# Patient Record
Sex: FEMALE | Race: WHITE | NOT HISPANIC OR LATINO | Employment: OTHER | URBAN - METROPOLITAN AREA
[De-identification: names, ages, dates, MRNs, and addresses within clinical notes are randomized per-mention and may not be internally consistent; named-entity substitution may affect disease eponyms.]

---

## 2017-05-08 ENCOUNTER — OFFICE VISIT (OUTPATIENT)
Dept: ORTHOPEDICS | Facility: CLINIC | Age: 62
End: 2017-05-08
Payer: COMMERCIAL

## 2017-05-08 VITALS — TEMPERATURE: 97.2 F | HEIGHT: 62 IN | RESPIRATION RATE: 18 BRPM | WEIGHT: 148 LBS | BODY MASS INDEX: 27.23 KG/M2

## 2017-05-08 DIAGNOSIS — M17.11 PRIMARY OSTEOARTHRITIS OF RIGHT KNEE: Primary | ICD-10-CM

## 2017-05-08 PROCEDURE — 20610 DRAIN/INJ JOINT/BURSA W/O US: CPT | Mod: RT | Performed by: ORTHOPAEDIC SURGERY

## 2017-05-08 RX ORDER — METHYLPREDNISOLONE ACETATE 80 MG/ML
80 INJECTION, SUSPENSION INTRA-ARTICULAR; INTRALESIONAL; INTRAMUSCULAR; SOFT TISSUE ONCE
Qty: 1 ML | Refills: 0 | OUTPATIENT
Start: 2017-05-08 | End: 2017-05-08

## 2017-05-08 NOTE — LETTER
5/8/2017       RE: [facility]ilda Amaro  [address] [address] [facility] [address]           Dear Colleague,    Thank you for referring your patient, Nilda Amaro, to the [facility]. Please see a copy of my visit note below.    Follow up right knee primary osteoarthritis.  Last injection 12/19/16.  She has lost weight, and knees are feeling better with that.  She notes bowlegged gait on right..  Range of motion 0-125.    She  desires injection today of right knee(s).  Risks, benefits, potential complications and alternatives were discussed.   With the patient's consent, sterile prep was performed of right knee(s).  Right knee was injected with Depo Medrol 80 mg and lidocaine at anterolateral site.  Return to clinic as needed.       The patient's right knee was prepped with betadine solution after verification of allergies. Area approximately 10 cm x 10 cm prepped in a sterile fashion. After injection, betadine removed with soap and water and band-aids applied.    1ml depo medrol with 1% lidocaine plain injected into patient's right knee by Dr. Quincy Carey  LOT# [id number]  Exp. 8/19    Again, thank you for allowing me to participate in the care of your patient.        Sincerely,              Quincy Carey MD

## 2017-05-08 NOTE — PROGRESS NOTES
Follow up right knee primary osteoarthritis.  Last injection 12/19/16.  She has lost weight, and knees are feeling better with that.  She notes bowlegged gait on right..  Range of motion 0-125.    She  desires injection today of right knee(s).  Risks, benefits, potential complications and alternatives were discussed.   With the patient's consent, sterile prep was performed of right knee(s).  Right knee was injected with Depo Medrol 80 mg and lidocaine at anterolateral site.  Return to clinic as needed.

## 2017-05-08 NOTE — PROGRESS NOTES
The patient's right knee was prepped with betadine solution after verification of allergies. Area approximately 10 cm x 10 cm prepped in a sterile fashion. After injection, betadine removed with soap and water and band-aids applied.    1ml depo medrol with 1% lidocaine plain injected into patient's right knee by Dr. Quincy Carey  LOT# [id number]  Exp. 8/19

## 2017-05-08 NOTE — NURSING NOTE
"  Chief Complaint   Patient presents with     RECHECK     Right knee OA last injection 12/29/16.       Initial Temp 97.2  F (36.2  C)  Resp 18  Ht 1.562 m (5' 1.5\")  Wt 67.1 kg (148 lb)  BMI 27.51 kg/m2 Estimated body mass index is 27.51 kg/(m^2) as calculated from the following:    Height as of this encounter: 1.562 m (5' 1.5\").    Weight as of this encounter: 67.1 kg (148 lb).  Medication Reconciliation: complete   Meri Connell MA      "

## 2017-05-08 NOTE — MR AVS SNAPSHOT
After Visit Summary   5/8/2017    Nilda Amaro    MRN: [id number]           Patient Information     Date Of Birth          1955        Visit Information        Provider Department      5/8/2017 1:15 PM Quincy Carey MD [facility]        Today's Diagnoses     Primary osteoarthritis of right knee    -  1      Care Instructions    You have had a steroid injection today.  For the first 2 hours there will likely be some numbing in the joint from the lidocaine.  This is a good sign, indicating that the injection is in the right place.  In 2 hours the lidocaine will wear off, and the joint will hurt like you had a shot.  Each day the cortisone makes it feel better.  It reaches peak effect in 2 weeks.  We expect it to last for 3 months.  You may resume regular activity when you feel ready.  If you are diabetic, your glucoses will be quite high for several days.          Follow-ups after your visit        Who to contact     If you have questions or need follow up information about today's clinic visit or your schedule please contact [facility] directly at [phone].  Normal or non-critical lab and imaging results will be communicated to you by MYOMOhart, letter or phone within 4 business days after the clinic has received the results. If you do not hear from us within 7 days, please contact the clinic through Tripbirdst or phone. If you have a critical or abnormal lab result, we will notify you by phone as soon as possible.  Submit refill requests through Alitalia or call your pharmacy and they will forward the refill request to us. Please allow 3 business days for your refill to be completed.          Additional Information About Your Visit        MyChart Information     Alitalia gives you secure access to your electronic health record. If you see a primary care provider, you can also send messages to your care team and make appointments. If you have questions, please call  "your primary care clinic.  If you do not have a primary care provider, please call [phone] and they will assist you.        Care EveryWhere ID     This is your Care EveryWhere ID. This could be used by other organizations to access your [facility] medical records  [id number]        Your Vitals Were     Temperature Respirations Height BMI (Body Mass Index)          97.2  F (36.2  C) 18 1.562 m (5' 1.5\") 27.51 kg/m2         Blood Pressure from Last 3 Encounters:   10/10/16 135/82   04/07/16 133/80   03/21/16 102/76    Weight from Last 3 Encounters:   05/08/17 67.1 kg (148 lb)   12/19/16 73 kg (161 lb)   10/10/16 80.4 kg (177 lb 3.2 oz)              We Performed the Following     DRAIN/INJECT LARGE JOINT/BURSA     METHYLPREDNISOLONE 80 MG INJ          Today's Medication Changes          These changes are accurate as of: 5/8/17  1:42 PM.  If you have any questions, ask your nurse or doctor.               Start taking these medicines.        Dose/Directions    methylPREDNISolone acetate 80 MG/ML injection   Commonly known as:  DEPO-MEDROL   Used for:  Primary osteoarthritis of right knee   Started by:  Quincy Carey MD        Dose:  80 mg   1 mL (80 mg) by INTRA-ARTICULAR route once for 1 dose   Quantity:  1 mL   Refills:  0            Where to get your medicines      Some of these will need a paper prescription and others can be bought over the counter.  Ask your nurse if you have questions.     You don't need a prescription for these medications     methylPREDNISolone acetate 80 MG/ML injection                Primary Care Provider Office Phone # Fax #    [facility] [phone]       [facility] [address] [place]  [facility] [address]        Thank you!     Thank you for choosing [facility]  for your care. Our goal is always to provide you with excellent care. Hearing back from our patients is one way we can continue to improve our services. Please " take a few minutes to complete the written survey that you may receive in the mail after your visit with us. Thank you!             Your Updated Medication List - Protect others around you: Learn how to safely use, store and throw away your medicines at www.disposemymeds.org.          This list is accurate as of: 5/8/17  1:42 PM.  Always use your most recent med list.                   Brand Name Dispense Instructions for use    celecoxib 200 MG capsule    celeBREX    60 capsule    Take 1 capsule (200 mg) by mouth 2 times daily as needed for moderate pain       methylPREDNISolone acetate 80 MG/ML injection    DEPO-MEDROL    1 mL    1 mL (80 mg) by INTRA-ARTICULAR route once for 1 dose       nystatin 391496 UNIT/GM Powd    MYCOSTATIN    60 g    Apply topically 3 times daily as needed       omeprazole 40 MG capsule    priLOSEC    30 capsule    Take 1 capsule (40 mg) by mouth daily Take 30-60 minutes before a meal.       sulindac 200 MG tablet    CLINORIL     Take 1 tablet (200 mg) by mouth 2 times daily (with meals) for knee pain.       triamcinolone 0.1 % ointment    KENALOG    80 g    Apply sparingly to affected area twice daily. Apply sparingly 2-3 weeks as directed.

## 2017-07-26 ENCOUNTER — TELEPHONE (OUTPATIENT)
Dept: ORTHOPEDICS | Facility: CLINIC | Age: 62
End: 2017-07-26

## 2017-07-26 NOTE — TELEPHONE ENCOUNTER
Reason for Call:  Other call back    Detailed comments: Patient would like to know if she has to come in exactly at the 3 month ashley to receive another injection?  Patient has scheduled an appointment dated 08/7/17 if she should cancel please call,     Phone Number Patient can be reached at: Home number on file [phone] (home)    Best Time: today    Can we leave a detailed message on this number? YES    Call taken on 7/26/2017 at 7:54 AM by Radha Sanchez

## 2017-07-26 NOTE — TELEPHONE ENCOUNTER
Tried calling Nilda but was unable to reach her and did not leave a message. Will try calling back tomorrow morning.    KRISTEN GrossC  Supervising physician: Quincy Carey MD  Dept. of Orthopedics  [facility]

## 2017-07-27 NOTE — TELEPHONE ENCOUNTER
Spoke with Nilda letting her know that she does not have to wait exactly 3 months for her appointment and that she can come in earlier if she wishes. She stated that she normally sees Dr. Carey at [facility]. I gave her the clinic hours we are at [facility] and the phone number to schedule an appointment. She had no further questions at this time.    KRISTEN GrossC  Supervising physician: Quincy Carey MD  Dept. of Orthopedics  [facility]

## 2017-07-31 ENCOUNTER — RADIANT APPOINTMENT (OUTPATIENT)
Dept: GENERAL RADIOLOGY | Facility: CLINIC | Age: 62
End: 2017-07-31
Attending: PHYSICIAN ASSISTANT
Payer: COMMERCIAL

## 2017-07-31 ENCOUNTER — OFFICE VISIT (OUTPATIENT)
Dept: ORTHOPEDICS | Facility: CLINIC | Age: 62
End: 2017-07-31
Payer: COMMERCIAL

## 2017-07-31 VITALS — RESPIRATION RATE: 18 BRPM | BODY MASS INDEX: 27.42 KG/M2 | HEIGHT: 62 IN | TEMPERATURE: 97.5 F | WEIGHT: 149 LBS

## 2017-07-31 DIAGNOSIS — M17.11 PRIMARY OSTEOARTHRITIS OF RIGHT KNEE: Primary | ICD-10-CM

## 2017-07-31 PROCEDURE — 73562 X-RAY EXAM OF KNEE 3: CPT | Mod: RT

## 2017-07-31 PROCEDURE — 20610 DRAIN/INJ JOINT/BURSA W/O US: CPT | Mod: RT | Performed by: ORTHOPAEDIC SURGERY

## 2017-07-31 RX ORDER — METHYLPREDNISOLONE ACETATE 80 MG/ML
80 INJECTION, SUSPENSION INTRA-ARTICULAR; INTRALESIONAL; INTRAMUSCULAR; SOFT TISSUE ONCE
Qty: 1 ML | Refills: 0 | OUTPATIENT
Start: 2017-07-31 | End: 2017-07-31

## 2017-07-31 NOTE — MR AVS SNAPSHOT
After Visit Summary   7/31/2017    Nilda Amaro    MRN: [id number]           Patient Information     Date Of Birth          1955        Visit Information        Provider Department      7/31/2017 8:30 AM Quincy Carey MD [facility]        Today's Diagnoses     Primary osteoarthritis of right knee    -  1      Care Instructions    You have had a steroid injection today.  For the first 2 hours there will likely be some numbing in the joint from the lidocaine.  This is a good sign, indicating that the injection is in the right place.  In 2 hours the lidocaine will wear off, and the joint will hurt like you had a shot.  Each day the cortisone makes it feel better.  It reaches peak effect in 2 weeks.  We expect it to last for 3 months.  You may resume regular activity when you feel ready.  If you are diabetic, your glucoses will be quite high for several days.            Follow-ups after your visit        Who to contact     If you have questions or need follow up information about today's clinic visit or your schedule please contact [facility] directly at [phone].  Normal or non-critical lab and imaging results will be communicated to you by [employer] Grouphart, letter or phone within 4 business days after the clinic has received the results. If you do not hear from us within 7 days, please contact the clinic through Monte Cristot or phone. If you have a critical or abnormal lab result, we will notify you by phone as soon as possible.  Submit refill requests through AirKast or call your pharmacy and they will forward the refill request to us. Please allow 3 business days for your refill to be completed.          Additional Information About Your Visit        MyChart Information     AirKast gives you secure access to your electronic health record. If you see a primary care provider, you can also send messages to your care team and make appointments. If you have questions, please  "call your primary care clinic.  If you do not have a primary care provider, please call [phone] and they will assist you.        Care EveryWhere ID     This is your Care EveryWhere ID. This could be used by other organizations to access your [facility] medical records  [id number]        Your Vitals Were     Temperature Respirations Height BMI (Body Mass Index)          97.5  F (36.4  C) 18 1.562 m (5' 1.5\") 27.7 kg/m2         Blood Pressure from Last 3 Encounters:   10/10/16 135/82   04/07/16 133/80   03/21/16 102/76    Weight from Last 3 Encounters:   07/31/17 67.6 kg (149 lb)   05/08/17 67.1 kg (148 lb)   12/19/16 73 kg (161 lb)              We Performed the Following     X-ray rt knee 3 view          Today's Medication Changes          These changes are accurate as of: 7/31/17  9:46 AM.  If you have any questions, ask your nurse or doctor.               Stop taking these medicines if you haven't already. Please contact your care team if you have questions.     sulindac 200 MG tablet   Commonly known as:  CLINORIL   Stopped by:  Quincy Carey MD                    Primary Care Provider Office Phone # Fax #    [facility] [phone]       [facility] [address]  [facility] [address]        Equal Access to Services     [facility] Center[facility] AH: Hadii aad ku hadasho Soomaali, waaxda luqadaha, qaybta kaalmada adeegyada, sujata murphy. So [facility] [phone].    ATENCIÓN: Si habla español, tiene a croft disposición servicios gratuitos de asistencia lingüística. Llame al [phone].    We comply with applicable federal civil rights laws and [place] laws. We do not discriminate on the basis of race, color, national origin, age, disability sex, sexual orientation or gender identity.            Thank you!     Thank you for choosing [facility]  for your care. Our goal is always to provide you with excellent care. Hearing back " from our patients is one way we can continue to improve our services. Please take a few minutes to complete the written survey that you may receive in the mail after your visit with us. Thank you!             Your Updated Medication List - Protect others around you: Learn how to safely use, store and throw away your medicines at www.disposemymeds.org.          This list is accurate as of: 7/31/17  9:46 AM.  Always use your most recent med list.                   Brand Name Dispense Instructions for use Diagnosis    celecoxib 200 MG capsule    celeBREX    60 capsule    Take 1 capsule (200 mg) by mouth 2 times daily as needed for moderate pain    Primary osteoarthritis of both knees       nystatin 875960 UNIT/GM Powd    MYCOSTATIN    60 g    Apply topically 3 times daily as needed    Intertriginous candidiasis       omeprazole 40 MG capsule    priLOSEC    30 capsule    Take 1 capsule (40 mg) by mouth daily Take 30-60 minutes before a meal.    Gastroesophageal reflux disease without esophagitis, Gastroesophageal reflux disease with esophagitis       triamcinolone 0.1 % ointment    KENALOG    80 g    Apply sparingly to affected area twice daily. Apply sparingly 2-3 weeks as directed.    Intertrigo

## 2017-07-31 NOTE — NURSING NOTE
"Chief Complaint   Patient presents with     RECHECK     Right knee OA last injection 5/8/17.       Initial Temp 97.5  F (36.4  C)  Resp 18  Ht 1.562 m (5' 1.5\")  Wt 67.6 kg (149 lb)  BMI 27.7 kg/m2 Estimated body mass index is 27.7 kg/(m^2) as calculated from the following:    Height as of this encounter: 1.562 m (5' 1.5\").    Weight as of this encounter: 67.6 kg (149 lb).  Medication Reconciliation: complete   Meri Connell MA      "

## 2017-07-31 NOTE — LETTER
7/31/2017         RE: [facility]ilda Amaro  [address] [address] [facility] [address]        Dear Colleague,    Thank you for referring your patient, Nilda Amaro, to the [facility]. Please see a copy of my visit note below.    The patient's right knee was prepped with betadine solution after verification of allergies. Area approximately 10 cm x 10 cm prepped in a sterile fashion. After injection, betadine removed with soap and water and band-aids applied.    1ml depo-medrol with 1% lidocaine plain injected into patient's right knee by Dr. Quincy Carey  LOT# [id number]  Exp. 08/2019    Delmar Perla PA-C  Supervising physician: Quincy Carey MD  Dept. of Orthopedics  [facility]          Follow up right knee primary osteoarthritis.  Last injection 5/8/17 did numb the joint, but steroid injection did not last.  She is wondering about her options.  She has pain mainly along the medial aspect of the knee.  She has spurring along the medial aspect of the knee.  There is no effusion.  There is no ligamentous laxity.  Range of motion 0-125.      Xray images were independently visualized with the patient.  Right knee x-rays today show severe medial osteoarthritis.  She has has mild patellofemoral arthritis. Her arthritis is much worse than November 2015.    Assessment:  Right knee osteoarthritis severe.  We discussed options of anti-inflammatories steroid injection or joint replacement.  She  desires injection today of right knee(s).  Risks, benefits, potential complications and alternatives were discussed.   With the patient's consent, sterile prep was performed of right knee(s).  Right knee was injected with Depo Medrol 80 mg and lidocaine at anterolateral site.  Return to clinic as needed.         Again, thank you for allowing me to participate in the care of your patient.        Sincerely,        Quincy Carey MD

## 2017-07-31 NOTE — PROGRESS NOTES
Follow up right knee primary osteoarthritis.  Last injection 5/8/17 did numb the joint, but steroid injection did not last.  She is wondering about her options.  She has pain mainly along the medial aspect of the knee.  She has spurring along the medial aspect of the knee.  There is no effusion.  There is no ligamentous laxity.  Range of motion 0-125.      Xray images were independently visualized with the patient.  Right knee x-rays today show severe medial osteoarthritis.  She has has mild patellofemoral arthritis. Her arthritis is much worse than November 2015.    Assessment:  Right knee osteoarthritis severe.  We discussed options of anti-inflammatories steroid injection or joint replacement.  She  desires injection today of right knee(s).  Risks, benefits, potential complications and alternatives were discussed.   With the patient's consent, sterile prep was performed of right knee(s).  Right knee was injected with Depo Medrol 80 mg and lidocaine at anterolateral site.  Return to clinic as needed.

## 2017-07-31 NOTE — PROGRESS NOTES
The patient's right knee was prepped with betadine solution after verification of allergies. Area approximately 10 cm x 10 cm prepped in a sterile fashion. After injection, betadine removed with soap and water and band-aids applied.    1ml depo-medrol with 1% lidocaine plain injected into patient's right knee by Dr. Quincy Carey  LOT# [id number]  Exp. 08/2019    Delmar Perla PA-C  Supervising physician: Quincy Carey MD  Dept. of Orthopedics  [facility]

## 2017-08-28 ENCOUNTER — MYC REFILL (OUTPATIENT)
Dept: ORTHOPEDICS | Facility: CLINIC | Age: 62
End: 2017-08-28

## 2017-08-28 DIAGNOSIS — M17.0 PRIMARY OSTEOARTHRITIS OF BOTH KNEES: ICD-10-CM

## 2017-08-28 RX ORDER — CELECOXIB 200 MG/1
200 CAPSULE ORAL 2 TIMES DAILY PRN
Qty: 60 CAPSULE | Refills: 2 | Status: SHIPPED | OUTPATIENT
Start: 2017-08-28 | End: 2018-01-29

## 2017-08-28 NOTE — TELEPHONE ENCOUNTER
Message from MyChart:  Original authorizing provider: MD Nilda Castellano would like a refill of the following medications:  celecoxib (CELEBREX) 200 MG capsule [Quincy Carey MD]    Preferred pharmacy: [facility] [place] - [place], [place] - [address]    Comment:

## 2017-08-28 NOTE — TELEPHONE ENCOUNTER
Prescription approved per [facility] Refill Protocol.    Signed Prescriptions:                        Disp   Refills    celecoxib (CELEBREX) 200 MG capsule        60 cap*2        Sig: Take 1 capsule (200 mg) by mouth 2 times daily as           needed for moderate pain  Authorizing Provider: JENNY WILLETT  Ordering User: JANELLE JOHN    Celecoxib (Celebrex) 200 MG capsule       Last Written Prescription Date: 6/27/16  Last Quantity: 60, # refills: 11  Last Office Visit with [facility], [facility] or [facility] prescribing provider: 7/31/2017       Creatinine   Date Value Ref Range Status   10/10/2016 0.69 0.52 - 1.04 mg/dL Final     Lab Results   Component Value Date    AST 18 10/10/2016     Lab Results   Component Value Date    ALT 23 10/10/2016     BP Readings from Last 3 Encounters:   10/10/16 135/82   04/07/16 133/80   03/21/16 102/76     Janelle John RN

## 2017-08-29 ENCOUNTER — MYC REFILL (OUTPATIENT)
Dept: ORTHOPEDICS | Facility: CLINIC | Age: 62
End: 2017-08-29

## 2017-08-29 DIAGNOSIS — M17.0 PRIMARY OSTEOARTHRITIS OF BOTH KNEES: ICD-10-CM

## 2017-08-29 RX ORDER — CELECOXIB 200 MG/1
200 CAPSULE ORAL 2 TIMES DAILY PRN
Qty: 60 CAPSULE | Refills: 2 | OUTPATIENT
Start: 2017-08-29

## 2017-08-29 NOTE — TELEPHONE ENCOUNTER
Message from MyChart:  Original authorizing provider: MD Nilda Castellano would like a refill of the following medications:  celecoxib (CELEBREX) 200 MG capsule [Quincy Carey MD]    Preferred pharmacy: [facility] [place], [place] - [address]    Comment:

## 2017-09-11 DIAGNOSIS — Z12.31 VISIT FOR SCREENING MAMMOGRAM: ICD-10-CM

## 2017-09-11 PROCEDURE — G0202 SCR MAMMO BI INCL CAD: HCPCS

## 2017-09-15 DIAGNOSIS — Z13.29 SCREENING FOR THYROID DISORDER: ICD-10-CM

## 2017-09-15 DIAGNOSIS — Z13.6 CARDIOVASCULAR SCREENING; LDL GOAL LESS THAN 160: ICD-10-CM

## 2017-09-15 DIAGNOSIS — Z13.0 SCREENING FOR DEFICIENCY ANEMIA: ICD-10-CM

## 2017-09-15 DIAGNOSIS — Z13.1 SCREENING FOR DIABETES MELLITUS (DM): ICD-10-CM

## 2017-09-15 DIAGNOSIS — Z00.00 ROUTINE GENERAL MEDICAL EXAMINATION AT A HEALTH CARE FACILITY: Primary | ICD-10-CM

## 2017-09-25 ENCOUNTER — OFFICE VISIT (OUTPATIENT)
Dept: PEDIATRICS | Facility: CLINIC | Age: 62
End: 2017-09-25
Payer: COMMERCIAL

## 2017-09-25 VITALS
HEIGHT: 62 IN | SYSTOLIC BLOOD PRESSURE: 116 MMHG | WEIGHT: 150.9 LBS | OXYGEN SATURATION: 99 % | DIASTOLIC BLOOD PRESSURE: 60 MMHG | TEMPERATURE: 96.8 F | BODY MASS INDEX: 27.77 KG/M2 | HEART RATE: 49 BPM

## 2017-09-25 DIAGNOSIS — Z13.29 SCREENING FOR THYROID DISORDER: ICD-10-CM

## 2017-09-25 DIAGNOSIS — Z13.6 CARDIOVASCULAR SCREENING; LDL GOAL LESS THAN 160: ICD-10-CM

## 2017-09-25 DIAGNOSIS — Z80.3 FAMILY HISTORY OF BREAST CANCER IN SISTER: ICD-10-CM

## 2017-09-25 DIAGNOSIS — Z13.0 SCREENING FOR DEFICIENCY ANEMIA: ICD-10-CM

## 2017-09-25 DIAGNOSIS — M17.0 PRIMARY OSTEOARTHRITIS OF BOTH KNEES: ICD-10-CM

## 2017-09-25 DIAGNOSIS — E66.811 OBESITY, CLASS I, BMI 30-34.9: ICD-10-CM

## 2017-09-25 DIAGNOSIS — L81.9 CHANGE IN COLOR OF PIGMENTED SKIN LESION: ICD-10-CM

## 2017-09-25 DIAGNOSIS — Z00.00 ROUTINE GENERAL MEDICAL EXAMINATION AT A HEALTH CARE FACILITY: ICD-10-CM

## 2017-09-25 DIAGNOSIS — R00.1 BRADYCARDIA: ICD-10-CM

## 2017-09-25 DIAGNOSIS — Z13.1 SCREENING FOR DIABETES MELLITUS (DM): ICD-10-CM

## 2017-09-25 DIAGNOSIS — Z00.00 ROUTINE GENERAL MEDICAL EXAMINATION AT A HEALTH CARE FACILITY: Primary | ICD-10-CM

## 2017-09-25 LAB
ALBUMIN SERPL-MCNC: 3.7 G/DL (ref 3.4–5)
ALP SERPL-CCNC: 60 U/L (ref 40–150)
ALT SERPL W P-5'-P-CCNC: 24 U/L (ref 0–50)
ANION GAP SERPL CALCULATED.3IONS-SCNC: 4 MMOL/L (ref 3–14)
AST SERPL W P-5'-P-CCNC: 18 U/L (ref 0–45)
BASOPHILS # BLD AUTO: 0 10E9/L (ref 0–0.2)
BASOPHILS NFR BLD AUTO: 0.3 %
BILIRUB SERPL-MCNC: 0.4 MG/DL (ref 0.2–1.3)
BUN SERPL-MCNC: 19 MG/DL (ref 7–30)
CALCIUM SERPL-MCNC: 8.7 MG/DL (ref 8.5–10.1)
CHLORIDE SERPL-SCNC: 103 MMOL/L (ref 94–109)
CHOLEST SERPL-MCNC: 193 MG/DL
CO2 SERPL-SCNC: 30 MMOL/L (ref 20–32)
CREAT SERPL-MCNC: 0.67 MG/DL (ref 0.52–1.04)
DIFFERENTIAL METHOD BLD: NORMAL
EOSINOPHIL # BLD AUTO: 0.1 10E9/L (ref 0–0.7)
EOSINOPHIL NFR BLD AUTO: 1.9 %
ERYTHROCYTE [DISTWIDTH] IN BLOOD BY AUTOMATED COUNT: 13.5 % (ref 10–15)
GFR SERPL CREATININE-BSD FRML MDRD: 89 ML/MIN/1.7M2
GLUCOSE SERPL-MCNC: 81 MG/DL (ref 70–99)
HCT VFR BLD AUTO: 39.7 % (ref 35–47)
HDLC SERPL-MCNC: 81 MG/DL
HGB BLD-MCNC: 13.5 G/DL (ref 11.7–15.7)
LDLC SERPL CALC-MCNC: 103 MG/DL
LYMPHOCYTES # BLD AUTO: 1.8 10E9/L (ref 0.8–5.3)
LYMPHOCYTES NFR BLD AUTO: 26.1 %
MCH RBC QN AUTO: 32.5 PG (ref 26.5–33)
MCHC RBC AUTO-ENTMCNC: 34 G/DL (ref 31.5–36.5)
MCV RBC AUTO: 95 FL (ref 78–100)
MONOCYTES # BLD AUTO: 0.7 10E9/L (ref 0–1.3)
MONOCYTES NFR BLD AUTO: 9.3 %
NEUTROPHILS # BLD AUTO: 4.4 10E9/L (ref 1.6–8.3)
NEUTROPHILS NFR BLD AUTO: 62.4 %
NONHDLC SERPL-MCNC: 112 MG/DL
PLATELET # BLD AUTO: 368 10E9/L (ref 150–450)
POTASSIUM SERPL-SCNC: 4 MMOL/L (ref 3.4–5.3)
PROT SERPL-MCNC: 7.4 G/DL (ref 6.8–8.8)
RBC # BLD AUTO: 4.16 10E12/L (ref 3.8–5.2)
SODIUM SERPL-SCNC: 137 MMOL/L (ref 133–144)
TRIGL SERPL-MCNC: 46 MG/DL
TSH SERPL DL<=0.005 MIU/L-ACNC: 1.19 MU/L (ref 0.4–4)
WBC # BLD AUTO: 7 10E9/L (ref 4–11)

## 2017-09-25 PROCEDURE — 99396 PREV VISIT EST AGE 40-64: CPT | Performed by: FAMILY MEDICINE

## 2017-09-25 PROCEDURE — 36415 COLL VENOUS BLD VENIPUNCTURE: CPT | Performed by: FAMILY MEDICINE

## 2017-09-25 PROCEDURE — 80061 LIPID PANEL: CPT | Performed by: FAMILY MEDICINE

## 2017-09-25 PROCEDURE — 80050 GENERAL HEALTH PANEL: CPT | Performed by: FAMILY MEDICINE

## 2017-09-25 ASSESSMENT — PAIN SCALES - GENERAL: PAINLEVEL: NO PAIN (0)

## 2017-09-25 NOTE — PROGRESS NOTES
SUBJECTIVE:   CC: Nilda Amaro is an 62 year old woman who presents for preventive health visit.     Healthy Habits:  This is a new patient to this provider  Here today for physical      Do you get at least three servings of calcium containing foods daily (dairy, green leafy vegetables, etc.)? yes    Amount of exercise or daily activities, outside of work: 3 day(s) per week    Problems taking medications regularly No    Medication side effects: No    Have you had an eye exam in the past two years? no    Do you see a dentist twice per year? yes    Do you have sleep apnea, excessive snoring or daytime drowsiness?no              Today's PHQ-2 Score:   PHQ-2 ( 1999 Pfizer) 9/25/2017 4/7/2016   Q1: Little interest or pleasure in doing things 0 0   Q2: Feeling down, depressed or hopeless 0 0   PHQ-2 Score 0 0         Abuse: Current or Past(Physical, Sexual or Emotional)- NO  Do you feel safe in your environment - YES  Social History   Substance Use Topics     Smoking status: Former Smoker     Smokeless tobacco: Never Used     Alcohol use 0.0 oz/week     0 Standard drinks or equivalent per week     The patient does not drink >3 drinks per day nor >7 drinks per week.    Reviewed orders with patient.  Reviewed health maintenance and updated orders accordingly - Yes  Labs reviewed in EPIC  BP Readings from Last 3 Encounters:   09/25/17 116/60   10/10/16 135/82   04/07/16 133/80    Wt Readings from Last 3 Encounters:   09/25/17 150 lb 14.4 oz (68.4 kg)   07/31/17 149 lb (67.6 kg)   05/08/17 148 lb (67.1 kg)                  Patient Active Problem List   Diagnosis     Advance care planning     Obesity, Class I, BMI 30-34.9     Gastroesophageal reflux disease without esophagitis     Medial meniscus tear, right, subsequent encounter     Chondromalacia patellae, left     Chondromalacia patellae, right     Primary osteoarthritis of both knees     CARDIOVASCULAR SCREENING; LDL GOAL LESS THAN 160     Change in color of pigmented  skin lesion, left neck, 2mm     Past Surgical History:   Procedure Laterality Date     COLONOSCOPY WITH CO2 INSUFFLATION N/A 3/21/2016    Procedure: COLONOSCOPY WITH CO2 INSUFFLATION;  Surgeon: Geoffrey Blackwell MD;  Location: [facility]     ENDOSCOPY UPPER, COLONOSCOPY, COMBINED N/A 3/21/2016    Procedure: COMBINED ENDOSCOPY UPPER, COLONOSCOPY;  Surgeon: Geoffrey Blackwell MD;  Location: [facility]     ESOPHAGOSCOPY, GASTROSCOPY, DUODENOSCOPY (EGD), COMBINED N/A 3/21/2016    Procedure: COMBINED ESOPHAGOSCOPY, GASTROSCOPY, DUODENOSCOPY (EGD), BIOPSY SINGLE OR MULTIPLE;  Surgeon: Geoffrey Blackwell MD;  Location: [facility]       Social History   Substance Use Topics     Smoking status: Former Smoker     Smokeless tobacco: Never Used     Alcohol use 0.0 oz/week     0 Standard drinks or equivalent per week     History reviewed. No pertinent family history.      Current Outpatient Prescriptions   Medication Sig Dispense Refill     Multiple Vitamins-Minerals (CENTRUM ADULTS PO) Take 1 tablet by mouth daily       vitamin B complex with vitamin C (VITAMIN  B COMPLEX) TABS tablet Take 1 tablet by mouth daily       Cyanocobalamin (VITAMIN B 12 PO) Take 1 tablet by mouth daily       Omega-3 Fatty Acids (FISH OIL OMEGA-3 PO) Take 1 capsule by mouth daily       Calcium Citrate-Vitamin D (CALCIUM + D PO) Take 1 tablet by mouth daily       celecoxib (CELEBREX) 200 MG capsule Take 1 capsule (200 mg) by mouth 2 times daily as needed for moderate pain 60 capsule 2     triamcinolone (KENALOG) 0.1 % ointment Apply sparingly to affected area twice daily. Apply sparingly 2-3 weeks as directed. 80 g 1     nystatin (MYCOSTATIN) 903633 UNIT/GM POWD Apply topically 3 times daily as needed 60 g 3     Allergies   Allergen Reactions     Penicillins      Recent Labs   Lab Test  09/25/17   1052  10/10/16   1140  08/03/15   0949   A1C   --    --   5.4   LDL  103*   --   119   HDL  81   --   78   TRIG  46   --   65   ALT  24  23   --    CR  0.67   0.69   --    GFRESTIMATED  89  86   --    GFRESTBLACK  >90  >90  African American GFR Calc     --    POTASSIUM  4.0  3.9   --    TSH  1.19   --    --               Patient over age 50, mutual decision to screen reflected in health maintenance.      Pertinent mammograms are reviewed under the imaging tab.  History of abnormal Pap smear: NO - age 30- 65 PAP every 3 years recommended    Reviewed and updated as needed this visit by clinical staffTobacco  Allergies  Meds  Med Hx  Surg Hx  Fam Hx  Soc Hx        Reviewed and updated as needed this visit by Provider          Past Medical History:   Diagnosis Date     Arthritis       Past Surgical History:   Procedure Laterality Date     COLONOSCOPY WITH CO2 INSUFFLATION N/A 3/21/2016    Procedure: COLONOSCOPY WITH CO2 INSUFFLATION;  Surgeon: Geoffrey Blackwell MD;  [facility]     ENDOSCOPY UPPER, COLONOSCOPY, COMBINED N/A 3/21/2016    Procedure: COMBINED ENDOSCOPY UPPER, COLONOSCOPY;  Surgeon: Geoffrey Blackwell MD;  [facility]     ESOPHAGOSCOPY, GASTROSCOPY, DUODENOSCOPY (EGD), COMBINED N/A 3/21/2016    Procedure: COMBINED ESOPHAGOSCOPY, GASTROSCOPY, DUODENOSCOPY (EGD), BIOPSY SINGLE OR MULTIPLE;  Surgeon: Geoffrey Blackwell MD;  Location: [facility]     Obstetric History     No data available            ROS:  C: NEGATIVE for fever, chills, change in weight  INTEGUMENTARY/SKIN: Darkening of the mole on the left side of upper neck  E: NEGATIVE for vision changes or irritation  ENT: NEGATIVE for ear, mouth and throat problems  R: NEGATIVE for significant cough or SOB  B: NEGATIVE for masses, tenderness or discharge  CV: NEGATIVE for chest pain, palpitations or peripheral edema  GI: NEGATIVE for nausea, abdominal pain, heartburn, or change in bowel habits  : NEGATIVE for unusual urinary or vaginal symptoms. No vaginal bleeding.  M: NEGATIVE for significant arthralgias or myalgia  N: NEGATIVE for weakness, dizziness or paresthesias  E: NEGATIVE for  "temperature intolerance, skin/hair changes  H: NEGATIVE for bleeding problems  P: NEGATIVE for changes in mood or affect     OBJECTIVE:   /60 (BP Location: Left arm, Patient Position: Sitting, Cuff Size: Adult Regular)  Pulse (!) 49  Temp 96.8  F (36  C) (Oral)  Ht 5' 1.5\" (1.562 m)  Wt 150 lb 14.4 oz (68.4 kg)  SpO2 99%  BMI 28.05 kg/m2  EXAM:  GENERAL APPEARANCE: healthy, alert and no distress  EYES: Eyes grossly normal to inspection, PERRL and conjunctivae and sclerae normal  HENT: ear canals and TM's normal, nose and mouth without ulcers or lesions, oropharynx clear and oral mucous membranes moist  NECK: no adenopathy, no asymmetry, masses, or scars and thyroid normal to palpation  RESP: lungs clear to auscultation - no rales, rhonchi or wheezes  BREAST: normal without masses, tenderness or nipple discharge and no palpable axillary masses or adenopathy  CV: regular rate and rhythm, normal S1 S2, no S3 or S4, no murmur, click or rub, no peripheral edema and peripheral pulses strong  ABDOMEN: soft, nontender, no hepatosplenomegaly, no masses and bowel sounds normal   (female): normal female external genitalia, normal urethral meatus, vaginal mucosal atrophy noted, normal cervix, adnexae, and uterus without masses or abnormal discharge  MS: no musculoskeletal defects are noted and gait is age appropriate without ataxia  SKIN: 2 mm, dark black nevus-skin of the left upper anterior neck  NEURO: Normal strength and tone, sensory exam grossly normal, mentation intact and speech normal  PSYCH: mentation appears normal and affect normal/bright    ASSESSMENT/PLAN:   1. Routine general medical examination at a health care facility  Discussed on regular exercises, daily calcium intake, healthy eating, self breast exams monthly and routine dental checks    2. Primary osteoarthritis of both knees  Patient is requesting to be seen by [facility] physician or at [facility] for a second opinion before " proceeding with surgery for her knee osteoarthritis as was recommended by Dr. Carey  - ORTHOPEDICS ADULT REFERRAL    3. Change in color of pigmented skin lesion, left neck, 2mm  Recommended biopsy, patient will return   in a few weeks-will consider a 3 mm punch biopsy    4. CARDIOVASCULAR SCREENING; LDL GOAL LESS THAN 160  LDL Cholesterol Calculated   Date Value Ref Range Status   09/25/2017 103 (H) <100 mg/dL Final     Comment:     Above desirable:  100-129 mg/dl  Borderline High:  130-159 mg/dL  High:             160-189 mg/dL  Very high:       >189 mg/dl     ]      5. Obesity, Class I, BMI 30-34.  Wt Readings from Last 5 Encounters:   09/25/17 150 lb 14.4 oz (68.4 kg)   07/31/17 149 lb (67.6 kg)   05/08/17 148 lb (67.1 kg)   12/19/16 161 lb (73 kg)   10/10/16 177 lb 3.2 oz (80.4 kg)     Appreciated patient suffered some weight loss, she is currently in [employer]  Continue healthy weight loss efforts, healthy eating, regular exercises    6. Family history of breast cancer in sister  Continue annual mammograms    7. Bradycardia  Patient's heart rate was noted to be 49, she is asymptomatic.   Reviewed flow sheet, has normal heart rate in the past  Recommended to push fluids, monitor for symptoms of dizziness, chest pressure, shortness of breath, follow p.r.n., and will recheck at the time of visit for biopsy In 2 weeks      COUNSELING:   Reviewed preventive health counseling, as reflected in patient instructions  Special attention given to:        Regular exercise       Healthy diet/nutrition       Vision screening       Immunizations    Declined: Influenza due to Other              Osteoporosis Prevention/Bone Health       Colon cancer screening       Consider Hep C screening for patients born between 1945 and 1965       (Christelle)menopause management       The 10-year ASCVD risk score ([place] BERNAL Jr, et al., 2013) is: 2.3%    Values used to calculate the score:      Age: 62 years      Sex: Female      Is  "Non- : No      Diabetic: No      Tobacco smoker: No      Systolic Blood Pressure: 116 mmHg      Is BP treated: No      HDL Cholesterol: 81 mg/dL      Total Cholesterol: 193 mg/dL       Advance Care Planning           reports that she has quit smoking. She has never used smokeless tobacco.    Estimated body mass index is 28.05 kg/(m^2) as calculated from the following:    Height as of this encounter: 5' 1.5\" (1.562 m).    Weight as of this encounter: 150 lb 14.4 oz (68.4 kg).   Weight management plan: Discussed healthy diet and exercise guidelines and patient will follow up in 12 months in clinic to re-evaluate.    Counseling Resources:  ATP IV Guidelines  Pooled Cohorts Equation Calculator  Breast Cancer Risk Calculator  FRAX Risk Assessment  ICSI Preventive Guidelines  Dietary Guidelines for Americans, 2010  USDA's MyPlate  ASA Prophylaxis  Lung CA Screening    Leonor Muir MD  [facility]  Chart documentation done in part with Dragon Voice recognition Software. Although reviewed after completion, some word and grammatical error may remain.    "

## 2017-09-25 NOTE — PATIENT INSTRUCTIONS
Schedule for mole biopsy- 40 minutes  Schedule for ortho consult      Preventive Health Recommendations  Female Ages 50 - 64    Yearly exam: See your health care provider every year in order to  o Review health changes.   o Discuss preventive care.    o Review your medicines if your doctor has prescribed any.      Get a Pap test every three years (unless you have an abnormal result and your provider advises testing more often).    If you get Pap tests with HPV test, you only need to test every 5 years, unless you have an abnormal result.     You do not need a Pap test if your uterus was removed (hysterectomy) and you have not had cancer.    You should be tested each year for STDs (sexually transmitted diseases) if you're at risk.     Have a mammogram every 1 to 2 years.    Have a colonoscopy at age 50, or have a yearly FIT test (stool test). These exams screen for colon cancer.      Have a cholesterol test every 5 years, or more often if advised.    Have a diabetes test (fasting glucose) every three years. If you are at risk for diabetes, you should have this test more often.     If you are at risk for osteoporosis (brittle bone disease), think about having a bone density scan (DEXA).    Shots: Get a flu shot each year. Get a tetanus shot every 10 years.    Nutrition:     Eat at least 5 servings of fruits and vegetables each day.    Eat whole-grain bread, whole-wheat pasta and brown rice instead of white grains and rice.    Talk to your provider about Calcium and Vitamin D.     Lifestyle    Exercise at least 150 minutes a week (30 minutes a day, 5 days a week). This will help you control your weight and prevent disease.    Limit alcohol to one drink per day.    No smoking.     Wear sunscreen to prevent skin cancer.     See your dentist every six months for an exam and cleaning.    See your eye doctor every 1 to 2 years.

## 2017-09-25 NOTE — MR AVS SNAPSHOT
After Visit Summary   9/25/2017    Nilda Amaro    MRN: [id number]           Patient Information     Date Of Birth          1955        Visit Information        Provider Department      9/25/2017 11:00 AM Leonor Muir MD [facility]        Today's Diagnoses     Routine general medical examination at a health care facility    -  1    Primary osteoarthritis of both knees          Care Instructions    Schedule for mole biopsy- 40 minutes  Schedule for ortho consult      Preventive Health Recommendations  Female Ages 50 - 64    Yearly exam: See your health care provider every year in order to  o Review health changes.   o Discuss preventive care.    o Review your medicines if your doctor has prescribed any.      Get a Pap test every three years (unless you have an abnormal result and your provider advises testing more often).    If you get Pap tests with HPV test, you only need to test every 5 years, unless you have an abnormal result.     You do not need a Pap test if your uterus was removed (hysterectomy) and you have not had cancer.    You should be tested each year for STDs (sexually transmitted diseases) if you're at risk.     Have a mammogram every 1 to 2 years.    Have a colonoscopy at age 50, or have a yearly FIT test (stool test). These exams screen for colon cancer.      Have a cholesterol test every 5 years, or more often if advised.    Have a diabetes test (fasting glucose) every three years. If you are at risk for diabetes, you should have this test more often.     If you are at risk for osteoporosis (brittle bone disease), think about having a bone density scan (DEXA).    Shots: Get a flu shot each year. Get a tetanus shot every 10 years.    Nutrition:     Eat at least 5 servings of fruits and vegetables each day.    Eat whole-grain bread, whole-wheat pasta and brown rice instead of white grains and rice.    Talk to your provider about Calcium and Vitamin D.      Lifestyle    Exercise at least 150 minutes a week (30 minutes a day, 5 days a week). This will help you control your weight and prevent disease.    Limit alcohol to one drink per day.    No smoking.     Wear sunscreen to prevent skin cancer.     See your dentist every six months for an exam and cleaning.    See your eye doctor every 1 to 2 years.            Follow-ups after your visit        Additional Services     ORTHOPEDICS ADULT REFERRAL       Your provider has referred you to: [facility]  OTHER PROVIDERS:  [facility] [place] [phone]   [email or web address]  [place] [phone]   https://[email or web address]/[email or web address]  [place] [phone]   https://www.[facility].com/locations/[facility] [phone]   https://www.[email or web address].com/locations/[place]  [place] [phone]   https://www.[facility].com/locations/[place]  [place] [phone]   https://www.[facility].com/locations/[place]  [place] [phone]   https://www.[email or web address].MyFit/locations/[place]  [place] [phone]   https://www.[facility].com/locations/[place]  [place] [phone]   https://www.[facility].com/locations/[place]  [place] [phone]   https://www.[email or web address]/locations/[place]  [place] [phone]   https://www.[email or web address].com/locations/[place]  [place] [phone]   https://www.[facility].com/locations/[place]  [place] [phone]   https://www.[facility].com/locations/[place]  [place] [phone]   https://www.[facility].com/locations/[place]  [place] [phone]   https://www.[facility].com/locations/[place]  [place] [phone]   https://www.[facility].com/locations/[place] [phone]   https://www.[facility].com/locations/[place] [phone]   https://www.[facility].com/locations/[place] [phone]   https://www.[facility].com/locations/[place]  [place] [phone]   https://www.[facility].com/locations/[place]  [place] [phone]    https://www.[email or web address].net.com/locations/[place] [phone]   https://www.[email or web address].net.[facility]/locations/[place] [phone]   https://www.[email or web address].[facility]/locations/[place]  [place] [phone]   https://www.[facility].net.[facility]/locations/[facility] [phone] https://www.[email or web address]/locations/[place]    Please be aware that coverage of these services is subject to the terms and limitations of your health insurance plan.  Call member services at your health plan with any benefit or coverage questions.      Please bring the following to your appointment:    >>   Any x-rays, CTs or MRIs which have been performed.  Contact the facility where they were done to arrange for  prior to your scheduled appointment.    >>   List of current medications   >>   This referral request   >>   Any documents/labs given to you for this referral                  Who to contact     If you have questions or need follow up information about today's clinic visit or your schedule please contact [facility] directly at [phone].  Normal or non-critical lab and imaging results will be communicated to you by MyChart, letter or phone within 4 business days after the clinic has received the results. If you do not hear from us within 7 days, please contact the clinic through [facility] or phone. If you have a critical or abnormal lab result, we will notify you by phone as soon as possible.  Submit refill requests through MEEP or call your pharmacy and they will forward the refill request to us. Please allow 3 business days for your refill to be completed.          Additional Information About Your Visit        MEEP Information     MEEP is an electronic gateway that provides easy, online access to your medical records. With MEEP, you can request a clinic appointment, read your test results, renew a prescription or communicate with your care team.     To sign up for MEEP visit the website at  "[email or web address]   You will be asked to enter the access code listed below, as well as some personal information. Please follow the directions to create your username and password.     Your access code is: [email or web address]  Expires: 2017 11:44 AM     Your access code will  in 90 days. If you need help or a new code, please contact your [facility] or call [phone] for assistance.        Care EveryWhere ID     This is your Care EveryWhere ID. This could be used by other organizations to access your [facility] medical records  [id number]        Your Vitals Were     Pulse Temperature Height Pulse Oximetry BMI (Body Mass Index)       49 96.8  F (36  C) (Oral) 5' 1.5\" (1.562 m) 99% 28.05 kg/m2        Blood Pressure from Last 3 Encounters:   17 116/60   10/10/16 135/82   16 133/80    Weight from Last 3 Encounters:   17 150 lb 14.4 oz (68.4 kg)   17 149 lb (67.6 kg)   17 148 lb (67.1 kg)              We Performed the Following     ORTHOPEDICS ADULT REFERRAL        Primary Care Provider Office Phone # Fax #    [facility] [phone]       [address]  [facility] [address]        Equal Access to Services     [facility]: Hadii aad ku hadasho Soomaali, waaxda luqadaha, qaybta kaalmada adeegyada, waxay deb olson . So [facility] [phone].    ATENCIÓN: Si habla español, tiene a croft disposición servicios gratuitos de asistencia lingüística. Llame al [phone].    We comply with applicable federal civil rights laws and [place] laws. We do not discriminate on the basis of race, color, national origin, age, disability sex, sexual orientation or gender identity.            Thank you!     Thank you for choosing [facility]  for your care. Our goal is always to provide you with excellent care. Hearing back from our patients is one way we can continue to improve our services. " Please take a few minutes to complete the written survey that you may receive in the mail after your visit with us. Thank you!             Your Updated Medication List - Protect others around you: Learn how to safely use, store and throw away your medicines at www.disposemymeds.org.          This list is accurate as of: 9/25/17 11:44 AM.  Always use your most recent med list.                   Brand Name Dispense Instructions for use Diagnosis    CALCIUM + D PO      Take 1 tablet by mouth daily        celecoxib 200 MG capsule    celeBREX    60 capsule    Take 1 capsule (200 mg) by mouth 2 times daily as needed for moderate pain    Primary osteoarthritis of both knees       CENTRUM ADULTS PO      Take 1 tablet by mouth daily        FISH OIL OMEGA-3 PO      Take 1 capsule by mouth daily        nystatin [id number] UNIT/GM Powd    MYCOSTATIN    60 g    Apply topically 3 times daily as needed    Intertriginous candidiasis       triamcinolone 0.1 % ointment    KENALOG    80 g    Apply sparingly to affected area twice daily. Apply sparingly 2-3 weeks as directed.    Intertrigo       VITAMIN B 12 PO      Take 1 tablet by mouth daily        vitamin B complex with vitamin C Tabs tablet      Take 1 tablet by mouth daily

## 2017-09-25 NOTE — LETTER
September 25, 2017      Nilda Amaro  [address] [place] [address]        Dear Nilda,       Enclosed are your recent lab results.    Your lab results are all normal.    Sincerely,      Loenor Muir MD                                                      Resulted Orders   CBC with platelets differential   Result Value Ref Range    WBC 7.0 4.0 - 11.0 10e9/L    RBC Count 4.16 3.8 - 5.2 10e12/L    Hemoglobin 13.5 11.7 - 15.7 g/dL    Hematocrit 39.7 35.0 - 47.0 %    MCV 95 78 - 100 fl    MCH 32.5 26.5 - 33.0 pg    MCHC 34.0 31.5 - 36.5 g/dL    RDW 13.5 10.0 - 15.0 %    Platelet Count 368 150 - 450 10e9/L    Diff Method Automated Method     % Neutrophils 62.4 %    % Lymphocytes 26.1 %    % Monocytes 9.3 %    % Eosinophils 1.9 %    % Basophils 0.3 %    Absolute Neutrophil 4.4 1.6 - 8.3 10e9/L    Absolute Lymphocytes 1.8 0.8 - 5.3 10e9/L    Absolute Monocytes 0.7 0.0 - 1.3 10e9/L    Absolute Eosinophils 0.1 0.0 - 0.7 10e9/L    Absolute Basophils 0.0 0.0 - 0.2 10e9/L   **Comprehensive metabolic panel FUTURE anytime   Result Value Ref Range    Sodium 137 133 - 144 mmol/L    Potassium 4.0 3.4 - 5.3 mmol/L    Chloride 103 94 - 109 mmol/L    Carbon Dioxide 30 20 - 32 mmol/L    Anion Gap 4 3 - 14 mmol/L    Glucose 81 70 - 99 mg/dL      Comment:      Fasting specimen    Urea Nitrogen 19 7 - 30 mg/dL    Creatinine 0.67 0.52 - 1.04 mg/dL    GFR Estimate 89 >60 mL/min/1.7m2      Comment:      Non  GFR Calc    GFR Estimate If Black >90 >60 mL/min/1.7m2      Comment:       GFR Calc    Calcium 8.7 8.5 - 10.1 mg/dL    Bilirubin Total 0.4 0.2 - 1.3 mg/dL    Albumin 3.7 3.4 - 5.0 g/dL    Protein Total 7.4 6.8 - 8.8 g/dL    Alkaline Phosphatase 60 40 - 150 U/L    ALT 24 0 - 50 U/L    AST 18 0 - 45 U/L   TSH with free T4 reflex   Result Value Ref Range    TSH 1.19 0.40 - 4.00 mU/L   **Lipid panel reflex to direct LDL FUTURE anytime   Result Value Ref Range    Cholesterol 193 <200 mg/dL     Triglycerides 46 <150 mg/dL      Comment:      Fasting specimen    HDL Cholesterol 81 >49 mg/dL    LDL Cholesterol Calculated 103 (H) <100 mg/dL      Comment:      Above desirable:  100-129 mg/dl  Borderline High:  130-159 mg/dL  High:             160-189 mg/dL  Very high:       >189 mg/dl      Non HDL Cholesterol 112 <130 mg/dL

## 2017-09-25 NOTE — NURSING NOTE
"Chief Complaint   Patient presents with     Physical       Initial /60 (BP Location: Left arm, Patient Position: Sitting, Cuff Size: Adult Regular)  Pulse (!) 49  Temp 96.8  F (36  C) (Oral)  Ht 5' 1.5\" (1.562 m)  Wt 150 lb 14.4 oz (68.4 kg)  SpO2 99%  BMI 28.05 kg/m2 Estimated body mass index is 28.05 kg/(m^2) as calculated from the following:    Height as of this encounter: 5' 1.5\" (1.562 m).    Weight as of this encounter: 150 lb 14.4 oz (68.4 kg).  BP completed using cuff size: regular  John Aparicio CMA    "

## 2017-10-09 ENCOUNTER — OFFICE VISIT (OUTPATIENT)
Dept: PEDIATRICS | Facility: CLINIC | Age: 62
End: 2017-10-09
Payer: COMMERCIAL

## 2017-10-09 VITALS
HEART RATE: 54 BPM | WEIGHT: 151.6 LBS | OXYGEN SATURATION: 97 % | SYSTOLIC BLOOD PRESSURE: 128 MMHG | BODY MASS INDEX: 28.18 KG/M2 | TEMPERATURE: 97.6 F | DIASTOLIC BLOOD PRESSURE: 81 MMHG

## 2017-10-09 DIAGNOSIS — L81.9 PIGMENTED SKIN LESION OF UNCERTAIN NATURE: Primary | ICD-10-CM

## 2017-10-09 DIAGNOSIS — T63.441A BEE STING, ACCIDENTAL OR UNINTENTIONAL, INITIAL ENCOUNTER: ICD-10-CM

## 2017-10-09 PROCEDURE — 88305 TISSUE EXAM BY PATHOLOGIST: CPT | Performed by: FAMILY MEDICINE

## 2017-10-09 PROCEDURE — 11100 HC BIOPSY SKIN/SUBQ/MUC MEM, SINGLE LESION: CPT | Performed by: FAMILY MEDICINE

## 2017-10-09 PROCEDURE — 99212 OFFICE O/P EST SF 10 MIN: CPT | Mod: 25 | Performed by: FAMILY MEDICINE

## 2017-10-09 NOTE — MR AVS SNAPSHOT
After Visit Summary   10/9/2017    Nilda Amaro    MRN: [id number]           Patient Information     Date Of Birth          1955        Visit Information        Provider Department      10/9/2017 7:50 AM Leonor Muir MD; PROC RM 1 FP [facility]        Today's Diagnoses     Pigmented skin lesion of uncertain nature    -  1    Bee sting, accidental or unintentional, initial encounter          Care Instructions    Return for suture removal in 1 week  - see appointment details below            Follow-ups after your visit        Your next 10 appointments already scheduled     Oct 16, 2017  8:20 AM CDT   Nurse Only with MG ANCILLARY   [facility] ([facility])    [address] [address] [place] [id number] [phone]              Who to contact     If you have questions or need follow up information about today's clinic visit or your schedule please contact [facility] directly at [phone].  Normal or non-critical lab and imaging results will be communicated to you by [employer] Loftshart, letter or phone within 4 business days after the clinic has received the results. If you do not hear from us within 7 days, please contact the clinic through Catacelt or phone. If you have a critical or abnormal lab result, we will notify you by phone as soon as possible.  Submit refill requests through Polyvore or call your pharmacy and they will forward the refill request to us. Please allow 3 business days for your refill to be completed.          Additional Information About Your Visit        MyChart Information     Polyvore gives you secure access to your electronic health record. If you see a primary care provider, you can also send messages to your care team and make appointments. If you have questions, please call your primary care clinic.  If you do not have a primary care provider, please call [phone] and they will assist you.       WalkHub is an electronic gateway that provides easy, online access to your medical records. With WalkHub, you can request a clinic appointment, read your test results, renew a prescription or communicate with your care team.     To access your existing account, please contact your [facility] or call [phone] for assistance.        Care EveryWhere ID     This is your Care EveryWhere ID. This could be used by other organizations to access your [place] medical records  [id number]        Your Vitals Were     Pulse Temperature Pulse Oximetry BMI (Body Mass Index)          54 97.6  F (36.4  C) (Oral) 97% 28.18 kg/m2         Blood Pressure from Last 3 Encounters:   10/09/17 128/81   09/25/17 116/60   10/10/16 135/82    Weight from Last 3 Encounters:   10/09/17 151 lb 9.6 oz (68.8 kg)   09/25/17 150 lb 14.4 oz (68.4 kg)   07/31/17 149 lb (67.6 kg)              We Performed the Following     Surgical pathology exam        Primary Care Provider Office Phone # Fax #    Leonor Muir [phone]       [address] [facility] [address]        Equal Access to Services     PARIS ALVAREZ : Hadii arnol ku hadasho Soomaali, waaxda luqadaha, qaybta kaalmada adeegyada, sujata murphy. So [facility] [phone].    ATENCIÓN: Si habla español, tiene a croft disposición servicios gratuitos de asistencia lingüística. Llame al [phone].    We comply with applicable federal civil rights laws and [place] laws. We do not discriminate on the basis of race, color, national origin, age, disability, sex, sexual orientation, or gender identity.            Thank you!     Thank you for choosing [facility]  for your care. Our goal is always to provide you with excellent care. Hearing back from our patients is one way we can continue to improve our services. Please take a few minutes to complete the written survey that you may receive in the mail  after your visit with us. Thank you!             Your Updated Medication List - Protect others around you: Learn how to safely use, store and throw away your medicines at www.disposemymeds.org.          This list is accurate as of: 10/9/17  8:22 AM.  Always use your most recent med list.                   Brand Name Dispense Instructions for use Diagnosis    CALCIUM + D PO      Take 1 tablet by mouth daily        celecoxib 200 MG capsule    celeBREX    60 capsule    Take 1 capsule (200 mg) by mouth 2 times daily as needed for moderate pain    Primary osteoarthritis of both knees       CENTRUM ADULTS PO      Take 1 tablet by mouth daily        FISH OIL OMEGA-3 PO      Take 1 capsule by mouth daily        nystatin 040346 UNIT/GM Powd    MYCOSTATIN    60 g    Apply topically 3 times daily as needed    Intertriginous candidiasis       triamcinolone 0.1 % ointment    KENALOG    80 g    Apply sparingly to affected area twice daily. Apply sparingly 2-3 weeks as directed.    Intertrigo       VITAMIN B 12 PO      Take 1 tablet by mouth daily        vitamin B complex with vitamin C Tabs tablet      Take 1 tablet by mouth daily

## 2017-10-09 NOTE — PROGRESS NOTES
SUBJECTIVE:   Nilda Amaro is a 62 year old female who presents to clinic today for the following health issues:      Lesion Removal - Left neck lesion removal  Patient is here for lesion to bone on the left upper neck-changing pigmented skin lesion    Insect sting - Patient c/o bee sting on left foot. Happened on Saturday 10/7/17. Patient c/o ongoing irritation/pain. Possible stinger remaining in foot. Patient has put bacitracin and a bandage on it.  Has no history of allergic reaction to bee sting in the past  Denies concerns for leg or foot swelling, fever, Throat swelling, difficulty breathing        Problem list and histories reviewed & adjusted, as indicated.  Additional history: as documented    Patient Active Problem List   Diagnosis     Advance care planning     Obesity, Class I, BMI 30-34.9     Gastroesophageal reflux disease without esophagitis     Medial meniscus tear, right, subsequent encounter     Chondromalacia patellae, left     Chondromalacia patellae, right     Primary osteoarthritis of both knees     CARDIOVASCULAR SCREENING; LDL GOAL LESS THAN 160     Change in color of pigmented skin lesion, left neck, 2mm     Family history of breast cancer in sister     Past Surgical History:   Procedure Laterality Date     COLONOSCOPY WITH CO2 INSUFFLATION N/A 3/21/2016    Procedure: COLONOSCOPY WITH CO2 INSUFFLATION;  Surgeon: Geoffrey Blackwell MD;  Location:  OR     ENDOSCOPY UPPER, COLONOSCOPY, COMBINED N/A 3/21/2016    Procedure: COMBINED ENDOSCOPY UPPER, COLONOSCOPY;  Surgeon: Geoffrey Blackwell MD;  Location:  OR     ESOPHAGOSCOPY, GASTROSCOPY, DUODENOSCOPY (EGD), COMBINED N/A 3/21/2016    Procedure: COMBINED ESOPHAGOSCOPY, GASTROSCOPY, DUODENOSCOPY (EGD), BIOPSY SINGLE OR MULTIPLE;  Surgeon: Geoffrey Blackwell MD;  Location:  OR       Social History   Substance Use Topics     Smoking status: Former Smoker     Smokeless tobacco: Never Used     Alcohol use 0.0 oz/week     0  Standard drinks or equivalent per week     Family History   Problem Relation Age of Onset     Ankylosing Spondylitis Brother 57     Breast Cancer Sister          Current Outpatient Prescriptions   Medication Sig Dispense Refill     Multiple Vitamins-Minerals (CENTRUM ADULTS PO) Take 1 tablet by mouth daily       vitamin B complex with vitamin C (VITAMIN  B COMPLEX) TABS tablet Take 1 tablet by mouth daily       Cyanocobalamin (VITAMIN B 12 PO) Take 1 tablet by mouth daily       Omega-3 Fatty Acids (FISH OIL OMEGA-3 PO) Take 1 capsule by mouth daily       Calcium Citrate-Vitamin D (CALCIUM + D PO) Take 1 tablet by mouth daily       celecoxib (CELEBREX) 200 MG capsule Take 1 capsule (200 mg) by mouth 2 times daily as needed for moderate pain 60 capsule 2     triamcinolone (KENALOG) 0.1 % ointment Apply sparingly to affected area twice daily. Apply sparingly 2-3 weeks as directed. 80 g 1     nystatin (MYCOSTATIN) 849877 UNIT/GM POWD Apply topically 3 times daily as needed 60 g 3     Allergies   Allergen Reactions     Penicillins      Recent Labs   Lab Test  09/25/17   1052  10/10/16   1140  08/03/15   0949   A1C   --    --   5.4   LDL  103*   --   119   HDL  81   --   78   TRIG  46   --   65   ALT  24  23   --    CR  0.67  0.69   --    GFRESTIMATED  89  86   --    GFRESTBLACK  >90  >90  African American GFR Calc     --    POTASSIUM  4.0  3.9   --    TSH  1.19   --    --       BP Readings from Last 3 Encounters:   10/09/17 128/81   09/25/17 116/60   10/10/16 135/82    Wt Readings from Last 3 Encounters:   10/09/17 151 lb 9.6 oz (68.8 kg)   09/25/17 150 lb 14.4 oz (68.4 kg)   07/31/17 149 lb (67.6 kg)                  Labs reviewed in EPIC          Reviewed and updated as needed this visit by clinical staffTobacco  Allergies  Meds  Med Hx  Surg Hx  Fam Hx  Soc Hx      Reviewed and updated as needed this visit by Provider         ROS:  C: NEGATIVE for fever, chills, change in weight  INTEGUMENTARY/SKIN:as  above    OBJECTIVE:     /81  Pulse 54  Temp 97.6  F (36.4  C) (Oral)  Wt 151 lb 9.6 oz (68.8 kg)  SpO2 97%  BMI 28.18 kg/m2  Body mass index is 28.18 kg/(m^2).  GENERAL: healthy, alert and no distress  SKIN:  Left upper anterior neck-3 mm black pigmented raised skin lesion  Left foot-3 mm, insect bite ashley with no Tenderness, surrounding inflammation, swelling    Diagnostic Test Results:  none     ASSESSMENT/PLAN:             1. Pigmented skin lesion of uncertain nature  After explaining the risks and benefits of the procedure and after getting written consent under aseptic precuations and local anesthesia with 1cc of 2% lidocaine and epinephrine, the lesion was removed with a 3 mm punch blade  Wound was closed with 1 interrupted suture with 5-0 Ethilon.   Wound was dressed with bacitracin.   Wound care instructions given  Specimen sent for histopathology  Patient will be back in 1 week for suture removal  .rtc for concerns of cutaneous infection.  patient tolerated the procedure well.  Patient verbalised understanding and is agreeable to the plan.    - Surgical pathology exam  - BIOPSY SKIN/SUBQ/MUC MEM, SINGLE LESION    2. Bee sting, accidental or unintentional, initial encounter  Reassured patient, will apply ice locally   can take over-the-counter Benadryl 25 mg 1-2 times a day p.r.n. For itching or concerns for swelling  RTC in 1week if no better by then or sooner prn.        Chart documentation done in part with Dragon Voice recognition Software. Although reviewed after completion, some word and grammatical error may remain.    See Patient Instructions    Leonor Muir MD  [facility]

## 2017-10-09 NOTE — NURSING NOTE
"Chief Complaint   Patient presents with     Lesion Removal     Insect Bites       Initial /81  Pulse 54  Temp 97.6  F (36.4  C) (Oral)  Wt 151 lb 9.6 oz (68.8 kg)  SpO2 97%  BMI 28.18 kg/m2 Estimated body mass index is 28.18 kg/(m^2) as calculated from the following:    Height as of 9/25/17: 5' 1.5\" (1.562 m).    Weight as of this encounter: 151 lb 9.6 oz (68.8 kg).  BP completed using cuff size: andrea Aparicio CMA    "

## 2017-10-13 LAB — COPATH REPORT: NORMAL

## 2017-10-15 NOTE — PROGRESS NOTES
Dear Nilda,   Dr. Muir is out of the office. I'm review your results on her behalf.     Here are your recent results.   -- the skin biopsy showed the skin lesion is consistent with lentigo, which is a benign condition with increasing number of pigment containing cells in the skin. This is not cancerous.     Please call or Mychart to our office if you have further questions.     Katie Stokes MD-PhD

## 2017-10-16 ENCOUNTER — ALLIED HEALTH/NURSE VISIT (OUTPATIENT)
Dept: PEDIATRICS | Facility: CLINIC | Age: 62
End: 2017-10-16
Payer: COMMERCIAL

## 2017-10-16 DIAGNOSIS — Z48.02 VISIT FOR SUTURE REMOVAL: Primary | ICD-10-CM

## 2017-10-16 PROCEDURE — 99207 ZZC NO CHARGE LOS: CPT

## 2017-10-16 NOTE — MR AVS SNAPSHOT
After Visit Summary   10/16/2017    Nilda Amaro    MRN: [id number]           Patient Information     Date Of Birth          1955        Visit Information        Provider Department      10/16/2017 8:20 AM MG ANCILLARY [facility]        Today's Diagnoses     Visit for suture removal    -  1       Follow-ups after your visit        Who to contact     If you have questions or need follow up information about today's clinic visit or your schedule please contact [facility] directly at [phone].  Normal or non-critical lab and imaging results will be communicated to you by Acrintahart, letter or phone within 4 business days after the clinic has received the results. If you do not hear from us within 7 days, please contact the clinic through Acrintahart or phone. If you have a critical or abnormal lab result, we will notify you by phone as soon as possible.  Submit refill requests through ScaleGrid or call your pharmacy and they will forward the refill request to us. Please allow 3 business days for your refill to be completed.          Additional Information About Your Visit        MyChart Information     ScaleGrid gives you secure access to your electronic health record. If you see a primary care provider, you can also send messages to your care team and make appointments. If you have questions, please call your primary care clinic.  If you do not have a primary care provider, please call [phone] and they will assist you.      ScaleGrid is an electronic gateway that provides easy, online access to your medical records. With ScaleGrid, you can request a clinic appointment, read your test results, renew a prescription or communicate with your care team.     To access your existing account, please contact your [facility] or call [phone] for assistance.        Care EveryWhere ID     This is your Care EveryWhere ID. This could be used by  other organizations to access your [place] medical records  [id number]         Blood Pressure from Last 3 Encounters:   10/09/17 128/81   09/25/17 116/60   10/10/16 135/82    Weight from Last 3 Encounters:   10/09/17 151 lb 9.6 oz (68.8 kg)   09/25/17 150 lb 14.4 oz (68.4 kg)   07/31/17 149 lb (67.6 kg)              Today, you had the following     No orders found for display       Primary Care Provider Office Phone # Fax #    Leonor Muir [phone]       [address]  [facility] [address]        Equal Access to Services     PARIS ALVAREZ : Hadii arnol pondo Sorose, waaxda angella, qaybta kaalmada adenoniyada, sujata olson . So [facility] [phone].    ATENCIÓN: Si habla español, tiene a croft disposición servicios gratuitos de asistencia lingüística. Llame al [phone].    We comply with applicable federal civil rights laws and [place] laws. We do not discriminate on the basis of race, color, national origin, age, disability, sex, sexual orientation, or gender identity.            Thank you!     Thank you for choosing [facility]  for your care. Our goal is always to provide you with excellent care. Hearing back from our patients is one way we can continue to improve our services. Please take a few minutes to complete the written survey that you may receive in the mail after your visit with us. Thank you!             Your Updated Medication List - Protect others around you: Learn how to safely use, store and throw away your medicines at www.disposemymeds.org.          This list is accurate as of: 10/16/17  8:38 AM.  Always use your most recent med list.                   Brand Name Dispense Instructions for use Diagnosis    CALCIUM + D PO      Take 1 tablet by mouth daily        celecoxib 200 MG capsule    celeBREX    60 capsule    Take 1 capsule (200 mg) by mouth 2 times daily as needed for moderate pain    Primary osteoarthritis of  both knees       CENTRUM ADULTS PO      Take 1 tablet by mouth daily        FISH OIL OMEGA-3 PO      Take 1 capsule by mouth daily        nystatin 400653 UNIT/GM Powd    MYCOSTATIN    60 g    Apply topically 3 times daily as needed    Intertriginous candidiasis       triamcinolone 0.1 % ointment    KENALOG    80 g    Apply sparingly to affected area twice daily. Apply sparingly 2-3 weeks as directed.    Intertrigo       VITAMIN B 12 PO      Take 1 tablet by mouth daily        vitamin B complex with vitamin C Tabs tablet      Take 1 tablet by mouth daily

## 2017-10-16 NOTE — NURSING NOTE
Nilda presents to the clinic today for  removal of suture.  The patient has had the suture in place for 7 days.    There has been no history of infection or drainage.    O: 1 suture are seen located on the Left side of neck.  The wound is healing well with no signs of infection.    Tetanus status is up to date.    A: Suture removal.    P:  All suture were easily removed today.  Routine wound care discussed.  The patient will follow up as needed.    Ashlyn Rodriguez RN, [facility]

## 2018-01-29 ENCOUNTER — OFFICE VISIT (OUTPATIENT)
Dept: ORTHOPEDICS | Facility: CLINIC | Age: 63
End: 2018-01-29
Payer: COMMERCIAL

## 2018-01-29 VITALS — BODY MASS INDEX: 28.52 KG/M2 | TEMPERATURE: 98 F | RESPIRATION RATE: 18 BRPM | WEIGHT: 155 LBS | HEIGHT: 62 IN

## 2018-01-29 DIAGNOSIS — M17.0 PRIMARY OSTEOARTHRITIS OF BOTH KNEES: ICD-10-CM

## 2018-01-29 DIAGNOSIS — M17.11 PRIMARY OSTEOARTHRITIS OF RIGHT KNEE: Primary | ICD-10-CM

## 2018-01-29 PROCEDURE — 20610 DRAIN/INJ JOINT/BURSA W/O US: CPT | Mod: RT | Performed by: ORTHOPAEDIC SURGERY

## 2018-01-29 RX ORDER — CELECOXIB 200 MG/1
200 CAPSULE ORAL 2 TIMES DAILY PRN
Qty: 60 CAPSULE | Refills: 11 | Status: SHIPPED | OUTPATIENT
Start: 2018-01-29 | End: 2018-10-01

## 2018-01-29 RX ORDER — METHYLPREDNISOLONE ACETATE 80 MG/ML
80 INJECTION, SUSPENSION INTRA-ARTICULAR; INTRALESIONAL; INTRAMUSCULAR; SOFT TISSUE ONCE
Qty: 1 ML | Refills: 0 | OUTPATIENT
Start: 2018-01-29 | End: 2018-01-29

## 2018-01-29 NOTE — NURSING NOTE
"Chief Complaint   Patient presents with     RECHECK     Right knee OA last injection 7/31/17.       Initial Temp 98  F (36.7  C)  Resp 18  Ht 1.562 m (5' 1.5\")  Wt 70.3 kg (155 lb)  BMI 28.81 kg/m2 Estimated body mass index is 28.81 kg/(m^2) as calculated from the following:    Height as of this encounter: 1.562 m (5' 1.5\").    Weight as of this encounter: 70.3 kg (155 lb).  Medication Reconciliation: complete   Meri Connell MA      "

## 2018-01-29 NOTE — PROGRESS NOTES
Follow up right knee primary osteoarthritis.  Last injection October.  She went for a second opinion and was also told she had osteoarthritis, received steroid injection.  She is wondering about her options.  She has pain mainly along the medial aspect of the knee.  She has spurring along the medial aspect of the knee.  There is no effusion.  There is no ligamentous laxity.  Range of motion 0-125.      Xray images were independently visualized with the patient.  Right knee x-rays 7/31/17show severe medial osteoarthritis.  She has has mild patellofemoral arthritis. Her arthritis is much worse than November 2015.    Assessment:  Right knee osteoarthritis severe.  We discussed options of anti-inflammatories steroid injection or joint replacement.  She  desires injection today of right knee(s).  Risks, benefits, potential complications and alternatives were discussed.   With the patient's consent, sterile prep was performed of right knee(s).  Right knee was injected with Depo Medrol 80 mg and lidocaine at anterolateral site.  Consider total knee arthroplasty if pain is not improved.  Return to clinic as needed.

## 2018-01-29 NOTE — MR AVS SNAPSHOT
"              After Visit Summary   1/29/2018    Nilda Amaro    MRN: [id number]           Patient Information     Date Of Birth          1955        Visit Information        Provider Department      1/29/2018 1:45 PM Quincy Carey MD [facility]        Today's Diagnoses     Primary osteoarthritis of right knee    -  1    Primary osteoarthritis of both knees           Follow-ups after your visit        Who to contact     If you have questions or need follow up information about today's clinic visit or your schedule please contact [facility] directly at [phone].  Normal or non-critical lab and imaging results will be communicated to you by [employer], letter or phone within 4 business days after the clinic has received the results. If you do not hear from us within 7 days, please contact the clinic through LotLinxt or phone. If you have a critical or abnormal lab result, we will notify you by phone as soon as possible.  Submit refill requests through E-nterview or call your pharmacy and they will forward the refill request to us. Please allow 3 business days for your refill to be completed.          Additional Information About Your Visit        MyChart Information     E-nterview gives you secure access to your electronic health record. If you see a primary care provider, you can also send messages to your care team and make appointments. If you have questions, please call your primary care clinic.  If you do not have a primary care provider, please call [phone] and they will assist you.        Care EveryWhere ID     This is your Care EveryWhere ID. This could be used by other organizations to access your [place] medical records  [id number]        Your Vitals Were     Temperature Respirations Height BMI (Body Mass Index)          98  F (36.7  C) 18 1.562 m (5' 1.5\") 28.81 kg/m2         Blood Pressure from Last 3 Encounters:   10/09/17 128/81   09/25/17 116/60   10/10/16 " 135/82    Weight from Last 3 Encounters:   01/29/18 70.3 kg (155 lb)   10/09/17 68.8 kg (151 lb 9.6 oz)   09/25/17 68.4 kg (150 lb 14.4 oz)              We Performed the Following     DRAIN/INJECT LARGE JOINT/BURSA     METHYLPREDNISOLONE 80 MG INJ          Today's Medication Changes          These changes are accurate as of 1/29/18  2:06 PM.  If you have any questions, ask your nurse or doctor.               Start taking these medicines.        Dose/Directions    methylPREDNISolone acetate 80 MG/ML injection   Commonly known as:  DEPO-MEDROL   Used for:  Primary osteoarthritis of right knee   Started by:  Quincy Carey MD        Dose:  80 mg   1 mL (80 mg) by INTRA-ARTICULAR route once for 1 dose   Quantity:  1 mL   Refills:  0            Where to get your medicines      These medications were sent to [facility] - [place], [place] - [address]  [address], [facility] [address]     Phone:  [phone]     celecoxib 200 MG capsule         Some of these will need a paper prescription and others can be bought over the counter.  Ask your nurse if you have questions.     You don't need a prescription for these medications     methylPREDNISolone acetate 80 MG/ML injection                Primary Care Provider Office Phone # Fax #    Leonor Muir [phone] [address]  [facility] [address]        Equal Access to Services     [facility] AH: Hadii arnol almeida hadasho Sorose, waaxda luqadaha, qaybta kaalmada adeegyada, sujata olson . So [facility] [phone].    ATENCIÓN: Si habla español, tiene a croft disposición servicios gratuitos de asistencia lingüística. Llame al [phone].    We comply with applicable federal civil rights laws and [place] laws. We do not discriminate on the basis of race, color, national origin, age, disability, sex, sexual orientation, or gender identity.            Thank you!     Thank you for  choosing [facility]  for your care. Our goal is always to provide you with excellent care. Hearing back from our patients is one way we can continue to improve our services. Please take a few minutes to complete the written survey that you may receive in the mail after your visit with us. Thank you!             Your Updated Medication List - Protect others around you: Learn how to safely use, store and throw away your medicines at www.disposemymeds.org.          This list is accurate as of 1/29/18  2:06 PM.  Always use your most recent med list.                   Brand Name Dispense Instructions for use Diagnosis    CALCIUM + D PO      Take 1 tablet by mouth daily        celecoxib 200 MG capsule    celeBREX    60 capsule    Take 1 capsule (200 mg) by mouth 2 times daily as needed for moderate pain    Primary osteoarthritis of both knees       CENTRUM ADULTS PO      Take 1 tablet by mouth daily        FISH OIL OMEGA-3 PO      Take 1 capsule by mouth daily        methylPREDNISolone acetate 80 MG/ML injection    DEPO-MEDROL    1 mL    1 mL (80 mg) by INTRA-ARTICULAR route once for 1 dose    Primary osteoarthritis of right knee       nystatin 514030 UNIT/GM Powd    MYCOSTATIN    60 g    Apply topically 3 times daily as needed    Intertriginous candidiasis       triamcinolone 0.1 % ointment    KENALOG    80 g    Apply sparingly to affected area twice daily. Apply sparingly 2-3 weeks as directed.    Intertrigo       VITAMIN B 12 PO      Take 1 tablet by mouth daily        vitamin B complex with vitamin C Tabs tablet      Take 1 tablet by mouth daily

## 2018-01-29 NOTE — LETTER
1/29/2018         RE: Nilda Amaro  [address] [facility] [address]        Dear Colleague,    Thank you for referring your patient, Nilda Amaro, to the [facility]. Please see a copy of my visit note below.    The patient's right knee was prepped with betadine solution after verification of allergies. Area approximately 10 cm x 10 cm prepped in a sterile fashion. After injection, betadine removed with soap and water and band-aids applied.    1ml depo-medrol with 1% lidocaine plain injected into patient's right knee by Dr. Quincy Carey  LOT# [id number]  Exp. 03/2020    Delmar Perla PA-C  Supervising physician: Quincy Carey MD  Dept. of Orthopedics  [facility]          Follow up right knee primary osteoarthritis.  Last injection October.  She went for a second opinion and was also told she had osteoarthritis, received steroid injection.  She is wondering about her options.  She has pain mainly along the medial aspect of the knee.  She has spurring along the medial aspect of the knee.  There is no effusion.  There is no ligamentous laxity.  Range of motion 0-125.      Xray images were independently visualized with the patient.  Right knee x-rays 7/31/17show severe medial osteoarthritis.  She has has mild patellofemoral arthritis. Her arthritis is much worse than November 2015.    Assessment:  Right knee osteoarthritis severe.  We discussed options of anti-inflammatories steroid injection or joint replacement.  She  desires injection today of right knee(s).  Risks, benefits, potential complications and alternatives were discussed.   With the patient's consent, sterile prep was performed of right knee(s).  Right knee was injected with Depo Medrol 80 mg and lidocaine at anterolateral site.  Consider total knee arthroplasty if pain is not improved.  Return to clinic as needed.         Again, thank you for allowing me to participate in the care of your patient.         Sincerely,        Quincy Carey MD

## 2018-01-29 NOTE — PROGRESS NOTES
The patient's right knee was prepped with betadine solution after verification of allergies. Area approximately 10 cm x 10 cm prepped in a sterile fashion. After injection, betadine removed with soap and water and band-aids applied.    1ml depo-medrol with 1% lidocaine plain injected into patient's right knee by Dr. Quincy Carey  LOT# [id number]  Exp. 03/2020    Delmar Perla PA-C  Supervising physician: Quincy Carey MD  Dept. of Orthopedics  [facility]

## 2018-02-26 ENCOUNTER — MYC MEDICAL ADVICE (OUTPATIENT)
Dept: ORTHOPEDICS | Facility: CLINIC | Age: 63
End: 2018-02-26

## 2018-03-05 ENCOUNTER — TRANSFERRED RECORDS (OUTPATIENT)
Dept: HEALTH INFORMATION MANAGEMENT | Facility: CLINIC | Age: 63
End: 2018-03-05

## 2018-03-12 ENCOUNTER — TRANSFERRED RECORDS (OUTPATIENT)
Dept: HEALTH INFORMATION MANAGEMENT | Facility: CLINIC | Age: 63
End: 2018-03-12

## 2018-03-26 ENCOUNTER — OFFICE VISIT (OUTPATIENT)
Dept: PEDIATRICS | Facility: CLINIC | Age: 63
End: 2018-03-26
Payer: COMMERCIAL

## 2018-03-26 VITALS
BODY MASS INDEX: 29.35 KG/M2 | DIASTOLIC BLOOD PRESSURE: 85 MMHG | WEIGHT: 157.9 LBS | OXYGEN SATURATION: 98 % | HEART RATE: 54 BPM | SYSTOLIC BLOOD PRESSURE: 137 MMHG | TEMPERATURE: 98.6 F

## 2018-03-26 DIAGNOSIS — Z01.818 PREOP GENERAL PHYSICAL EXAM: Primary | ICD-10-CM

## 2018-03-26 DIAGNOSIS — M17.11 PRIMARY OSTEOARTHRITIS OF RIGHT KNEE: ICD-10-CM

## 2018-03-26 LAB
ANION GAP SERPL CALCULATED.3IONS-SCNC: 7 MMOL/L (ref 3–14)
BASOPHILS # BLD AUTO: 0 10E9/L (ref 0–0.2)
BASOPHILS NFR BLD AUTO: 0.4 %
BUN SERPL-MCNC: 17 MG/DL (ref 7–30)
CALCIUM SERPL-MCNC: 9 MG/DL (ref 8.5–10.1)
CHLORIDE SERPL-SCNC: 104 MMOL/L (ref 94–109)
CO2 SERPL-SCNC: 28 MMOL/L (ref 20–32)
CREAT SERPL-MCNC: 0.65 MG/DL (ref 0.52–1.04)
DIFFERENTIAL METHOD BLD: NORMAL
EOSINOPHIL # BLD AUTO: 0.1 10E9/L (ref 0–0.7)
EOSINOPHIL NFR BLD AUTO: 1.6 %
ERYTHROCYTE [DISTWIDTH] IN BLOOD BY AUTOMATED COUNT: 12.6 % (ref 10–15)
GFR SERPL CREATININE-BSD FRML MDRD: >90 ML/MIN/1.7M2
GLUCOSE SERPL-MCNC: 77 MG/DL (ref 70–99)
HCT VFR BLD AUTO: 36.7 % (ref 35–47)
HGB BLD-MCNC: 12.9 G/DL (ref 11.7–15.7)
IMM GRANULOCYTES # BLD: 0 10E9/L (ref 0–0.4)
IMM GRANULOCYTES NFR BLD: 0.4 %
LYMPHOCYTES # BLD AUTO: 2.2 10E9/L (ref 0.8–5.3)
LYMPHOCYTES NFR BLD AUTO: 29.3 %
MCH RBC QN AUTO: 32.8 PG (ref 26.5–33)
MCHC RBC AUTO-ENTMCNC: 35.1 G/DL (ref 31.5–36.5)
MCV RBC AUTO: 93 FL (ref 78–100)
MONOCYTES # BLD AUTO: 0.6 10E9/L (ref 0–1.3)
MONOCYTES NFR BLD AUTO: 7.6 %
NEUTROPHILS # BLD AUTO: 4.5 10E9/L (ref 1.6–8.3)
NEUTROPHILS NFR BLD AUTO: 60.7 %
PLATELET # BLD AUTO: 316 10E9/L (ref 150–450)
POTASSIUM SERPL-SCNC: 3.8 MMOL/L (ref 3.4–5.3)
RBC # BLD AUTO: 3.93 10E12/L (ref 3.8–5.2)
SODIUM SERPL-SCNC: 139 MMOL/L (ref 133–144)
WBC # BLD AUTO: 7.3 10E9/L (ref 4–11)

## 2018-03-26 PROCEDURE — 93000 ELECTROCARDIOGRAM COMPLETE: CPT | Performed by: FAMILY MEDICINE

## 2018-03-26 PROCEDURE — 99215 OFFICE O/P EST HI 40 MIN: CPT | Performed by: FAMILY MEDICINE

## 2018-03-26 PROCEDURE — 80048 BASIC METABOLIC PNL TOTAL CA: CPT | Performed by: FAMILY MEDICINE

## 2018-03-26 PROCEDURE — 36415 COLL VENOUS BLD VENIPUNCTURE: CPT | Performed by: FAMILY MEDICINE

## 2018-03-26 PROCEDURE — 85025 COMPLETE CBC W/AUTO DIFF WBC: CPT | Performed by: FAMILY MEDICINE

## 2018-03-26 NOTE — NURSING NOTE
"Chief Complaint   Patient presents with     Pre-Op Exam       Initial /85 (BP Location: Right arm, Patient Position: Chair, Cuff Size: Adult Regular)  Pulse 54  Temp 98.6  F (37  C) (Temporal)  Wt 157 lb 14.4 oz (71.6 kg)  SpO2 98%  BMI 29.35 kg/m2 Estimated body mass index is 29.35 kg/(m^2) as calculated from the following:    Height as of 1/29/18: 5' 1.5\" (1.562 m).    Weight as of this encounter: 157 lb 14.4 oz (71.6 kg).  Medication Reconciliation: complete   Latonia Muller CMA      "

## 2018-03-26 NOTE — PATIENT INSTRUCTIONS
Get the labs today  Hold the supplements including the fish oil one week before surgery  Hold CELEBREX 5 days prior to surgery        Before Your Surgery      Call your surgeon if there is any change in your health. This includes signs of a cold or flu (such as a sore throat, runny nose, cough, rash or fever).    Do not smoke, drink alcohol or take over the counter medicine (unless your surgeon or primary care doctor tells you to) for the 24 hours before and after surgery.    If you take prescribed drugs: Follow your doctor s orders about which medicines to take and which to stop until after surgery.    Eating and drinking prior to surgery: follow the instructions from your surgeon    Take a shower or bath the night before surgery. Use the soap your surgeon gave you to gently clean your skin. If you do not have soap from your surgeon, use your regular soap. Do not shave or scrub the surgery site.  Wear clean pajamas and have clean sheets on your bed.

## 2018-03-26 NOTE — MR AVS SNAPSHOT
After Visit Summary   3/26/2018    Nilda Amaro    MRN: [id number]           Patient Information     Date Of Birth          1955        Visit Information        Provider Department      3/26/2018 1:50 PM Leonor Muir MD [facility]        Today's Diagnoses     Preop general physical exam    -  1    Primary osteoarthritis of right knee          Care Instructions    Get the labs today  Hold the supplements including the fish oil one week before surgery  Hold CELEBREX 5 days prior to surgery        Before Your Surgery      Call your surgeon if there is any change in your health. This includes signs of a cold or flu (such as a sore throat, runny nose, cough, rash or fever).    Do not smoke, drink alcohol or take over the counter medicine (unless your surgeon or primary care doctor tells you to) for the 24 hours before and after surgery.    If you take prescribed drugs: Follow your doctor s orders about which medicines to take and which to stop until after surgery.    Eating and drinking prior to surgery: follow the instructions from your surgeon    Take a shower or bath the night before surgery. Use the soap your surgeon gave you to gently clean your skin. If you do not have soap from your surgeon, use your regular soap. Do not shave or scrub the surgery site.  Wear clean pajamas and have clean sheets on your bed.           Follow-ups after your visit        Who to contact     If you have questions or need follow up information about today's clinic visit or your schedule please contact [facility] directly at [phone].  Normal or non-critical lab and imaging results will be communicated to you by MyChart, letter or phone within 4 business days after the clinic has received the results. If you do not hear from us within 7 days, please contact the clinic through MyChart or phone. If you have a critical or abnormal lab result, we will notify you by phone as  soon as possible.  Submit refill requests through Cherry or call your pharmacy and they will forward the refill request to us. Please allow 3 business days for your refill to be completed.          Additional Information About Your Visit        [facility] Information     Cherry gives you secure access to your electronic health record. If you see a primary care provider, you can also send messages to your care team and make appointments. If you have questions, please call your primary care clinic.  If you do not have a primary care provider, please call [phone] and they will assist you.      Cherry is an electronic gateway that provides easy, online access to your medical records. With Cherry, you can request a clinic appointment, read your test results, renew a prescription or communicate with your care team.     To access your existing account, please contact your [facility] or call [phone] for assistance.        Care EveryWhere ID     This is your Care EveryWhere ID. This could be used by other organizations to access your [place] medical records  [id number]        Your Vitals Were     Pulse Temperature Pulse Oximetry BMI (Body Mass Index)          54 98.6  F (37  C) (Temporal) 98% 29.35 kg/m2         Blood Pressure from Last 3 Encounters:   03/26/18 137/85   10/09/17 128/81   09/25/17 116/60    Weight from Last 3 Encounters:   03/26/18 157 lb 14.4 oz (71.6 kg)   01/29/18 155 lb (70.3 kg)   10/09/17 151 lb 9.6 oz (68.8 kg)              We Performed the Following     Basic metabolic panel  (Ca, Cl, CO2, Creat, Gluc, K, Na, BUN)     CBC with platelets and differential     EKG 12-lead complete w/read - Clinics          Today's Medication Changes          These changes are accurate as of 3/26/18  2:12 PM.  If you have any questions, ask your nurse or doctor.               These medicines have changed or have updated prescriptions.        Dose/Directions    triamcinolone 0.1  % ointment   Commonly known as:  KENALOG   This may have changed:    - when to take this  - reasons to take this  - additional instructions   Used for:  Intertrigo        Apply sparingly to affected area twice daily. Apply sparingly 2-3 weeks as directed.   Quantity:  80 g   Refills:  1                Primary Care Provider Office Phone # Fax #    Leonor Muir [phone] [address]  [facility] [address]        Equal Access to Services     [facility] ALVAREZ : Hadii aad ku hadasho Soomaali, waaxda luqadaha, qaybta kaalmada adeegyada, waxay idiin hayaan adeeg jhonathanarashaun lamarychuy . So [facility] [phone].    ATENCIÓN: Si habla español, tiene a croft disposición servicios gratuitos de asistencia lingüística. [facility] [phone].    We comply with applicable federal civil rights laws and [place] laws. We do not discriminate on the basis of race, color, national origin, age, disability, sex, sexual orientation, or gender identity.            Thank you!     Thank you for choosing [facility]  for your care. Our goal is always to provide you with excellent care. Hearing back from our patients is one way we can continue to improve our services. Please take a few minutes to complete the written survey that you may receive in the mail after your visit with us. Thank you!             Your Updated Medication List - Protect others around you: Learn how to safely use, store and throw away your medicines at www.disposemymeds.org.          This list is accurate as of 3/26/18  2:12 PM.  Always use your most recent med list.                   Brand Name Dispense Instructions for use Diagnosis    BIOTIN PO      Take by mouth daily    Preop general physical exam, Primary osteoarthritis of right knee       CALCIUM + D PO      Take 1 tablet by mouth daily        celecoxib 200 MG capsule    celeBREX    60 capsule    Take 1 capsule (200 mg) by mouth 2 times daily as needed for moderate pain     Primary osteoarthritis of both knees       CENTRUM ADULTS PO      Take 1 tablet by mouth daily        FISH OIL OMEGA-3 PO      Take 1 capsule by mouth daily        nystatin [id number] UNIT/GM Powd    MYCOSTATIN    60 g    Apply topically 3 times daily as needed    Intertriginous candidiasis       triamcinolone 0.1 % ointment    KENALOG    80 g    Apply sparingly to affected area twice daily. Apply sparingly 2-3 weeks as directed.    Intertrigo       VITAMIN B 12 PO      Take 1 tablet by mouth daily        vitamin B complex with vitamin C Tabs tablet      Take 1 tablet by mouth daily

## 2018-03-26 NOTE — PROGRESS NOTES
[address] [address]  [phone]  Dept: [phone]    PRE-OP EVALUATION:  Today's date: 3/26/2018    Nilda Amaro (: 1955) presents for pre-operative evaluation assessment as requested by Dr. Briggs.  She requires evaluation and anesthesia risk assessment prior to undergoing surgery/procedure for treatment of right knee pain .    Proposed Surgery/ Procedure: Total right knee replacement  Date of Surgery/ Procedure: 18  Time of Surgery/ Procedure: 7:30 am  Hospital/Surgical Facility: [facility]  Fax number for surgical facility: [phone]  Primary Physician: Leonor Muir  Type of Anesthesia Anticipated: General    Patient has a Health Care Directive or Living Will:  NO    1. NO - Do you have a history of heart attack, stroke, stent, bypass or surgery on an artery in the head, neck, heart or legs?  2. NO - Do you ever have any pain or discomfort in your chest?  3. NO - Do you have a history of  Heart Failure?  4. NO - Are you troubled by shortness of breath when: walking on the level, up a slight hill or at night?  5. NO - Do you currently have a cold, bronchitis or other respiratory infection?  6. NO - Do you have a cough, shortness of breath or wheezing?  7. NO - Do you sometimes get pains in the calves of your legs when you walk?  8. NO - Do you or anyone in your family have previous history of blood clots?  9. NO - Do you or does anyone in your family have a serious bleeding problem such as prolonged bleeding following surgeries or cuts?  10. NO - Have you ever had problems with anemia or been told to take iron pills?  11. NO - Have you had any abnormal blood loss such as black, tarry or bloody stools, or abnormal vaginal bleeding?  12. NO - Have you ever had a blood transfusion?  13. NO - Have you or any of your relatives ever had problems with anesthesia?  14. NO - Do you have sleep apnea, excessive  snoring or daytime drowsiness?  15. NO - Do you have any prosthetic heart valves?  16. NO - Do you have prosthetic joints?  17. NO - Is there any chance that you may be pregnant?      HPI:     HPI related to upcoming procedure: Patient is here for preop clearance for upcoming right knee total arthroplasty for severe right knee osteoarthritis.      See problem list for active medical problems.  Problems all longstanding and stable, except as noted/documented.  See ROS for pertinent symptoms related to these conditions.                                                                                                  .    MEDICAL HISTORY:     Patient Active Problem List    Diagnosis Date Noted     Change in color of pigmented skin lesion, left neck, 2mm 09/25/2017     Priority: Medium     Family history of breast cancer in sister 09/25/2017     Priority: Medium     CARDIOVASCULAR SCREENING; LDL GOAL LESS THAN 160 09/15/2017     Priority: Medium     Primary osteoarthritis of both knees 06/27/2016     Priority: Medium     Medial meniscus tear, right, subsequent encounter 02/01/2016     Priority: Medium     Chondromalacia patellae, left 02/01/2016     Priority: Medium     Chondromalacia patellae, right 02/01/2016     Priority: Medium     Gastroesophageal reflux disease without esophagitis 11/05/2015     Priority: Medium     Obesity, Class I, BMI 30-34.9 09/25/2015     Priority: Medium     Advance care planning 08/03/2015     Priority: Medium     Advance Care Planning 8/3/2015: Discussed advance care planning with patient; information given to patient to review. August 3, 2015. Gregorio Swartz MA                  Past Medical History:   Diagnosis Date     Arthritis      Past Surgical History:   Procedure Laterality Date     COLONOSCOPY WITH CO2 INSUFFLATION N/A 3/21/2016    Procedure: COLONOSCOPY WITH CO2 INSUFFLATION;  Surgeon: Geoffrey Blackwell MD;  Location:  OR     ENDOSCOPY UPPER, COLONOSCOPY, COMBINED N/A  3/21/2016    Procedure: COMBINED ENDOSCOPY UPPER, COLONOSCOPY;  Surgeon: Geoffrey Blackwell MD;  Location: [facility] OR     ESOPHAGOSCOPY, GASTROSCOPY, DUODENOSCOPY (EGD), COMBINED N/A 3/21/2016    Procedure: COMBINED ESOPHAGOSCOPY, GASTROSCOPY, DUODENOSCOPY (EGD), BIOPSY SINGLE OR MULTIPLE;  Surgeon: Geoffrey Blackwell MD;  Location: [facility] OR     Current Outpatient Prescriptions   Medication Sig Dispense Refill     BIOTIN PO Take by mouth daily       celecoxib (CELEBREX) 200 MG capsule Take 1 capsule (200 mg) by mouth 2 times daily as needed for moderate pain 60 capsule 11     Multiple Vitamins-Minerals (CENTRUM ADULTS PO) Take 1 tablet by mouth daily       vitamin B complex with vitamin C (VITAMIN  B COMPLEX) TABS tablet Take 1 tablet by mouth daily       Cyanocobalamin (VITAMIN B 12 PO) Take 1 tablet by mouth daily       Omega-3 Fatty Acids (FISH OIL OMEGA-3 PO) Take 1 capsule by mouth daily       Calcium Citrate-Vitamin D (CALCIUM + D PO) Take 1 tablet by mouth daily       nystatin (MYCOSTATIN) 282506 UNIT/GM POWD Apply topically 3 times daily as needed 60 g 3     triamcinolone (KENALOG) 0.1 % ointment Apply sparingly to affected area twice daily. Apply sparingly 2-3 weeks as directed. (Patient taking differently: as needed Apply sparingly to affected area twice daily. Apply sparingly 2-3 weeks as directed.) 80 g 1     OTC products: None, except as noted above    Allergies   Allergen Reactions     Penicillins       Latex Allergy: NO    Social History   Substance Use Topics     Smoking status: Former Smoker     Smokeless tobacco: Never Used     Alcohol use 0.0 oz/week     0 Standard drinks or equivalent per week     History   Drug Use No       REVIEW OF SYSTEMS:   CONSTITUTIONAL: NEGATIVE for fever, chills, change in weight  INTEGUMENTARY/SKIN: NEGATIVE for worrisome rashes, moles or lesions  EYES: NEGATIVE for vision changes or irritation  ENT/MOUTH: NEGATIVE for ear, mouth and throat problems  RESP: NEGATIVE  for significant cough or SOB  BREAST: NEGATIVE for masses, tenderness or discharge  CV: NEGATIVE for chest pain, palpitations or peripheral edema  GI: NEGATIVE for nausea, abdominal pain, heartburn, or change in bowel habits  : NEGATIVE for frequency, dysuria, or hematuria  MUSCULOSKELETAL: Right knee pain  NEURO: NEGATIVE for weakness, dizziness or paresthesias  ENDOCRINE: NEGATIVE for temperature intolerance, skin/hair changes  HEME: NEGATIVE for bleeding problems  PSYCHIATRIC: NEGATIVE for changes in mood or affect    EXAM:   /85 (BP Location: Right arm, Patient Position: Chair, Cuff Size: Adult Regular)  Pulse 54  Temp 98.6  F (37  C) (Temporal)  Wt 157 lb 14.4 oz (71.6 kg)  SpO2 98%  BMI 29.35 kg/m2    GENERAL APPEARANCE: healthy, alert and no distress     EYES: EOMI, PERRL     HENT: ear canals and TM's normal and nose and mouth without ulcers or lesions     NECK: no adenopathy, no asymmetry, masses, or scars and thyroid normal to palpation     RESP: lungs clear to auscultation - no rales, rhonchi or wheezes     CV: regular rates and rhythm, normal S1 S2, no S3 or S4 and no murmur, click or rub     ABDOMEN:  soft, nontender, no HSM or masses and bowel sounds normal     MS: extremities normal- no gross deformities noted, no evidence of inflammation in joints, FROM in all extremities.     SKIN: no suspicious lesions or rashes     NEURO: Normal strength and tone, sensory exam grossly normal, mentation intact and speech normal     PSYCH: mentation appears normal. and affect normal/bright     LYMPHATICS: No cervical adenopathy    DIAGNOSTICS:   EKG: Normal Sinus Rhythm, sinus bradycardia, normal axis, normal intervals, no acute ST/T changes c/w ischemia, no LVH by voltage criteria, unchanged from previous tracings  Results for orders placed or performed in visit on 03/26/18   CBC with platelets and differential   Result Value Ref Range    WBC 7.3 4.0 - 11.0 10e9/L    RBC Count 3.93 3.8 - 5.2 10e12/L     Hemoglobin 12.9 11.7 - 15.7 g/dL    Hematocrit 36.7 35.0 - 47.0 %    MCV 93 78 - 100 fl    MCH 32.8 26.5 - 33.0 pg    MCHC 35.1 31.5 - 36.5 g/dL    RDW 12.6 10.0 - 15.0 %    Platelet Count 316 150 - 450 10e9/L    Diff Method Automated Method     % Neutrophils 60.7 %    % Lymphocytes 29.3 %    % Monocytes 7.6 %    % Eosinophils 1.6 %    % Basophils 0.4 %    % Immature Granulocytes 0.4 %    Absolute Neutrophil 4.5 1.6 - 8.3 10e9/L    Absolute Lymphocytes 2.2 0.8 - 5.3 10e9/L    Absolute Monocytes 0.6 0.0 - 1.3 10e9/L    Absolute Eosinophils 0.1 0.0 - 0.7 10e9/L    Absolute Basophils 0.0 0.0 - 0.2 10e9/L    Abs Immature Granulocytes 0.0 0 - 0.4 10e9/L   Basic metabolic panel  (Ca, Cl, CO2, Creat, Gluc, K, Na, BUN)   Result Value Ref Range    Sodium 139 133 - 144 mmol/L    Potassium 3.8 3.4 - 5.3 mmol/L    Chloride 104 94 - 109 mmol/L    Carbon Dioxide 28 20 - 32 mmol/L    Anion Gap 7 3 - 14 mmol/L    Glucose 77 70 - 99 mg/dL    Urea Nitrogen 17 7 - 30 mg/dL    Creatinine 0.65 0.52 - 1.04 mg/dL    GFR Estimate >90 >60 mL/min/1.7m2    GFR Estimate If Black >90 >60 mL/min/1.7m2    Calcium 9.0 8.5 - 10.1 mg/dL         Recent Labs   Lab Test  09/25/17   1052  10/10/16   1140  08/03/15   0949   HGB  13.5   --    --    PLT  368   --    --    NA  137  136   --    POTASSIUM  4.0  3.9   --    CR  0.67  0.69   --    A1C   --    --   5.4        IMPRESSION:   Reason for surgery/procedure: Right knee osteoarthritis/total right knee arthroplasty  Diagnosis/reason for consult: Preoperative clearance    ASSESSMENT / PLAN:  (Z01.818) Preop general physical exam  (primary encounter diagnosis)  Comment:   Plan: BIOTIN PO, EKG 12-lead complete w/read -         Clinics, CBC with platelets and differential,         Basic metabolic panel  (Ca, Cl, CO2, Creat,         Gluc, K, Na, BUN)        Patient is cleared for the procedure, recommended to hold Celebrex 3-4 days before the procedure, hold omega-3 fish oil 1 week before the  procedure.    (M17.11) Primary osteoarthritis of right knee  Comment:   Plan: BIOTIN PO, EKG 12-lead complete w/read -         Clinics, CBC with platelets and differential,         Basic metabolic panel  (Ca, Cl, CO2, Creat,         Gluc, K, Na, BUN)        as above        The proposed surgical procedure is considered INTERMEDIATE risk.    REVISED CARDIAC RISK INDEX  The patient has the following serious cardiovascular risks for perioperative complications such as (MI, PE, VFib and 3  AV Block):  No serious cardiac risks  INTERPRETATION: 0 risks: Class I (very low risk - 0.4% complication rate)    The patient has the following additional risks for perioperative complications:  No identified additional risks      ICD-10-CM    1. Preop general physical exam Z01.818 BIOTIN PO     EKG 12-lead complete w/read - Clinics     CBC with platelets and differential     Basic metabolic panel  (Ca, Cl, CO2, Creat, Gluc, K, Na, BUN)   2. Primary osteoarthritis of right knee M17.11 BIOTIN PO     EKG 12-lead complete w/read - Clinics     CBC with platelets and differential     Basic metabolic panel  (Ca, Cl, CO2, Creat, Gluc, K, Na, BUN)       RECOMMENDATIONS:     --Consult hospital rounder / IM to assist post-op medical management    --Patient is to take all scheduled medications on the day of surgery EXCEPT for modifications listed below.    Anticoagulant or Antiplatelet Medication Use  NSAIDS: Celecoxib (Celebrex):    Stop 2-3 days prior to surgery        APPROVAL GIVEN to proceed with proposed procedure, without further diagnostic evaluation       Signed Electronically by: Leonor Muir MD    Copy of this evaluation report is provided to requesting physician.    [facility] Preop Guidelines

## 2018-05-07 ENCOUNTER — TRANSFERRED RECORDS (OUTPATIENT)
Dept: HEALTH INFORMATION MANAGEMENT | Facility: CLINIC | Age: 63
End: 2018-05-07

## 2018-06-04 ENCOUNTER — TRANSFERRED RECORDS (OUTPATIENT)
Dept: HEALTH INFORMATION MANAGEMENT | Facility: CLINIC | Age: 63
End: 2018-06-04

## 2018-06-14 ENCOUNTER — TRANSFERRED RECORDS (OUTPATIENT)
Dept: HEALTH INFORMATION MANAGEMENT | Facility: CLINIC | Age: 63
End: 2018-06-14

## 2018-06-20 ENCOUNTER — TRANSFERRED RECORDS (OUTPATIENT)
Dept: HEALTH INFORMATION MANAGEMENT | Facility: CLINIC | Age: 63
End: 2018-06-20

## 2018-06-27 ENCOUNTER — TRANSFERRED RECORDS (OUTPATIENT)
Dept: HEALTH INFORMATION MANAGEMENT | Facility: CLINIC | Age: 63
End: 2018-06-27

## 2018-07-27 DIAGNOSIS — Z13.6 CARDIOVASCULAR SCREENING; LDL GOAL LESS THAN 160: Primary | ICD-10-CM

## 2018-07-30 ENCOUNTER — TRANSFERRED RECORDS (OUTPATIENT)
Dept: HEALTH INFORMATION MANAGEMENT | Facility: CLINIC | Age: 63
End: 2018-07-30

## 2018-08-06 ENCOUNTER — OFFICE VISIT (OUTPATIENT)
Dept: PEDIATRICS | Facility: CLINIC | Age: 63
End: 2018-08-06
Payer: COMMERCIAL

## 2018-08-06 VITALS
WEIGHT: 163.5 LBS | DIASTOLIC BLOOD PRESSURE: 88 MMHG | HEART RATE: 52 BPM | TEMPERATURE: 97.7 F | OXYGEN SATURATION: 98 % | BODY MASS INDEX: 30.39 KG/M2 | SYSTOLIC BLOOD PRESSURE: 144 MMHG

## 2018-08-06 DIAGNOSIS — M79.644 THUMB PAIN, RIGHT: Primary | ICD-10-CM

## 2018-08-06 DIAGNOSIS — R03.0 ELEVATED BLOOD PRESSURE READING WITHOUT DIAGNOSIS OF HYPERTENSION: ICD-10-CM

## 2018-08-06 DIAGNOSIS — M77.8 THUMB TENDONITIS: ICD-10-CM

## 2018-08-06 PROCEDURE — 99214 OFFICE O/P EST MOD 30 MIN: CPT | Performed by: FAMILY MEDICINE

## 2018-08-06 ASSESSMENT — PAIN SCALES - GENERAL: PAINLEVEL: MILD PAIN (3)

## 2018-08-06 NOTE — PROGRESS NOTES
SUBJECTIVE:   Nilda Amaro is a 62 year old female who presents to clinic today for the following health issues:      Musculoskeletal problem/pain  Patient with past medical history significant for obesity and bilateral knee osteoarthritis, status post right knee replacement 3 months ago is here with concerns of having sudden onset of pain and swelling at the base of the right thumb for the past 3-4 days which started after she cleaned her house on Thursday.  Patient denies history of fall or trauma but noticed a pain with moving the thumb.  She denies right wrist pain no previous similar symptoms, left-sided symptoms.    Patient is right-handed.      Duration: Thursday    Description  Location: Right thumb    Intensity:  mild    Accompanying signs and symptoms: swelling    History  Previous similar problem: no   Previous evaluation:  none    Precipitating or alleviating factors:  Trauma or overuse: YES- no specific injury. Pain onset after cleaning house  Aggravating factors include: movement in general    Therapies tried and outcome: nothing    Elevated BP-patient's blood pressure was found to be elevated today.  Denies history of hypertension.  Does have family history of hypertension in both parents and siblings.  Patient does not smoke, rarely drinks alcohol   Denies chest pain, SOB, edema legs, visual concerns, focal neurological symptoms, dizziness, syncope, palpitations.  Patient noticed elevated blood pressure since she had a knee surgery 3 months ago          Problem list and histories reviewed & adjusted, as indicated.  Additional history: as documented    Patient Active Problem List   Diagnosis     Advance care planning     Obesity, Class I, BMI 30-34.9     Gastroesophageal reflux disease without esophagitis     Medial meniscus tear, right, subsequent encounter     Chondromalacia patellae, left     Chondromalacia patellae, right     Primary osteoarthritis of both knees     CARDIOVASCULAR SCREENING; LDL  GOAL LESS THAN 160     Change in color of pigmented skin lesion, left neck, 2mm     Family history of breast cancer in sister     Past Surgical History:   Procedure Laterality Date     COLONOSCOPY WITH CO2 INSUFFLATION N/A 3/21/2016    Procedure: COLONOSCOPY WITH CO2 INSUFFLATION;  Surgeon: Geoffrey Blackwell MD;  Location: [facility] OR     ENDOSCOPY UPPER, COLONOSCOPY, COMBINED N/A 3/21/2016    Procedure: COMBINED ENDOSCOPY UPPER, COLONOSCOPY;  Surgeon: Geoffrey Blackwell MD;  Location: [facility] OR     ESOPHAGOSCOPY, GASTROSCOPY, DUODENOSCOPY (EGD), COMBINED N/A 3/21/2016    Procedure: COMBINED ESOPHAGOSCOPY, GASTROSCOPY, DUODENOSCOPY (EGD), BIOPSY SINGLE OR MULTIPLE;  Surgeon: Geoffrey Blackwell MD;  Location: [facility] OR       Social History   Substance Use Topics     Smoking status: Former Smoker     Smokeless tobacco: Never Used     Alcohol use 0.0 oz/week     0 Standard drinks or equivalent per week     Family History   Problem Relation Age of Onset     Ankylosing Spondylitis Brother 57     Breast Cancer Sister          Current Outpatient Prescriptions   Medication Sig Dispense Refill     BIOTIN PO Take by mouth daily       Calcium Citrate-Vitamin D (CALCIUM + D PO) Take 1 tablet by mouth daily       celecoxib (CELEBREX) 200 MG capsule Take 1 capsule (200 mg) by mouth 2 times daily as needed for moderate pain 60 capsule 11     Cyanocobalamin (VITAMIN B 12 PO) Take 1 tablet by mouth daily       Multiple Vitamins-Minerals (CENTRUM ADULTS PO) Take 1 tablet by mouth daily       nystatin (MYCOSTATIN) 792753 UNIT/GM POWD Apply topically 3 times daily as needed 60 g 3     Omega-3 Fatty Acids (FISH OIL OMEGA-3 PO) Take 1 capsule by mouth daily       triamcinolone (KENALOG) 0.1 % ointment Apply sparingly to affected area twice daily. Apply sparingly 2-3 weeks as directed. (Patient taking differently: as needed Apply sparingly to affected area twice daily. Apply sparingly 2-3 weeks as directed.) 80 g 1     vitamin B complex  with vitamin C (VITAMIN  B COMPLEX) TABS tablet Take 1 tablet by mouth daily       Allergies   Allergen Reactions     Penicillins      Recent Labs   Lab Test  03/26/18   1415  09/25/17   1052  10/10/16   1140   08/03/15   0949   A1C   --    --    --    --   5.4   LDL   --   103*   --    --   119   HDL   --   81   --    --   78   TRIG   --   46   --    --   65   ALT   --   24  23   --    --    CR  0.65  0.67  0.69   < >   --    GFRESTIMATED  >90  89  86   < >   --    GFRESTBLACK  >90  >90  >90  African American GFR Calc     < >   --    POTASSIUM  3.8  4.0  3.9   < >   --    TSH   --   1.19   --    --    --     < > = values in this interval not displayed.      BP Readings from Last 3 Encounters:   08/06/18 144/88   03/26/18 137/85   10/09/17 128/81    Wt Readings from Last 3 Encounters:   08/06/18 163 lb 8 oz (74.2 kg)   03/26/18 157 lb 14.4 oz (71.6 kg)   01/29/18 155 lb (70.3 kg)                  Labs reviewed in EPIC    Reviewed and updated as needed this visit by clinical staff  Tobacco  Allergies  Med Hx  Surg Hx  Fam Hx  Soc Hx      Reviewed and updated as needed this visit by Provider         ROS:  CONSTITUTIONAL: NEGATIVE for fever, chills, change in weight  RESP: NEGATIVE for significant cough or SOB  CV: NEGATIVE for chest pain, palpitations or peripheral edema  CV: as above  MUSCULOSKELETAL: as above  NEURO: NEGATIVE for weakness, dizziness or paresthesias  PSYCHIATRIC: NEGATIVE for changes in mood or affect    OBJECTIVE:     /88  Pulse 52  Temp 97.7  F (36.5  C) (Oral)  Wt 163 lb 8 oz (74.2 kg)  SpO2 98%  BMI 30.39 kg/m2  Body mass index is 30.39 kg/(m^2).  GENERAL: healthy, alert and no distress  MS: Right wrist-normal exam  Right thumb-minimal tenderness with no significant swelling at the right CMP joint, full range of motion but with minimal amount of pain  Positive Finkelstein test  SKIN: no suspicious lesions or rashes    Diagnostic Test Results:  none     ASSESSMENT/PLAN:              1. Thumb pain, right  ddx-thumb tendinitis versus de Quervain's tenosynovitis vs CMP arthritis  Gave education handouts on the same.  Reviewed the etiology of the condition, recommended thumb splints,   Recommended  local ice and heat, avoid triggering activities, tylenol or Ibuprofen prn for pain and rtc ofr persistent or worsening concerns in 2 weeks or sooner if needed.  Patient verbalised understanding and is agreeable to the plan.        2. Thumb tendonitis  as above      3.Elevated blood pressure reading without diagnosis of hypertension  Comment:   Plan:   BP Readings from Last 6 Encounters:   08/06/18 144/88   03/26/18 137/85   10/09/17 128/81   09/25/17 116/60   10/10/16 135/82   04/07/16 133/80       Initial blood pressure was 141/86, rechecked-144/88  Recommended patient to start taking blood pressure once daily at home, the blood pressure log at her physical appointment next month  We will consider antihypertensives  for persistent or worsening concerns  Will follow low salt diet, weight loss and regular exercises.  Patient verbalised understanding and is agreeable to the plan.        Chart documentation done in part with Dragon Voice recognition Software. Although reviewed after completion, some word and grammatical error may remain.    See Patient Instructions    Leonor Muir MD  [facility]

## 2018-08-06 NOTE — MR AVS SNAPSHOT
After Visit Summary   8/6/2018    Nilda Amaro    MRN: [id number]           Patient Information     Date Of Birth          1955        Visit Information        Provider Department      8/6/2018 8:50 AM Leonor Muir MD [facility]        Today's Diagnoses     Thumb pain, right    -  1    Thumb tendonitis        Elevated blood pressure reading without diagnosis of hypertension          Care Instructions    Wear thumb splints  Apply ice and heat  Take Motrin 400 mg , 3 times a day as needed for thumb pain  Start checking BP once daily at home and bring the log at your next visit  Understanding De Quervain Tenosynovitis    De Quervain tenosynovitis is a condition that can cause wrist and thumb pain. Tendons connect muscles in your wrist and forearm to the bones in your thumb. The tendons have a protective cover (sheath). The sheath s lining makes a fluid that lets the tendons slide easily when you straighten your wrist and thumb. If any of these tendons are irritated or injured, they can become swollen and inflamed. This is called de Quervain tenosynovitis.  How to say it  [id number]   What causes de Quervain tenosynovitis?  This condition is most often caused from overuse. For example, making the same wrist motions over and over can irritate the tendons. This includes doing things like unscrewing jar lids or grasping a tool. Activities such as typing, playing racquet sports, knitting, and texting can also lead to the condition.  Symptoms of de Quervain tenosynovitis  You may have pain, soreness, redness, and swelling along the side of your wrist and the base of your thumb. You may feel pain when you pinch or grasp things, turn or touch your wrist, or make a fist. Your thumb may catch or make a crackling sound when you move it.  Treatment for de Quervain tenosynovitis  Treatments may include:    Resting the wrist and thumb. This involves limiting  movements that make your symptoms worse. You also may need to avoid certain hobbies, sports, and types of work for a time.    Cold packs. These help reduce pain and swelling.    Prescription or over-the-counter pain medicines. These help relieve pain and swelling.    Splint or brace. This helps keep the thumb and wrist from moving and gives time for your tendons to heal.    Exercises or physical therapy. These help stretch, strengthen, and improve the range of motion in your wrist and thumb.    Shots of medicine into the area around the tendon. These may help relieve symptoms for a time.    Surgery. You may need surgery if other treatments don t relieve symptoms. During surgery, the surgeon releases the sheath that surrounds the tendons so the tendons can move more easily.  Possible complications of de Quervain tenosynovitis  Without proper care and treatment, healing may take longer than normal. Also, symptoms may continue or get worse. Over time, the problem may become long-term (chronic). This can make it hard to use your wrist and thumb for normal activities.  When to call your healthcare provider  Call your healthcare provider right away if you have any of these:    Fever of 100.4 F (38 C) or higher, or as directed    Symptoms that don t get better with treatment, or get worse    Pain, numbness, or coldness in the hand    New symptoms   Date Last Reviewed: 3/10/2016    8109-2154 The [employer]. [address] [place] [address]. All rights reserved. This information is not intended as a substitute for professional medical care. Always follow your healthcare professional's instructions.                Follow-ups after your visit        Your next 10 appointments already scheduled     Oct 01, 2018  7:50 AM CDT   Physical with Leon[facility] Muir MD   [facility]    [address] [address] [place] [id number] [phone]              Who to  contact     If you have questions or need follow up information about today's clinic visit or your schedule please contact [facility] directly at [phone].  Normal or non-critical lab and imaging results will be communicated to you by C-Notehart, letter or phone within 4 business days after the clinic has received the results. If you do not hear from us within 7 days, please contact the clinic through C-Notehart or phone. If you have a critical or abnormal lab result, we will notify you by phone as soon as possible.  Submit refill requests through [employer] or call your pharmacy and they will forward the refill request to us. Please allow 3 business days for your refill to be completed.          Additional Information About Your Visit        [employer] Information     [employer] gives you secure access to your electronic health record. If you see a primary care provider, you can also send messages to your care team and make appointments. If you have questions, please call your primary care clinic.  If you do not have a primary care provider, please call [phone] and they will assist you.      [employer] is an electronic gateway that provides easy, online access to your medical records. With [employer], you can request a clinic appointment, read your test results, renew a prescription or communicate with your care team.     To access your existing account, please contact your [facility] or call [phone] for assistance.        Care EveryWhere ID     This is your Care EveryWhere ID. This could be used by other organizations to access your [facility] medical records  [id number]        Your Vitals Were     Pulse Temperature Pulse Oximetry BMI (Body Mass Index)          52 97.7  F (36.5  C) (Oral) 98% 30.39 kg/m2         Blood Pressure from Last 3 Encounters:   08/06/18 144/88   03/26/18 137/85   10/09/17 128/81    Weight from Last 3 Encounters:   08/06/18 163 lb 8 oz (74.2 kg)    03/26/18 157 lb 14.4 oz (71.6 kg)   01/29/18 155 lb (70.3 kg)              Today, you had the following     No orders found for display         Today's Medication Changes          These changes are accurate as of 8/6/18  9:21 AM.  If you have any questions, ask your nurse or doctor.               These medicines have changed or have updated prescriptions.        Dose/Directions    triamcinolone 0.1 % ointment   Commonly known as:  KENALOG   This may have changed:    - when to take this  - reasons to take this  - additional instructions   Used for:  Intertrigo        Apply sparingly to affected area twice daily. Apply sparingly 2-3 weeks as directed.   Quantity:  80 g   Refills:  1                Primary Care Provider Office Phone # Fax #    Leonor Muir [phone] [address]  [facility] [address]        Equal Access to Services     [facility] : Hadii arnol almeida hadasho Sorose, waaxda luqadaha, qaybta kaalmada adeegyada, sujata boyd hayalberto olson . So [facility] [phone].    ATENCIÓN: Si habla español, tiene a croft disposición servicios gratuitos de asistencia lingüística. Llame al [phone].    We comply with applicable federal civil rights laws and [place] laws. We do not discriminate on the basis of race, color, national origin, age, disability, sex, sexual orientation, or gender identity.            Thank you!     Thank you for choosing [facility]  for your care. Our goal is always to provide you with excellent care. Hearing back from our patients is one way we can continue to improve our services. Please take a few minutes to complete the written survey that you may receive in the mail after your visit with us. Thank you!             Your Updated Medication List - Protect others around you: Learn how to safely use, store and throw away your medicines at www.disposemymeds.org.          This list is accurate as of 8/6/18  9:21 AM.  Always use  your most recent med list.                   Brand Name Dispense Instructions for use Diagnosis    BIOTIN PO      Take by mouth daily    Preop general physical exam, Primary osteoarthritis of right knee       CALCIUM + D PO      Take 1 tablet by mouth daily        celecoxib 200 MG capsule    celeBREX    60 capsule    Take 1 capsule (200 mg) by mouth 2 times daily as needed for moderate pain    Primary osteoarthritis of both knees       CENTRUM ADULTS PO      Take 1 tablet by mouth daily        FISH OIL OMEGA-3 PO      Take 1 capsule by mouth daily        nystatin 746211 UNIT/GM Powd    MYCOSTATIN    60 g    Apply topically 3 times daily as needed    Intertriginous candidiasis       triamcinolone 0.1 % ointment    KENALOG    80 g    Apply sparingly to affected area twice daily. Apply sparingly 2-3 weeks as directed.    Intertrigo       VITAMIN B 12 PO      Take 1 tablet by mouth daily        vitamin B complex with vitamin C Tabs tablet      Take 1 tablet by mouth daily

## 2018-08-06 NOTE — PATIENT INSTRUCTIONS
Wear thumb splints  Apply ice and heat  Take Motrin 400 mg , 3 times a day as needed for thumb pain  Start checking BP once daily at home and bring the log at your next visit  Understanding De Quervain Tenosynovitis    De Quervain tenosynovitis is a condition that can cause wrist and thumb pain. Tendons connect muscles in your wrist and forearm to the bones in your thumb. The tendons have a protective cover (sheath). The sheath s lining makes a fluid that lets the tendons slide easily when you straighten your wrist and thumb. If any of these tendons are irritated or injured, they can become swollen and inflamed. This is called de Quervain tenosynovitis.  How to say it  [id number] ten-oh-sin-oh-VY-tis   What causes de Quervain tenosynovitis?  This condition is most often caused from overuse. For example, making the same wrist motions over and over can irritate the tendons. This includes doing things like unscrewing jar lids or grasping a tool. Activities such as typing, playing racquet sports, knitting, and texting can also lead to the condition.  Symptoms of de Quervain tenosynovitis  You may have pain, soreness, redness, and swelling along the side of your wrist and the base of your thumb. You may feel pain when you pinch or grasp things, turn or touch your wrist, or make a fist. Your thumb may catch or make a crackling sound when you move it.  Treatment for de Quervain tenosynovitis  Treatments may include:    Resting the wrist and thumb. This involves limiting movements that make your symptoms worse. You also may need to avoid certain hobbies, sports, and types of work for a time.    Cold packs. These help reduce pain and swelling.    Prescription or over-the-counter pain medicines. These help relieve pain and swelling.    Splint or brace. This helps keep the thumb and wrist from moving and gives time for your tendons to heal.    Exercises or physical therapy. These help stretch, strengthen, and improve the  range of motion in your wrist and thumb.    Shots of medicine into the area around the tendon. These may help relieve symptoms for a time.    Surgery. You may need surgery if other treatments don t relieve symptoms. During surgery, the surgeon releases the sheath that surrounds the tendons so the tendons can move more easily.  Possible complications of de Quervain tenosynovitis  Without proper care and treatment, healing may take longer than normal. Also, symptoms may continue or get worse. Over time, the problem may become long-term (chronic). This can make it hard to use your wrist and thumb for normal activities.  When to call your healthcare provider  Call your healthcare provider right away if you have any of these:    Fever of 100.4 F (38 C) or higher, or as directed    Symptoms that don t get better with treatment, or get worse    Pain, numbness, or coldness in the hand    New symptoms   Date Last Reviewed: 3/10/2016    8391-4473 [employer]. [address] [place] [address], [place] [address]. All rights reserved. This information is not intended as a substitute for professional medical care. Always follow your healthcare professional's instructions.

## 2018-10-01 ENCOUNTER — RADIANT APPOINTMENT (OUTPATIENT)
Dept: MAMMOGRAPHY | Facility: CLINIC | Age: 63
End: 2018-10-01
Attending: FAMILY MEDICINE
Payer: COMMERCIAL

## 2018-10-01 ENCOUNTER — OFFICE VISIT (OUTPATIENT)
Dept: PEDIATRICS | Facility: CLINIC | Age: 63
End: 2018-10-01
Payer: COMMERCIAL

## 2018-10-01 VITALS
WEIGHT: 165.6 LBS | BODY MASS INDEX: 30.47 KG/M2 | TEMPERATURE: 98.2 F | OXYGEN SATURATION: 96 % | SYSTOLIC BLOOD PRESSURE: 130 MMHG | DIASTOLIC BLOOD PRESSURE: 84 MMHG | HEIGHT: 62 IN | HEART RATE: 55 BPM

## 2018-10-01 DIAGNOSIS — Z12.39 BREAST CANCER SCREENING: ICD-10-CM

## 2018-10-01 DIAGNOSIS — Z23 NEED FOR PROPHYLACTIC VACCINATION AND INOCULATION AGAINST INFLUENZA: ICD-10-CM

## 2018-10-01 DIAGNOSIS — Z00.01 ENCOUNTER FOR GENERAL ADULT MEDICAL EXAMINATION WITH ABNORMAL FINDINGS: ICD-10-CM

## 2018-10-01 DIAGNOSIS — Z00.01 ENCOUNTER FOR GENERAL ADULT MEDICAL EXAMINATION WITH ABNORMAL FINDINGS: Primary | ICD-10-CM

## 2018-10-01 DIAGNOSIS — L30.9 DERMATITIS: ICD-10-CM

## 2018-10-01 DIAGNOSIS — Z13.6 CARDIOVASCULAR SCREENING; LDL GOAL LESS THAN 160: ICD-10-CM

## 2018-10-01 DIAGNOSIS — I10 HYPERTENSION GOAL BP (BLOOD PRESSURE) < 140/90: ICD-10-CM

## 2018-10-01 DIAGNOSIS — Z12.4 SCREENING FOR CERVICAL CANCER: ICD-10-CM

## 2018-10-01 DIAGNOSIS — Z80.3 FAMILY HISTORY OF BREAST CANCER IN SISTER: ICD-10-CM

## 2018-10-01 DIAGNOSIS — E66.811 OBESITY, CLASS I, BMI 30-34.9: ICD-10-CM

## 2018-10-01 DIAGNOSIS — Z13.1 SCREENING FOR DIABETES MELLITUS (DM): ICD-10-CM

## 2018-10-01 DIAGNOSIS — Z13.29 SCREENING FOR THYROID DISORDER: ICD-10-CM

## 2018-10-01 DIAGNOSIS — Z13.0 SCREENING FOR DEFICIENCY ANEMIA: ICD-10-CM

## 2018-10-01 DIAGNOSIS — Z11.4 SCREENING FOR HUMAN IMMUNODEFICIENCY VIRUS: ICD-10-CM

## 2018-10-01 PROBLEM — R03.0 ELEVATED BLOOD PRESSURE READING WITHOUT DIAGNOSIS OF HYPERTENSION: Status: RESOLVED | Noted: 2018-08-06 | Resolved: 2018-10-01

## 2018-10-01 LAB
ALBUMIN SERPL-MCNC: 3.6 G/DL (ref 3.4–5)
ALP SERPL-CCNC: 63 U/L (ref 40–150)
ALT SERPL W P-5'-P-CCNC: 22 U/L (ref 0–50)
ANION GAP SERPL CALCULATED.3IONS-SCNC: 6 MMOL/L (ref 3–14)
AST SERPL W P-5'-P-CCNC: 19 U/L (ref 0–45)
BASOPHILS # BLD AUTO: 0 10E9/L (ref 0–0.2)
BASOPHILS NFR BLD AUTO: 0.4 %
BILIRUB SERPL-MCNC: 0.4 MG/DL (ref 0.2–1.3)
BUN SERPL-MCNC: 15 MG/DL (ref 7–30)
CALCIUM SERPL-MCNC: 8.8 MG/DL (ref 8.5–10.1)
CHLORIDE SERPL-SCNC: 106 MMOL/L (ref 94–109)
CHOLEST SERPL-MCNC: 210 MG/DL
CO2 SERPL-SCNC: 28 MMOL/L (ref 20–32)
CREAT SERPL-MCNC: 0.67 MG/DL (ref 0.52–1.04)
CREAT UR-MCNC: 39 MG/DL
DIFFERENTIAL METHOD BLD: NORMAL
EOSINOPHIL # BLD AUTO: 0.2 10E9/L (ref 0–0.7)
EOSINOPHIL NFR BLD AUTO: 2.1 %
ERYTHROCYTE [DISTWIDTH] IN BLOOD BY AUTOMATED COUNT: 14.7 % (ref 10–15)
GFR SERPL CREATININE-BSD FRML MDRD: 89 ML/MIN/1.7M2
GLUCOSE SERPL-MCNC: 81 MG/DL (ref 70–99)
HCT VFR BLD AUTO: 39.8 % (ref 35–47)
HDLC SERPL-MCNC: 90 MG/DL
HGB BLD-MCNC: 12.9 G/DL (ref 11.7–15.7)
HIV 1+2 AB+HIV1 P24 AG SERPL QL IA: NONREACTIVE
IMM GRANULOCYTES # BLD: 0 10E9/L (ref 0–0.4)
IMM GRANULOCYTES NFR BLD: 0.3 %
LDLC SERPL CALC-MCNC: 113 MG/DL
LYMPHOCYTES # BLD AUTO: 1.7 10E9/L (ref 0.8–5.3)
LYMPHOCYTES NFR BLD AUTO: 24.7 %
MCH RBC QN AUTO: 31.2 PG (ref 26.5–33)
MCHC RBC AUTO-ENTMCNC: 32.4 G/DL (ref 31.5–36.5)
MCV RBC AUTO: 96 FL (ref 78–100)
MICROALBUMIN UR-MCNC: <5 MG/L
MICROALBUMIN/CREAT UR: NORMAL MG/G CR (ref 0–25)
MONOCYTES # BLD AUTO: 0.7 10E9/L (ref 0–1.3)
MONOCYTES NFR BLD AUTO: 9.3 %
NEUTROPHILS # BLD AUTO: 4.4 10E9/L (ref 1.6–8.3)
NEUTROPHILS NFR BLD AUTO: 63.2 %
NONHDLC SERPL-MCNC: 120 MG/DL
PLATELET # BLD AUTO: 348 10E9/L (ref 150–450)
POTASSIUM SERPL-SCNC: 4.1 MMOL/L (ref 3.4–5.3)
PROT SERPL-MCNC: 7.5 G/DL (ref 6.8–8.8)
RBC # BLD AUTO: 4.13 10E12/L (ref 3.8–5.2)
SODIUM SERPL-SCNC: 140 MMOL/L (ref 133–144)
TRIGL SERPL-MCNC: 36 MG/DL
TSH SERPL DL<=0.005 MIU/L-ACNC: 2.43 MU/L (ref 0.4–4)
WBC # BLD AUTO: 7 10E9/L (ref 4–11)

## 2018-10-01 PROCEDURE — 90682 RIV4 VACC RECOMBINANT DNA IM: CPT | Performed by: FAMILY MEDICINE

## 2018-10-01 PROCEDURE — G0145 SCR C/V CYTO,THINLAYER,RESCR: HCPCS | Performed by: FAMILY MEDICINE

## 2018-10-01 PROCEDURE — 90471 IMMUNIZATION ADMIN: CPT | Performed by: FAMILY MEDICINE

## 2018-10-01 PROCEDURE — 36415 COLL VENOUS BLD VENIPUNCTURE: CPT | Performed by: FAMILY MEDICINE

## 2018-10-01 PROCEDURE — 84443 ASSAY THYROID STIM HORMONE: CPT | Performed by: FAMILY MEDICINE

## 2018-10-01 PROCEDURE — 80053 COMPREHEN METABOLIC PANEL: CPT | Performed by: FAMILY MEDICINE

## 2018-10-01 PROCEDURE — 85025 COMPLETE CBC W/AUTO DIFF WBC: CPT | Performed by: FAMILY MEDICINE

## 2018-10-01 PROCEDURE — 82043 UR ALBUMIN QUANTITATIVE: CPT | Performed by: FAMILY MEDICINE

## 2018-10-01 PROCEDURE — 77067 SCR MAMMO BI INCL CAD: CPT | Performed by: STUDENT IN AN ORGANIZED HEALTH CARE EDUCATION/TRAINING PROGRAM

## 2018-10-01 PROCEDURE — 87624 HPV HI-RISK TYP POOLED RSLT: CPT | Performed by: FAMILY MEDICINE

## 2018-10-01 PROCEDURE — 87389 HIV-1 AG W/HIV-1&-2 AB AG IA: CPT | Performed by: FAMILY MEDICINE

## 2018-10-01 PROCEDURE — 99396 PREV VISIT EST AGE 40-64: CPT | Mod: 25 | Performed by: FAMILY MEDICINE

## 2018-10-01 PROCEDURE — 99213 OFFICE O/P EST LOW 20 MIN: CPT | Mod: 25 | Performed by: FAMILY MEDICINE

## 2018-10-01 PROCEDURE — 80061 LIPID PANEL: CPT | Performed by: FAMILY MEDICINE

## 2018-10-01 RX ORDER — TRIAMCINOLONE ACETONIDE 1 MG/G
OINTMENT TOPICAL
Qty: 80 G | Refills: 1 | Status: SHIPPED | OUTPATIENT
Start: 2018-10-01 | End: 2018-12-27

## 2018-10-01 RX ORDER — LISINOPRIL 10 MG/1
10 TABLET ORAL DAILY
Qty: 30 TABLET | Refills: 1 | Status: SHIPPED | OUTPATIENT
Start: 2018-10-01 | End: 2018-10-15

## 2018-10-01 ASSESSMENT — PAIN SCALES - GENERAL: PAINLEVEL: NO PAIN (0)

## 2018-10-01 NOTE — MR AVS SNAPSHOT
After Visit Summary   10/1/2018    Nilda Amaro    MRN: [id number]           Patient Information     Date Of Birth          1955        Visit Information        Provider Department      10/1/2018 7:50 AM Leonor Muir MD [facility]        Today's Diagnoses     Encounter for general adult medical examination with abnormal findings    -  1    Screening for cervical cancer        Hypertension goal BP (blood pressure) < 140/90        Screening for human immunodeficiency virus        Screening for thyroid disorder        Screening for deficiency anemia        Screening for diabetes mellitus (DM)        Intertrigo        Dermatitis        Breast cancer screening          Care Instructions    Get the flu shot today  Get the labs today    Schedule for mammogram  Schedule for HTN recheck and non fasting labs in 2 weeks    Start on LISINOPRIL 10mg daily      Preventive Health Recommendations  Female Ages 50 - 64    Yearly exam: See your health care provider every year in order to  o Review health changes.   o Discuss preventive care.    o Review your medicines if your doctor has prescribed any.      Get a Pap test every three years (unless you have an abnormal result and your provider advises testing more often).    If you get Pap tests with HPV test, you only need to test every 5 years, unless you have an abnormal result.     You do not need a Pap test if your uterus was removed (hysterectomy) and you have not had cancer.    You should be tested each year for STDs (sexually transmitted diseases) if you're at risk.     Have a mammogram every 1 to 2 years.    Have a colonoscopy at age 50, or have a yearly FIT test (stool test). These exams screen for colon cancer.      Have a cholesterol test every 5 years, or more often if advised.    Have a diabetes test (fasting glucose) every three years. If you are at risk for diabetes, you should have this test more often.     If you are at risk  for osteoporosis (brittle bone disease), think about having a bone density scan (DEXA).        Shots: Get a flu shot each year. Get a tetanus shot every 10 years.    Nutrition:     Eat at least 5 servings of fruits and vegetables each day.    Eat whole-grain bread, whole-wheat pasta and brown rice instead of white grains and rice.    Get adequate Calcium and Vitamin D.     Lifestyle    Exercise at least 150 minutes a week (30 minutes a day, 5 days a week). This will help you control your weight and prevent disease.    Limit alcohol to one drink per day.    No smoking.     Wear sunscreen to prevent skin cancer.     See your dentist every six months for an exam and cleaning.    See your eye doctor every 1 to 2 years.            Follow-ups after your visit        Future tests that were ordered for you today     Open Future Orders        Priority Expected Expires Ordered    MA Screening Digital Bilateral Routine  10/1/2019 10/1/2018            Who to contact     If you have questions or need follow up information about today's clinic visit or your schedule please contact [facility] directly at [phone].  Normal or non-critical lab and imaging results will be communicated to you by Zaarlyhart, letter or phone within 4 business days after the clinic has received the results. If you do not hear from us within 7 days, please contact the clinic through [employer] or phone. If you have a critical or abnormal lab result, we will notify you by phone as soon as possible.  Submit refill requests through [employer] or call your pharmacy and they will forward the refill request to us. Please allow 3 business days for your refill to be completed.          Additional Information About Your Visit        [employer] Information     [employer] gives you secure access to your electronic health record. If you see a primary care provider, you can also send messages to your care team and make appointments. If you have questions,  "please call your primary care clinic.  If you do not have a primary care provider, please call [phone] and they will assist you.      [employer] is an electronic gateway that provides easy, online access to your medical records. With [employer], you can request a clinic appointment, read your test results, renew a prescription or communicate with your care team.     To access your existing account, please contact your [facility] or call [phone] for assistance.        Care EveryWhere ID     This is your Care EveryWhere ID. This could be used by other organizations to access your [facility] medical records  [id number]        Your Vitals Were     Pulse Temperature Height Pulse Oximetry BMI (Body Mass Index)       55 98.2  F (36.8  C) (Oral) 5' 1.75\" (1.568 m) 96% 30.53 kg/m2        Blood Pressure from Last 3 Encounters:   10/01/18 130/84   08/06/18 144/88   03/26/18 137/85    Weight from Last 3 Encounters:   10/01/18 165 lb 9.6 oz (75.1 kg)   08/06/18 163 lb 8 oz (74.2 kg)   03/26/18 157 lb 14.4 oz (71.6 kg)              We Performed the Following     Albumin Random Urine Quantitative with Creat Ratio     CBC with platelets and differential     Comprehensive metabolic panel     HIV Antigen Antibody Combo     HPV High Risk Types DNA Cervical     Lipid panel reflex to direct LDL Fasting     Pap imaged thin layer screen with HPV - recommended age 30 - 65 years (select HPV order below)     TSH with free T4 reflex          Today's Medication Changes          These changes are accurate as of 10/1/18  8:21 AM.  If you have any questions, ask your nurse or doctor.               Start taking these medicines.        Dose/Directions    lisinopril 10 MG tablet   Commonly known as:  PRINIVIL/ZESTRIL   Used for:  Hypertension goal BP (blood pressure) < 140/90   Started by:  Leonor Muir MD        Dose:  10 mg   Take 1 tablet (10 mg) by mouth daily   Quantity:  30 tablet   Refills:  1       "   Stop taking these medicines if you haven't already. Please contact your care team if you have questions.     celecoxib 200 MG capsule   Commonly known as:  celeBREX   Stopped by:  Leonor Muir MD                Where to get your medicines      These medications were sent to [facility] [place] - [address]  [address], [place] [address]     Phone:  [phone]     lisinopril 10 MG tablet    triamcinolone 0.1 % ointment                Primary Care Provider Office Phone # Fax #    Leonor Muir [phone] [address]  [facility] [address]        Equal Access to Services     [facility]: Hadii aad ku hadasho Soomaali, waaxda luqadaha, qaybta kaalmada adeegyada, waxay idiin hayalberto olson . So [facility] [phone].    ATENCIÓN: Si habla español, tiene a croft disposición servicios gratuitos de asistencia lingüística. [facility] [phone].    We comply with applicable federal civil rights laws and [place] laws. We do not discriminate on the basis of race, color, national origin, age, disability, sex, sexual orientation, or gender identity.            Thank you!     Thank you for choosing [facility]  for your care. Our goal is always to provide you with excellent care. Hearing back from our patients is one way we can continue to improve our services. Please take a few minutes to complete the written survey that you may receive in the mail after your visit with us. Thank you!             Your Updated Medication List - Protect others around you: Learn how to safely use, store and throw away your medicines at www.disposemymeds.org.          This list is accurate as of 10/1/18  8:21 AM.  Always use your most recent med list.                   Brand Name Dispense Instructions for use Diagnosis    BIOTIN PO      Take by mouth daily    Preop general physical exam, Primary osteoarthritis of right knee        CALCIUM + D PO      Take 1 tablet by mouth daily        CENTRUM ADULTS PO      Take 1 tablet by mouth daily        FISH OIL OMEGA-3 PO      Take 1 capsule by mouth daily        lisinopril 10 MG tablet    PRINIVIL/ZESTRIL    30 tablet    Take 1 tablet (10 mg) by mouth daily    Hypertension goal BP (blood pressure) < 140/90       nystatin [id number] UNIT/GM Powd    MYCOSTATIN    60 g    Apply topically 3 times daily as needed    Intertriginous candidiasis       triamcinolone 0.1 % ointment    KENALOG    80 g    Apply sparingly to affected area twice daily. Apply sparingly 2-3 weeks as directed.    Dermatitis       VITAMIN B 12 PO      Take 1 tablet by mouth daily        vitamin B complex with vitamin C Tabs tablet      Take 1 tablet by mouth daily

## 2018-10-01 NOTE — PATIENT INSTRUCTIONS
Get the flu shot today  Get the labs today    Schedule for mammogram  Schedule for HTN recheck and non fasting labs in 2 weeks    Start on LISINOPRIL 10mg daily      Preventive Health Recommendations  Female Ages 50 - 64    Yearly exam: See your health care provider every year in order to  o Review health changes.   o Discuss preventive care.    o Review your medicines if your doctor has prescribed any.      Get a Pap test every three years (unless you have an abnormal result and your provider advises testing more often).    If you get Pap tests with HPV test, you only need to test every 5 years, unless you have an abnormal result.     You do not need a Pap test if your uterus was removed (hysterectomy) and you have not had cancer.    You should be tested each year for STDs (sexually transmitted diseases) if you're at risk.     Have a mammogram every 1 to 2 years.    Have a colonoscopy at age 50, or have a yearly FIT test (stool test). These exams screen for colon cancer.      Have a cholesterol test every 5 years, or more often if advised.    Have a diabetes test (fasting glucose) every three years. If you are at risk for diabetes, you should have this test more often.     If you are at risk for osteoporosis (brittle bone disease), think about having a bone density scan (DEXA).        Shots: Get a flu shot each year. Get a tetanus shot every 10 years.    Nutrition:     Eat at least 5 servings of fruits and vegetables each day.    Eat whole-grain bread, whole-wheat pasta and brown rice instead of white grains and rice.    Get adequate Calcium and Vitamin D.     Lifestyle    Exercise at least 150 minutes a week (30 minutes a day, 5 days a week). This will help you control your weight and prevent disease.    Limit alcohol to one drink per day.    No smoking.     Wear sunscreen to prevent skin cancer.     See your dentist every six months for an exam and cleaning.    See your eye doctor every 1 to 2 years.

## 2018-10-01 NOTE — PROGRESS NOTES

## 2018-10-01 NOTE — PROGRESS NOTES
SUBJECTIVE:   CC: Nilda Amaro is an 63 year old woman who presents for preventive health visit.     Healthy Habits:    Do you get at least three servings of calcium containing foods daily (dairy, green leafy vegetables, etc.)? yes    Amount of exercise or daily activities, outside of work: 3-4 day(s) per week    Problems taking medications regularly No    Medication side effects: No    Have you had an eye exam in the past two years? yes    Do you see a dentist twice per year? yes    Do you have sleep apnea, excessive snoring or daytime drowsiness?no      Hypertension Follow-up  New diagnosis, patient gets higher readings at home has significant family history of hypertension or heart disease  Occasionally feels lightheaded and fuzzy, and noted high blood pressure readings at those times   Denies chest pain, SOB, edema legs, visual concerns, focal neurological symptoms,, syncope, palpitations.  Denies concerns for snoring, sleep apnea, patient does not drink alcohol, does not smoke        Outpatient blood pressures are being checked at home.  Results are 145-150's/85-90's.    Low Salt Diet: no added salt      Today's PHQ-2 Score:   PHQ-2 ( 1999 Pfizer) 10/1/2018 9/25/2017   Q1: Little interest or pleasure in doing things 0 0   Q2: Feeling down, depressed or hopeless 0 0   PHQ-2 Score 0 0       Abuse: Current or Past(Physical, Sexual or Emotional)- No  Do you feel safe in your environment - Yes    Social History   Substance Use Topics     Smoking status: Former Smoker     Smokeless tobacco: Never Used     Alcohol use 0.0 oz/week     0 Standard drinks or equivalent per week     If you drink alcohol do you typically have >3 drinks per day or >7 drinks per week? No                     Reviewed orders with patient.  Reviewed health maintenance and updated orders accordingly - Yes  Labs reviewed in EPIC  BP Readings from Last 3 Encounters:   10/01/18 130/84   08/06/18 144/88   03/26/18 137/85    Wt Readings from Last 3  Encounters:   10/01/18 165 lb 9.6 oz (75.1 kg)   08/06/18 163 lb 8 oz (74.2 kg)   03/26/18 157 lb 14.4 oz (71.6 kg)                  Patient Active Problem List   Diagnosis     Advance care planning     Obesity, Class I, BMI 30-34.9     Gastroesophageal reflux disease without esophagitis     Medial meniscus tear, right, subsequent encounter     Chondromalacia patellae, left     Chondromalacia patellae, right     Primary osteoarthritis of both knees     CARDIOVASCULAR SCREENING; LDL GOAL LESS THAN 160     Change in color of pigmented skin lesion, left neck, 2mm     Family history of breast cancer in sister     Hypertension goal BP (blood pressure) < 140/90     Past Surgical History:   Procedure Laterality Date     COLONOSCOPY WITH CO2 INSUFFLATION N/A 3/21/2016    Procedure: COLONOSCOPY WITH CO2 INSUFFLATION;  Surgeon: Geoffrey Blackwell MD;  Location: [facility]     ENDOSCOPY UPPER, COLONOSCOPY, COMBINED N/A 3/21/2016    Procedure: COMBINED ENDOSCOPY UPPER, COLONOSCOPY;  Surgeon: Geoffrey Blackwell MD;  Location: [facility]     ESOPHAGOSCOPY, GASTROSCOPY, DUODENOSCOPY (EGD), COMBINED N/A 3/21/2016    Procedure: COMBINED ESOPHAGOSCOPY, GASTROSCOPY, DUODENOSCOPY (EGD), BIOPSY SINGLE OR MULTIPLE;  Surgeon: Geoffrey Blackwell MD;  Location: [facility]       Social History   Substance Use Topics     Smoking status: Former Smoker     Smokeless tobacco: Never Used     Alcohol use 0.0 oz/week     0 Standard drinks or equivalent per week     Family History   Problem Relation Age of Onset     Ankylosing Spondylitis Brother 57     HEART DISEASE Brother      Breast Cancer Sister      HEART DISEASE Sister      HEART DISEASE Mother      HEART DISEASE Father      HEART DISEASE Brother          Current Outpatient Prescriptions   Medication Sig Dispense Refill     BIOTIN PO Take by mouth daily       Calcium Citrate-Vitamin D (CALCIUM + D PO) Take 1 tablet by mouth daily       Cyanocobalamin (VITAMIN B 12 PO) Take 1 tablet by mouth  daily       lisinopril (PRINIVIL/ZESTRIL) 10 MG tablet Take 1 tablet (10 mg) by mouth daily 30 tablet 1     Multiple Vitamins-Minerals (CENTRUM ADULTS PO) Take 1 tablet by mouth daily       Omega-3 Fatty Acids (FISH OIL OMEGA-3 PO) Take 1 capsule by mouth daily       triamcinolone (KENALOG) 0.1 % ointment Apply sparingly to affected area twice daily. Apply sparingly 2-3 weeks as directed. 80 g 1     vitamin B complex with vitamin C (VITAMIN  B COMPLEX) TABS tablet Take 1 tablet by mouth daily       nystatin (MYCOSTATIN) 514711 UNIT/GM POWD Apply topically 3 times daily as needed (Patient not taking: Reported on 10/1/2018) 60 g 3     Allergies   Allergen Reactions     Penicillins      Recent Labs   Lab Test  03/26/18   1415  09/25/17   1052  10/10/16   1140   08/03/15   0949   A1C   --    --    --    --   5.4   LDL   --   103*   --    --   119   HDL   --   81   --    --   78   TRIG   --   46   --    --   65   ALT   --   24  23   --    --    CR  0.65  0.67  0.69   < >   --    GFRESTIMATED  >90  89  86   < >   --    GFRESTBLACK  >90  >90  >90  African American GFR Calc     < >   --    POTASSIUM  3.8  4.0  3.9   < >   --    TSH   --   1.19   --    --    --     < > = values in this interval not displayed.        Patient over age 50, mutual decision to screen reflected in health maintenance.    Pertinent mammograms are reviewed under the imaging tab.  History of abnormal Pap smear: NO - age 30- 65 PAP every 3 years recommended  PAP / HPV Latest Ref Rng & Units 8/4/2015   PAP - NIL   HPV 16 DNA NEG Negative   HPV 18 DNA NEG Negative   OTHER HR HPV NEG Negative     Reviewed and updated as needed this visit by clinical staff  Tobacco  Allergies  Meds  Med Hx  Surg Hx  Fam Hx  Soc Hx        Reviewed and updated as needed this visit by Provider          Past Medical History:   Diagnosis Date     Arthritis       Past Surgical History:   Procedure Laterality Date     COLONOSCOPY WITH CO2 INSUFFLATION N/A 3/21/2016     Procedure: COLONOSCOPY WITH CO2 INSUFFLATION;  Surgeon: Geoffrey Blackwell MD;  Location: [facility]     ENDOSCOPY UPPER, COLONOSCOPY, COMBINED N/A 3/21/2016    Procedure: COMBINED ENDOSCOPY UPPER, COLONOSCOPY;  Surgeon: Geoffrey Blackwell MD;  [facility]     ESOPHAGOSCOPY, GASTROSCOPY, DUODENOSCOPY (EGD), COMBINED N/A 3/21/2016    Procedure: COMBINED ESOPHAGOSCOPY, GASTROSCOPY, DUODENOSCOPY (EGD), BIOPSY SINGLE OR MULTIPLE;  Surgeon: Geoffrey Blackwell MD;  Location: [facility]     Obstetric History     No data available          ROS:  CONSTITUTIONAL: NEGATIVE for fever, chills, change in weight  INTEGUMENTARY/SKIN: Complaining of a red flaky skin lesion at the lower back,has used topical steroids in the past, is requesting refills  EYES: NEGATIVE for vision changes or irritation  ENT: NEGATIVE for ear, mouth and throat problems  RESP: NEGATIVE for significant cough or SOB  BREAST: NEGATIVE for masses, tenderness or discharge  CV: NEGATIVE for chest pain, palpitations or peripheral edema  CV: as above  GI: NEGATIVE for nausea, abdominal pain, heartburn, or change in bowel habits  : NEGATIVE for unusual urinary or vaginal symptoms. No vaginal bleeding.  MUSCULOSKELETAL: NEGATIVE for significant arthralgias or myalgia  NEURO: NEGATIVE for weakness, dizziness or paresthesias  ENDOCRINE: NEGATIVE for temperature intolerance, skin/hair changes  HEME/ALLERGY/IMMUNE: NEGATIVE for bleeding problems  PSYCHIATRIC: NEGATIVE for changes in mood or affect     OBJECTIVE:   There were no vitals taken for this visit.  EXAM:  GENERAL APPEARANCE: healthy, alert and no distress  EYES: Eyes grossly normal to inspection, PERRL and conjunctivae and sclerae normal  HENT: ear canals and TM's normal, nose and mouth without ulcers or lesions, oropharynx clear and oral mucous membranes moist  NECK: no adenopathy, no asymmetry, masses, or scars and thyroid normal to palpation  RESP: lungs clear to auscultation - no rales, rhonchi  or wheezes  BREAST: normal without masses, tenderness or nipple discharge and no palpable axillary masses or adenopathy  CV: regular rate and rhythm, normal S1 S2, no S3 or S4, no murmur, click or rub, no peripheral edema and peripheral pulses strong  ABDOMEN: soft, nontender, no hepatosplenomegaly, no masses and bowel sounds normal   (female): normal female external genitalia, normal urethral meatus, vaginal mucosal atrophy noted, normal cervix, adnexae, and uterus without masses or abnormal discharge  MS: no musculoskeletal defects are noted and gait is age appropriate without ataxia  SKIN: Erythematous dry patch of skin in the sacrococcygeal region  NEURO: Normal strength and tone, sensory exam grossly normal, mentation intact and speech normal  PSYCH: mentation appears normal and affect normal/bright    Diagnostic Test Results:  none     ASSESSMENT/PLAN:   1. Encounter for general adult medical examination with abnormal findings  Discussed on regular exercises, daily calcium intake, healthy eating, self breast exams monthly and routine dental checks  - Pap imaged thin layer screen with HPV - recommended age 30 - 65 years (select HPV order below)  - HPV High Risk Types DNA Cervical  - CBC with platelets and differential  - Comprehensive metabolic panel  - TSH with free T4 reflex  - Lipid panel reflex to direct LDL Fasting  - HIV Antigen Antibody Combo  - MA Screening Digital Bilateral; Future    2. Hypertension goal BP (blood pressure) < 140/90  BP Readings from Last 6 Encounters:   10/01/18 130/84   08/06/18 144/88   03/26/18 137/85   10/09/17 128/81   09/25/17 116/60   10/10/16 135/82     Reviewed multiple elevated blood pressure readings at home  Blood pressure today was in the prehypertensive range.    Given the family history and recent high readings and symptoms, recommended to start on lisinopril 10 mg daily to control the blood pressure  Follow-up for recheck in 2 weeks along with Nonfasting BMP  lab  Will follow low salt diet, weight loss and regular exercises.  Dosing and potential medication side effects discussed.  Patient verbalised understanding and is agreeable to the plan.    - lisinopril (PRINIVIL/ZESTRIL) 10 MG tablet; Take 1 tablet (10 mg) by mouth daily  Dispense: 30 tablet; Refill: 1  - CBC with platelets and differential  - Comprehensive metabolic panel  - TSH with free T4 reflex  - Lipid panel reflex to direct LDL Fasting  - Albumin Random Urine Quantitative with Creat Ratio    3. Screening for cervical cancer    - Pap imaged thin layer screen with HPV - recommended age 30 - 65 years (select HPV order below)  - HPV High Risk Types DNA Cervical    4. Screening for human immunodeficiency virus    - HIV Antigen Antibody Combo    5. Screening for thyroid disorder    - TSH with free T4 reflex    6. Screening for deficiency anemia    - CBC with platelets and differential    7. Screening for diabetes mellitus (DM)    - Comprehensive metabolic panel      9. Dermatitis    - triamcinolone (KENALOG) 0.1 % ointment; Apply sparingly to affected area twice daily. Apply sparingly 2-3 weeks as directed.  Dispense: 80 g; Refill: 1    10. Breast cancer screening    - MA Screening Digital Bilateral; Future    11. Need for prophylactic vaccination and inoculation against influenza    - Vaccine Administration, Initial [68039]  - FLU VACCINE, (RIV4) RECOMBINANT HA  , IM (FluBlok, egg free) [26011]- >18 YRS ([facility] [place] recommended  50-64 YRS)    12. Family history of breast cancer in sister  Continue annual mammograms    13. CARDIOVASCULAR SCREENING; LDL GOAL LESS THAN 160    - Lipid panel reflex to direct LDL Fasting    14. Obesity, Class I, BMI 30-34.9  Wt Readings from Last 5 Encounters:   10/01/18 165 lb 9.6 oz (75.1 kg)   08/06/18 163 lb 8 oz (74.2 kg)   03/26/18 157 lb 14.4 oz (71.6 kg)   01/29/18 155 lb (70.3 kg)   10/09/17 151 lb 9.6 oz (68.8 kg)     Emphasized on weight loss, portion control, low calorie and low  "fat diet, healthy eating, regular exercises.        COUNSELING:   Reviewed preventive health counseling, as reflected in patient instructions  Special attention given to:        Regular exercise       Healthy diet/nutrition       Vision screening       Immunizations    Vaccinated for: Influenza             Osteoporosis Prevention/Bone Health       Colon cancer screening       HIV screeninx in teen years, 1x in adult years, and at intervals if high risk       (Christelle)menopause management       The 10-year ASCVD risk score (David BERNAL Jr, et al., 2013) is: 4.4%    Values used to calculate the score:      Age: 63 years      Sex: Female      Is Non- : No      Diabetic: No      Tobacco smoker: No      Systolic Blood Pressure: 130 mmHg      Is BP treated: Yes      HDL Cholesterol: 81 mg/dL      Total Cholesterol: 193 mg/dL       Advance Care Planning    BP Readings from Last 1 Encounters:   18 144/88     Estimated body mass index is 30.39 kg/(m^2) as calculated from the following:    Height as of 18: 5' 1.5\" (1.562 m).    Weight as of 18: 163 lb 8 oz (74.2 kg).      Weight management plan: Discussed healthy diet and exercise guidelines and patient will follow up in 6 months in clinic to re-evaluate.     reports that she has quit smoking. She has never used smokeless tobacco.      Counseling Resources:  ATP IV Guidelines  Pooled Cohorts Equation Calculator  Breast Cancer Risk Calculator  FRAX Risk Assessment  ICSI Preventive Guidelines  Dietary Guidelines for Americans,   USDA's MyPlate  ASA Prophylaxis  Lung CA Screening    Leonor Muir MD  [facility]  Chart documentation done in part with Dragon Voice recognition Software. Although reviewed after completion, some word and grammatical error may remain.    "

## 2018-10-02 NOTE — PROGRESS NOTES
Calixto Munguia,  Your lab results showed fasting cholesterol within the expected range for your age, normal urine exam, normal fasting blood sugar, liver and kidney functions, thyroid functions, hemoglobin and blood counts.  Your test results for HIV screening is negative, this is good and reassuring.   Let me know if you have any questions. Take care.  Leonor Muir MD

## 2018-10-04 LAB
COPATH REPORT: NORMAL
PAP: NORMAL

## 2018-10-05 LAB
FINAL DIAGNOSIS: NORMAL
HPV HR 12 DNA CVX QL NAA+PROBE: NEGATIVE
HPV16 DNA SPEC QL NAA+PROBE: NEGATIVE
HPV18 DNA SPEC QL NAA+PROBE: NEGATIVE
SPECIMEN DESCRIPTION: NORMAL
SPECIMEN SOURCE CVX/VAG CYTO: NORMAL

## 2018-10-15 ENCOUNTER — OFFICE VISIT (OUTPATIENT)
Dept: PEDIATRICS | Facility: CLINIC | Age: 63
End: 2018-10-15
Payer: COMMERCIAL

## 2018-10-15 VITALS
DIASTOLIC BLOOD PRESSURE: 79 MMHG | BODY MASS INDEX: 30.24 KG/M2 | WEIGHT: 164 LBS | SYSTOLIC BLOOD PRESSURE: 123 MMHG | TEMPERATURE: 97.9 F | OXYGEN SATURATION: 99 % | HEART RATE: 55 BPM

## 2018-10-15 DIAGNOSIS — I10 HYPERTENSION GOAL BP (BLOOD PRESSURE) < 140/90: ICD-10-CM

## 2018-10-15 LAB
ANION GAP SERPL CALCULATED.3IONS-SCNC: 6 MMOL/L (ref 3–14)
BUN SERPL-MCNC: 17 MG/DL (ref 7–30)
CALCIUM SERPL-MCNC: 9 MG/DL (ref 8.5–10.1)
CHLORIDE SERPL-SCNC: 106 MMOL/L (ref 94–109)
CO2 SERPL-SCNC: 26 MMOL/L (ref 20–32)
CREAT SERPL-MCNC: 0.7 MG/DL (ref 0.52–1.04)
GFR SERPL CREATININE-BSD FRML MDRD: 85 ML/MIN/1.7M2
GLUCOSE SERPL-MCNC: 124 MG/DL (ref 70–99)
POTASSIUM SERPL-SCNC: 4 MMOL/L (ref 3.4–5.3)
SODIUM SERPL-SCNC: 138 MMOL/L (ref 133–144)

## 2018-10-15 PROCEDURE — 99213 OFFICE O/P EST LOW 20 MIN: CPT | Performed by: FAMILY MEDICINE

## 2018-10-15 PROCEDURE — 80048 BASIC METABOLIC PNL TOTAL CA: CPT | Performed by: FAMILY MEDICINE

## 2018-10-15 PROCEDURE — 36415 COLL VENOUS BLD VENIPUNCTURE: CPT | Performed by: FAMILY MEDICINE

## 2018-10-15 RX ORDER — LISINOPRIL 10 MG/1
10 TABLET ORAL DAILY
Qty: 90 TABLET | Refills: 1 | Status: SHIPPED | OUTPATIENT
Start: 2018-10-15 | End: 2019-04-15

## 2018-10-15 ASSESSMENT — PAIN SCALES - GENERAL: PAINLEVEL: NO PAIN (0)

## 2018-10-15 NOTE — MR AVS SNAPSHOT
After Visit Summary   10/15/2018    Nilda Amaro    MRN: [id number]           Patient Information     Date Of Birth          1955        Visit Information        Provider Department      10/15/2018 8:10 AM Leonor Muir MD [facility]        Today's Diagnoses     Hypertension goal BP (blood pressure) < 140/90          Care Instructions    Schedule for recheck in 6 months          Follow-ups after your visit        Who to contact     If you have questions or need follow up information about today's clinic visit or your schedule please contact [facility] [place] directly at [phone].  Normal or non-critical lab and imaging results will be communicated to you by [employer], letter or phone within 4 business days after the clinic has received the results. If you do not hear from us within 7 days, please contact the clinic through [employer] or phone. If you have a critical or abnormal lab result, we will notify you by phone as soon as possible.  Submit refill requests through [employer]+ or call your pharmacy and they will forward the refill request to us. Please allow 3 business days for your refill to be completed.          Additional Information About Your Visit        MyChart Information     [employer] gives you secure access to your electronic health record. If you see a primary care provider, you can also send messages to your care team and make appointments. If you have questions, please call your primary care clinic.  If you do not have a primary care provider, please call [phone] and they will assist you.      [employer]+ is an electronic gateway that provides easy, online access to your medical records. With [employer]+, you can request a clinic appointment, read your test results, renew a prescription or communicate with your care team.     To access your existing account, please contact your [facility] or call [phone] for  assistance.        Care EveryWhere ID     This is your Care EveryWhere ID. This could be used by other organizations to access your [facility] medical records  [id number]        Your Vitals Were     Pulse Temperature Pulse Oximetry BMI (Body Mass Index)          55 97.9  F (36.6  C) (Oral) 99% 30.24 kg/m2         Blood Pressure from Last 3 Encounters:   10/15/18 123/79   10/01/18 130/84   08/06/18 144/88    Weight from Last 3 Encounters:   10/15/18 164 lb (74.4 kg)   10/01/18 165 lb 9.6 oz (75.1 kg)   08/06/18 163 lb 8 oz (74.2 kg)              Today, you had the following     No orders found for display         Where to get your medicines      [place] [facility] [place] [address]  [address], [facility] [address]     Phone:  [phone]     lisinopril 10 MG tablet          Primary Care Provider Office Phone # Fax #    Leonor Muir [phone] [address]  [place] [address]        Equal Access to Services     PIPE ALVAREZ : Hadii aad ku hadasho Soomaali, waaxda luqadaha, qaybta kaalmada adeegyada, sujata metzin jaiden murphy. So [facility] [phone].    ATENCIÓN: Si habla español, tiene a croft disposición servicios gratuitos de asistencia lingüística. Llame al [phone].    We comply with applicable federal civil rights laws and [place] laws. We do not discriminate on the basis of race, color, national origin, age, disability, sex, sexual orientation, or gender identity.            Thank you!     Thank you for choosing [facility]  for your care. Our goal is always to provide you with excellent care. Hearing back from our patients is one way we can continue to improve our services. Please take a few minutes to complete the written survey that you may receive in the mail after your visit with us. Thank you!             Your Updated Medication List - Protect others around you:  Learn how to safely use, store and throw away your medicines at www.disposemymeds.org.          This list is accurate as of 10/15/18  8:33 AM.  Always use your most recent med list.                   Brand Name Dispense Instructions for use Diagnosis    BIOTIN PO      Take by mouth daily    Preop general physical exam, Primary osteoarthritis of right knee       CALCIUM + D PO      Take 1 tablet by mouth daily        CENTRUM ADULTS PO      Take 1 tablet by mouth daily        FISH OIL OMEGA-3 PO      Take 1 capsule by mouth daily        lisinopril 10 MG tablet    PRINIVIL/ZESTRIL    90 tablet    Take 1 tablet (10 mg) by mouth daily    Hypertension goal BP (blood pressure) < 140/90       nystatin 272108 UNIT/GM Powd    MYCOSTATIN    60 g    Apply topically 3 times daily as needed    Intertriginous candidiasis       triamcinolone 0.1 % ointment    KENALOG    80 g    Apply sparingly to affected area twice daily. Apply sparingly 2-3 weeks as directed.    Dermatitis       VITAMIN B 12 PO      Take 1 tablet by mouth daily        vitamin B complex with vitamin C Tabs tablet      Take 1 tablet by mouth daily

## 2018-10-15 NOTE — PROGRESS NOTES
SUBJECTIVE:   Nilda Amaro is a 63 year old female who presents to clinic today for the following health issues:    Hypertension Follow-up  Patient is here for hypertension recheck after being started on lisinopril 2 weeks ago.  Patient's symptoms including dizziness, headache and blurred vision were all improved after starting on medicine.    Denies medication side effects      Outpatient blood pressures are not being checked.    Low Salt Diet: low salt      Amount of exercise or physical activity: 4-5 days/week for an average of 15-30 minutes    Problems taking medications regularly: No    Medication side effects: none    Diet: low salt            Problem list and histories reviewed & adjusted, as indicated.  Additional history: as documented    Patient Active Problem List   Diagnosis     Advance care planning     Obesity, Class I, BMI 30-34.9     Gastroesophageal reflux disease without esophagitis     Medial meniscus tear, right, subsequent encounter     Chondromalacia patellae, left     Chondromalacia patellae, right     Primary osteoarthritis of both knees     CARDIOVASCULAR SCREENING; LDL GOAL LESS THAN 160     Change in color of pigmented skin lesion, left neck, 2mm     Family history of breast cancer in sister     Hypertension goal BP (blood pressure) < 140/90     Past Surgical History:   Procedure Laterality Date     COLONOSCOPY WITH CO2 INSUFFLATION N/A 3/21/2016    Procedure: COLONOSCOPY WITH CO2 INSUFFLATION;  Surgeon: Geoffrey Blackwell MD;  Location:  OR     ENDOSCOPY UPPER, COLONOSCOPY, COMBINED N/A 3/21/2016    Procedure: COMBINED ENDOSCOPY UPPER, COLONOSCOPY;  Surgeon: Geoffrey Blackwell MD;  Location:  OR     ESOPHAGOSCOPY, GASTROSCOPY, DUODENOSCOPY (EGD), COMBINED N/A 3/21/2016    Procedure: COMBINED ESOPHAGOSCOPY, GASTROSCOPY, DUODENOSCOPY (EGD), BIOPSY SINGLE OR MULTIPLE;  Surgeon: Geoffrey Blackwell MD;  Location:  OR       Social History   Substance Use Topics      Smoking status: Former Smoker     Smokeless tobacco: Never Used     Alcohol use 0.0 oz/week     0 Standard drinks or equivalent per week     Family History   Problem Relation Age of Onset     Ankylosing Spondylitis Brother 57     HEART DISEASE Brother      Breast Cancer Sister      HEART DISEASE Sister      HEART DISEASE Mother      HEART DISEASE Father      HEART DISEASE Brother          Current Outpatient Prescriptions   Medication Sig Dispense Refill     BIOTIN PO Take by mouth daily       Calcium Citrate-Vitamin D (CALCIUM + D PO) Take 1 tablet by mouth daily       Cyanocobalamin (VITAMIN B 12 PO) Take 1 tablet by mouth daily       lisinopril (PRINIVIL/ZESTRIL) 10 MG tablet Take 1 tablet (10 mg) by mouth daily 90 tablet 1     Multiple Vitamins-Minerals (CENTRUM ADULTS PO) Take 1 tablet by mouth daily       Omega-3 Fatty Acids (FISH OIL OMEGA-3 PO) Take 1 capsule by mouth daily       triamcinolone (KENALOG) 0.1 % ointment Apply sparingly to affected area twice daily. Apply sparingly 2-3 weeks as directed. 80 g 1     vitamin B complex with vitamin C (VITAMIN  B COMPLEX) TABS tablet Take 1 tablet by mouth daily       nystatin (MYCOSTATIN) 833675 UNIT/GM POWD Apply topically 3 times daily as needed (Patient not taking: Reported on 10/1/2018) 60 g 3     [DISCONTINUED] lisinopril (PRINIVIL/ZESTRIL) 10 MG tablet Take 1 tablet (10 mg) by mouth daily 30 tablet 1     Allergies   Allergen Reactions     Penicillins      Recent Labs   Lab Test  10/15/18   0752  10/01/18   0835   09/25/17   1052  10/10/16   1140   08/03/15   0949   A1C   --    --    --    --    --    --   5.4   LDL   --   113*   --   103*   --    --   119   HDL   --   90   --   81   --    --   78   TRIG   --   36   --   46   --    --   65   ALT   --   22   --   24  23   --    --    CR  0.70  0.67   < >  0.67  0.69   < >   --    GFRESTIMATED  85  89   < >  89  86   < >   --    GFRESTBLACK  >90  >90   < >  >90  >90  African American GFR Calc     < >   --     POTASSIUM  4.0  4.1   < >  4.0  3.9   < >   --    TSH   --   2.43   --   1.19   --    --    --     < > = values in this interval not displayed.      BP Readings from Last 3 Encounters:   10/15/18 123/79   10/01/18 130/84   08/06/18 144/88    Wt Readings from Last 3 Encounters:   10/15/18 164 lb (74.4 kg)   10/01/18 165 lb 9.6 oz (75.1 kg)   08/06/18 163 lb 8 oz (74.2 kg)                  Labs reviewed in EPIC    Reviewed and updated as needed this visit by clinical staff       Reviewed and updated as needed this visit by Provider         ROS:  CONSTITUTIONAL: NEGATIVE for fever, chills, change in weight  INTEGUMENTARY/SKIN: NEGATIVE for worrisome rashes, moles or lesions  EYES: NEGATIVE for vision changes or irritation  RESP: NEGATIVE for significant cough or SOB  CV: NEGATIVE for chest pain, palpitations or peripheral edema  CV: Hx HTN  GI: NEGATIVE for nausea, abdominal pain, heartburn, or change in bowel habits  MUSCULOSKELETAL: NEGATIVE for significant arthralgias or myalgia  NEURO: NEGATIVE for weakness, dizziness or paresthesias  ENDOCRINE: NEGATIVE for temperature intolerance, skin/hair changes  PSYCHIATRIC: NEGATIVE for changes in mood or affect    OBJECTIVE:     /79 (BP Location: Right arm, Patient Position: Sitting, Cuff Size: Adult Regular)  Pulse 55  Temp 97.9  F (36.6  C) (Oral)  Wt 164 lb (74.4 kg)  SpO2 99%  BMI 30.24 kg/m2  Body mass index is 30.24 kg/(m^2).  GENERAL: healthy, alert and no distress  NECK: no adenopathy, no asymmetry, masses, or scars and thyroid normal to palpation  RESP: lungs clear to auscultation - no rales, rhonchi or wheezes  CV: regular rate and rhythm, normal S1 S2, no S3 or S4, no murmur, click or rub, no peripheral edema and peripheral pulses strong  MS: no gross musculoskeletal defects noted, no edema  PSYCH: mentation appears normal, affect normal/bright    Diagnostic Test Results:  Results for orders placed or performed in visit on 10/15/18 (from the past 24  hour(s))   **Basic metabolic panel FUTURE 14d   Result Value Ref Range    Sodium 138 133 - 144 mmol/L    Potassium 4.0 3.4 - 5.3 mmol/L    Chloride 106 94 - 109 mmol/L    Carbon Dioxide 26 20 - 32 mmol/L    Anion Gap 6 3 - 14 mmol/L    Glucose 124 (H) 70 - 99 mg/dL    Urea Nitrogen 17 7 - 30 mg/dL    Creatinine 0.70 0.52 - 1.04 mg/dL    GFR Estimate 85 >60 mL/min/1.7m2    GFR Estimate If Black >90 >60 mL/min/1.7m2    Calcium 9.0 8.5 - 10.1 mg/dL       ASSESSMENT/PLAN:     Hypertension; controlled   Associated with the following complications:    None   Plan:  No changes in the patient's current treatment plan          1. Hypertension goal BP (blood pressure) < 140/90  BP Readings from Last 6 Encounters:   10/15/18 123/79   10/01/18 130/84   08/06/18 144/88   03/26/18 137/85   10/09/17 128/81   09/25/17 116/60     Blood pressure is at goal,  Continue with lisinopril 10 mg daily, regular exercises, low-salt diet, recheck in 6 months  - lisinopril (PRINIVIL/ZESTRIL) 10 MG tablet; Take 1 tablet (10 mg) by mouth daily  Dispense: 90 tablet; Refill: 1    Regular exercise  Chart documentation done in part with Dragon Voice recognition Software. Although reviewed after completion, some word and grammatical error may remain.    See Patient Instructions    Leonor Muir MD  [facility]

## 2018-11-12 ENCOUNTER — TRANSFERRED RECORDS (OUTPATIENT)
Dept: HEALTH INFORMATION MANAGEMENT | Facility: CLINIC | Age: 63
End: 2018-11-12

## 2018-12-27 ENCOUNTER — MYC REFILL (OUTPATIENT)
Dept: PEDIATRICS | Facility: CLINIC | Age: 63
End: 2018-12-27

## 2018-12-27 DIAGNOSIS — L30.9 DERMATITIS: ICD-10-CM

## 2018-12-30 RX ORDER — TRIAMCINOLONE ACETONIDE 1 MG/G
OINTMENT TOPICAL
Qty: 80 G | Refills: 1 | Status: SHIPPED | OUTPATIENT
Start: 2018-12-30 | End: 2019-10-21

## 2018-12-31 ENCOUNTER — TRANSFERRED RECORDS (OUTPATIENT)
Dept: HEALTH INFORMATION MANAGEMENT | Facility: CLINIC | Age: 63
End: 2018-12-31

## 2019-02-11 ENCOUNTER — TRANSFERRED RECORDS (OUTPATIENT)
Dept: HEALTH INFORMATION MANAGEMENT | Facility: CLINIC | Age: 64
End: 2019-02-11

## 2019-04-15 ENCOUNTER — OFFICE VISIT (OUTPATIENT)
Dept: PEDIATRICS | Facility: CLINIC | Age: 64
End: 2019-04-15
Payer: COMMERCIAL

## 2019-04-15 VITALS
WEIGHT: 167.8 LBS | HEART RATE: 56 BPM | TEMPERATURE: 97.7 F | OXYGEN SATURATION: 97 % | HEIGHT: 62 IN | BODY MASS INDEX: 30.88 KG/M2 | DIASTOLIC BLOOD PRESSURE: 81 MMHG | SYSTOLIC BLOOD PRESSURE: 128 MMHG

## 2019-04-15 DIAGNOSIS — G47.09 OTHER INSOMNIA: ICD-10-CM

## 2019-04-15 DIAGNOSIS — I10 HYPERTENSION GOAL BP (BLOOD PRESSURE) < 140/90: Primary | ICD-10-CM

## 2019-04-15 DIAGNOSIS — Z12.39 BREAST CANCER SCREENING: ICD-10-CM

## 2019-04-15 PROCEDURE — 99214 OFFICE O/P EST MOD 30 MIN: CPT | Performed by: FAMILY MEDICINE

## 2019-04-15 RX ORDER — TRAZODONE HYDROCHLORIDE 50 MG/1
25-50 TABLET, FILM COATED ORAL
Qty: 90 TABLET | Refills: 1 | Status: SHIPPED | OUTPATIENT
Start: 2019-04-15 | End: 2020-11-16

## 2019-04-15 RX ORDER — LISINOPRIL 10 MG/1
10 TABLET ORAL DAILY
Qty: 90 TABLET | Refills: 3 | Status: SHIPPED | OUTPATIENT
Start: 2019-04-15 | End: 2019-10-21

## 2019-04-15 ASSESSMENT — MIFFLIN-ST. JEOR: SCORE: 1270.42

## 2019-04-15 ASSESSMENT — PAIN SCALES - GENERAL: PAINLEVEL: NO PAIN (0)

## 2019-04-15 NOTE — PATIENT INSTRUCTIONS
Start on TRAZODONE as needed for sleep  Schedule for physical, mammogram, fasting labs in 6 months

## 2019-04-15 NOTE — PROGRESS NOTES
SUBJECTIVE:   Nilda Amaro is a 63 year old female who presents to clinic today for the following   health issues:    Hypertension Follow-up      Outpatient blood pressures are not being checked.    Low Salt Diet: low salt      Amount of exercise or physical activity: 4-5 days/week for an average of 30-45 minutes    Problems taking medications regularly: No    Medication side effects: none    Diet: low salt    Sleep Concerns - Patient reports trouble staying asleep. Patient started on Unisom about a 1 week ago and reports somewhat helpful but would prateek to further discuss.    Insomnia  Onset: Last  few months  Patient does not have trouble falling asleep, wakes up within 2 hours of going to bed and has a hard time getting back to sleep due to thinking about too many things but denies history of anxiety, panic attacks, previously problems.  Patient has mild snoring but denies concerns for sleep apnea, daytime sleepiness.  Has tried some with some relief but is worried about the long-term side effects.    Description:   Time to fall asleep (sleep latency): 15 minutes  Middle of night awakening:  YES  Early morning awakening:  YES    Progression of Symptoms:  worsening    Accompanying Signs & Symptoms:  Daytime sleepiness/napping: no  Excessive snoring/apnea: no  Restless legs: no  Frequent urination: no  Chronic pain:  no    History:  Prior Insomnia: no    Precipitating factors:   New stressful situation: no  Caffeine intake: no  OTC decongestants: no  Any new medications: no    Alleviating factors:  Self medicating (alcohol, etc.):  no    Therapies Tried and outcome: Over-the-counter Unisom with some relief        Additional history: as documented    Reviewed  and updated as needed this visit by clinical staff         Reviewed and updated as needed this visit by Provider         Patient Active Problem List   Diagnosis     Advance care planning     Obesity, Class I, BMI 30-34.9     Gastroesophageal reflux disease  without esophagitis     Medial meniscus tear, right, subsequent encounter     Chondromalacia patellae, left     Chondromalacia patellae, right     Primary osteoarthritis of both knees     CARDIOVASCULAR SCREENING; LDL GOAL LESS THAN 160     Change in color of pigmented skin lesion, left neck, 2mm     Family history of breast cancer in sister     Hypertension goal BP (blood pressure) < 140/90     Past Surgical History:   Procedure Laterality Date     COLONOSCOPY WITH CO2 INSUFFLATION N/A 3/21/2016    Procedure: COLONOSCOPY WITH CO2 INSUFFLATION;  Surgeon: Geoffrey Blackwell MD;  Location: [facility] OR     ENDOSCOPY UPPER, COLONOSCOPY, COMBINED N/A 3/21/2016    Procedure: COMBINED ENDOSCOPY UPPER, COLONOSCOPY;  Surgeon: Geoffrey Blackwell MD;  Location: [facility] OR     ESOPHAGOSCOPY, GASTROSCOPY, DUODENOSCOPY (EGD), COMBINED N/A 3/21/2016    Procedure: COMBINED ESOPHAGOSCOPY, GASTROSCOPY, DUODENOSCOPY (EGD), BIOPSY SINGLE OR MULTIPLE;  Surgeon: Geoffrey Blackwell MD;  Location: [facility] OR       Social History     Tobacco Use     Smoking status: Former Smoker     Smokeless tobacco: Never Used   Substance Use Topics     Alcohol use: Yes     Alcohol/week: 0.0 oz     Family History   Problem Relation Age of Onset     Ankylosing Spondylitis Brother 57     Heart Disease Brother      Breast Cancer Sister      Heart Disease Sister      Heart Disease Mother      Heart Disease Father      Heart Disease Brother          Current Outpatient Medications   Medication Sig Dispense Refill     BIOTIN PO Take by mouth daily       Cyanocobalamin (VITAMIN B 12 PO) Take 1 tablet by mouth daily       doxylamine (UNISOM) 25 MG TABS tablet Take 25 mg by mouth nightly as needed       lisinopril (PRINIVIL/ZESTRIL) 10 MG tablet Take 1 tablet (10 mg) by mouth daily 90 tablet 3     Multiple Vitamins-Minerals (CENTRUM ADULTS PO) Take 1 tablet by mouth daily       nystatin (MYCOSTATIN) 630451 UNIT/GM external powder Apply topically 3 times daily as  needed 60 g 3     traZODone (DESYREL) 50 MG tablet Take 0.5-1 tablets (25-50 mg) by mouth nightly as needed for sleep 90 tablet 1     triamcinolone (KENALOG) 0.1 % external ointment Apply sparingly to affected area twice daily. Apply sparingly 2-3 weeks as directed. 80 g 1     vitamin B complex with vitamin C (VITAMIN  B COMPLEX) TABS tablet Take 1 tablet by mouth daily       Calcium Citrate-Vitamin D (CALCIUM + D PO) Take 1 tablet by mouth daily       Omega-3 Fatty Acids (FISH OIL OMEGA-3 PO) Take 1 capsule by mouth daily       Allergies   Allergen Reactions     Penicillins      Recent Labs   Lab Test 10/15/18  0752 10/01/18  0835  09/25/17  1052 10/10/16  1140  08/03/15  0949   A1C  --   --   --   --   --   --  5.4   LDL  --  113*  --  103*  --   --  119   HDL  --  90  --  81  --   --  78   TRIG  --  36  --  46  --   --  65   ALT  --  22  --  24 23  --   --    CR 0.70 0.67   < > 0.67 0.69   < >  --    GFRESTIMATED 85 89   < > 89 86   < >  --    GFRESTBLACK >90 >90   < > >90 >90  African American GFR Calc     < >  --    POTASSIUM 4.0 4.1   < > 4.0 3.9   < >  --    TSH  --  2.43  --  1.19  --   --   --     < > = values in this interval not displayed.      BP Readings from Last 3 Encounters:   04/15/19 128/81   10/15/18 123/79   10/01/18 130/84    Wt Readings from Last 3 Encounters:   04/15/19 76.1 kg (167 lb 12.8 oz)   10/15/18 74.4 kg (164 lb)   10/01/18 75.1 kg (165 lb 9.6 oz)                  Labs reviewed in EPIC    ROS:  CONSTITUTIONAL: NEGATIVE for fever, chills, change in weight  RESP: NEGATIVE for significant cough or SOB  CV: NEGATIVE for chest pain, palpitations or peripheral edema  CV: Hx HTN  GI: NEGATIVE for nausea, abdominal pain, heartburn, or change in bowel habits  MUSCULOSKELETAL: NEGATIVE for significant arthralgias or myalgia  NEURO: NEGATIVE for weakness, dizziness or paresthesias  ENDOCRINE: NEGATIVE for temperature intolerance, skin/hair changes  PSYCHIATRIC: NEGATIVE for changes in mood or  "affect    OBJECTIVE:     /81   Pulse 56   Temp 97.7  F (36.5  C) (Oral)   Ht 1.568 m (5' 1.75\")   Wt 76.1 kg (167 lb 12.8 oz)   SpO2 97%   BMI 30.94 kg/m    Body mass index is 30.94 kg/m .  GENERAL: healthy, alert and no distress  NECK: no adenopathy, no asymmetry, masses, or scars and thyroid normal to palpation  RESP: lungs clear to auscultation - no rales, rhonchi or wheezes  CV: regular rate and rhythm, normal S1 S2, no S3 or S4, no murmur, click or rub, no peripheral edema and peripheral pulses strong  MS: no gross musculoskeletal defects noted, no edema  PSYCH: mentation appears normal, affect normal/bright    Diagnostic Test Results:  none     ASSESSMENT/PLAN:     Hypertension; controlled   Associated with the following complications:    None   Plan:  No changes in the patient's current treatment plan          1. Hypertension goal BP (blood pressure) < 140/90  BP Readings from Last 6 Encounters:   04/15/19 128/81   10/15/18 123/79   10/01/18 130/84   08/06/18 144/88   03/26/18 137/85   10/09/17 128/81       Blood pressure is at goal, continue with lisinopril 10 mg daily, low-salt diet, regular exercises.  Recheck along with fasting labs at the time of physical in 6 months or sooner if needed.  - lisinopril (PRINIVIL/ZESTRIL) 10 MG tablet; Take 1 tablet (10 mg) by mouth daily  Dispense: 90 tablet; Refill: 3    2. Other insomnia  Reviewed sleep hygiene  Recommended to stop using Unisom due to long-term side effects  Prescription given for trazodone to take 25-50 mg as needed for sleep  Dosing and potential medication side effects discussed.  Recommended patient to send a my chart message for update to provider in 2-3 weeks of trying the medication  Patient verbalised understanding and is agreeable to the plan.    - traZODone (DESYREL) 50 MG tablet; Take 0.5-1 tablets (25-50 mg) by mouth nightly as needed for sleep  Dispense: 90 tablet; Refill: 1    3. Breast cancer screening    - MA Screening " Digital Bilateral; Future    Chart documentation done in part with Dragon Voice recognition Software. Although reviewed after completion, some word and grammatical error may remain.    See Patient Instructions    Leonor Muir MD  [facility]

## 2019-10-12 DIAGNOSIS — I10 HYPERTENSION GOAL BP (BLOOD PRESSURE) < 140/90: ICD-10-CM

## 2019-10-12 DIAGNOSIS — Z13.6 CARDIOVASCULAR SCREENING; LDL GOAL LESS THAN 160: ICD-10-CM

## 2019-10-12 DIAGNOSIS — Z13.1 SCREENING FOR DIABETES MELLITUS (DM): ICD-10-CM

## 2019-10-12 DIAGNOSIS — Z00.00 ROUTINE GENERAL MEDICAL EXAMINATION AT A HEALTH CARE FACILITY: Primary | ICD-10-CM

## 2019-10-12 DIAGNOSIS — Z13.0 SCREENING FOR DEFICIENCY ANEMIA: ICD-10-CM

## 2019-10-21 ENCOUNTER — OFFICE VISIT (OUTPATIENT)
Dept: PEDIATRICS | Facility: CLINIC | Age: 64
End: 2019-10-21
Payer: COMMERCIAL

## 2019-10-21 ENCOUNTER — ANCILLARY PROCEDURE (OUTPATIENT)
Dept: MAMMOGRAPHY | Facility: CLINIC | Age: 64
End: 2019-10-21
Attending: FAMILY MEDICINE
Payer: COMMERCIAL

## 2019-10-21 VITALS
TEMPERATURE: 97.8 F | DIASTOLIC BLOOD PRESSURE: 82 MMHG | HEART RATE: 47 BPM | OXYGEN SATURATION: 99 % | HEIGHT: 62 IN | BODY MASS INDEX: 30.91 KG/M2 | WEIGHT: 168 LBS | SYSTOLIC BLOOD PRESSURE: 130 MMHG

## 2019-10-21 DIAGNOSIS — G47.09 OTHER INSOMNIA: ICD-10-CM

## 2019-10-21 DIAGNOSIS — Z12.39 BREAST CANCER SCREENING: ICD-10-CM

## 2019-10-21 DIAGNOSIS — I10 HYPERTENSION GOAL BP (BLOOD PRESSURE) < 140/90: ICD-10-CM

## 2019-10-21 DIAGNOSIS — L30.9 DERMATITIS: ICD-10-CM

## 2019-10-21 DIAGNOSIS — K21.00 GASTROESOPHAGEAL REFLUX DISEASE WITH ESOPHAGITIS: ICD-10-CM

## 2019-10-21 DIAGNOSIS — Z13.6 CARDIOVASCULAR SCREENING; LDL GOAL LESS THAN 160: ICD-10-CM

## 2019-10-21 DIAGNOSIS — Z12.4 CERVICAL CANCER SCREENING: ICD-10-CM

## 2019-10-21 DIAGNOSIS — Z00.00 ROUTINE GENERAL MEDICAL EXAMINATION AT A HEALTH CARE FACILITY: ICD-10-CM

## 2019-10-21 DIAGNOSIS — Z13.0 SCREENING FOR DEFICIENCY ANEMIA: ICD-10-CM

## 2019-10-21 DIAGNOSIS — Z23 NEED FOR PROPHYLACTIC VACCINATION AND INOCULATION AGAINST INFLUENZA: ICD-10-CM

## 2019-10-21 DIAGNOSIS — H91.93 HEARING DEFICIT, BILATERAL: ICD-10-CM

## 2019-10-21 DIAGNOSIS — K21.9 CHRONIC GERD: ICD-10-CM

## 2019-10-21 DIAGNOSIS — Z00.00 ROUTINE GENERAL MEDICAL EXAMINATION AT A HEALTH CARE FACILITY: Primary | ICD-10-CM

## 2019-10-21 DIAGNOSIS — Z13.1 SCREENING FOR DIABETES MELLITUS (DM): ICD-10-CM

## 2019-10-21 DIAGNOSIS — Z23 NEED FOR SHINGLES VACCINE: ICD-10-CM

## 2019-10-21 DIAGNOSIS — Z12.11 COLON CANCER SCREENING: ICD-10-CM

## 2019-10-21 DIAGNOSIS — B37.2 INTERTRIGINOUS CANDIDIASIS: ICD-10-CM

## 2019-10-21 LAB
ALBUMIN SERPL-MCNC: 3.8 G/DL (ref 3.4–5)
ALP SERPL-CCNC: 63 U/L (ref 40–150)
ALT SERPL W P-5'-P-CCNC: 21 U/L (ref 0–50)
ANION GAP SERPL CALCULATED.3IONS-SCNC: 6 MMOL/L (ref 3–14)
AST SERPL W P-5'-P-CCNC: 19 U/L (ref 0–45)
BASOPHILS # BLD AUTO: 0 10E9/L (ref 0–0.2)
BASOPHILS NFR BLD AUTO: 0.4 %
BILIRUB SERPL-MCNC: 0.5 MG/DL (ref 0.2–1.3)
BUN SERPL-MCNC: 17 MG/DL (ref 7–30)
CALCIUM SERPL-MCNC: 9.2 MG/DL (ref 8.5–10.1)
CHLORIDE SERPL-SCNC: 105 MMOL/L (ref 94–109)
CHOLEST SERPL-MCNC: 201 MG/DL
CO2 SERPL-SCNC: 26 MMOL/L (ref 20–32)
CREAT SERPL-MCNC: 0.62 MG/DL (ref 0.52–1.04)
CREAT UR-MCNC: 103 MG/DL
DIFFERENTIAL METHOD BLD: NORMAL
EOSINOPHIL # BLD AUTO: 0.3 10E9/L (ref 0–0.7)
EOSINOPHIL NFR BLD AUTO: 3.7 %
ERYTHROCYTE [DISTWIDTH] IN BLOOD BY AUTOMATED COUNT: 12.7 % (ref 10–15)
GFR SERPL CREATININE-BSD FRML MDRD: >90 ML/MIN/{1.73_M2}
GLUCOSE SERPL-MCNC: 87 MG/DL (ref 70–99)
HCT VFR BLD AUTO: 41 % (ref 35–47)
HDLC SERPL-MCNC: 84 MG/DL
HGB BLD-MCNC: 13.7 G/DL (ref 11.7–15.7)
IMM GRANULOCYTES # BLD: 0 10E9/L (ref 0–0.4)
IMM GRANULOCYTES NFR BLD: 0.3 %
LDLC SERPL CALC-MCNC: 109 MG/DL
LYMPHOCYTES # BLD AUTO: 2.1 10E9/L (ref 0.8–5.3)
LYMPHOCYTES NFR BLD AUTO: 26.6 %
MCH RBC QN AUTO: 32 PG (ref 26.5–33)
MCHC RBC AUTO-ENTMCNC: 33.4 G/DL (ref 31.5–36.5)
MCV RBC AUTO: 96 FL (ref 78–100)
MICROALBUMIN UR-MCNC: 6 MG/L
MICROALBUMIN/CREAT UR: 5.79 MG/G CR (ref 0–25)
MONOCYTES # BLD AUTO: 0.7 10E9/L (ref 0–1.3)
MONOCYTES NFR BLD AUTO: 9.3 %
NEUTROPHILS # BLD AUTO: 4.7 10E9/L (ref 1.6–8.3)
NEUTROPHILS NFR BLD AUTO: 59.7 %
NONHDLC SERPL-MCNC: 117 MG/DL
PLATELET # BLD AUTO: 378 10E9/L (ref 150–450)
POTASSIUM SERPL-SCNC: 4 MMOL/L (ref 3.4–5.3)
PROT SERPL-MCNC: 7.8 G/DL (ref 6.8–8.8)
RBC # BLD AUTO: 4.28 10E12/L (ref 3.8–5.2)
SODIUM SERPL-SCNC: 137 MMOL/L (ref 133–144)
TRIGL SERPL-MCNC: 39 MG/DL
WBC # BLD AUTO: 7.9 10E9/L (ref 4–11)

## 2019-10-21 PROCEDURE — 77067 SCR MAMMO BI INCL CAD: CPT | Mod: GC | Performed by: RADIOLOGY

## 2019-10-21 PROCEDURE — 99396 PREV VISIT EST AGE 40-64: CPT | Mod: 25 | Performed by: FAMILY MEDICINE

## 2019-10-21 PROCEDURE — 80053 COMPREHEN METABOLIC PANEL: CPT | Performed by: FAMILY MEDICINE

## 2019-10-21 PROCEDURE — 99213 OFFICE O/P EST LOW 20 MIN: CPT | Mod: 25 | Performed by: FAMILY MEDICINE

## 2019-10-21 PROCEDURE — 82043 UR ALBUMIN QUANTITATIVE: CPT | Performed by: FAMILY MEDICINE

## 2019-10-21 PROCEDURE — 85025 COMPLETE CBC W/AUTO DIFF WBC: CPT | Performed by: FAMILY MEDICINE

## 2019-10-21 PROCEDURE — 36415 COLL VENOUS BLD VENIPUNCTURE: CPT | Performed by: FAMILY MEDICINE

## 2019-10-21 PROCEDURE — 90471 IMMUNIZATION ADMIN: CPT | Performed by: FAMILY MEDICINE

## 2019-10-21 PROCEDURE — 90682 RIV4 VACC RECOMBINANT DNA IM: CPT | Performed by: FAMILY MEDICINE

## 2019-10-21 PROCEDURE — 80061 LIPID PANEL: CPT | Performed by: FAMILY MEDICINE

## 2019-10-21 RX ORDER — NYSTATIN 100000 [USP'U]/G
POWDER TOPICAL 3 TIMES DAILY PRN
Qty: 60 G | Refills: 3 | Status: SHIPPED | OUTPATIENT
Start: 2019-10-21 | End: 2020-11-16

## 2019-10-21 RX ORDER — LISINOPRIL 10 MG/1
10 TABLET ORAL DAILY
Qty: 90 TABLET | Refills: 3 | Status: SHIPPED | OUTPATIENT
Start: 2019-10-21 | End: 2020-06-10

## 2019-10-21 RX ORDER — TRIAMCINOLONE ACETONIDE 1 MG/G
OINTMENT TOPICAL
Qty: 80 G | Refills: 1 | Status: SHIPPED | OUTPATIENT
Start: 2019-10-21 | End: 2020-11-16

## 2019-10-21 ASSESSMENT — PAIN SCALES - GENERAL: PAINLEVEL: NO PAIN (0)

## 2019-10-21 ASSESSMENT — MIFFLIN-ST. JEOR: SCORE: 1266.32

## 2019-10-21 NOTE — PATIENT INSTRUCTIONS
Schedule for upper GI endoscopy  Schedule  for f/u after the scope  Schedule for audiology consult  Get the flu shot today    Preventive Health Recommendations  Female Ages 50 - 64    Yearly exam: See your health care provider every year in order to  o Review health changes.   o Discuss preventive care.    o Review your medicines if your doctor has prescribed any.      Get a Pap test every three years (unless you have an abnormal result and your provider advises testing more often).    If you get Pap tests with HPV test, you only need to test every 5 years, unless you have an abnormal result.     You do not need a Pap test if your uterus was removed (hysterectomy) and you have not had cancer.    You should be tested each year for STDs (sexually transmitted diseases) if you're at risk.     Have a mammogram every 1 to 2 years.    Have a colonoscopy at age 50, or have a yearly FIT test (stool test). These exams screen for colon cancer.      Have a cholesterol test every 5 years, or more often if advised.    Have a diabetes test (fasting glucose) every three years. If you are at risk for diabetes, you should have this test more often.     If you are at risk for osteoporosis (brittle bone disease), think about having a bone density scan (DEXA).    Shots: Get a flu shot each year. Get a tetanus shot every 10 years.    Nutrition:     Eat at least 5 servings of fruits and vegetables each day.    Eat whole-grain bread, whole-wheat pasta and brown rice instead of white grains and rice.    Get adequate Calcium and Vitamin D.     Lifestyle    Exercise at least 150 minutes a week (30 minutes a day, 5 days a week). This will help you control your weight and prevent disease.    Limit alcohol to one drink per day.    No smoking.     Wear sunscreen to prevent skin cancer.     See your dentist every six months for an exam and cleaning.    See your eye doctor every 1 to 2 years.

## 2019-10-21 NOTE — PROGRESS NOTES
SUBJECTIVE:   CC: Nilda Amaro is an 64 year old woman who presents for preventive health visit.     Healthy Habits:     Getting at least 3 servings of Calcium per day:  Yes    Bi-annual eye exam:  Yes    Dental care twice a year:  Yes    Sleep apnea or symptoms of sleep apnea:  None    Diet:  Regular (no restrictions)    Frequency of exercise:  4-5 days/week    Duration of exercise:  30-45 minutes    Taking medications regularly:  Yes    Medication side effects:  Not applicable    PHQ-2 Total Score: 0    Additional concerns today:  Yes    Flu Vaccine - offered, patient would like to discuss first.    QUESTIONS/ CONCERNS: intermittent rash between buttocks    Hypertension Follow-up    Do you check your blood pressure regularly outside of the clinic? No     Are you following a low salt diet? Yes    Are your blood pressures ever more than 140 on the top number (systolic) OR more   than 90 on the bottom number (diastolic), for example 140/90? No          Today's PHQ-2 Score:   PHQ-2 ( 1999 [employer]) 10/21/2019   Q1: Little interest or pleasure in doing things 0   Q2: Feeling down, depressed or hopeless 0   PHQ-2 Score 0   Q1: Little interest or pleasure in doing things -   Q2: Feeling down, depressed or hopeless -   PHQ-2 Score -       Abuse: Current or Past(Physical, Sexual or Emotional)- No  Do you feel safe in your environment? Yes    Social History     Tobacco Use     Smoking status: Former Smoker     Smokeless tobacco: Never Used   Substance Use Topics     Alcohol use: Yes     Alcohol/week: 0.0 standard drinks       Alcohol Use 10/18/2019   Prescreen: >3 drinks/day or >7 drinks/week? No   Prescreen: >3 drinks/day or >7 drinks/week? -     Reviewed orders with patient.  Reviewed health maintenance and updated orders accordingly - Yes  Lab work is in process  Labs reviewed in EPIC  BP Readings from Last 3 Encounters:   10/21/19 130/82   04/15/19 128/81   10/15/18 123/79    Wt Readings from Last 3 Encounters:    10/21/19 76.2 kg (168 lb)   04/15/19 76.1 kg (167 lb 12.8 oz)   10/15/18 74.4 kg (164 lb)                  Patient Active Problem List   Diagnosis     Advance care planning     Obesity, Class I, BMI 30-34.9     Chronic GERD     Medial meniscus tear, right, subsequent encounter     Chondromalacia patellae, left     Chondromalacia patellae, right     Primary osteoarthritis of both knees     CARDIOVASCULAR SCREENING; LDL GOAL LESS THAN 160     Change in color of pigmented skin lesion, left neck, 2mm     Family history of breast cancer in sister     Hypertension goal BP (blood pressure) < 140/90     Gastroesophageal reflux disease with esophagitis     Intertriginous candidiasis     Other insomnia     Hearing deficit, bilateral     Past Surgical History:   Procedure Laterality Date     COLONOSCOPY WITH CO2 INSUFFLATION N/A 3/21/2016    Procedure: COLONOSCOPY WITH CO2 INSUFFLATION;  Surgeon: Geoffrey Blackwell MD;  Location: [facility]     ENDOSCOPY UPPER, COLONOSCOPY, COMBINED N/A 3/21/2016    Procedure: COMBINED ENDOSCOPY UPPER, COLONOSCOPY;  Surgeon: Geoffrey Blackwell MD;  Location: [facility]     ESOPHAGOSCOPY, GASTROSCOPY, DUODENOSCOPY (EGD), COMBINED N/A 3/21/2016    Procedure: COMBINED ESOPHAGOSCOPY, GASTROSCOPY, DUODENOSCOPY (EGD), BIOPSY SINGLE OR MULTIPLE;  Surgeon: Geoffrey Blackwell MD;  Location: [facility]       Social History     Tobacco Use     Smoking status: Former Smoker     Smokeless tobacco: Never Used   Substance Use Topics     Alcohol use: Yes     Alcohol/week: 0.0 standard drinks     Family History   Problem Relation Age of Onset     Ankylosing Spondylitis Brother 57     Heart Disease Brother      Breast Cancer Sister      Heart Disease Sister      Heart Disease Mother      Heart Disease Father      Heart Disease Brother          Current Outpatient Medications   Medication Sig Dispense Refill     BIOTIN PO Take by mouth daily       Cyanocobalamin (VITAMIN B 12 PO) Take 1 tablet by mouth daily        lisinopril (PRINIVIL/ZESTRIL) 10 MG tablet Take 1 tablet (10 mg) by mouth daily 90 tablet 3     Multiple Vitamins-Minerals (CENTRUM ADULTS PO) Take 1 tablet by mouth daily       nystatin (MYCOSTATIN) 048154 UNIT/GM external powder Apply topically 3 times daily as needed 60 g 3     triamcinolone (KENALOG) 0.1 % external ointment Apply sparingly to affected area twice daily. Apply sparingly 2-3 weeks as directed. 80 g 1     vitamin B complex with vitamin C (VITAMIN  B COMPLEX) TABS tablet Take 1 tablet by mouth daily       Calcium Citrate-Vitamin D (CALCIUM + D PO) Take 1 tablet by mouth daily       Omega-3 Fatty Acids (FISH OIL OMEGA-3 PO) Take 1 capsule by mouth daily       traZODone (DESYREL) 50 MG tablet Take 0.5-1 tablets (25-50 mg) by mouth nightly as needed for sleep (Patient not taking: Reported on 10/21/2019) 90 tablet 1     Allergies   Allergen Reactions     Penicillins      Recent Labs   Lab Test 10/21/19  0719 10/15/18  0752 10/01/18  0835  09/25/17  1052  08/03/15  0949   A1C  --   --   --   --   --   --  5.4   LDL PENDING  --  113*  --  103*  --  119   HDL 84  --  90  --  81  --  78   TRIG PENDING  --  36  --  46  --  65   ALT 21  --  22  --  24   < >  --    CR 0.62 0.70 0.67   < > 0.67   < >  --    GFRESTIMATED >90 85 89   < > 89   < >  --    GFRESTBLACK >90 >90 >90   < > >90   < >  --    POTASSIUM 4.0 4.0 4.1   < > 4.0   < >  --    TSH  --   --  2.43  --  1.19  --   --     < > = values in this interval not displayed.        Mammogram Screening: Patient over age 50, mutual decision to screen reflected in health maintenance.    Pertinent mammograms are reviewed under the imaging tab.  History of abnormal Pap smear: NO - age 30-65 PAP every 5 years with negative HPV co-testing recommended  PAP / HPV Latest Ref Rng & Units 10/1/2018 8/4/2015   PAP - NIL NIL   HPV 16 DNA NEG:Negative Negative Negative   HPV 18 DNA NEG:Negative Negative Negative   OTHER HR HPV NEG:Negative Negative Negative     Reviewed  and updated as needed this visit by clinical staff         Reviewed and updated as needed this visit by Provider          Past Medical History:   Diagnosis Date     Arthritis       Past Surgical History:   Procedure Laterality Date     COLONOSCOPY WITH CO2 INSUFFLATION N/A 3/21/2016    Procedure: COLONOSCOPY WITH CO2 INSUFFLATION;  Surgeon: Geoffrey Blackwell MD;  Location: [facility]     ENDOSCOPY UPPER, COLONOSCOPY, COMBINED N/A 3/21/2016    Procedure: COMBINED ENDOSCOPY UPPER, COLONOSCOPY;  Surgeon: Geoffrey Blackwell MD;  Location: [facility]     ESOPHAGOSCOPY, GASTROSCOPY, DUODENOSCOPY (EGD), COMBINED N/A 3/21/2016    Procedure: COMBINED ESOPHAGOSCOPY, GASTROSCOPY, DUODENOSCOPY (EGD), BIOPSY SINGLE OR MULTIPLE;  Surgeon: Geoffrey Blackwell MD;  Location: [facility]     OB History   No obstetric history on file.       Review of Systems  CONSTITUTIONAL: NEGATIVE for fever, chills, change in weight  INTEGUMENTARY/SKIN: Chronic rash in between the buttocks, comes and goes for a few years now.  Patient has tried antifungal powder and a steroid cream in the past with good relief of symptoms.  EYES: NEGATIVE for vision changes or irritation  ENT: NEGATIVE for ear, mouth and throat problems  RESP: NEGATIVE for significant cough or SOB  BREAST: NEGATIVE for masses, tenderness or discharge  CV: NEGATIVE for chest pain, palpitations or peripheral edema  CV: Hx HTN  GI: History of chronic GERD  : NEGATIVE for unusual urinary or vaginal symptoms. No vaginal bleeding.  MUSCULOSKELETAL: NEGATIVE for significant arthralgias or myalgia  NEURO: NEGATIVE for weakness, dizziness or paresthesias  ENDOCRINE: NEGATIVE for temperature intolerance, skin/hair changes  HEME/ALLERGY/IMMUNE: NEGATIVE for bleeding problems  PSYCHIATRIC: NEGATIVE for changes in mood or affect      OBJECTIVE:   There were no vitals taken for this visit.  Physical Exam  GENERAL APPEARANCE: healthy, alert and no distress  EYES: Eyes grossly normal to  inspection, PERRL and conjunctivae and sclerae normal  HENT: ear canals and TM's normal, nose and mouth without ulcers or lesions, oropharynx clear and oral mucous membranes moist  NECK: no adenopathy, no asymmetry, masses, or scars and thyroid normal to palpation  RESP: lungs clear to auscultation - no rales, rhonchi or wheezes  BREAST: normal without masses, tenderness or nipple discharge and no palpable axillary masses or adenopathy  CV: regular rate and rhythm, normal S1 S2, no S3 or S4, no murmur, click or rub, no peripheral edema and peripheral pulses strong  ABDOMEN: soft, nontender, no hepatosplenomegaly, no masses and bowel sounds normal   (female): normal female external genitalia, normal urethral meatus, vaginal mucosal atrophy noted, normal cervix, adnexae, and uterus without masses or abnormal discharge  MS: no musculoskeletal defects are noted and gait is age appropriate without ataxia  SKIN: no suspicious lesions or rashes  NEURO: Normal strength and tone, sensory exam grossly normal, mentation intact and speech normal  PSYCH: mentation appears normal and affect normal/bright    Diagnostic Test Results:  Labs reviewed in Epic  Results for orders placed or performed in visit on 10/21/19 (from the past 24 hour(s))   Lipid panel reflex to direct LDL Fasting   Result Value Ref Range    Cholesterol 201 (H) <200 mg/dL    Triglycerides PENDING <150 mg/dL    HDL Cholesterol 84 >49 mg/dL    LDL Cholesterol Calculated PENDING <100 mg/dL    Non HDL Cholesterol 117 <130 mg/dL   **Comprehensive metabolic panel FUTURE anytime   Result Value Ref Range    Sodium 137 133 - 144 mmol/L    Potassium 4.0 3.4 - 5.3 mmol/L    Chloride 105 94 - 109 mmol/L    Carbon Dioxide 26 20 - 32 mmol/L    Anion Gap 6 3 - 14 mmol/L    Glucose 87 70 - 99 mg/dL    Urea Nitrogen 17 7 - 30 mg/dL    Creatinine 0.62 0.52 - 1.04 mg/dL    GFR Estimate >90 >60 mL/min/[1.73_m2]    GFR Estimate If Black >90 >60 mL/min/[1.73_m2]    Calcium 9.2 8.5  - 10.1 mg/dL    Bilirubin Total 0.5 0.2 - 1.3 mg/dL    Albumin 3.8 3.4 - 5.0 g/dL    Protein Total 7.8 6.8 - 8.8 g/dL    Alkaline Phosphatase 63 40 - 150 U/L    ALT 21 0 - 50 U/L    AST 19 0 - 45 U/L   CBC with platelets differential   Result Value Ref Range    WBC 7.9 4.0 - 11.0 10e9/L    RBC Count 4.28 3.8 - 5.2 10e12/L    Hemoglobin 13.7 11.7 - 15.7 g/dL    Hematocrit 41.0 35.0 - 47.0 %    MCV 96 78 - 100 fl    MCH 32.0 26.5 - 33.0 pg    MCHC 33.4 31.5 - 36.5 g/dL    RDW 12.7 10.0 - 15.0 %    Platelet Count 378 150 - 450 10e9/L    % Neutrophils 59.7 %    % Lymphocytes 26.6 %    % Monocytes 9.3 %    % Eosinophils 3.7 %    % Basophils 0.4 %    % Immature Granulocytes 0.3 %    Absolute Neutrophil 4.7 1.6 - 8.3 10e9/L    Absolute Lymphocytes 2.1 0.8 - 5.3 10e9/L    Absolute Monocytes 0.7 0.0 - 1.3 10e9/L    Absolute Eosinophils 0.3 0.0 - 0.7 10e9/L    Absolute Basophils 0.0 0.0 - 0.2 10e9/L    Abs Immature Granulocytes 0.0 0 - 0.4 10e9/L    Diff Method Automated Method        ASSESSMENT/PLAN:   1. Routine general medical examination at a health care facility  Discussed on regular exercises, daily calcium intake, healthy eating, self breast exams monthly and routine dental checks      2. Hypertension goal BP (blood pressure) < 140/90  BP Readings from Last 6 Encounters:   10/21/19 130/82   04/15/19 128/81   10/15/18 123/79   10/01/18 130/84   08/06/18 144/88   03/26/18 137/85     Blood pressure is at goal, continue with lisinopril 10 mg daily  Will follow low salt diet, weight loss and regular exercises.    - lisinopril (PRINIVIL/ZESTRIL) 10 MG tablet; Take 1 tablet (10 mg) by mouth daily  Dispense: 90 tablet; Refill: 3    3. Chronic GERD  With history of reflux esophagitis.  Patient continues to take Tums several tablets every day with ongoing symptoms.  She has not taken PPI  Recommended to get the high-protein endoscopy for recheck on her esophagitis, last scope was in 2016  - GASTROENTEROLOGY ADULT REF PROCEDURE  ONLY None    4. Gastroesophageal reflux disease with esophagitis  as above    - GASTROENTEROLOGY ADULT REF PROCEDURE ONLY None    5. Dermatitis  Recommended patient to try the nystatin powder over the rash for the next 1 week if no better then she can try the triamcinolone cream for up to 2 weeks  We will follow-up if rash does not resolve with both the applications in 2 weeks or sooner if needed  - triamcinolone (KENALOG) 0.1 % external ointment; Apply sparingly to affected area twice daily. Apply sparingly 2-3 weeks as directed.  Dispense: 80 g; Refill: 1    6. Intertriginous candidiasis  as above    - nystatin (MYCOSTATIN) 394322 UNIT/GM external powder; Apply topically 3 times daily as needed  Dispense: 60 g; Refill: 3    7. Other insomnia  Patient continues to have insomnia, has not tried the trazodone yet  Will give a trial and let provider know the therapeutic response through my chart    8. Hearing deficit, bilateral  Recommended to consult audiology for further evaluation  - AUDIOLOGY ADULT REFERRAL    9. CARDIOVASCULAR SCREENING; LDL GOAL LESS THAN 160  LDL Cholesterol Calculated   Date Value Ref Range Status   10/21/2019 PENDING <100 mg/dL Incomplete         10. Colon cancer screening  Reviewed colonoscopy from 2016  Recheck in     11. Breast cancer screening  Patient is getting a mammogram today    12. Cervical cancer screening  Not due for Pap this year    13. Need for shingles vaccine  Recommended patient to have it done at the pharmacy after checking with insurance for coverage.        COUNSELING:  Reviewed preventive health counseling, as reflected in patient instructions  Special attention given to:        Regular exercise       Healthy diet/nutrition       Vision screening       Hearing screening       Immunizations    Vaccinated for: Influenza             Osteoporosis Prevention/Bone Health       HIV screeninx in teen years, 1x in adult years, and at intervals if high risk        "(Christelle)menopause management       The 10-year ASCVD risk score (David BERNAL Jr., et al., 2013) is: 5%    Values used to calculate the score:      Age: 64 years      Sex: Female      Is Non- : No      Diabetic: No      Tobacco smoker: No      Systolic Blood Pressure: 130 mmHg      Is BP treated: Yes      HDL Cholesterol: 84 mg/dL      Total Cholesterol: 201 mg/dL       Advance Care Planning    Estimated body mass index is 30.94 kg/m  as calculated from the following:    Height as of 4/15/19: 1.568 m (5' 1.75\").    Weight as of 4/15/19: 76.1 kg (167 lb 12.8 oz).    Weight management plan: Discussed healthy diet and exercise guidelines     reports that she has quit smoking. She has never used smokeless tobacco.      Counseling Resources:  ATP IV Guidelines  Pooled Cohorts Equation Calculator  Breast Cancer Risk Calculator  FRAX Risk Assessment  ICSI Preventive Guidelines  Dietary Guidelines for Americans, 2010  USDA's MyPlate  ASA Prophylaxis  Lung CA Screening    Leonor Muir MD  [facility]  Chart documentation done in part with Dragon Voice recognition Software. Although reviewed after completion, some word and grammatical error may remain.    "

## 2019-10-23 NOTE — RESULT ENCOUNTER NOTE
Dear Nilda,  Your mammogram is negative and reassuring.   Let me know if you have any questions. Take care.  Leonor Muri MD

## 2020-03-09 ENCOUNTER — TELEPHONE (OUTPATIENT)
Dept: PEDIATRICS | Facility: CLINIC | Age: 65
End: 2020-03-09

## 2020-03-09 NOTE — TELEPHONE ENCOUNTER
[facility] Call Center    Phone Message    May a detailed message be left on voicemail: yes     Reason for Call: Patient called wanting to know if she is able to get a medication for her cold that she has had for a few weeks. If she is able to she would like it sent to the [facility]'s in [place]. Please advise. Thank you.    Action Taken: Message routed to:  Primary Care p [phone]    Travel Screening: Not Applicable

## 2020-03-10 ENCOUNTER — E-VISIT (OUTPATIENT)
Dept: PEDIATRICS | Facility: CLINIC | Age: 65
End: 2020-03-10
Payer: COMMERCIAL

## 2020-03-10 DIAGNOSIS — J01.00 ACUTE NON-RECURRENT MAXILLARY SINUSITIS: Primary | ICD-10-CM

## 2020-03-10 PROCEDURE — 99421 OL DIG E/M SVC 5-10 MIN: CPT | Performed by: INTERNAL MEDICINE

## 2020-03-10 RX ORDER — DOXYCYCLINE HYCLATE 100 MG
100 TABLET ORAL 2 TIMES DAILY
Qty: 20 TABLET | Refills: 0 | Status: SHIPPED | OUTPATIENT
Start: 2020-03-10 | End: 2020-03-20

## 2020-03-10 NOTE — PATIENT INSTRUCTIONS

## 2020-03-10 NOTE — TELEPHONE ENCOUNTER
Chart review, Problem list/medication/allergies reviewed.     Provider E-Visit time total (minutes): 5

## 2020-06-10 ENCOUNTER — MYC REFILL (OUTPATIENT)
Dept: PEDIATRICS | Facility: CLINIC | Age: 65
End: 2020-06-10

## 2020-06-10 ENCOUNTER — MYC MEDICAL ADVICE (OUTPATIENT)
Dept: PEDIATRICS | Facility: CLINIC | Age: 65
End: 2020-06-10

## 2020-06-10 DIAGNOSIS — I10 HYPERTENSION GOAL BP (BLOOD PRESSURE) < 140/90: ICD-10-CM

## 2020-06-10 RX ORDER — LISINOPRIL 10 MG/1
10 TABLET ORAL DAILY
Qty: 90 TABLET | Refills: 3 | Status: CANCELLED | OUTPATIENT
Start: 2020-06-10

## 2020-06-10 RX ORDER — LISINOPRIL 10 MG/1
10 TABLET ORAL DAILY
Qty: 90 TABLET | Refills: 0 | Status: SHIPPED | OUTPATIENT
Start: 2020-06-10 | End: 2020-09-21

## 2020-09-21 ENCOUNTER — MYC REFILL (OUTPATIENT)
Dept: PEDIATRICS | Facility: CLINIC | Age: 65
End: 2020-09-21

## 2020-09-21 DIAGNOSIS — I10 HYPERTENSION GOAL BP (BLOOD PRESSURE) < 140/90: ICD-10-CM

## 2020-09-22 ENCOUNTER — MYC REFILL (OUTPATIENT)
Dept: PEDIATRICS | Facility: CLINIC | Age: 65
End: 2020-09-22

## 2020-09-22 DIAGNOSIS — I10 HYPERTENSION GOAL BP (BLOOD PRESSURE) < 140/90: ICD-10-CM

## 2020-09-22 RX ORDER — LISINOPRIL 10 MG/1
10 TABLET ORAL DAILY
Qty: 90 TABLET | Refills: 0 | Status: CANCELLED | OUTPATIENT
Start: 2020-09-22

## 2020-09-22 RX ORDER — LISINOPRIL 10 MG/1
10 TABLET ORAL DAILY
Qty: 90 TABLET | Refills: 0 | Status: SHIPPED | OUTPATIENT
Start: 2020-09-22 | End: 2020-11-16

## 2020-09-22 NOTE — TELEPHONE ENCOUNTER
Last Clinic Visit: 10/21/2019  [facility]  Scheduling has been notified to contact the pt for appointment.

## 2020-09-23 RX ORDER — LISINOPRIL 10 MG/1
TABLET ORAL
Qty: 90 TABLET | Refills: 0 | OUTPATIENT
Start: 2020-09-23

## 2020-10-26 DIAGNOSIS — Z12.31 VISIT FOR SCREENING MAMMOGRAM: ICD-10-CM

## 2020-11-08 DIAGNOSIS — Z13.6 CARDIOVASCULAR SCREENING; LDL GOAL LESS THAN 160: ICD-10-CM

## 2020-11-08 DIAGNOSIS — I10 HYPERTENSION GOAL BP (BLOOD PRESSURE) < 140/90: ICD-10-CM

## 2020-11-08 DIAGNOSIS — Z13.1 SCREENING FOR DIABETES MELLITUS (DM): ICD-10-CM

## 2020-11-08 DIAGNOSIS — Z13.0 SCREENING FOR DEFICIENCY ANEMIA: Primary | ICD-10-CM

## 2020-11-16 ENCOUNTER — OFFICE VISIT (OUTPATIENT)
Dept: PEDIATRICS | Facility: CLINIC | Age: 65
End: 2020-11-16
Payer: COMMERCIAL

## 2020-11-16 VITALS
WEIGHT: 172.1 LBS | HEART RATE: 47 BPM | HEIGHT: 62 IN | DIASTOLIC BLOOD PRESSURE: 78 MMHG | SYSTOLIC BLOOD PRESSURE: 130 MMHG | OXYGEN SATURATION: 99 % | BODY MASS INDEX: 31.67 KG/M2 | TEMPERATURE: 98.6 F

## 2020-11-16 DIAGNOSIS — Z12.11 COLON CANCER SCREENING: ICD-10-CM

## 2020-11-16 DIAGNOSIS — Z71.89 ADVANCE CARE PLANNING: ICD-10-CM

## 2020-11-16 DIAGNOSIS — Z13.6 CARDIOVASCULAR SCREENING; LDL GOAL LESS THAN 160: ICD-10-CM

## 2020-11-16 DIAGNOSIS — Z13.0 SCREENING FOR DEFICIENCY ANEMIA: ICD-10-CM

## 2020-11-16 DIAGNOSIS — Z12.31 ENCOUNTER FOR SCREENING MAMMOGRAM FOR MALIGNANT NEOPLASM OF BREAST: ICD-10-CM

## 2020-11-16 DIAGNOSIS — Z00.00 ROUTINE GENERAL MEDICAL EXAMINATION AT A HEALTH CARE FACILITY: Primary | ICD-10-CM

## 2020-11-16 DIAGNOSIS — Z12.4 CERVICAL CANCER SCREENING: ICD-10-CM

## 2020-11-16 DIAGNOSIS — L30.9 DERMATITIS: ICD-10-CM

## 2020-11-16 DIAGNOSIS — I10 HYPERTENSION GOAL BP (BLOOD PRESSURE) < 140/90: ICD-10-CM

## 2020-11-16 DIAGNOSIS — H91.93 HEARING DEFICIT, BILATERAL: ICD-10-CM

## 2020-11-16 DIAGNOSIS — G47.09 OTHER INSOMNIA: ICD-10-CM

## 2020-11-16 DIAGNOSIS — R41.3 MEMORY DEFICIT: ICD-10-CM

## 2020-11-16 DIAGNOSIS — Z13.1 SCREENING FOR DIABETES MELLITUS (DM): ICD-10-CM

## 2020-11-16 DIAGNOSIS — Z23 NEED FOR SHINGLES VACCINE: ICD-10-CM

## 2020-11-16 DIAGNOSIS — B37.2 INTERTRIGINOUS CANDIDIASIS: ICD-10-CM

## 2020-11-16 DIAGNOSIS — K21.00 GASTROESOPHAGEAL REFLUX DISEASE WITH ESOPHAGITIS, UNSPECIFIED WHETHER HEMORRHAGE: ICD-10-CM

## 2020-11-16 DIAGNOSIS — Z82.0 FAMILY HISTORY OF ALZHEIMER'S DISEASE: ICD-10-CM

## 2020-11-16 LAB
ALBUMIN SERPL-MCNC: 3.6 G/DL (ref 3.4–5)
ALP SERPL-CCNC: 64 U/L (ref 40–150)
ALT SERPL W P-5'-P-CCNC: 21 U/L (ref 0–50)
ANION GAP SERPL CALCULATED.3IONS-SCNC: 4 MMOL/L (ref 3–14)
AST SERPL W P-5'-P-CCNC: 15 U/L (ref 0–45)
BASOPHILS # BLD AUTO: 0 10E9/L (ref 0–0.2)
BASOPHILS NFR BLD AUTO: 0.4 %
BILIRUB SERPL-MCNC: 0.5 MG/DL (ref 0.2–1.3)
BUN SERPL-MCNC: 20 MG/DL (ref 7–30)
CALCIUM SERPL-MCNC: 9.3 MG/DL (ref 8.5–10.1)
CHLORIDE SERPL-SCNC: 108 MMOL/L (ref 94–109)
CHOLEST SERPL-MCNC: 224 MG/DL
CO2 SERPL-SCNC: 27 MMOL/L (ref 20–32)
CREAT SERPL-MCNC: 0.71 MG/DL (ref 0.52–1.04)
CREAT UR-MCNC: 95 MG/DL
DIFFERENTIAL METHOD BLD: NORMAL
EOSINOPHIL # BLD AUTO: 0.3 10E9/L (ref 0–0.7)
EOSINOPHIL NFR BLD AUTO: 3.9 %
ERYTHROCYTE [DISTWIDTH] IN BLOOD BY AUTOMATED COUNT: 12.6 % (ref 10–15)
GFR SERPL CREATININE-BSD FRML MDRD: >90 ML/MIN/{1.73_M2}
GLUCOSE SERPL-MCNC: 82 MG/DL (ref 70–99)
HCT VFR BLD AUTO: 41 % (ref 35–47)
HDLC SERPL-MCNC: 85 MG/DL
HGB BLD-MCNC: 13.6 G/DL (ref 11.7–15.7)
IMM GRANULOCYTES # BLD: 0 10E9/L (ref 0–0.4)
IMM GRANULOCYTES NFR BLD: 0.3 %
LDLC SERPL CALC-MCNC: 129 MG/DL
LYMPHOCYTES # BLD AUTO: 2.2 10E9/L (ref 0.8–5.3)
LYMPHOCYTES NFR BLD AUTO: 28.3 %
MCH RBC QN AUTO: 32.5 PG (ref 26.5–33)
MCHC RBC AUTO-ENTMCNC: 33.2 G/DL (ref 31.5–36.5)
MCV RBC AUTO: 98 FL (ref 78–100)
MICROALBUMIN UR-MCNC: 14 MG/L
MICROALBUMIN/CREAT UR: 14.54 MG/G CR (ref 0–25)
MONOCYTES # BLD AUTO: 0.7 10E9/L (ref 0–1.3)
MONOCYTES NFR BLD AUTO: 8.7 %
NEUTROPHILS # BLD AUTO: 4.5 10E9/L (ref 1.6–8.3)
NEUTROPHILS NFR BLD AUTO: 58.4 %
NONHDLC SERPL-MCNC: 139 MG/DL
PLATELET # BLD AUTO: 328 10E9/L (ref 150–450)
POTASSIUM SERPL-SCNC: 3.9 MMOL/L (ref 3.4–5.3)
PROT SERPL-MCNC: 7.5 G/DL (ref 6.8–8.8)
RBC # BLD AUTO: 4.19 10E12/L (ref 3.8–5.2)
SODIUM SERPL-SCNC: 139 MMOL/L (ref 133–144)
TRIGL SERPL-MCNC: 51 MG/DL
TSH SERPL DL<=0.005 MIU/L-ACNC: 2.48 MU/L (ref 0.4–4)
VIT B12 SERPL-MCNC: 2241 PG/ML (ref 193–986)
WBC # BLD AUTO: 7.7 10E9/L (ref 4–11)

## 2020-11-16 PROCEDURE — 80061 LIPID PANEL: CPT | Performed by: FAMILY MEDICINE

## 2020-11-16 PROCEDURE — 82607 VITAMIN B-12: CPT | Performed by: FAMILY MEDICINE

## 2020-11-16 PROCEDURE — 36415 COLL VENOUS BLD VENIPUNCTURE: CPT | Performed by: FAMILY MEDICINE

## 2020-11-16 PROCEDURE — 80050 GENERAL HEALTH PANEL: CPT | Performed by: FAMILY MEDICINE

## 2020-11-16 PROCEDURE — 99214 OFFICE O/P EST MOD 30 MIN: CPT | Mod: 25 | Performed by: FAMILY MEDICINE

## 2020-11-16 PROCEDURE — 82043 UR ALBUMIN QUANTITATIVE: CPT | Performed by: FAMILY MEDICINE

## 2020-11-16 PROCEDURE — 83921 ORGANIC ACID SINGLE QUANT: CPT | Performed by: FAMILY MEDICINE

## 2020-11-16 PROCEDURE — 99396 PREV VISIT EST AGE 40-64: CPT | Performed by: FAMILY MEDICINE

## 2020-11-16 RX ORDER — TRIAMCINOLONE ACETONIDE 1 MG/G
OINTMENT TOPICAL
Qty: 80 G | Refills: 1 | Status: SHIPPED | OUTPATIENT
Start: 2020-11-16 | End: 2024-03-15

## 2020-11-16 RX ORDER — LISINOPRIL 10 MG/1
10 TABLET ORAL DAILY
Qty: 90 TABLET | Refills: 3 | Status: SHIPPED | OUTPATIENT
Start: 2020-11-16 | End: 2022-01-04

## 2020-11-16 RX ORDER — NYSTATIN 100000 [USP'U]/G
POWDER TOPICAL 3 TIMES DAILY PRN
Qty: 60 G | Refills: 3 | Status: SHIPPED | OUTPATIENT
Start: 2020-11-16 | End: 2022-01-31

## 2020-11-16 RX ORDER — TRAZODONE HYDROCHLORIDE 50 MG/1
75-100 TABLET, FILM COATED ORAL
Qty: 180 TABLET | Refills: 3 | Status: SHIPPED | OUTPATIENT
Start: 2020-11-16 | End: 2022-01-31

## 2020-11-16 ASSESSMENT — MIFFLIN-ST. JEOR: SCORE: 1275.95

## 2020-11-16 NOTE — PATIENT INSTRUCTIONS
Schedule for neurology consult  Schedule for audiology consult  Schedule for colonoscopy  Take TRAZODONE 75-100mg at night as needed for sleep  Start on PRILOSEC 20mg daily at bedtime    Preventive Health Recommendations  Female Ages 50 - 64    Yearly exam: See your health care provider every year in order to  o Review health changes.   o Discuss preventive care.    o Review your medicines if your doctor has prescribed any.      Get a Pap test every three years (unless you have an abnormal result and your provider advises testing more often).    If you get Pap tests with HPV test, you only need to test every 5 years, unless you have an abnormal result.     You do not need a Pap test if your uterus was removed (hysterectomy) and you have not had cancer.    You should be tested each year for STDs (sexually transmitted diseases) if you're at risk.     Have a mammogram every 1 to 2 years.    Have a colonoscopy at age 50, or have a yearly FIT test (stool test). These exams screen for colon cancer.      Have a cholesterol test every 5 years, or more often if advised.    Have a diabetes test (fasting glucose) every three years. If you are at risk for diabetes, you should have this test more often.     If you are at risk for osteoporosis (brittle bone disease), think about having a bone density scan (DEXA).    Shots: Get a flu shot each year. Get a tetanus shot every 10 years.    Nutrition:     Eat at least 5 servings of fruits and vegetables each day.    Eat whole-grain bread, whole-wheat pasta and brown rice instead of white grains and rice.    Get adequate Calcium and Vitamin D.     Lifestyle    Exercise at least 150 minutes a week (30 minutes a day, 5 days a week). This will help you control your weight and prevent disease.    Limit alcohol to one drink per day.    No smoking.     Wear sunscreen to prevent skin cancer.     See your dentist every six months for an exam and cleaning.    See your eye doctor every 1 to 2  years.    Patient Education     Tips to Control Acid Reflux    To control acid reflux, you ll need to make some basic diet and lifestyle changes. The simple steps outlined below may be all you ll need to ease discomfort.   Watch what you eat    Don't have fatty foods or spicy foods.    Eat fewer acidic foods, such as citrus and tomato-based foods. These can increase symptoms.    Limit drinking alcohol, caffeine, and fizzy beverages. All increase acid reflux.    Try limiting chocolate, peppermint, and spearmint. These can make acid reflux worse in some people.    Watch when you eat    Don't lie down for 3 hours after eating.    Don't snack before going to bed.    Raise your head  Raising your head and upper body by 4 to 6 inches helps limit reflux when you re lying down. Put blocks under the head of your bed frame or a wedge under your mattress to raise it.   Other changes    Lose weight, if you need to    Don t exercise near bedtime    Don't wear tight-fitting clothes    Limit aspirin and ibuprofen    Stop smoking    [employer] last reviewed this educational content on 6/1/2019 2000-2020 [employer]. [address] [place] [address]. All rights reserved. This information is not intended as a substitute for professional medical care. Always follow your healthcare professional's instructions.           Patient Education     GERD (Adult)    The esophagus is a tube that carries food from the mouth to the stomach. A valve (the LES, lower esophageal sphincter) at the lower end of the esophagus prevents stomach acid from flowing upward. When this valve doesn't work properly, stomach contents may repeatedly flow back up (reflux) into the esophagus. This is called gastroesophageal reflux disease (GERD). GERD can irritate the esophagus. It can cause problems with pain, swallowing or breathing. In severe cases, GERD can cause recurrent pneumonia (from aspiration or breathing in particles) or other  "serious problems.  Symptoms of reflux include burning, pressure or sharp pain in the upper abdomen or mid to lower chest. The pain can spread to the neck, back, or shoulder. There may be belching, an acid taste in the back of the throat, chronic cough, or sore throat, or hoarseness. GERD symptoms often occur during the day after a big meal. They can also occur at night when lying down.   Home care  Lifestyle changes can help reduce symptoms. If needed, your healthcare provider may prescribe medicines. Symptoms often improve with treatment, but if treatment is stopped, the symptoms often return after a few months. So most persons with GERD will need to continue treatment or get treatment on and off.  Lifestyle changes    Limit or avoid fatty, fried, and spicy foods, as well as coffee, chocolate, mint, and foods with high acid content such as tomatoes and citrus fruit and juices (orange, grapefruit, lemon).    Don t eat large meals, especially at night. Frequent, smaller meals are best. Don't lie down right after eating. And don t eat anything 3 hours before going to bed.    Don't drink alcohol or smoke. As much as possible, stay away from second hand smoke.    If you are overweight, losing weight will reduce symptoms.     Don't wear tight clothing around your stomach area.    If your symptoms occur during sleep, use a foam wedge to elevate your upper body (not just your head.) Or, place 4\" blocks under the head of your bed. Or use 2 bed risers under your bedframe.  Medicines  If needed, medicines can help relieve the symptoms of GERD and prevent damage to the esophagus. Discuss a medicine plan with your healthcare provider. This may include one or more of the following medicines:    Antacids to help neutralize the normal acids in your stomach.    Acid blockers (Histamine or H2 blockers) to decrease acid production.    Acid inhibitors (proton pump inhibitors PPIs) to decrease acid production in a different way than " the blockers. They may work better, but can take a little longer to take effect.  Take an antacid 30 to 60 minutes after eating and at bedtime, but not at the same time as an acid blocker.  Try not to take medicines such as ibuprofen and aspirin. If you are taking aspirin for your heart or other medical reasons, talk to your healthcare provider about stopping it.  Follow-up care  Follow up with your healthcare provider or as advised by our staff.  When to seek medical advice  Call your healthcare provider if any of the following occur:    Stomach pain gets worse or moves to the lower right abdomen (appendix area)    Chest pain appears or gets worse, or spreads to the back, neck, shoulder, or arm    An over-the-counter trial of medicine doesn't relieve your symptoms    Weight loss that can't be explained    Trouble or pain swallowing    Frequent vomiting (can t keep down liquids)    Blood in the stool or vomit (red or black in color)    Feeling weak or dizzy    Fever of 100.4 F (38 C) or higher, or as directed by your healthcare provider  [employer] last reviewed this educational content on 3/1/2018    0502-1298 [employer]. [address] [place] [address]. All rights reserved. This information is not intended as a substitute for professional medical care. Always follow your healthcare professional's instructions.           Patient Education     Lifestyle Changes for Controlling GERD  When you have GERD, stomach acid feels as if it s backing up toward your mouth. Making lifestyle changes can often improve your symptoms. This is true if you take medicine to control your GERD or not. Talk with your healthcare provider about the following suggestions. They may help you get relief from your symptoms.       Raise your head  Reflux is more likely to happen when you re lying down flat. That's because stomach fluid can flow backward more easily. Raising the head of your bed 4 to 6 inches can help. To do  this:     Slide blocks or books under the legs at the head of your bed. Or put a wedge under the mattress. Many foam stores can make a wedge for you. The wedge should go from your waist to the top of your head.    Don t just prop your head up on a few pillows. This increases pressure on your stomach. It can make GERD worse.  Watch your eating habits  Certain foods may increase the acid in your stomach. Or they may relax the lower esophageal sphincter. This makes GERD more likely. It s best to avoid the following if they cause you symptoms:     Coffee, tea, and carbonated drinks (with and without caffeine)    Fatty, fried, or spicy food    Mint, chocolate, onions, tomatoes, and citrus    Peppermint    Any other foods that seem to irritate your stomach or cause you pain  Relieve the pressure  Tips include the following:    Eat smaller meals, even if you have to eat more often.    Don t lie down right after you eat. Wait a few hours for your stomach to empty.    Don't wear tight belts or tight-fitting clothes.    Lose any extra weight.  Tobacco and alcohol  Don't smoke tobacco or drink alcohol. They can make GERD symptoms worse.   [employer] last reviewed this educational content on 6/1/2019 2000-2020 [employer] [address] [place] [address], [place] [address]. All rights reserved. This information is not intended as a substitute for professional medical care. Always follow your healthcare professional's instructions.

## 2020-11-16 NOTE — PROGRESS NOTES
SUBJECTIVE:   CC: Nilda Amaro is an 65 year old woman who presents for preventive health visit.     Is here for annual physical and with other concerns as mentioned below    MEMORY DEFICIT-complaining of noticing frequent deficits in memory which was also noticed by patient's family members in the past year patient is concerned since mother was having a history of Alzheimer's disease  She denies underlying history of hypothyroidism.  Denies tingling, numbness or weakness of extremities, headache, dizziness, history of head trauma  Past medical history significant for hypertension  Denies history of depression, anxiety  GERD-has been experiencing frequent episodes of heartburn in the past few months, using Tums 2 times a day almost on a daily basis since then  Denies nausea, vomiting, melena, hematemesis, abdominal bloating, abdominal pain  Drinks 1 cup of coffee in the morning  Denies NSAID use  Having upper GI endoscopy in 2016 showing LA grade D reflux esophagitis  Is currently not on PPI therapy  Denies excessive alcohol use  INSOMNIA-patient continues to struggle with having good quality sleep at night  At her physical last year, she was given a prescription for trazodone 50 mg which patient did not think was working  She was able to go to bed but once she woke up in the middle of the night, she was not able to return to bed  Denies snoring, concerns for sleep apnea    Patient has been advised of split billing requirements and indicates understanding: Yes  Healthy Habits:     Getting at least 3 servings of Calcium per day:  Yes    Bi-annual eye exam:  Yes    Dental care twice a year:  Yes    Sleep apnea or symptoms of sleep apnea:  None    Diet:  Regular (no restrictions)    Frequency of exercise:  2-3 days/week    Duration of exercise:  Less than 15 minutes    Taking medications regularly:  Yes    PHQ-2 Total Score: 0    Additional concerns today:  Yes              Today's PHQ-2 Score:   PHQ-2 ( 1999  [employer]) 11/16/2020   Q1: Little interest or pleasure in doing things 0   Q2: Feeling down, depressed or hopeless 0   PHQ-2 Score 0   Q1: Little interest or pleasure in doing things Not at all   Q2: Feeling down, depressed or hopeless Not at all   PHQ-2 Score 0       Abuse: Current or Past (Physical, Sexual or Emotional) - No  Do you feel safe in your environment? Yes    Have you ever done Advance Care Planning? (For example, a Health Directive, POLST, or a discussion with a medical provider or your loved ones about your wishes): No, advance care planning information given to patient to review.  Patient plans to discuss their wishes with loved ones or provider.      Social History     Tobacco Use     Smoking status: Former Smoker     Smokeless tobacco: Never Used   Substance Use Topics     Alcohol use: Yes     Alcohol/week: 0.0 standard drinks     If you drink alcohol do you typically have >3 drinks per day or >7 drinks per week? No    Alcohol Use 11/16/2020   Prescreen: >3 drinks/day or >7 drinks/week? No   Prescreen: >3 drinks/day or >7 drinks/week? -   No flowsheet data found.    Reviewed orders with patient.  Reviewed health maintenance and updated orders accordingly - Yes  Lab work is in process  Labs reviewed in EPIC  BP Readings from Last 3 Encounters:   11/16/20 130/78   10/21/19 130/82   04/15/19 128/81    Wt Readings from Last 3 Encounters:   11/16/20 78.1 kg (172 lb 1.6 oz)   10/21/19 76.2 kg (168 lb)   04/15/19 76.1 kg (167 lb 12.8 oz)                  Patient Active Problem List   Diagnosis     Advance care planning     Obesity, Class I, BMI 30-34.9     Chronic GERD     Medial meniscus tear, right, subsequent encounter     Chondromalacia patellae, left     Chondromalacia patellae, right     Primary osteoarthritis of both knees     CARDIOVASCULAR SCREENING; LDL GOAL LESS THAN 160     Change in color of pigmented skin lesion, left neck, 2mm     Family history of breast cancer in sister     Hypertension  goal BP (blood pressure) < 140/90     Gastroesophageal reflux disease with esophagitis     Intertriginous candidiasis     Other insomnia     Hearing deficit, bilateral     Family history of Alzheimer's disease     Past Surgical History:   Procedure Laterality Date     COLONOSCOPY WITH CO2 INSUFFLATION N/A 3/21/2016    Procedure: COLONOSCOPY WITH CO2 INSUFFLATION;  Surgeon: Geoffrey Blackwell MD;  Location: [facility]     ENDOSCOPY UPPER, COLONOSCOPY, COMBINED N/A 3/21/2016    Procedure: COMBINED ENDOSCOPY UPPER, COLONOSCOPY;  Surgeon: Geoffrey Blackwell MD;  Location: [facility]     ESOPHAGOSCOPY, GASTROSCOPY, DUODENOSCOPY (EGD), COMBINED N/A 3/21/2016    Procedure: COMBINED ESOPHAGOSCOPY, GASTROSCOPY, DUODENOSCOPY (EGD), BIOPSY SINGLE OR MULTIPLE;  Surgeon: Geoffrey Blackwell MD;  Location: [facility]       Social History     Tobacco Use     Smoking status: Former Smoker     Smokeless tobacco: Never Used   Substance Use Topics     Alcohol use: Yes     Alcohol/week: 0.0 standard drinks     Family History   Problem Relation Age of Onset     Ankylosing Spondylitis Brother 57     Heart Disease Brother      Breast Cancer Sister      Heart Disease Sister      Heart Disease Mother      Heart Disease Father      Heart Disease Brother          Current Outpatient Medications   Medication Sig Dispense Refill     BIOTIN PO Take by mouth daily       Calcium Citrate-Vitamin D (CALCIUM + D PO) Take 1 tablet by mouth daily       Cyanocobalamin (VITAMIN B 12 PO) Take 1 tablet by mouth daily       lisinopril (ZESTRIL) 10 MG tablet Take 1 tablet (10 mg) by mouth daily 90 tablet 3     Multiple Vitamins-Minerals (CENTRUM ADULTS PO) Take 1 tablet by mouth daily       nystatin (MYCOSTATIN) 480682 UNIT/GM external powder Apply topically 3 times daily as needed 60 g 3     omeprazole (PRILOSEC) 20 MG DR capsule Take 1 capsule (20 mg) by mouth daily 90 capsule 3     traZODone (DESYREL) 50 MG tablet Take 1.5-2 tablets ( mg) by mouth  nightly as needed for sleep 180 tablet 3     triamcinolone (KENALOG) 0.1 % external ointment Apply sparingly to affected area twice daily. Apply sparingly 2-3 weeks as directed. 80 g 1     vitamin B complex with vitamin C (VITAMIN  B COMPLEX) TABS tablet Take 1 tablet by mouth daily       Omega-3 Fatty Acids (FISH OIL OMEGA-3 PO) Take 1 capsule by mouth daily       Allergies   Allergen Reactions     Penicillins      Recent Labs   Lab Test 11/16/20  0840 10/21/19  0719 10/01/18  0835 10/01/18  0835 09/25/17  1052 09/25/17  1052 08/03/15  0949 08/03/15  0949   A1C  --   --   --   --   --   --   --  5.4   * 109*  --  113*  --  103*  --  119   HDL 85 84  --  90  --  81  --  78   TRIG 51 39  --  36  --  46  --  65   ALT 21 21  --  22  --  24   < >  --    CR 0.71 0.62   < > 0.67   < > 0.67   < >  --    GFRESTIMATED >90 >90   < > 89   < > 89   < >  --    GFRESTBLACK >90 >90   < > >90   < > >90   < >  --    POTASSIUM 3.9 4.0   < > 4.1   < > 4.0   < >  --    TSH  --   --   --  2.43  --  1.19  --   --     < > = values in this interval not displayed.        Mammogram Screening: Patient over age 50, mutual decision to screen reflected in health maintenance.    Pertinent mammograms are reviewed under the imaging tab.  History of abnormal Pap smear: NO - age 30-65 PAP every 5 years with negative HPV co-testing recommended  PAP / HPV Latest Ref Rng & Units 10/1/2018 8/4/2015   PAP - NIL NIL   HPV 16 DNA NEG:Negative Negative Negative   HPV 18 DNA NEG:Negative Negative Negative   OTHER HR HPV NEG:Negative Negative Negative     Reviewed and updated as needed this visit by clinical staff   Allergies               Reviewed and updated as needed this visit by Provider                  Past Medical History:   Diagnosis Date     Arthritis       Past Surgical History:   Procedure Laterality Date     COLONOSCOPY WITH CO2 INSUFFLATION N/A 3/21/2016    Procedure: COLONOSCOPY WITH CO2 INSUFFLATION;  Surgeon: Geoffrey Blackwell,  MD;  Location: [facility]     ENDOSCOPY UPPER, COLONOSCOPY, COMBINED N/A 3/21/2016    Procedure: COMBINED ENDOSCOPY UPPER, COLONOSCOPY;  Surgeon: Geoffrey Blackwell MD;  Location: [facility]     ESOPHAGOSCOPY, GASTROSCOPY, DUODENOSCOPY (EGD), COMBINED N/A 3/21/2016    Procedure: COMBINED ESOPHAGOSCOPY, GASTROSCOPY, DUODENOSCOPY (EGD), BIOPSY SINGLE OR MULTIPLE;  Surgeon: Geoffrey Blackwell MD;  Location: [facility]     OB History   No obstetric history on file.       Review of Systems  CONSTITUTIONAL: NEGATIVE for fever, chills, change in weight  INTEGUMENTARY/SKIN: NEGATIVE for worrisome rashes, moles or lesions  EYES: NEGATIVE for vision changes or irritation  ENT: NEGATIVE for ear, mouth and throat problems  RESP: NEGATIVE for significant cough or SOB  BREAST: NEGATIVE for masses, tenderness or discharge  CV: NEGATIVE for chest pain, palpitations or peripheral edema  CV: Hx HTN  GI: NEGATIVE for nausea, abdominal pain, heartburn, or change in bowel habits  GI: as above  : NEGATIVE for unusual urinary or vaginal symptoms. No vaginal bleeding.  MUSCULOSKELETAL: NEGATIVE for significant arthralgias or myalgia  NEURO: NEGATIVE for weakness, dizziness or paresthesias  ENDOCRINE: NEGATIVE for temperature intolerance, skin/hair changes  HEME/ALLERGY/IMMUNE: NEGATIVE for bleeding problems  PSYCHIATRIC: NEGATIVE for changes in mood or affect      OBJECTIVE:   There were no vitals taken for this visit.  Physical Exam  GENERAL APPEARANCE: healthy, alert and no distress  EYES: Eyes grossly normal to inspection, PERRL and conjunctivae and sclerae normal  HENT: ear canals and TM's normal, nose and mouth without ulcers or lesions, oropharynx clear and oral mucous membranes moist  NECK: no adenopathy, no asymmetry, masses, or scars and thyroid normal to palpation  RESP: lungs clear to auscultation - no rales, rhonchi or wheezes  BREAST: normal without masses, tenderness or nipple discharge and no palpable axillary masses or  adenopathy  CV: regular rate and rhythm, normal S1 S2, no S3 or S4, no murmur, click or rub, no peripheral edema and peripheral pulses strong  ABDOMEN: soft, nontender, no hepatosplenomegaly, no masses and bowel sounds normal   (female): normal female external genitalia, normal urethral meatus, vaginal mucosal atrophy noted, normal cervix, adnexae, and uterus without masses or abnormal discharge  MS: no musculoskeletal defects are noted and gait is age appropriate without ataxia  SKIN: no suspicious lesions or rashes  NEURO: Normal strength and tone, sensory exam grossly normal, mentation intact and speech normal  PSYCH: mentation appears normal and affect normal/bright    Diagnostic Test Results:  Labs reviewed in Epic  Results for orders placed or performed in visit on 11/16/20 (from the past 24 hour(s))   Albumin Random Urine Quantitative with Creat Ratio   Result Value Ref Range    Creatinine Urine 95 mg/dL    Albumin Urine mg/L 14 mg/L    Albumin Urine mg/g Cr 14.54 0 - 25 mg/g Cr   **Comprehensive metabolic panel FUTURE anytime   Result Value Ref Range    Sodium 139 133 - 144 mmol/L    Potassium 3.9 3.4 - 5.3 mmol/L    Chloride 108 94 - 109 mmol/L    Carbon Dioxide 27 20 - 32 mmol/L    Anion Gap 4 3 - 14 mmol/L    Glucose 82 70 - 99 mg/dL    Urea Nitrogen 20 7 - 30 mg/dL    Creatinine 0.71 0.52 - 1.04 mg/dL    GFR Estimate >90 >60 mL/min/[1.73_m2]    GFR Estimate If Black >90 >60 mL/min/[1.73_m2]    Calcium 9.3 8.5 - 10.1 mg/dL    Bilirubin Total 0.5 0.2 - 1.3 mg/dL    Albumin 3.6 3.4 - 5.0 g/dL    Protein Total 7.5 6.8 - 8.8 g/dL    Alkaline Phosphatase 64 40 - 150 U/L    ALT 21 0 - 50 U/L    AST 15 0 - 45 U/L   CBC with platelets differential   Result Value Ref Range    WBC 7.7 4.0 - 11.0 10e9/L    RBC Count 4.19 3.8 - 5.2 10e12/L    Hemoglobin 13.6 11.7 - 15.7 g/dL    Hematocrit 41.0 35.0 - 47.0 %    MCV 98 78 - 100 fl    MCH 32.5 26.5 - 33.0 pg    MCHC 33.2 31.5 - 36.5 g/dL    RDW 12.6 10.0 - 15.0 %     Platelet Count 328 150 - 450 10e9/L    Diff Method Automated Method     % Neutrophils 58.4 %    % Lymphocytes 28.3 %    % Monocytes 8.7 %    % Eosinophils 3.9 %    % Basophils 0.4 %    % Immature Granulocytes 0.3 %    Absolute Neutrophil 4.5 1.6 - 8.3 10e9/L    Absolute Lymphocytes 2.2 0.8 - 5.3 10e9/L    Absolute Monocytes 0.7 0.0 - 1.3 10e9/L    Absolute Eosinophils 0.3 0.0 - 0.7 10e9/L    Absolute Basophils 0.0 0.0 - 0.2 10e9/L    Abs Immature Granulocytes 0.0 0 - 0.4 10e9/L   Lipid panel reflex to direct LDL Fasting   Result Value Ref Range    Cholesterol 224 (H) <200 mg/dL    Triglycerides 51 <150 mg/dL    HDL Cholesterol 85 >49 mg/dL    LDL Cholesterol Calculated 129 (H) <100 mg/dL    Non HDL Cholesterol 139 (H) <130 mg/dL       ASSESSMENT/PLAN:   1. Routine general medical examination at a health care facility  Discussed on regular exercises, daily calcium intake, healthy eating, self breast exams monthly and routine dental checks    - GASTROENTEROLOGY ADULT REF PROCEDURE ONLY; Future    2. Other insomnia  Reviewed sleep hygiene, recommended to increase the dose of trazodone completely milligrams up to  mg at bedtime as needed for sleep  Patient does not notice any improvement in 2 to 3 weeks, she understands to send a [employer] message for therapeutic update  - traZODone (DESYREL) 50 MG tablet; Take 1.5-2 tablets ( mg) by mouth nightly as needed for sleep  Dispense: 180 tablet; Refill: 3    3. Gastroesophageal reflux disease with esophagitis, unspecified whether hemorrhage  Reviewed upper GI endoscopy from 2016 showing LA grade D reflux esophagitis  Reviewed reflux precautions gave education handouts on the same,  Recommend to start on Prilosec 20 mg once daily  If symptoms are not improved in 5 to 6 weeks of starting on PPI, patient will follow for further evaluation  - omeprazole (PRILOSEC) 20 MG DR capsule; Take 1 capsule (20 mg) by mouth daily  Dispense: 90 capsule; Refill: 3    4. Memory  deficit  Given the family history of Alzheimer's disease in mother, recommended to consult neurology for further evaluation  We will check vitamin B12 and thyroid screening today  Patient reported taking B complex daily  Patient verbalised understanding and is agreeable to the plan.    - Vitamin B12  - Methylmalonic Acid  - NEUROLOGY ADULT REFERRAL  - TSH with free T4 reflex    5. Family history of Alzheimer's disease  as above    - Vitamin B12  - Methylmalonic Acid  - NEUROLOGY ADULT REFERRAL  - TSH with free T4 reflex    6. Hearing deficit, bilateral  Ongoing, was not evaluated before  Recommended to consult audiology for further evaluation  - AUDIOLOGY ADULT REFERRAL; Future    7. Advance care planning  ACP information given to patient, will review with family, will drop us a notarized copy when it is ready  Patient verbalised understanding and is agreeable to the plan.      8. Encounter for screening mammogram for malignant neoplasm of breast  Reviewed normal mammogram from last month    9. Colon cancer screening  Reviewed colonoscopy from 2016, she is due for recheck in March 2021 due to history of polyps  - GASTROENTEROLOGY ADULT REF PROCEDURE ONLY; Future    10. Cervical cancer screening  Patient is not due for Pap until 2023  Patient understands that after age 65, we will stop cervical cancer screening unless indicated    11. Need for shingles vaccine  Recommended  patient to have it done at the pharmacy after checking with insurance for coverage.      12. Hypertension goal BP (blood pressure) < 140/90  BP Readings from Last 6 Encounters:   11/16/20 130/78   10/21/19 130/82   04/15/19 128/81   10/15/18 123/79   10/01/18 130/84   08/06/18 144/88     Initial blood pressure was 143/80, rechecked-130/78,  Continue with current dose of lisinopril, low-salt diet, regular exercises  Follow-up in 1 year or sooner if needed  - lisinopril (ZESTRIL) 10 MG tablet; Take 1 tablet (10 mg) by mouth daily  Dispense: 90  "tablet; Refill: 3    13. Intertriginous candidiasis  Refills given per patient's request  - nystatin (MYCOSTATIN) 073915 UNIT/GM external powder; Apply topically 3 times daily as needed  Dispense: 60 g; Refill: 3    14. Dermatitis  as above    - triamcinolone (KENALOG) 0.1 % external ointment; Apply sparingly to affected area twice daily. Apply sparingly 2-3 weeks as directed.  Dispense: 80 g; Refill: 1    Patient has been advised of split billing requirements and indicates understanding: Yes  COUNSELING:  Reviewed preventive health counseling, as reflected in patient instructions  Special attention given to:        Regular exercise       Healthy diet/nutrition       Vision screening       Osteoporosis prevention/bone health       Colon cancer screening       (Christelle)menopause management       The 10-year ASCVD risk score (David BERNAL JrThi, et al., 2013) is: 6%    Values used to calculate the score:      Age: 65 years      Sex: Female      Is Non- : No      Diabetic: No      Tobacco smoker: No      Systolic Blood Pressure: 130 mmHg      Is BP treated: Yes      HDL Cholesterol: 85 mg/dL      Total Cholesterol: 224 mg/dL       Advance Care Planning    Estimated body mass index is 30.98 kg/m  as calculated from the following:    Height as of 10/21/19: 1.568 m (5' 1.75\").    Weight as of 10/21/19: 76.2 kg (168 lb).    Weight management plan: Discussed healthy diet and exercise guidelines    She reports that she has quit smoking. She has never used smokeless tobacco.      Counseling Resources:  ATP IV Guidelines  Pooled Cohorts Equation Calculator  Breast Cancer Risk Calculator  BRCA-Related Cancer Risk Assessment: FHS-7 Tool  FRAX Risk Assessment  ICSI Preventive Guidelines  Dietary Guidelines for Americans, 2010  [employer]'s MyPlate  ASA Prophylaxis  Lung CA Screening    Leonor Muir MD  [facility]  Chart documentation done in part with Dragon Voice recognition Software. " Although reviewed after completion, some word and grammatical error may remain.

## 2020-11-16 NOTE — PROGRESS NOTES
"   SUBJECTIVE:   CC: Nilda Amaro is an 65 year old woman who presents for preventive health visit.     {Split Bill scripting  The purpose of this visit is to discuss your medical history and prevent health problems before you are sick. You may be responsible for a co-pay, coinsurance, or deductible if your visit today includes services such as checking on a sore throat, having an x-ray or lab test, or treating and evaluating a new or existing condition :959443}  Patient has been advised of split billing requirements and indicates understanding: {Yes and No:988727}  Healthy Habits:    Do you get at least three servings of calcium containing foods daily (dairy, green leafy vegetables, etc.)? { :384634::\"yes\"}    Amount of exercise or daily activities, outside of work: { :502231}    Problems taking medications regularly { :318855::\"No\"}    Medication side effects: { :204872::\"No\"}    Have you had an eye exam in the past two years? { :340915}    Do you see a dentist twice per year? { :658373}    Do you have sleep apnea, excessive snoring or daytime drowsiness?{ :976605}  {Outside tests to abstract? :704762}    {additional problems to add (Optional):247595}    Today's PHQ-2 Score:   PHQ-2 ( 1999 [employer]) 11/16/2020 10/21/2019   Q1: Little interest or pleasure in doing things 0 0   Q2: Feeling down, depressed or hopeless 0 0   PHQ-2 Score 0 0   Q1: Little interest or pleasure in doing things Not at all -   Q2: Feeling down, depressed or hopeless Not at all -   PHQ-2 Score 0 -     {PHQ-2 LOOK IN ASSESSMENTS (Optional) :552440}  Abuse: Current or Past(Physical, Sexual or Emotional)- {YES/NO/NA:058675}  Do you feel safe in your environment? {YES/NO/NA:256743}    Have you ever done Advance Care Planning? (For example, a Health Directive, POLST, or a discussion with a medical provider or your loved ones about your wishes): { :257287}    Social History     Tobacco Use     Smoking status: Former Smoker     Smokeless tobacco: " "Never Used   Substance Use Topics     Alcohol use: Yes     Alcohol/week: 0.0 standard drinks     If you drink alcohol do you typically have >3 drinks per day or >7 drinks per week? {ETOH :696989}                     Reviewed orders with patient.  Reviewed health maintenance and updated orders accordingly - {Yes/No:647827::\"Yes\"}  {Chronicprobdata (Optional):814264}    {Mammo Decision Support (Optional):018911}    Pertinent mammograms are reviewed under the imaging tab.  History of abnormal Pap smear: {PAP HX:902028}  PAP / HPV Latest Ref Rng & Units 10/1/2018 8/4/2015   PAP - NIL NIL   HPV 16 DNA NEG:Negative Negative Negative   HPV 18 DNA NEG:Negative Negative Negative   OTHER HR HPV NEG:Negative Negative Negative     Reviewed and updated as needed this visit by clinical staff   Allergies               Reviewed and updated as needed this visit by Provider                {HISTORY OPTIONS (Optional):328499}    ROS:  { :490636}    OBJECTIVE:   There were no vitals taken for this visit.  EXAM:  {Exam Choices:066460}    {Diagnostic Test Results (Optional):528849::\"Diagnostic Test Results:\",\"Labs reviewed in Epic\"}    ASSESSMENT/PLAN:   {Diag Picklist:513675}    Patient has been advised of split billing requirements and indicates understanding: {YES / NO:343995::\"Yes\"}  COUNSELING:   {FEMALE COUNSELING MESSAGES:683905::\"Reviewed preventive health counseling, as reflected in patient instructions\"}    Estimated body mass index is 30.98 kg/m  as calculated from the following:    Height as of 10/21/19: 1.568 m (5' 1.75\").    Weight as of 10/21/19: 76.2 kg (168 lb).    {Weight Management Plan (ACO) Complete if BMI is abnormal-  Ages 18-64  BMI >24.9.  Age 65+ with BMI <23 or >30 (Optional):516437}    She reports that she has quit smoking. She has never used smokeless tobacco.      Counseling Resources:  ATP IV Guidelines  Pooled Cohorts Equation Calculator  Breast Cancer Risk Calculator  BRCA-Related Cancer Risk Assessment: " FHS-7 Tool  FRAX Risk Assessment  ICSI Preventive Guidelines  Dietary Guidelines for Americans, 2010  USDA's MyPlate  ASA Prophylaxis  Lung CA Screening    Leonor Muir MD  [facility]

## 2020-11-19 LAB — METHYLMALONATE SERPL-SCNC: 0.12 UMOL/L (ref 0–0.4)

## 2020-12-20 ENCOUNTER — HEALTH MAINTENANCE LETTER (OUTPATIENT)
Age: 65
End: 2020-12-20

## 2021-04-18 ENCOUNTER — MYC MEDICAL ADVICE (OUTPATIENT)
Dept: FAMILY MEDICINE | Facility: CLINIC | Age: 66
End: 2021-04-18

## 2021-04-19 ENCOUNTER — MYC MEDICAL ADVICE (OUTPATIENT)
Dept: FAMILY MEDICINE | Facility: CLINIC | Age: 66
End: 2021-04-19

## 2021-04-19 ENCOUNTER — ANCILLARY PROCEDURE (OUTPATIENT)
Dept: GENERAL RADIOLOGY | Facility: CLINIC | Age: 66
End: 2021-04-19
Attending: FAMILY MEDICINE
Payer: COMMERCIAL

## 2021-04-19 ENCOUNTER — VIRTUAL VISIT (OUTPATIENT)
Dept: FAMILY MEDICINE | Facility: CLINIC | Age: 66
End: 2021-04-19
Payer: COMMERCIAL

## 2021-04-19 ENCOUNTER — APPOINTMENT (OUTPATIENT)
Dept: URGENT CARE | Facility: CLINIC | Age: 66
End: 2021-04-19
Payer: COMMERCIAL

## 2021-04-19 VITALS — BODY MASS INDEX: 31.28 KG/M2 | HEIGHT: 62 IN | WEIGHT: 170 LBS

## 2021-04-19 DIAGNOSIS — M25.422 PAIN AND SWELLING OF ELBOW, LEFT: Primary | ICD-10-CM

## 2021-04-19 DIAGNOSIS — M25.522 PAIN AND SWELLING OF ELBOW, LEFT: ICD-10-CM

## 2021-04-19 DIAGNOSIS — M25.422 PAIN AND SWELLING OF ELBOW, LEFT: ICD-10-CM

## 2021-04-19 DIAGNOSIS — M25.522 PAIN AND SWELLING OF ELBOW, LEFT: Primary | ICD-10-CM

## 2021-04-19 LAB
BASOPHILS # BLD AUTO: 0.1 10E9/L (ref 0–0.2)
BASOPHILS NFR BLD AUTO: 0.3 %
CRP SERPL-MCNC: 86.9 MG/L (ref 0–8)
DIFFERENTIAL METHOD BLD: ABNORMAL
EOSINOPHIL # BLD AUTO: 0.2 10E9/L (ref 0–0.7)
EOSINOPHIL NFR BLD AUTO: 1 %
ERYTHROCYTE [DISTWIDTH] IN BLOOD BY AUTOMATED COUNT: 13.2 % (ref 10–15)
ERYTHROCYTE [SEDIMENTATION RATE] IN BLOOD BY WESTERGREN METHOD: 13 MM/H (ref 0–30)
HCT VFR BLD AUTO: 42.8 % (ref 35–47)
HGB BLD-MCNC: 14.3 G/DL (ref 11.7–15.7)
IMM GRANULOCYTES # BLD: 0.1 10E9/L (ref 0–0.4)
IMM GRANULOCYTES NFR BLD: 0.6 %
LYMPHOCYTES # BLD AUTO: 2.7 10E9/L (ref 0.8–5.3)
LYMPHOCYTES NFR BLD AUTO: 15.6 %
MCH RBC QN AUTO: 31.8 PG (ref 26.5–33)
MCHC RBC AUTO-ENTMCNC: 33.4 G/DL (ref 31.5–36.5)
MCV RBC AUTO: 95 FL (ref 78–100)
MONOCYTES # BLD AUTO: 1.4 10E9/L (ref 0–1.3)
MONOCYTES NFR BLD AUTO: 7.9 %
NEUTROPHILS # BLD AUTO: 13 10E9/L (ref 1.6–8.3)
NEUTROPHILS NFR BLD AUTO: 74.6 %
PLATELET # BLD AUTO: 358 10E9/L (ref 150–450)
RBC # BLD AUTO: 4.5 10E12/L (ref 3.8–5.2)
URATE SERPL-MCNC: 3.2 MG/DL (ref 2.6–6)
WBC # BLD AUTO: 17.4 10E9/L (ref 4–11)

## 2021-04-19 PROCEDURE — 73070 X-RAY EXAM OF ELBOW: CPT | Mod: LT | Performed by: RADIOLOGY

## 2021-04-19 PROCEDURE — 85025 COMPLETE CBC W/AUTO DIFF WBC: CPT | Performed by: FAMILY MEDICINE

## 2021-04-19 PROCEDURE — 99214 OFFICE O/P EST MOD 30 MIN: CPT | Mod: 95 | Performed by: FAMILY MEDICINE

## 2021-04-19 PROCEDURE — 86140 C-REACTIVE PROTEIN: CPT | Performed by: FAMILY MEDICINE

## 2021-04-19 PROCEDURE — 36415 COLL VENOUS BLD VENIPUNCTURE: CPT | Performed by: FAMILY MEDICINE

## 2021-04-19 PROCEDURE — 85652 RBC SED RATE AUTOMATED: CPT | Performed by: FAMILY MEDICINE

## 2021-04-19 PROCEDURE — 84550 ASSAY OF BLOOD/URIC ACID: CPT | Performed by: FAMILY MEDICINE

## 2021-04-19 ASSESSMENT — MIFFLIN-ST. JEOR: SCORE: 1274.36

## 2021-04-19 NOTE — TELEPHONE ENCOUNTER
Patient called.  She states that she has been dealing with pain in her elbow for several days, hard to straighten it.  She can bend it and this does not hurt.   It is warm to touch and swollen.  She notes it is pink but she has been keeping ice on it.    She did not injure it, does not report over use.    She is scheduled this morning with Dr. Muir.   If not appropriate for video visit, she will keep her appt with Dr. Talavera.  If able to complete video visit, she will cancel Dr. Talavera's appt.    Ashlyn Rodriguez RN, [facility]

## 2021-04-19 NOTE — PROGRESS NOTES
Nilda is a 65 year old who is being evaluated via a billable video visit.      How would you like to obtain your AVS? MyChart  If the video visit is dropped, the invitation should be resent by: Text to cell phone: [phone]  Will anyone else be joining your video visit? No    Video Start Time: 9:17am    Assessment & Plan     Pain and swelling of elbow, left  ddx- ?  Gouty arthritis versus septic arthritis given the clinical findings and acuity of onset and chills  Recommended to proceed with left elbow x-ray and labs including CBC with differential, uric acid, CRP and sed rate today  Results for orders placed or performed in visit on 04/19/21   ESR: Erythrocyte sedimentation rate     Status: None   Result Value Ref Range    Sed Rate 13 0 - 30 mm/h   CRP, inflammation     Status: Abnormal   Result Value Ref Range    CRP Inflammation 86.9 (H) 0.0 - 8.0 mg/L   Uric acid     Status: None   Result Value Ref Range    Uric Acid 3.2 2.6 - 6.0 mg/dL   CBC with platelets and differential     Status: Abnormal   Result Value Ref Range    WBC 17.4 (H) 4.0 - 11.0 10e9/L    RBC Count 4.50 3.8 - 5.2 10e12/L    Hemoglobin 14.3 11.7 - 15.7 g/dL    Hematocrit 42.8 35.0 - 47.0 %    MCV 95 78 - 100 fl    MCH 31.8 26.5 - 33.0 pg    MCHC 33.4 31.5 - 36.5 g/dL    RDW 13.2 10.0 - 15.0 %    Platelet Count 358 150 - 450 10e9/L    Diff Method Automated Method     % Neutrophils 74.6 %    % Lymphocytes 15.6 %    % Monocytes 7.9 %    % Eosinophils 1.0 %    % Basophils 0.3 %    % Immature Granulocytes 0.6 %    Absolute Neutrophil 13.0 (H) 1.6 - 8.3 10e9/L    Absolute Lymphocytes 2.7 0.8 - 5.3 10e9/L    Absolute Monocytes 1.4 (H) 0.0 - 1.3 10e9/L    Absolute Eosinophils 0.2 0.0 - 0.7 10e9/L    Absolute Basophils 0.1 0.0 - 0.2 10e9/L    Abs Immature Granulocytes 0.1 0 - 0.4 10e9/L   Results for orders placed or performed in visit on 04/19/21   XR Elbow Left 2 Views     Status: None    Narrative    Exam: 2 views of the left elbow dated  4/19/2021.    COMPARISON: None.    CLINICAL HISTORY: Pain and swelling.    FINDINGS: 2 views of the left elbow were obtained. Soft tissue  swelling along the dorsal aspect of the proximal to mid ulna,  correlate with clinical exam. No significant joint effusion. The bones  are well aligned. No evidence of fracture or dislocation involving the  left elbow.      Impression    IMPRESSION:  1. No evidence of fracture or dislocation involving the left elbow.  2. Mild soft tissue prominence in the region of the left elbow  olecranon.  3. Soft tissue swelling along the dorsal aspect of the proximal to mid  left ulna, correlate with clinical exam.    JAIMEE RICO MD       - CBC with platelets and differential; Future  - Uric acid; Future  - CRP, inflammation; Future  - ESR: Erythrocyte sedimentation rate; Future  - XR Elbow Left 2 Views; Future    Reviewed labs are showing normal uric acid and sed rate but significant elevated CRP and leukocytosis with a left shift raising the concern for possible septic arthritis  Reviewed x-ray showing soft tissue prominence but no active effusion.  Contacted sports medicine physician and learned that they do not do elbow aspiration.  Patient might need elbow aspiration with fluid culture and possible blood cultures and IV antibiotics.  Called patient, reviewed lab results and recommendations to be seen in the emergency room today for the same.  I personally called myself and spoke to the [facility] emergency room staff , updated patient's arrival, reviewed patient's history, and test results.  Called patient and left a message regarding the ER update    Review of the result(s) of each unique test - CBC with differential, CRP, sed rate, serum uric acid, left elbow x-ray  37 minutes spent on the date of the encounter doing chart review, review of test results, interpretation of tests, patient visit and documentation        Chart documentation done in part with Dragon Voice  recognition Software. Although reviewed after completion, some word and grammatical error may remain.    See Patient Instructions    No follow-ups on file.    Leonor Muir MD  [facility] ORION Munguia is a 65 year old who presents for the following health issues   Patient is here for a  video visit instead of in person visit due to the current COVID-19 pandemic.  Patient with past medical history significant for hypertension, chronic GERD is here with concerns of having acute onset of pain, warmth and swelling of the left elbow that started last Saturday with no associated history of fall or injury to the affected area.  Patient denies previous similar symptoms in the past, right-sided symptoms.  Patient is right-handed\she denies fever, , other joint pain, swelling, abnormal skin rashes, URI symptoms  She does report chills since the start of the pain  Patient denies history of gout in the past.  Has no history of open skin injuries/sores over the affected area.  She describes the pain as a tingling sensation over the swollen warm area    HPI     Musculoskeletal problem/pain  Onset/Duration: x 2 days Saturday   Description  Location: elbow - left  Joint Swelling: YES  Redness: YES  Pain: YES  Warmth: YES  Intensity:  moderate, 6/10  Progression of Symptoms:  same  Accompanying signs and symptoms:   Fevers: no  Numbness/tingling/weakness: YES  History  Trauma to the area: no  Recent illness:  no  Previous similar problem: no  Previous evaluation:  no  Precipitating or alleviating factors:  Aggravating factors include: none  Therapies tried and outcome: acetaminophen        Review of Systems   CONSTITUTIONAL: chills with no fever  INTEGUMENTARY/SKIN: NEGATIVE for worrisome rashes, moles or lesions  RESP: NEGATIVE for significant cough or SOB  CV: NEGATIVE for chest pain, palpitations or peripheral edema  CV: Hx HTN  MUSCULOSKELETAL: as above  NEURO: NEGATIVE for weakness,  dizziness or paresthesias  PSYCHIATRIC: NEGATIVE for changes in mood or affect      Objective           Vitals:  No vitals were obtained today due to virtual visit.    Physical Exam   GENERAL: Healthy, alert and no distress  RESP: No audible wheeze, cough, or visible cyanosis.  No visible retractions or increased work of breathing.    MS: Left elbow-swollen, red, skin over the olecranon process, stretched and shiny.  Range of motion-normal except patient was describing subjective stiffness with range of motion  SKIN: Visible skin clear. No significant rash, abnormal pigmentation or lesions.  PSYCH: Mentation appears normal, affect normal/bright, judgement and insight intact, normal speech and appearance well-groomed.                Video-Visit Details    Type of service:  Video Visit    Video End Time:9:23am   long term through the visit, audio was not working well.  Video visit was continued along with the help of [employer] phone call for audio    Originating Location (pt. Location): Home    Distant Location (provider location):  [facility]     Platform used for Video Visit: [employer]

## 2021-04-21 ENCOUNTER — OFFICE VISIT (OUTPATIENT)
Dept: FAMILY MEDICINE | Facility: CLINIC | Age: 66
End: 2021-04-21
Payer: COMMERCIAL

## 2021-04-21 ENCOUNTER — TELEPHONE (OUTPATIENT)
Dept: ORTHOPEDICS | Facility: CLINIC | Age: 66
End: 2021-04-21

## 2021-04-21 ENCOUNTER — NURSE TRIAGE (OUTPATIENT)
Dept: NURSING | Facility: CLINIC | Age: 66
End: 2021-04-21

## 2021-04-21 VITALS
HEART RATE: 57 BPM | BODY MASS INDEX: 32.83 KG/M2 | TEMPERATURE: 97.5 F | SYSTOLIC BLOOD PRESSURE: 122 MMHG | DIASTOLIC BLOOD PRESSURE: 78 MMHG | WEIGHT: 179.5 LBS | OXYGEN SATURATION: 99 % | RESPIRATION RATE: 16 BRPM

## 2021-04-21 DIAGNOSIS — L03.114 CELLULITIS OF LEFT UPPER EXTREMITY: ICD-10-CM

## 2021-04-21 DIAGNOSIS — M25.522 PAIN AND SWELLING OF ELBOW, LEFT: Primary | ICD-10-CM

## 2021-04-21 DIAGNOSIS — L03.114 CELLULITIS OF LEFT UPPER EXTREMITY: Primary | ICD-10-CM

## 2021-04-21 DIAGNOSIS — M25.422 PAIN AND SWELLING OF ELBOW, LEFT: Primary | ICD-10-CM

## 2021-04-21 LAB
BASOPHILS # BLD AUTO: 0.1 10E9/L (ref 0–0.2)
BASOPHILS NFR BLD AUTO: 0.4 %
CRP SERPL-MCNC: 76.7 MG/L (ref 0–8)
DIFFERENTIAL METHOD BLD: ABNORMAL
EOSINOPHIL # BLD AUTO: 0.3 10E9/L (ref 0–0.7)
EOSINOPHIL NFR BLD AUTO: 1.8 %
ERYTHROCYTE [DISTWIDTH] IN BLOOD BY AUTOMATED COUNT: 13.1 % (ref 10–15)
ERYTHROCYTE [SEDIMENTATION RATE] IN BLOOD BY WESTERGREN METHOD: 34 MM/H (ref 0–30)
HCT VFR BLD AUTO: 36.1 % (ref 35–47)
HGB BLD-MCNC: 12 G/DL (ref 11.7–15.7)
IMM GRANULOCYTES # BLD: 0.1 10E9/L (ref 0–0.4)
IMM GRANULOCYTES NFR BLD: 0.6 %
LYMPHOCYTES # BLD AUTO: 2.6 10E9/L (ref 0.8–5.3)
LYMPHOCYTES NFR BLD AUTO: 18.1 %
MCH RBC QN AUTO: 31.7 PG (ref 26.5–33)
MCHC RBC AUTO-ENTMCNC: 33.2 G/DL (ref 31.5–36.5)
MCV RBC AUTO: 96 FL (ref 78–100)
MONOCYTES # BLD AUTO: 1.1 10E9/L (ref 0–1.3)
MONOCYTES NFR BLD AUTO: 7.9 %
NEUTROPHILS # BLD AUTO: 10.1 10E9/L (ref 1.6–8.3)
NEUTROPHILS NFR BLD AUTO: 71.2 %
PLATELET # BLD AUTO: 394 10E9/L (ref 150–450)
RBC # BLD AUTO: 3.78 10E12/L (ref 3.8–5.2)
WBC # BLD AUTO: 14.2 10E9/L (ref 4–11)

## 2021-04-21 PROCEDURE — 36415 COLL VENOUS BLD VENIPUNCTURE: CPT | Performed by: PHYSICIAN ASSISTANT

## 2021-04-21 PROCEDURE — 99214 OFFICE O/P EST MOD 30 MIN: CPT | Mod: 25 | Performed by: PHYSICIAN ASSISTANT

## 2021-04-21 PROCEDURE — 85025 COMPLETE CBC W/AUTO DIFF WBC: CPT | Performed by: PHYSICIAN ASSISTANT

## 2021-04-21 PROCEDURE — 85652 RBC SED RATE AUTOMATED: CPT | Performed by: PHYSICIAN ASSISTANT

## 2021-04-21 PROCEDURE — 86140 C-REACTIVE PROTEIN: CPT | Performed by: PHYSICIAN ASSISTANT

## 2021-04-21 PROCEDURE — 96372 THER/PROPH/DIAG INJ SC/IM: CPT | Performed by: PHYSICIAN ASSISTANT

## 2021-04-21 RX ORDER — CEFUROXIME AXETIL 500 MG/1
500 TABLET ORAL 2 TIMES DAILY
COMMUNITY
Start: 2021-04-21 | End: 2022-01-31

## 2021-04-21 RX ORDER — CEFTRIAXONE SODIUM 1 G
1 VIAL (EA) INJECTION ONCE
Status: COMPLETED | OUTPATIENT
Start: 2021-04-21 | End: 2021-04-21

## 2021-04-21 RX ORDER — SULFAMETHOXAZOLE/TRIMETHOPRIM 800-160 MG
1 TABLET ORAL 2 TIMES DAILY
Qty: 20 TABLET | Refills: 0 | Status: SHIPPED | OUTPATIENT
Start: 2021-04-21 | End: 2022-01-31

## 2021-04-21 RX ADMIN — Medication 1 G: at 17:44

## 2021-04-21 NOTE — TELEPHONE ENCOUNTER
Nilda has an appointment today with Gracie Taveras.  Nilda feel that blood work needs to be done for appointment today.  Nilda is requesting an order for blood work.  Nilda is requesting to have blood work done before appointment as results are needed for appointment.  Please phone Nilda when orders are placed.  Please phone Nilda with in two hours.     COVID 19 Nurse Triage Plan/Patient Instructions    Please be aware that novel coronavirus (COVID-19) may be circulating in the community. If you develop symptoms such as fever, cough, or SOB or if you have concerns about the presence of another infection including coronavirus (COVID-19), please contact your health care provider or visit [email or web address].     Disposition/Instructions    Home care recommended. Follow home care protocol based instructions.    Thank you for taking steps to prevent the spread of this virus.  o Limit your contact with others.  o Wear a simple mask to cover your cough.  o Wash your hands well and often.    Resources    [facility]: About COVID-19: [email or web address]    CDC: What to Do If You're Sick: www.cdc.gov/coronavirus/2019-ncov/about/steps-when-sick.html    CDC: Ending Home Isolation: www.cdc.gov/coronavirus/2019-ncov/hcp/disposition-in-home-patients.html     CDC: Caring for Someone: www.cdc.gov/coronavirus/2019-ncov/if-you-are-sick/care-for-someone.html     [facility]: Interim Guidance for Hospital Discharge to Home: www.health.[facility].[place]/diseases/coronavirus/hcp/hospdischarge.pdf    [facility] clinical trials (COVID-19 research studies): [email or web address]/n-clinical-trials     Below are the COVID-19 hotlines at the [place] Department of Health ([facility]). Interpreters are available.   o For health questions: Call 764-309-6998 or 1-594.543.9387 (7 a.m. to 7 p.m.)  o For questions about schools and childcare: Call 665-146-8525 or 1-870.793.5479 (7 a.m. to 7 p.m.)                         Additional Information    Negative: Nursing judgment    Nursing judgment    Protocols used: INFORMATION ONLY CALL - NO TRIAGE-A-[place]

## 2021-04-21 NOTE — PROGRESS NOTES
Assessment & Plan     Pain and swelling of elbow, left  I have concerns about a septic arthritis.  Afebrile but blood counts and inflammatory markers very high.  Patient seen and evaluated by Dr. Talavera  As well and agree likely needs aspiration and possibly iv antibiotics.  Given 1 gram rocephin IM here and started on septra as well to cover MRSA- already prescribed ceftin from EMERGENCY DEPARTMENT  Follow up with ortho    - cefuroxime (CEFTIN) 500 MG tablet  - cefTRIAXone (ROCEPHIN) injection 1 g  - sulfamethoxazole-trimethoprim (BACTRIM DS) 800-160 MG tablet  Dispense: 20 tablet; Refill: 0  - Orthopedic & Spine  Referral    Cellulitis of left upper extremity    - cefTRIAXone (ROCEPHIN) injection 1 g  - sulfamethoxazole-trimethoprim (BACTRIM DS) 800-160 MG tablet  Dispense: 20 tablet; Refill: 0  - Orthopedic & Spine  Referral      Review of prior external note(s) from - CareEverywhere information from [facility]  reviewed  Review of the result(s) of each unique test - cbc, sed rate, crp, uric acid   Discussion of management or test interpretation with external physician/other qualified healthcare professional/appropriate source - evaluated by Dr. Talavera as well   Ordering of each unique test  Prescription drug management         Patient Instructions   Follow up with orthopedics in the next day or two  Go to emergency department if increasing pain, increasing swelling, fever or other change in symptoms.          Return in about 2 days (around 4/23/2021), or if symptoms worsen or fail to improve, for in person.    Gracie Alcantara PA-C  [facility] [place] ORION Munguia is a 65 year old who presents for the following health issues     HPI     ED/UC Followup:    Facility:  [facility]  Date of visit: 4/19/2021  Reason for visit: Elbow pain, chills  Current Status:      Had virtual visit with Dr. Muir 2 days ago and sent to emergency department due to white blood cell  count of 17.4, sed rate was 13, CRP was 86.9.  In emergency department they treated with ceftin 500 mg and ibuprofen  Has not been on antibiotic for 48 hours   No fever. Chills last night.   No troubles with range of motion but painful  Taking 800 mg ibuprofen three times a day -does help for couple hours  Redness is traveling up arm. Feels tight and painful.   Works as .  Right hand dominant.    Did receive call from ortho but wasn't sure what it was for and did not schedule an appointment   No history of MRSA or similar symptoms in past   History of hypertension controlled with lisinopril 10 mg         Review of Systems   Constitutional, HEENT, cardiovascular, pulmonary, gi and gu systems are negative, except as otherwise noted.      Objective    /78   Pulse 57   Temp 97.5  F (36.4  C) (Oral)   Resp 16   Wt 81.4 kg (179 lb 8 oz)   SpO2 99%   BMI 32.83 kg/m    There is no height or weight on file to calculate BMI.  Physical Exam   GENERAL: healthy, alert and no distress  NECK: no adenopathy, no asymmetry, masses, or scars and thyroid normal to palpation  RESP: lungs clear to auscultation - no rales, rhonchi or wheezes  CV: regular rate and rhythm, normal S1 S2, no S3 or S4, no murmur, click or rub, no peripheral edema and peripheral pulses strong  MS: left elbow with erythematous warm edematous elbow with normal range of motion but pain with range of motion.  Redness extends to mid humerus.  Tender to palpation  Normal radial pulse.      Results for orders placed or performed in visit on 04/21/21   ESR: Erythrocyte sedimentation rate     Status: Abnormal   Result Value Ref Range    Sed Rate 34 (H) 0 - 30 mm/h   CBC with platelets and differential     Status: Abnormal   Result Value Ref Range    WBC 14.2 (H) 4.0 - 11.0 10e9/L    RBC Count 3.78 (L) 3.8 - 5.2 10e12/L    Hemoglobin 12.0 11.7 - 15.7 g/dL    Hematocrit 36.1 35.0 - 47.0 %    MCV 96 78 - 100 fl    MCH 31.7 26.5 - 33.0 pg    MCHC  33.2 31.5 - 36.5 g/dL    RDW 13.1 10.0 - 15.0 %    Platelet Count 394 150 - 450 10e9/L    Diff Method Automated Method     % Neutrophils 71.2 %    % Lymphocytes 18.1 %    % Monocytes 7.9 %    % Eosinophils 1.8 %    % Basophils 0.4 %    % Immature Granulocytes 0.6 %    Absolute Neutrophil 10.1 (H) 1.6 - 8.3 10e9/L    Absolute Lymphocytes 2.6 0.8 - 5.3 10e9/L    Absolute Monocytes 1.1 0.0 - 1.3 10e9/L    Absolute Eosinophils 0.3 0.0 - 0.7 10e9/L    Absolute Basophils 0.1 0.0 - 0.2 10e9/L    Abs Immature Granulocytes 0.1 0 - 0.4 10e9/L   CRP, inflammation     Status: Abnormal   Result Value Ref Range    CRP Inflammation 76.7 (H) 0.0 - 8.0 mg/L

## 2021-04-21 NOTE — NURSING NOTE
Clinic Administered Medication Documentation      Injectable Medication Documentation    Patient was given Ceftriaxone Sodium (Rocephin). Prior to medication administration, verified patients identity using patient s name and date of birth. Please see MAR and medication order for additional information. Patient instructed to remain in clinic for 15 minutes.      Was entire vial of medication used? Yes  Vial/Syringe: Single dose vial  Expiration Date:  05/01/2022  Was this medication supplied by the patient? No    Name of provider who requested the medication administration: Gracie Alcantara PA-C  Name of provider on site (faculty or community preceptor) at the time of performing the medication administration: Gracie Alcantara PA-C    Date of next administration: N/A  Date of next office visit with provider to renew medication plan (must be seen annually): N/A

## 2021-04-21 NOTE — TELEPHONE ENCOUNTER
4/21 Called and left voicemail. Provided phone number [phone] to schedule an appointment with Dr. Caro for elbow pain.     [facility]   Orthopedics, Podiatry, Sports Medicine, ENT/Eye Specialties  [facility]   [phone]      ----- Message from Alice Harrell ATC sent at 4/21/2021  1:35 PM CDT -----  Can you see if Nilda would like to be seen y Dr. Caro for her elbow     Alice

## 2021-04-23 ENCOUNTER — OFFICE VISIT (OUTPATIENT)
Dept: ORTHOPEDICS | Facility: CLINIC | Age: 66
End: 2021-04-23
Payer: COMMERCIAL

## 2021-04-23 VITALS — BODY MASS INDEX: 32.74 KG/M2 | WEIGHT: 179 LBS

## 2021-04-23 DIAGNOSIS — M71.122 SEPTIC OLECRANON BURSITIS OF LEFT ELBOW: Primary | ICD-10-CM

## 2021-04-23 PROCEDURE — 99215 OFFICE O/P EST HI 40 MIN: CPT | Performed by: FAMILY MEDICINE

## 2021-04-23 NOTE — LETTER
4/23/2021         RE: Nilda Amaro  [id number] [address] [facility] [id number]        Dear Colleague,    Thank you for referring your patient, Nilda Amaro, to the [facility] [place]. Please see a copy of my visit note below.    CHIEF COMPLAINT:  Consult (left elbow swelling, redness and warmth, going on for about a week, )       HISTORY OF PRESENT ILLNESS  Ms. Amaro is a pleasant 65 year old year old female who presents to clinic today with left elbow swelling, pain, redness and heat.  Nilda explains that her elbow just started hurting Saturday afternoon    Onset: gradual  Location: left elbow  Quality:  Aching, shooting and on fire  Duration: 1 weeks   Timing:intermittent episodes   Modifying factors:  resting and non-use makes it better, movement and use makes it worse  Associated signs & symptoms: burning  Previous similar pain: No  Treatments to date:antibiotics    Additional history: Patient was initially seen on 4/17 for olecranon bursitis's, suspected septic nature.  Labs elevated with white blood cell count 17.4 sed rate 13 CRP 86.9.  She was then directed to the emergency department.  They treat her with Ceftin 500 mg and ibuprofen.  She was then seen again by a provider, where she received a dose of ceftriaxone IM.  Bactrim was initiated in addition to continuing Ceftin.    She had chills earlier in the week.  This has resolved.  She is also noted that the large region of swelling at the elbow has nearly resolved after yesterday.  She notes that there is still erythema but this is also improved.  She notes it is still quite painful however.    Review of Systems:    Have you recently had a a fever, chills, weight loss? No    Do you have any vision problems? No    Do you have any chest pain or edema? No    Do you have any shortness of breath or wheezing?  No    Do you have stomach problems? No    Do you have any numbness or focal weakness? No    Do you have diabetes?  No    Do you have problems with bleeding or clotting? No    Do you have an rashes or other skin lesions? No    MEDICAL HISTORY  Patient Active Problem List   Diagnosis     Advance care planning     Obesity, Class I, BMI 30-34.9     Chronic GERD     Medial meniscus tear, right, subsequent encounter     Chondromalacia patellae, left     Chondromalacia patellae, right     Primary osteoarthritis of both knees     CARDIOVASCULAR SCREENING; LDL GOAL LESS THAN 160     Change in color of pigmented skin lesion, left neck, 2mm     Family history of breast cancer in sister     Hypertension goal BP (blood pressure) < 140/90     Gastroesophageal reflux disease with esophagitis     Intertriginous candidiasis     Other insomnia     Hearing deficit, bilateral     Family history of Alzheimer's disease       Current Outpatient Medications   Medication Sig Dispense Refill     acetaminophen-codeine (TYLENOL #3) 300-30 MG tablet Take 1 tablet by mouth every 6 hours as needed for severe pain 10 tablet 0     BIOTIN PO Take by mouth daily       Calcium Citrate-Vitamin D (CALCIUM + D PO) Take 1 tablet by mouth daily       cefuroxime (CEFTIN) 500 MG tablet Take 1 tablet (500 mg) by mouth 2 times daily       Cyanocobalamin (VITAMIN B 12 PO) Take 1 tablet by mouth daily       lisinopril (ZESTRIL) 10 MG tablet Take 1 tablet (10 mg) by mouth daily 90 tablet 3     Multiple Vitamins-Minerals (CENTRUM ADULTS PO) Take 1 tablet by mouth daily       nystatin (MYCOSTATIN) 322060 UNIT/GM external powder Apply topically 3 times daily as needed 60 g 3     omeprazole (PRILOSEC) 20 MG DR capsule Take 1 capsule (20 mg) by mouth daily 90 capsule 3     sulfamethoxazole-trimethoprim (BACTRIM DS) 800-160 MG tablet Take 1 tablet by mouth 2 times daily 20 tablet 0     traZODone (DESYREL) 50 MG tablet Take 1.5-2 tablets ( mg) by mouth nightly as needed for sleep 180 tablet 3     triamcinolone (KENALOG) 0.1 % external ointment Apply sparingly to affected area  twice daily. Apply sparingly 2-3 weeks as directed. 80 g 1     vitamin B complex with vitamin C (VITAMIN  B COMPLEX) TABS tablet Take 1 tablet by mouth daily         Allergies   Allergen Reactions     Penicillins        Family History   Problem Relation Age of Onset     Ankylosing Spondylitis Brother 57     Heart Disease Brother      Breast Cancer Sister      Heart Disease Sister      Heart Disease Mother      Heart Disease Father      Coronary Artery Disease Father      Hypertension Father      Heart Disease Brother      Diabetes Sister      Diabetes Brother      Hypertension Brother      Coronary Artery Disease Brother      Hypertension Brother      Osteoporosis Brother      Coronary Artery Disease Brother         Bypass surgery     Hypertension Brother      Hypertension Sister      Breast Cancer Sister        Additional medical/Social/Surgical histories reviewed in [facility] and updated as appropriate.       PHYSICAL EXAM  Wt 81.2 kg (179 lb)   BMI 32.74 kg/m      General  - normal appearance, in no obvious distress  Musculoskeletal - L elbow  - inspection: normal joint alignment, no obvious deformity, mild swelling at left olecranon.  Erythema at olecranon region and proximal ulna.  - palpation: Tender to palpation at olecranon bursa  - ROM: Slight decrease in full flexion however full extension pronation supination.  - strength: Intact  Neuro  - no sensory or motor deficit, grossly normal coordination, normal muscle tone  Skin  -No abrasions, skin with erythema surrounding the olecranon region from distal tricep into the proximal ulna.  Psych  - interactive, appropriate, normal mood and affect    IMAGING : In office ultrasound, limited.  Using high-frequency Doppler transducer, the posterior elbow was evaluated.  Visible fat cobblestoning and swelling at the region of the olecranon, very small fluid collection seen centrally at the olecranon bursa, otherwise no large fluid collection.    CRP 4/21-76.7  CBC  4/21/2021 14.2 WBC  ESR 4/21-34    ASSESSMENT & PLAN  Ms. Amaro is a 65 year old year old female who presents to clinic today with left elbow pain and swelling consistent with septic olecranon bursitis.    Diagnosis: Septic olecranon bursitis of left elbow    We do long discussion about current treatment for septic electron bursitis.  She is currently on a 2 antibiotic regimen of second-generation cephalosporin as well as anti-MRSA agent, Bactrim.  I do think this is a very good regimen that she should continue over the next week.  She has noted significant improvement in her swelling, and historically notes that there was a large fluctuant area of her left elbow which is no longer visible, even with ultrasound.  Therefore there is reason to consider aspiration as there is a lack of fluid as well as improvement in overall condition.    Left elbow was well demarcated and she will monitor the margins for progression over the weekend.  If there is any progression is likely a failure of oral antibiotics and she will likely require IV therapy.  She report to the ED for progression of her erythema, fevers, chills or other systemic symptoms.    If she has localized increased swelling next week, she may return for an aspiration and fluid should be sent for cultures, cell count and Gram stain.  We would also have a discussion about whether IV antibiotics are appropriate that time.    Continue with ibuprofen regimen, will add Tylenol 3 for breakthrough pain only.    It was a pleasure seeing Nilda today.    50 minutes on date of the encounter doing chart review, history and examination, independent imaging review, documentation, and additional activities noted above.  Reviewed labs from last week, reviewed ED note, reviewed multiple primary care notes.    Quincy Liu DO, [facility]  Primary Care Sports Medicine      Again, thank you for allowing me to participate in the care of your patient.        Sincerely,        Quincy Liu,  DO

## 2021-04-23 NOTE — PROGRESS NOTES
CHIEF COMPLAINT:  Consult (left elbow swelling, redness and warmth, going on for about a week, )       HISTORY OF PRESENT ILLNESS  Ms. Amaro is a pleasant 65 year old year old female who presents to clinic today with left elbow swelling, pain, redness and heat.  Nilda explains that her elbow just started hurting Saturday afternoon    Onset: gradual  Location: left elbow  Quality:  Aching, shooting and on fire  Duration: 1 weeks   Timing:intermittent episodes   Modifying factors:  resting and non-use makes it better, movement and use makes it worse  Associated signs & symptoms: burning  Previous similar pain: No  Treatments to date:antibiotics    Additional history: Patient was initially seen on 4/17 for olecranon bursitis's, suspected septic nature.  Labs elevated with white blood cell count 17.4 sed rate 13 CRP 86.9.  She was then directed to the emergency department.  They treat her with Ceftin 500 mg and ibuprofen.  She was then seen again by a provider, where she received a dose of ceftriaxone IM.  Bactrim was initiated in addition to continuing Ceftin.    She had chills earlier in the week.  This has resolved.  She is also noted that the large region of swelling at the elbow has nearly resolved after yesterday.  She notes that there is still erythema but this is also improved.  She notes it is still quite painful however.    Review of Systems:    Have you recently had a a fever, chills, weight loss? No    Do you have any vision problems? No    Do you have any chest pain or edema? No    Do you have any shortness of breath or wheezing?  No    Do you have stomach problems? No    Do you have any numbness or focal weakness? No    Do you have diabetes? No    Do you have problems with bleeding or clotting? No    Do you have an rashes or other skin lesions? No    MEDICAL HISTORY  Patient Active Problem List   Diagnosis     Advance care planning     Obesity, Class I, BMI 30-34.9     Chronic GERD     Medial meniscus tear,  right, subsequent encounter     Chondromalacia patellae, left     Chondromalacia patellae, right     Primary osteoarthritis of both knees     CARDIOVASCULAR SCREENING; LDL GOAL LESS THAN 160     Change in color of pigmented skin lesion, left neck, 2mm     Family history of breast cancer in sister     Hypertension goal BP (blood pressure) < 140/90     Gastroesophageal reflux disease with esophagitis     Intertriginous candidiasis     Other insomnia     Hearing deficit, bilateral     Family history of Alzheimer's disease       Current Outpatient Medications   Medication Sig Dispense Refill     acetaminophen-codeine (TYLENOL #3) 300-30 MG tablet Take 1 tablet by mouth every 6 hours as needed for severe pain 10 tablet 0     BIOTIN PO Take by mouth daily       Calcium Citrate-Vitamin D (CALCIUM + D PO) Take 1 tablet by mouth daily       cefuroxime (CEFTIN) 500 MG tablet Take 1 tablet (500 mg) by mouth 2 times daily       Cyanocobalamin (VITAMIN B 12 PO) Take 1 tablet by mouth daily       lisinopril (ZESTRIL) 10 MG tablet Take 1 tablet (10 mg) by mouth daily 90 tablet 3     Multiple Vitamins-Minerals (CENTRUM ADULTS PO) Take 1 tablet by mouth daily       nystatin (MYCOSTATIN) 722046 UNIT/GM external powder Apply topically 3 times daily as needed 60 g 3     omeprazole (PRILOSEC) 20 MG DR capsule Take 1 capsule (20 mg) by mouth daily 90 capsule 3     sulfamethoxazole-trimethoprim (BACTRIM DS) 800-160 MG tablet Take 1 tablet by mouth 2 times daily 20 tablet 0     traZODone (DESYREL) 50 MG tablet Take 1.5-2 tablets ( mg) by mouth nightly as needed for sleep 180 tablet 3     triamcinolone (KENALOG) 0.1 % external ointment Apply sparingly to affected area twice daily. Apply sparingly 2-3 weeks as directed. 80 g 1     vitamin B complex with vitamin C (VITAMIN  B COMPLEX) TABS tablet Take 1 tablet by mouth daily         Allergies   Allergen Reactions     Penicillins        Family History   Problem Relation Age of Onset      Ankylosing Spondylitis Brother 57     Heart Disease Brother      Breast Cancer Sister      Heart Disease Sister      Heart Disease Mother      Heart Disease Father      Coronary Artery Disease Father      Hypertension Father      Heart Disease Brother      Diabetes Sister      Diabetes Brother      Hypertension Brother      Coronary Artery Disease Brother      Hypertension Brother      Osteoporosis Brother      Coronary Artery Disease Brother         Bypass surgery     Hypertension Brother      Hypertension Sister      Breast Cancer Sister        Additional medical/Social/Surgical histories reviewed in [facility] and updated as appropriate.       PHYSICAL EXAM  Wt 81.2 kg (179 lb)   BMI 32.74 kg/m      General  - normal appearance, in no obvious distress  Musculoskeletal - L elbow  - inspection: normal joint alignment, no obvious deformity, mild swelling at left olecranon.  Erythema at olecranon region and proximal ulna.  - palpation: Tender to palpation at olecranon bursa  - ROM: Slight decrease in full flexion however full extension pronation supination.  - strength: Intact  Neuro  - no sensory or motor deficit, grossly normal coordination, normal muscle tone  Skin  -No abrasions, skin with erythema surrounding the olecranon region from distal tricep into the proximal ulna.  Psych  - interactive, appropriate, normal mood and affect    IMAGING : In office ultrasound, limited.  Using high-frequency Doppler transducer, the posterior elbow was evaluated.  Visible fat cobblestoning and swelling at the region of the olecranon, very small fluid collection seen centrally at the olecranon bursa, otherwise no large fluid collection.    CRP 4/21-76.7  CBC 4/21/2021 14.2 WBC  ESR 4/21-34    ASSESSMENT & PLAN  Ms. Amaro is a 65 year old year old female who presents to clinic today with left elbow pain and swelling consistent with septic olecranon bursitis.    Diagnosis: Septic olecranon bursitis of left elbow    We do long  discussion about current treatment for septic electron bursitis.  She is currently on a 2 antibiotic regimen of second-generation cephalosporin as well as anti-MRSA agent, Bactrim.  I do think this is a very good regimen that she should continue over the next week.  She has noted significant improvement in her swelling, and historically notes that there was a large fluctuant area of her left elbow which is no longer visible, even with ultrasound.  Therefore there is reason to consider aspiration as there is a lack of fluid as well as improvement in overall condition.    Left elbow was well demarcated and she will monitor the margins for progression over the weekend.  If there is any progression is likely a failure of oral antibiotics and she will likely require IV therapy.  She report to the ED for progression of her erythema, fevers, chills or other systemic symptoms.    If she has localized increased swelling next week, she may return for an aspiration and fluid should be sent for cultures, cell count and Gram stain.  We would also have a discussion about whether IV antibiotics are appropriate that time.    Continue with ibuprofen regimen, will add Tylenol 3 for breakthrough pain only.    It was a pleasure seeing Nilda today.    50 minutes on date of the encounter doing chart review, history and examination, independent imaging review, documentation, and additional activities noted above.  Reviewed labs from last week, reviewed ED note, reviewed multiple primary care notes.    Quincy Liu DO, CAQSM  Primary Care Sports Medicine

## 2021-04-23 NOTE — PATIENT INSTRUCTIONS
Thanks for coming today.  Ortho/Sports Medicine Clinic  [address] [place] [address]    To schedule future appointments in Ortho Clinic, you may call [phone].    To schedule ordered imaging by your Provider: Call [facility] at [phone]    [employer] available online at:   [email or web address]    Please call if any further questions or concerns [phone] and ask for the Orthopedic Department. Clinic hours 8 am to 5 pm.    Return to clinic if symptoms worsen.

## 2021-04-26 ENCOUNTER — MYC MEDICAL ADVICE (OUTPATIENT)
Dept: ORTHOPEDICS | Facility: CLINIC | Age: 66
End: 2021-04-26

## 2021-04-26 DIAGNOSIS — M71.122 SEPTIC OLECRANON BURSITIS OF LEFT ELBOW: ICD-10-CM

## 2021-04-26 DIAGNOSIS — M25.522 PAIN IN LEFT ELBOW: Primary | ICD-10-CM

## 2021-04-27 RX ORDER — DICLOFENAC SODIUM 75 MG/1
75 TABLET, DELAYED RELEASE ORAL 2 TIMES DAILY
Qty: 28 TABLET | Refills: 0 | Status: SHIPPED | OUTPATIENT
Start: 2021-04-27 | End: 2022-01-31

## 2021-10-03 ENCOUNTER — HEALTH MAINTENANCE LETTER (OUTPATIENT)
Age: 66
End: 2021-10-03

## 2021-12-06 DIAGNOSIS — K21.00 GASTROESOPHAGEAL REFLUX DISEASE WITH ESOPHAGITIS, UNSPECIFIED WHETHER HEMORRHAGE: ICD-10-CM

## 2021-12-20 ENCOUNTER — ANCILLARY PROCEDURE (OUTPATIENT)
Dept: GENERAL RADIOLOGY | Facility: CLINIC | Age: 66
End: 2021-12-20
Attending: PODIATRIST
Payer: COMMERCIAL

## 2021-12-20 ENCOUNTER — OFFICE VISIT (OUTPATIENT)
Dept: PODIATRY | Facility: CLINIC | Age: 66
End: 2021-12-20
Payer: COMMERCIAL

## 2021-12-20 VITALS — DIASTOLIC BLOOD PRESSURE: 84 MMHG | OXYGEN SATURATION: 97 % | SYSTOLIC BLOOD PRESSURE: 134 MMHG | HEART RATE: 54 BPM

## 2021-12-20 DIAGNOSIS — M25.571 RIGHT ANKLE PAIN, UNSPECIFIED CHRONICITY: ICD-10-CM

## 2021-12-20 DIAGNOSIS — M79.671 RIGHT FOOT PAIN: Primary | ICD-10-CM

## 2021-12-20 DIAGNOSIS — M19.071 OSTEOARTHRITIS OF RIGHT ANKLE AND FOOT: ICD-10-CM

## 2021-12-20 PROCEDURE — 99214 OFFICE O/P EST MOD 30 MIN: CPT | Performed by: PODIATRIST

## 2021-12-20 PROCEDURE — 73630 X-RAY EXAM OF FOOT: CPT | Mod: RT | Performed by: RADIOLOGY

## 2021-12-20 PROCEDURE — 73610 X-RAY EXAM OF ANKLE: CPT | Mod: 26 | Performed by: RADIOLOGY

## 2021-12-20 ASSESSMENT — PAIN SCALES - GENERAL: PAINLEVEL: NO PAIN (0)

## 2021-12-20 NOTE — PROGRESS NOTES
Past Medical History:   Diagnosis Date     Arthritis      Hypertension Last visit     Patient Active Problem List   Diagnosis     Advance care planning     Obesity, Class I, BMI 30-34.9     Chronic GERD     Medial meniscus tear, right, subsequent encounter     Chondromalacia patellae, left     Chondromalacia patellae, right     Primary osteoarthritis of both knees     CARDIOVASCULAR SCREENING; LDL GOAL LESS THAN 160     Change in color of pigmented skin lesion, left neck, 2mm     Family history of breast cancer in sister     Hypertension goal BP (blood pressure) < 140/90     Gastroesophageal reflux disease with esophagitis     Intertriginous candidiasis     Other insomnia     Hearing deficit, bilateral     Family history of Alzheimer's disease     Past Surgical History:   Procedure Laterality Date     BIOPSY  2018     COLONOSCOPY  3/21/16     COLONOSCOPY WITH CO2 INSUFFLATION N/A 3/21/2016    Procedure: COLONOSCOPY WITH CO2 INSUFFLATION;  Surgeon: Geoffrey Blackwell MD;  Location: [facility] OR     ENDOSCOPY UPPER, COLONOSCOPY, COMBINED N/A 3/21/2016    Procedure: COMBINED ENDOSCOPY UPPER, COLONOSCOPY;  Surgeon: Geoffrey Blackwell MD;  Location: [facility] OR     ESOPHAGOSCOPY, GASTROSCOPY, DUODENOSCOPY (EGD), COMBINED N/A 3/21/2016    Procedure: COMBINED ESOPHAGOSCOPY, GASTROSCOPY, DUODENOSCOPY (EGD), BIOPSY SINGLE OR MULTIPLE;  Surgeon: Geoffrey Blackwell MD;  Location: [facility] OR     ORTHOPEDIC SURGERY  18     Social History     Socioeconomic History     Marital status:      Spouse name: Not on file     Number of children: Not on file     Years of education: Not on file     Highest education level: Not on file   Occupational History     Not on file   Tobacco Use     Smoking status: Former Smoker     Packs/day: 1.50     Years: 15.00     Pack years: 22.50     Types: Cigarettes     Start date: 1972     Quit date: 3/1/2008     Years since quittin.8     Smokeless tobacco: Never Used   Substance and  Sexual Activity     Alcohol use: Yes     Alcohol/week: 0.0 standard drinks     Drug use: No     Sexual activity: Yes     Partners: Male     Birth control/protection: None   Other Topics Concern     Parent/sibling w/ CABG, MI or angioplasty before 65F 55M? Yes     Comment: Father 54 Brother 55   Social History Narrative     Not on file     Social Determinants of Health     Financial Resource Strain: Not on file   Food Insecurity: Not on file   Transportation Needs: Not on file   Physical Activity: Not on file   Stress: Not on file   Social Connections: Not on file   Intimate Partner Violence: Not on file   Housing Stability: Not on file     Family History   Problem Relation Age of Onset     Ankylosing Spondylitis Brother 57     Heart Disease Brother      Breast Cancer Sister      Heart Disease Sister      Heart Disease Mother      Heart Disease Father      Coronary Artery Disease Father      Hypertension Father      Heart Disease Brother      Diabetes Sister      Diabetes Brother      Hypertension Brother      Coronary Artery Disease Brother      Hypertension Brother      Osteoporosis Brother      Coronary Artery Disease Brother         Bypass surgery     Hypertension Brother      Hypertension Sister      Breast Cancer Sister      Uric Acid   Date Value Ref Range Status   04/19/2021 3.2 2.6 - 6.0 mg/dL Final     Lab Results   Component Value Date    WBC 14.2 04/21/2021     Lab Results   Component Value Date    RBC 3.78 04/21/2021     Lab Results   Component Value Date    HGB 12.0 04/21/2021     Lab Results   Component Value Date    HCT 36.1 04/21/2021     No components found for: MCT  Lab Results   Component Value Date    MCV 96 04/21/2021     Lab Results   Component Value Date    MCH 31.7 04/21/2021     Lab Results   Component Value Date    MCHC 33.2 04/21/2021     Lab Results   Component Value Date    RDW 13.1 04/21/2021     Lab Results   Component Value Date     04/21/2021     Last Comprehensive Metabolic  Panel:  Sodium   Date Value Ref Range Status   11/16/2020 139 133 - 144 mmol/L Final     Potassium   Date Value Ref Range Status   11/16/2020 3.9 3.4 - 5.3 mmol/L Final     Chloride   Date Value Ref Range Status   11/16/2020 108 94 - 109 mmol/L Final     Carbon Dioxide   Date Value Ref Range Status   11/16/2020 27 20 - 32 mmol/L Final     Anion Gap   Date Value Ref Range Status   11/16/2020 4 3 - 14 mmol/L Final     Glucose   Date Value Ref Range Status   11/16/2020 82 70 - 99 mg/dL Final     Comment:     Fasting specimen     Urea Nitrogen   Date Value Ref Range Status   11/16/2020 20 7 - 30 mg/dL Final     Creatinine   Date Value Ref Range Status   11/16/2020 0.71 0.52 - 1.04 mg/dL Final     GFR Estimate   Date Value Ref Range Status   11/16/2020 >90 >60 mL/min/[1.73_m2] Final     Comment:     Non  GFR Calc  Starting 12/18/2018, serum creatinine based estimated GFR (eGFR) will be   calculated using the Chronic Kidney Disease Epidemiology Collaboration   (CKD-EPI) equation.       Calcium   Date Value Ref Range Status   11/16/2020 9.3 8.5 - 10.1 mg/dL Final       SUBJECTIVE FINDINGS:  A 66-year-old female presents for about 2 months' duration right foot and ankle pain.  She relates it does not hurt now because she has been off work for 2 days.  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  She relates no injuries.  It kind of hurts on the medial side, the lateral side and the top of the foot.  It moves around.  She relates she takes Advil, and that helps.  She relates no specific treatment.  She has had her hips replaced.  Her knees have hurt for about a year.  After further discussion, she relates it does maybe go up in the knee and the leg and hip at times.  She relates she gets restless foot at night, too.    OBJECTIVE FINDINGS:  DP and PT are 2/4, right. She has some mild pain on palpation of the medial foot and ankle.  There is no erythema, no drainage, no odor, no calor, no gross tendon voids.  She  has dorsal medial 1st MPJ prominence.  There is decreased range of motion of the 1st MPJ.  She relates the pain is in the lateral foot and ankle, medial foot and ankle and dorsal foot when it occurs.  She relates that if she sits when she is at work and then gets up, it is sore.    IMAGING:  X-rays reviewed with the patient in clinic today.  There is no fracture.  Joint and cortical margins are intact.  She has diffuse joint space narrowing, subchondral sclerosis and spurring noted.  She has decreased calcaneal inclination, plantar and posterior calcaneal spurring, anterior break in the cyma line noted.    ASSESSMENT AND PLAN:  Foot and ankle pain, right.  She is getting tendinitis of the tibialis posterior peroneal tendons and extensor tendons.  She has diffuse osteoarthritic changes on x-ray.  Diagnosis and treatment options discussed with her.  I advised her on some stretching.  Prescription for Voltaren gel given and use discussed with her.  Removal of taping and strapping, right foot, done upon consent and use discussed with her.  She relates she has used some over-the-counter insoles in the past that she felt had helped at one point in time, but they are not helping anymore.  She opted for no Physical Therapy today.  Return to clinic and see me in 1 week.  We also should rule out radiculopathy and sciatica.  I gave her a referral to Sports Medicine to help rule this out and use discussed with her.    Moderate level of medical decision making.

## 2021-12-20 NOTE — NURSING NOTE
Nilda Amaro's chief complaint for this visit includes:  Chief Complaint   Patient presents with     Right Ankle - New Patient     PCP: Leonor Muir    Referring Provider:  No referring provider defined for this encounter.    /84 (BP Location: Left arm, Patient Position: Sitting, Cuff Size: Adult Regular)   Pulse 54   SpO2 97%   No Pain (0)     Do you need any medication refills at today's visit? NO    Allergies   Allergen Reactions     Penicillins        Anton Garcia MA

## 2021-12-20 NOTE — PATIENT INSTRUCTIONS
Thanks for coming today.  Ortho/Sports Medicine Clinic  [address] [place] [address]    To schedule future appointments in Ortho Clinic, you may call [phone].    To schedule ordered imaging by your provider:   Call [facility] [phone]    To schedule an injection ordered by your provider:  Call [facility] Injection scheduling line: [phone]  [employer] available online at:  [email or web address]    Please call if any further questions or concerns ([phone]).  Clinic hours 8 am to 5 pm.    Return to clinic (call) if symptoms worsen or fail to improve.

## 2021-12-20 NOTE — LETTER
12/20/2021         RE: Nilda Amaro  [id number] [facility] [id number]        Dear Colleague,    Thank you for referring your patient, Nilda Amaro, to the [facility]. Please see a copy of my visit note below.    Past Medical History:   Diagnosis Date     Arthritis      Hypertension Last visit     Patient Active Problem List   Diagnosis     Advance care planning     Obesity, Class I, BMI 30-34.9     Chronic GERD     Medial meniscus tear, right, subsequent encounter     Chondromalacia patellae, left     Chondromalacia patellae, right     Primary osteoarthritis of both knees     CARDIOVASCULAR SCREENING; LDL GOAL LESS THAN 160     Change in color of pigmented skin lesion, left neck, 2mm     Family history of breast cancer in sister     Hypertension goal BP (blood pressure) < 140/90     Gastroesophageal reflux disease with esophagitis     Intertriginous candidiasis     Other insomnia     Hearing deficit, bilateral     Family history of Alzheimer's disease     Past Surgical History:   Procedure Laterality Date     BIOPSY  2018     COLONOSCOPY  3/21/16     COLONOSCOPY WITH CO2 INSUFFLATION N/A 3/21/2016    Procedure: COLONOSCOPY WITH CO2 INSUFFLATION;  Surgeon: Geoffrey Blackwell MD;  Location: [facility] OR     ENDOSCOPY UPPER, COLONOSCOPY, COMBINED N/A 3/21/2016    Procedure: COMBINED ENDOSCOPY UPPER, COLONOSCOPY;  Surgeon: Geoffrey Blackwell MD;  Location: [facility] OR     ESOPHAGOSCOPY, GASTROSCOPY, DUODENOSCOPY (EGD), COMBINED N/A 3/21/2016    Procedure: COMBINED ESOPHAGOSCOPY, GASTROSCOPY, DUODENOSCOPY (EGD), BIOPSY SINGLE OR MULTIPLE;  Surgeon: Geoffrey Blackwell MD;  Location: [facility] OR     ORTHOPEDIC SURGERY  4/23/18     Social History     Socioeconomic History     Marital status:      Spouse name: Not on file     Number of children: Not on file     Years of education: Not on file     Highest education level: Not on file   Occupational History     Not on file   Tobacco  Use     Smoking status: Former Smoker     Packs/day: 1.50     Years: 15.00     Pack years: 22.50     Types: Cigarettes     Start date: 1972     Quit date: 3/1/2008     Years since quittin.8     Smokeless tobacco: Never Used   Substance and Sexual Activity     Alcohol use: Yes     Alcohol/week: 0.0 standard drinks     Drug use: No     Sexual activity: Yes     Partners: Male     Birth control/protection: None   Other Topics Concern     Parent/sibling w/ CABG, MI or angioplasty before 65F 55M? Yes     Comment: Father 54 Brother 55   Social History Narrative     Not on file     Social Determinants of Health     Financial Resource Strain: Not on file   Food Insecurity: Not on file   Transportation Needs: Not on file   Physical Activity: Not on file   Stress: Not on file   Social Connections: Not on file   Intimate Partner Violence: Not on file   Housing Stability: Not on file     Family History   Problem Relation Age of Onset     Ankylosing Spondylitis Brother 57     Heart Disease Brother      Breast Cancer Sister      Heart Disease Sister      Heart Disease Mother      Heart Disease Father      Coronary Artery Disease Father      Hypertension Father      Heart Disease Brother      Diabetes Sister      Diabetes Brother      Hypertension Brother      Coronary Artery Disease Brother      Hypertension Brother      Osteoporosis Brother      Coronary Artery Disease Brother         Bypass surgery     Hypertension Brother      Hypertension Sister      Breast Cancer Sister      Uric Acid   Date Value Ref Range Status   2021 3.2 2.6 - 6.0 mg/dL Final     Lab Results   Component Value Date    WBC 14.2 2021     Lab Results   Component Value Date    RBC 3.78 2021     Lab Results   Component Value Date    HGB 12.0 2021     Lab Results   Component Value Date    HCT 36.1 2021     No components found for: MCT  Lab Results   Component Value Date    MCV 96 2021     Lab Results   Component Value  Date    MCH 31.7 04/21/2021     Lab Results   Component Value Date    MCHC 33.2 04/21/2021     Lab Results   Component Value Date    RDW 13.1 04/21/2021     Lab Results   Component Value Date     04/21/2021     Last Comprehensive Metabolic Panel:  Sodium   Date Value Ref Range Status   11/16/2020 139 133 - 144 mmol/L Final     Potassium   Date Value Ref Range Status   11/16/2020 3.9 3.4 - 5.3 mmol/L Final     Chloride   Date Value Ref Range Status   11/16/2020 108 94 - 109 mmol/L Final     Carbon Dioxide   Date Value Ref Range Status   11/16/2020 27 20 - 32 mmol/L Final     Anion Gap   Date Value Ref Range Status   11/16/2020 4 3 - 14 mmol/L Final     Glucose   Date Value Ref Range Status   11/16/2020 82 70 - 99 mg/dL Final     Comment:     Fasting specimen     Urea Nitrogen   Date Value Ref Range Status   11/16/2020 20 7 - 30 mg/dL Final     Creatinine   Date Value Ref Range Status   11/16/2020 0.71 0.52 - 1.04 mg/dL Final     GFR Estimate   Date Value Ref Range Status   11/16/2020 >90 >60 mL/min/[1.73_m2] Final     Comment:     Non  GFR Calc  Starting 12/18/2018, serum creatinine based estimated GFR (eGFR) will be   calculated using the Chronic Kidney Disease Epidemiology Collaboration   (CKD-EPI) equation.       Calcium   Date Value Ref Range Status   11/16/2020 9.3 8.5 - 10.1 mg/dL Final       SUBJECTIVE FINDINGS:  A 66-year-old female presents for about 2 months' duration right foot and ankle pain.  She relates it does not hurt now because she has been off work for 2 days.  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  She relates no injuries.  It kind of hurts on the medial side, the lateral side and the top of the foot.  It moves around.  She relates she takes Advil, and that helps.  She relates no specific treatment.  She has had her hips replaced.  Her knees have hurt for about a year.  After further discussion, she relates it does maybe go up in the knee and the leg and hip at times.  She  relates she gets restless foot at night, too.    OBJECTIVE FINDINGS:  DP and PT are 2/4, right. She has some mild pain on palpation of the medial foot and ankle.  There is no erythema, no drainage, no odor, no calor, no gross tendon voids.  She has dorsal medial 1st MPJ prominence.  There is decreased range of motion of the 1st MPJ.  She relates the pain is in the lateral foot and ankle, medial foot and ankle and dorsal foot when it occurs.  She relates that if she sits when she is at work and then gets up, it is sore.    IMAGING:  X-rays reviewed with the patient in clinic today.  There is no fracture.  Joint and cortical margins are intact.  She has diffuse joint space narrowing, subchondral sclerosis and spurring noted.  She has decreased calcaneal inclination, plantar and posterior calcaneal spurring, anterior break in the cyma line noted.    ASSESSMENT AND PLAN:  Foot and ankle pain, right.  She is getting tendinitis of the tibialis posterior peroneal tendons and extensor tendons.  She has diffuse osteoarthritic changes on x-ray.  Diagnosis and treatment options discussed with her.  I advised her on some stretching.  Prescription for Voltaren gel given and use discussed with her.  Removal of taping and strapping, right foot, done upon consent and use discussed with her.  She relates she has used some over-the-counter insoles in the past that she felt had helped at one point in time, but they are not helping anymore.  She opted for no Physical Therapy today.  Return to clinic and see me in 1 week.  We also should rule out radiculopathy and sciatica.  I gave her a referral to Sports Medicine to help rule this out and use discussed with her.    Moderate level of medical decision making.      Again, thank you for allowing me to participate in the care of your patient.        Sincerely,        Delroy Rasmussen DPM

## 2022-01-07 DIAGNOSIS — Z13.1 SCREENING FOR DIABETES MELLITUS (DM): ICD-10-CM

## 2022-01-07 DIAGNOSIS — I10 HYPERTENSION GOAL BP (BLOOD PRESSURE) < 140/90: ICD-10-CM

## 2022-01-07 DIAGNOSIS — Z13.220 SCREENING FOR HYPERLIPIDEMIA: ICD-10-CM

## 2022-01-07 DIAGNOSIS — Z13.0 SCREENING FOR DEFICIENCY ANEMIA: Primary | ICD-10-CM

## 2022-01-23 ENCOUNTER — HEALTH MAINTENANCE LETTER (OUTPATIENT)
Age: 67
End: 2022-01-23

## 2022-01-30 ASSESSMENT — ENCOUNTER SYMPTOMS
PARESTHESIAS: 1
BREAST MASS: 0
HEMATURIA: 0
DIARRHEA: 0
SHORTNESS OF BREATH: 1
JOINT SWELLING: 1
FREQUENCY: 1
COUGH: 0
FEVER: 0
CHILLS: 0
ABDOMINAL PAIN: 0
MYALGIAS: 0
HEMATOCHEZIA: 0
CONSTIPATION: 0
SORE THROAT: 0
DIZZINESS: 0
DYSURIA: 0
WEAKNESS: 0
ARTHRALGIAS: 1
HEARTBURN: 1
HEADACHES: 0
NAUSEA: 0
PALPITATIONS: 0
EYE PAIN: 0
NERVOUS/ANXIOUS: 0

## 2022-01-30 ASSESSMENT — ACTIVITIES OF DAILY LIVING (ADL): CURRENT_FUNCTION: NO ASSISTANCE NEEDED

## 2022-01-31 ENCOUNTER — LAB (OUTPATIENT)
Dept: LAB | Facility: CLINIC | Age: 67
End: 2022-01-31
Payer: COMMERCIAL

## 2022-01-31 ENCOUNTER — OFFICE VISIT (OUTPATIENT)
Dept: FAMILY MEDICINE | Facility: CLINIC | Age: 67
End: 2022-01-31
Payer: COMMERCIAL

## 2022-01-31 ENCOUNTER — TELEPHONE (OUTPATIENT)
Dept: GASTROENTEROLOGY | Facility: CLINIC | Age: 67
End: 2022-01-31

## 2022-01-31 ENCOUNTER — MYC MEDICAL ADVICE (OUTPATIENT)
Dept: FAMILY MEDICINE | Facility: CLINIC | Age: 67
End: 2022-01-31

## 2022-01-31 VITALS
WEIGHT: 180 LBS | BODY MASS INDEX: 33.99 KG/M2 | HEIGHT: 61 IN | DIASTOLIC BLOOD PRESSURE: 68 MMHG | OXYGEN SATURATION: 100 % | SYSTOLIC BLOOD PRESSURE: 116 MMHG | RESPIRATION RATE: 18 BRPM | HEART RATE: 52 BPM

## 2022-01-31 DIAGNOSIS — Z12.2 SCREENING FOR LUNG CANCER: ICD-10-CM

## 2022-01-31 DIAGNOSIS — M25.571 CHRONIC PAIN OF BOTH ANKLES: ICD-10-CM

## 2022-01-31 DIAGNOSIS — Z12.11 COLON CANCER SCREENING: ICD-10-CM

## 2022-01-31 DIAGNOSIS — D12.6 TUBULAR ADENOMA OF COLON: ICD-10-CM

## 2022-01-31 DIAGNOSIS — Z13.0 SCREENING FOR DEFICIENCY ANEMIA: ICD-10-CM

## 2022-01-31 DIAGNOSIS — G89.29 CHRONIC PAIN OF BOTH ANKLES: ICD-10-CM

## 2022-01-31 DIAGNOSIS — Z00.00 ENCOUNTER FOR MEDICARE ANNUAL WELLNESS EXAM: Primary | ICD-10-CM

## 2022-01-31 DIAGNOSIS — Z87.891 FORMER CIGARETTE SMOKER: ICD-10-CM

## 2022-01-31 DIAGNOSIS — M25.572 CHRONIC PAIN OF BOTH ANKLES: ICD-10-CM

## 2022-01-31 DIAGNOSIS — K21.9 CHRONIC GERD: ICD-10-CM

## 2022-01-31 DIAGNOSIS — Z13.220 SCREENING FOR HYPERLIPIDEMIA: ICD-10-CM

## 2022-01-31 DIAGNOSIS — R06.83 SNORING: ICD-10-CM

## 2022-01-31 DIAGNOSIS — I10 HYPERTENSION GOAL BP (BLOOD PRESSURE) < 140/90: ICD-10-CM

## 2022-01-31 DIAGNOSIS — Z80.3 FAMILY HISTORY OF BREAST CANCER IN SISTER: ICD-10-CM

## 2022-01-31 DIAGNOSIS — Z78.0 MENOPAUSE: ICD-10-CM

## 2022-01-31 DIAGNOSIS — Z13.1 SCREENING FOR DIABETES MELLITUS (DM): ICD-10-CM

## 2022-01-31 DIAGNOSIS — E66.811 OBESITY, CLASS I, BMI 30-34.9: ICD-10-CM

## 2022-01-31 DIAGNOSIS — K21.00 GASTROESOPHAGEAL REFLUX DISEASE WITH ESOPHAGITIS WITHOUT HEMORRHAGE: ICD-10-CM

## 2022-01-31 DIAGNOSIS — Z23 NEED FOR SHINGLES VACCINE: ICD-10-CM

## 2022-01-31 LAB
ALBUMIN SERPL-MCNC: 4 G/DL (ref 3.4–5)
ALP SERPL-CCNC: 75 U/L (ref 40–150)
ALT SERPL W P-5'-P-CCNC: 23 U/L (ref 0–50)
ANION GAP SERPL CALCULATED.3IONS-SCNC: 7 MMOL/L (ref 3–14)
AST SERPL W P-5'-P-CCNC: 19 U/L (ref 0–45)
BASOPHILS # BLD AUTO: 0 10E3/UL (ref 0–0.2)
BASOPHILS NFR BLD AUTO: 0 %
BILIRUB SERPL-MCNC: 0.6 MG/DL (ref 0.2–1.3)
BUN SERPL-MCNC: 14 MG/DL (ref 7–30)
CALCIUM SERPL-MCNC: 9.5 MG/DL (ref 8.5–10.1)
CHLORIDE BLD-SCNC: 104 MMOL/L (ref 94–109)
CHOLEST SERPL-MCNC: 249 MG/DL
CO2 SERPL-SCNC: 26 MMOL/L (ref 20–32)
CREAT SERPL-MCNC: 0.67 MG/DL (ref 0.52–1.04)
CREAT UR-MCNC: 72 MG/DL
EOSINOPHIL # BLD AUTO: 0.2 10E3/UL (ref 0–0.7)
EOSINOPHIL NFR BLD AUTO: 2 %
ERYTHROCYTE [DISTWIDTH] IN BLOOD BY AUTOMATED COUNT: 12.9 % (ref 10–15)
FASTING STATUS PATIENT QL REPORTED: YES
GFR SERPL CREATININE-BSD FRML MDRD: >90 ML/MIN/1.73M2
GLUCOSE BLD-MCNC: 92 MG/DL (ref 70–99)
HCT VFR BLD AUTO: 39.9 % (ref 35–47)
HDLC SERPL-MCNC: 84 MG/DL
HGB BLD-MCNC: 13.4 G/DL (ref 11.7–15.7)
IMM GRANULOCYTES # BLD: 0 10E3/UL
IMM GRANULOCYTES NFR BLD: 0 %
LDLC SERPL CALC-MCNC: 152 MG/DL
LYMPHOCYTES # BLD AUTO: 2.9 10E3/UL (ref 0.8–5.3)
LYMPHOCYTES NFR BLD AUTO: 30 %
MCH RBC QN AUTO: 31.7 PG (ref 26.5–33)
MCHC RBC AUTO-ENTMCNC: 33.6 G/DL (ref 31.5–36.5)
MCV RBC AUTO: 94 FL (ref 78–100)
MICROALBUMIN UR-MCNC: 11 MG/L
MICROALBUMIN/CREAT UR: 15.28 MG/G CR (ref 0–25)
MONOCYTES # BLD AUTO: 0.8 10E3/UL (ref 0–1.3)
MONOCYTES NFR BLD AUTO: 8 %
NEUTROPHILS # BLD AUTO: 5.6 10E3/UL (ref 1.6–8.3)
NEUTROPHILS NFR BLD AUTO: 59 %
NONHDLC SERPL-MCNC: 165 MG/DL
PLATELET # BLD AUTO: 379 10E3/UL (ref 150–450)
POTASSIUM BLD-SCNC: 3.8 MMOL/L (ref 3.4–5.3)
PROT SERPL-MCNC: 8.1 G/DL (ref 6.8–8.8)
RBC # BLD AUTO: 4.23 10E6/UL (ref 3.8–5.2)
SODIUM SERPL-SCNC: 137 MMOL/L (ref 133–144)
TRIGL SERPL-MCNC: 64 MG/DL
WBC # BLD AUTO: 9.5 10E3/UL (ref 4–11)

## 2022-01-31 PROCEDURE — 80053 COMPREHEN METABOLIC PANEL: CPT

## 2022-01-31 PROCEDURE — 82043 UR ALBUMIN QUANTITATIVE: CPT

## 2022-01-31 PROCEDURE — 90471 IMMUNIZATION ADMIN: CPT | Performed by: FAMILY MEDICINE

## 2022-01-31 PROCEDURE — G0402 INITIAL PREVENTIVE EXAM: HCPCS | Performed by: FAMILY MEDICINE

## 2022-01-31 PROCEDURE — 99213 OFFICE O/P EST LOW 20 MIN: CPT | Mod: 25 | Performed by: FAMILY MEDICINE

## 2022-01-31 PROCEDURE — 85025 COMPLETE CBC W/AUTO DIFF WBC: CPT

## 2022-01-31 PROCEDURE — 80061 LIPID PANEL: CPT

## 2022-01-31 PROCEDURE — 90670 PCV13 VACCINE IM: CPT | Performed by: FAMILY MEDICINE

## 2022-01-31 PROCEDURE — 36415 COLL VENOUS BLD VENIPUNCTURE: CPT

## 2022-01-31 RX ORDER — LISINOPRIL 10 MG/1
10 TABLET ORAL DAILY
Qty: 90 TABLET | Refills: 3 | Status: SHIPPED | OUTPATIENT
Start: 2022-01-31 | End: 2023-03-13

## 2022-01-31 ASSESSMENT — ENCOUNTER SYMPTOMS
DYSURIA: 0
CHILLS: 0
FREQUENCY: 1
WEAKNESS: 0
PALPITATIONS: 0
NERVOUS/ANXIOUS: 0
ARTHRALGIAS: 1
HEMATOCHEZIA: 0
HEADACHES: 0
EYE PAIN: 0
HEARTBURN: 1
SHORTNESS OF BREATH: 1
DIARRHEA: 0
HEMATURIA: 0
PARESTHESIAS: 1
DIZZINESS: 0
ABDOMINAL PAIN: 0
SORE THROAT: 0
BREAST MASS: 0
COUGH: 0
FEVER: 0
MYALGIAS: 0
NAUSEA: 0
JOINT SWELLING: 1
CONSTIPATION: 0

## 2022-01-31 ASSESSMENT — ACTIVITIES OF DAILY LIVING (ADL): CURRENT_FUNCTION: NO ASSISTANCE NEEDED

## 2022-01-31 ASSESSMENT — PAIN SCALES - GENERAL: PAINLEVEL: NO PAIN (0)

## 2022-01-31 ASSESSMENT — MIFFLIN-ST. JEOR: SCORE: 1298.85

## 2022-01-31 NOTE — NURSING NOTE
Prior to immunization administration, verified patients identity using patient s name and date of birth. Please see Immunization Activity for additional information.     Screening Questionnaire for Adult Immunization    Are you sick today?   No   Do you have allergies to medications, food, a vaccine component or latex?   Yes   Have you ever had a serious reaction after receiving a vaccination?   No   Do you have a long-term health problem with heart, lung, kidney, or metabolic disease (e.g., diabetes), asthma, a blood disorder, no spleen, complement component deficiency, a cochlear implant, or a spinal fluid leak?  Are you on long-term aspirin therapy?   No   Do you have cancer, leukemia, HIV/AIDS, or any other immune system problem?   No   Do you have a parent, brother, or sister with an immune system problem?   No   In the past 3 months, have you taken medications that affect  your immune system, such as prednisone, other steroids, or anticancer drugs; drugs for the treatment of rheumatoid arthritis, Crohn s disease, or psoriasis; or have you had radiation treatments?   No   Have you had a seizure, or a brain or other nervous system problem?   No   During the past year, have you received a transfusion of blood or blood    products, or been given immune (gamma) globulin or antiviral drug?   No   For women: Are you pregnant or is there a chance you could become       pregnant during the next month?   No   Have you received any vaccinations in the past 4 weeks?   No     Immunization questionnaire answers were all negative.        Per orders of Dr. Muir, injection of Prevnar 13 given by Thu Escobar. Patient instructed to remain in clinic for 15 minutes afterwards, and to report any adverse reaction to me immediately.       Screening performed by Thu Escobar on 1/31/2022 at 1:55 PM.

## 2022-01-31 NOTE — PATIENT INSTRUCTIONS
Call  to schedule for sleep consult  Call   To schedule for DEXA, colonoscopy, chest CT and upper GI endoscopy    Patient Education   Personalized Prevention Plan  You are due for the preventive services outlined below.  Your care team is available to assist you in scheduling these services.  If you have already completed any of these items, please share that information with your care team to update in your medical record.  Health Maintenance Due   Topic Date Due     Osteoporosis Screening  Never done     ANNUAL REVIEW OF HM ORDERS  Never done     LUNG CANCER SCREENING  Never done     Zoster (Shingles) Vaccine (2 of 3) 12/05/2016     Diptheria Tetanus Pertussis (DTAP/TDAP/TD) Vaccine (3 - Td or Tdap) 07/08/2021     FALL RISK ASSESSMENT  08/21/2021     Mammogram  10/26/2021     Annual Wellness Visit  11/16/2021     Basic Metabolic Panel  11/16/2021     Cholesterol Lab  11/16/2021     Kidney Microalbumin Urine Test  11/16/2021     Pneumococcal Vaccine (1 of 1 - PPSV23) 08/21/2021

## 2022-01-31 NOTE — TELEPHONE ENCOUNTER
Screening Questions  Blue=prep questions Red=location Green=sedation   1. Are you active on mychart? Y    2. What insurance is in the chart? BCBS/MEDICARE     3.  Ordering/Referring Provider: DENIS MILLS     4. BMI 34.0, If greater than 40 review exclusion criteria also will need EXTENDED PREP    5.  Respiratory Screening (If yes to any of the following HOSPITAL setting only):     Do you use daily home oxygen? N  Do you have mod to severe Obstructive Sleep Apnea? N (can be seen at [facility] or hospital setting)    Do you have Pulmonary Hypertension? N   Do you have UNCONTROLLED asthma? N    6. Have you had a heart or lung transplant? N  (If yes, please review exclusion criteria)    7. Are you currently on dialysis?N  (If yes, schedule in HOSPITAL setting only)(If yes, please send Golytely prep)    8. Do you have chronic kidney disease? N (If yes, please send Golytely prep)    9. Have you had a stroke or Transient ischemic attack (TIA) within 6 months? N (If yes, do not schedule at [facility])    10. In the past 6 months, have you had any heart related issues including cardiomyopathy or heart attack? N (If yes, please review exclusion criteria)           If yes, did it require cardiac stenting or other implantable device?N  (If yes, please review exclusion criteria)      11. Do you have any implantable devices in your body (pacemaker, defib, LVAD)? N (If yes, schedule at [facility])    12. Do you take nitroglycerin? If yes, how often? N (if yes, schedule at HOSPITAL setting)    13. Are you currently taking any blood thinners?N (If yes- inform patient to follow up with PCP or provider for follow up instructions)     14. Are you a diabetic? N (If yes, please send Golytely prep)    15. (Females) Are you currently pregnant?   If yes, how many weeks?      16. Are you taking any prescription pain medications on a routine schedule? N If yes, MAC sedation and patient will need EXTENDED PREP.    17. Do you have any chemical dependencies  such as alcohol, street drugs, or methadone? N If yes, MAC sedation     18. Do you have any history of post-traumatic stress syndrome, severe anxiety or history of psychosis? N  If yes, MAC sedation.     19. Do you transfer independently? N    20.  Do you have any issues with constipation? N   If yes, pt will need EXTENDED PREP     21. Preferred Pharmacy for Pre Prescription [id number] - [place], [place] - [address] [facility]    Scheduling Details    Which Colonoscopy Prep was Sent?: MPREP  Type of Procedure Scheduled: EGD,COLONOSCOPY  Surgeon: ZONIA  Date of Procedure: 3/14  Location:   Caller (Please ask for phone number if not scheduled by patient): Nilda Amaro      Sedation Type: CS  Conscious Sedation- Needs  for 6 hours after the procedure  MAC/General-Needs  for 24 hours after procedure    Pre-op Required at [facility] [place], [place] and OR for MAC sedation:   (if yes advise patient they will need a pre-op prior to procedure)      Informed patient they will need an adult  Y  Cannot take any type of public or medical transportation alone    Pre-Procedure Covid test to be completed at [facility] or Externally: 3/11 B[facility]    Confirmed Nurse will call to complete assessment Y    Additional comments:  (DE JOSE MANUEL'S PATIENTS NEED EXTENDED PREP)

## 2022-01-31 NOTE — PROGRESS NOTES
"SUBJECTIVE:   Nilda Amaro is a 66 year old female who presents for Preventive Visit.      Patient has been advised of split billing requirements and indicates understanding: Yes  Are you in the first 12 months of your Medicare coverage?  Yes,  Visual Acuity:  Right Eye: 20/25   Left Eye: 20/25  Both Eyes: 20/20    Healthy Habits:     In general, how would you rate your overall health?  Good    Frequency of exercise:  2-3 days/week    Duration of exercise:  Less than 15 minutes    Do you usually eat at least 4 servings of fruit and vegetables a day, include whole grains    & fiber and avoid regularly eating high fat or \"junk\" foods?  No    Taking medications regularly:  Yes    Medication side effects:  None    Ability to successfully perform activities of daily living:  No assistance needed    Home Safety:  No safety concerns identified    Hearing Impairment:  Difficulty understanding soft or whispered speech    In the past 6 months, have you been bothered by leaking of urine? Yes    In general, how would you rate your overall mental or emotional health?  Good      PHQ-2 Total Score: 0    Additional concerns today:  Yes    Do you feel safe in your environment? Yes    Have you ever done Advance Care Planning? (For example, a Health Directive, POLST, or a discussion with a medical provider or your loved ones about your wishes): No, advance care planning information given to patient to review.  Patient plans to discuss their wishes with loved ones or provider.         Fall risk  Fallen 2 or more times in the past year?: No  Any fall with injury in the past year?: No    Cognitive Screening   1) Repeat 3 items (Leader, Season, Table)    2) Clock draw: NORMAL  3) 3 item recall: Recalls 3 objects  Results: 3 items recalled: COGNITIVE IMPAIRMENT LESS LIKELY    Mini-CogTM Copyright DANILO Pierre. Licensed by the author for use in [facility]; reprinted with permission ([email or web address]@.[email or web address]). All rights reserved.      Do you " have sleep apnea, excessive snoring or daytime drowsiness?: no    Reviewed and updated as needed this visit by clinical staff  Tobacco  Allergies  Meds             Reviewed and updated as needed this visit by Provider  Tobacco   Meds            Social History     Tobacco Use     Smoking status: Former Smoker     Packs/day: 1.50     Years: 36.00     Pack years: 54.00     Types: Cigarettes     Start date: 1972     Quit date: 3/1/2008     Years since quittin.9     Smokeless tobacco: Never Used   Substance Use Topics     Alcohol use: Yes     Alcohol/week: 0.0 standard drinks     If you drink alcohol do you typically have >3 drinks per day or >7 drinks per week? No    No flowsheet data found.        -order for endoscopy, having issues with GERD still even though pt takes 20 mg daily  -shooting pains in both feet, mostly right, ankles swell, ongoing for a few months          Current providers sharing in care for this patient include:   Patient Care Team:  Leonor Muir MD as PCP - General (Family Practice)  Leonor Muir MD as Assigned PCP  Delroy Rasmussen DPM as Assigned Musculoskeletal Provider    The following health maintenance items are reviewed in Epic and correct as of today:  Health Maintenance Due   Topic Date Due     DEXA  Never done     LUNG CANCER SCREENING  Never done     ZOSTER IMMUNIZATION (2 of 3) 2016     DTAP/TDAP/TD IMMUNIZATION (3 - Td or Tdap) 2021     FALL RISK ASSESSMENT  2021     MAMMO SCREENING  10/26/2021     BMP  2021     LIPID  2021     MICROALBUMIN  2021     Lab work is in process  Labs reviewed in EPIC  BP Readings from Last 3 Encounters:   22 116/68   21 134/84   21 122/78    Wt Readings from Last 3 Encounters:   22 81.6 kg (180 lb)   21 81.2 kg (179 lb)   21 81.4 kg (179 lb 8 oz)                  Patient Active Problem List   Diagnosis     Advance care planning     Obesity, Class I, BMI  30-34.9     Chronic GERD     Medial meniscus tear, right, subsequent encounter     Chondromalacia patellae, left     Chondromalacia patellae, right     Primary osteoarthritis of both knees     CARDIOVASCULAR SCREENING; LDL GOAL LESS THAN 160     Change in color of pigmented skin lesion, left neck, 2mm     Family history of breast cancer in sister     Hypertension goal BP (blood pressure) < 140/90     Gastroesophageal reflux disease with esophagitis     Intertriginous candidiasis     Other insomnia     Hearing deficit, bilateral     Family history of Alzheimer's disease     Tubular adenoma of colon     Snoring     Past Surgical History:   Procedure Laterality Date     BIOPSY  2018     COLONOSCOPY  3/21/16     COLONOSCOPY WITH CO2 INSUFFLATION N/A 3/21/2016    Procedure: COLONOSCOPY WITH CO2 INSUFFLATION;  Surgeon: Geoffrey Blackwell MD;  Location: [facility] OR     ENDOSCOPY UPPER, COLONOSCOPY, COMBINED N/A 3/21/2016    Procedure: COMBINED ENDOSCOPY UPPER, COLONOSCOPY;  Surgeon: Geoffrey Blackwell MD;  Location: [facility] OR     ESOPHAGOSCOPY, GASTROSCOPY, DUODENOSCOPY (EGD), COMBINED N/A 3/21/2016    Procedure: COMBINED ESOPHAGOSCOPY, GASTROSCOPY, DUODENOSCOPY (EGD), BIOPSY SINGLE OR MULTIPLE;  Surgeon: Geoffrey Blackwell MD;  Location: [facility] OR     ORTHOPEDIC SURGERY  18       Social History     Tobacco Use     Smoking status: Former Smoker     Packs/day: 1.50     Years: 36.00     Pack years: 54.00     Types: Cigarettes     Start date: 1972     Quit date: 3/1/2008     Years since quittin.9     Smokeless tobacco: Never Used   Substance Use Topics     Alcohol use: Yes     Alcohol/week: 0.0 standard drinks     Family History   Problem Relation Age of Onset     Ankylosing Spondylitis Brother 57     Heart Disease Brother      Breast Cancer Sister      Heart Disease Sister      Heart Disease Mother      Heart Disease Father      Coronary Artery Disease Father      Hypertension Father      Heart Disease Brother       Diabetes Sister      Hypertension Sister      Diabetes Brother      Hypertension Brother      Coronary Artery Disease Brother      Hypertension Brother      Osteoporosis Brother      Hyperlipidemia Brother      Coronary Artery Disease Brother         Bypass surgery     Hypertension Brother      Hyperlipidemia Brother      Hypertension Sister      Hyperlipidemia Sister      Breast Cancer Sister      Diabetes Nephew      Coronary Artery Disease Nephew          Current Outpatient Medications   Medication Sig Dispense Refill     BIOTIN PO Take by mouth daily       Cyanocobalamin (VITAMIN B 12 PO) Take 1 tablet by mouth daily       diclofenac (VOLTAREN) 1 % topical gel 1 gram to affected areas on right foot and ankle 2-3 times daily as needed for pain. 100 g 1     lisinopril (ZESTRIL) 10 MG tablet Take 1 tablet (10 mg) by mouth daily 90 tablet 3     Multiple Vitamins-Minerals (CENTRUM ADULTS PO) Take 1 tablet by mouth daily       omeprazole (PRILOSEC) 20 MG DR capsule TAKE 1 CAPSULE BY MOUTH ONCE DAILY 90 capsule 3     triamcinolone (KENALOG) 0.1 % external ointment Apply sparingly to affected area twice daily. Apply sparingly 2-3 weeks as directed. 80 g 1     vitamin B complex with vitamin C (VITAMIN  B COMPLEX) TABS tablet Take 1 tablet by mouth daily       Allergies   Allergen Reactions     Penicillins      Recent Labs   Lab Test 11/16/20  0840 10/21/19  0719 10/15/18  0752 10/01/18  0835 10/10/16  1140 08/03/15  0949   A1C  --   --   --   --   --  5.4   * 109*  --  113*   < > 119   HDL 85 84  --  90   < > 78   TRIG 51 39  --  36   < > 65   ALT 21 21  --  22   < >  --    CR 0.71 0.62   < > 0.67   < >  --    GFRESTIMATED >90 >90   < > 89   < >  --    GFRESTBLACK >90 >90   < > >90   < >  --    POTASSIUM 3.9 4.0   < > 4.1   < >  --    TSH 2.48  --   --  2.43   < >  --     < > = values in this interval not displayed.      Pneumonia Vaccine:Adults age 65+ who received Pneumovax (PPSV23) at 65 years or older:  Should be given PCV13 > 1 year after their most recent PPSV23  Mammogram Screening: Mammogram Screening: Recommended mammography every 1-2 years with patient discussion and risk factor consideration  History of abnormal Pap smear: NO - age 65 - see link Cervical Cytology Screening Guidelines  Last 3 Pap and HPV Results:   PAP / HPV Latest Ref Rng & Units 10/1/2018 8/4/2015   PAP (Historical) - NIL NIL   HPV16 NEG:Negative Negative Negative   HPV18 NEG:Negative Negative Negative   HRHPV NEG:Negative Negative Negative       FHS-7:   Breast CA Risk Assessment (FHS-7) 1/30/2022   Did any of your first-degree relatives have breast or ovarian cancer? Yes   Did any of your relatives have bilateral breast cancer? Unknown   Did any man in your family have breast cancer? No   Did any woman in your family have breast and ovarian cancer? Yes   Did any woman in your family have breast cancer before age 50 y? Yes   Do you have 2 or more relatives with breast and/or ovarian cancer? Yes   Do you have 2 or more relatives with breast and/or bowel cancer? Yes     click delete button to remove this line now  Mammogram Screening: Recommended mammography every 1-2 years with patient discussion and risk factor consideration  Pertinent mammograms are reviewed under the imaging tab.    Review of Systems   Constitutional: Negative for chills and fever.   HENT: Negative for congestion, ear pain, hearing loss and sore throat.    Eyes: Negative for pain and visual disturbance.   Respiratory: Positive for shortness of breath. Negative for cough.    Cardiovascular: Negative for chest pain, palpitations and peripheral edema.   Gastrointestinal: Positive for heartburn. Negative for abdominal pain, constipation, diarrhea, hematochezia and nausea.   Breasts:  Negative for tenderness, breast mass and discharge.   Genitourinary: Positive for frequency. Negative for dysuria, genital sores, hematuria, pelvic pain, urgency, vaginal bleeding and vaginal  "discharge.   Musculoskeletal: Positive for arthralgias and joint swelling. Negative for myalgias.   Skin: Positive for rash.   Neurological: Positive for paresthesias. Negative for dizziness, weakness and headaches.   Psychiatric/Behavioral: Negative for mood changes. The patient is not nervous/anxious.      CONSTITUTIONAL: NEGATIVE for fever, chills, change in weight  INTEGUMENTARY/SKIN: NEGATIVE for worrisome rashes, moles or lesions  EYES: NEGATIVE for vision changes or irritation  ENT/MOUTH: NEGATIVE for ear, mouth and throat problems  RESP: NEGATIVE for significant cough or SOB  BREAST: NEGATIVE for masses, tenderness or discharge  CV: NEGATIVE for chest pain, palpitations or peripheral edema  CV: Hx HTN  GI: Chronic GERD, tolerating mouth:  : Postmenopausal,   MUSCULOSKELETAL: Chronic ankle pain, intermittent numbness and tingling of the feet  NEURO: as above  ENDOCRINE: NEGATIVE for temperature intolerance, skin/hair changes  HEME/ALLERGY/IMMUNE: NEGATIVE for bleeding problems  PSYCHIATRIC: NEGATIVE for changes in mood or affect    OBJECTIVE:   /68   Pulse 52   Resp 18   Ht 1.549 m (5' 1\")   Wt 81.6 kg (180 lb)   SpO2 100%   BMI 34.01 kg/m   Estimated body mass index is 34.01 kg/m  as calculated from the following:    Height as of this encounter: 1.549 m (5' 1\").    Weight as of this encounter: 81.6 kg (180 lb).  Physical Exam  GENERAL APPEARANCE: healthy, alert and no distress  EYES: Eyes grossly normal to inspection, PERRL and conjunctivae and sclerae normal  HENT: ear canals and TM's normal, nose and mouth without ulcers or lesions, oropharynx clear and oral mucous membranes moist  NECK: no adenopathy, no asymmetry, masses, or scars and thyroid normal to palpation  RESP: lungs clear to auscultation - no rales, rhonchi or wheezes  BREAST: normal without masses, tenderness or nipple discharge and no palpable axillary masses or adenopathy  CV: regular rate and rhythm, normal S1 S2, no S3 or S4, " no murmur, click or rub, no peripheral edema and peripheral pulses strong  ABDOMEN: soft, nontender, no hepatosplenomegaly, no masses and bowel sounds normal  MS: no musculoskeletal defects are noted and gait is age appropriate without ataxia  SKIN: no suspicious lesions or rashes  NEURO: Normal strength and tone, sensory exam grossly normal, mentation intact and speech normal  PSYCH: mentation appears normal and affect normal/bright    Diagnostic Test Results:  Labs reviewed in Epic    ASSESSMENT / PLAN:   (Z00.00) Encounter for Medicare annual wellness exam  (primary encounter diagnosis)  Comment:   Plan: Discussed on regular exercises, daily calcium intake, healthy eating, self breast exams monthly and routine dental checks    (Z78.0) Menopause  Comment:   Plan: DEXA HIP/PELVIS/SPINE - Future            (R06.83) Snoring  Comment:   Plan: SLEEP EVALUATION & MANAGEMENT REFERRAL - ADULT         -        Chronic snoring with some daytime drowsiness recommended to get sleep consult for further evaluation to exclude sleep apnea,    (I10) Hypertension goal BP (blood pressure) < 140/90  Comment:   Plan: lisinopril (ZESTRIL) 10 MG tablet          BP Readings from Last 6 Encounters:   01/31/22 116/68   12/20/21 134/84   04/21/21 122/78   11/16/20 130/78   10/21/19 130/82   04/15/19 128/81     Blood pressure is at goal, continue with current medications, follow for recheck in 1 year or sooner if needed  Will follow low salt diet, weight loss and regular exercises.      (D12.6) Tubular adenoma of colon  Comment:   Plan: Adult Gastro Ref - Procedure Only, CANCELED:         Adult Gastro Ref - Procedure Only        Patient is overdue for recheck colonoscopy, will call to schedule    (Z12.11) Colon cancer screening  Comment:   Plan: Adult Gastro Ref - Procedure Only, CANCELED:         Adult Gastro Ref - Procedure Only            (Z80.3) Family history of breast cancer in sister  Comment:   Plan: ,Continue with annual mammograms,  scheduled for March 2022    (K21.9) Chronic GERD  Comment:   Plan: Adult Gastro Ref - Procedure Only        Continue with reflux precautions, proceed with upper GI endoscopy for surveillance  omeprazole (PRILOSEC) 20 MG DR capsule    (K21.00) Gastroesophageal reflux disease with esophagitis without hemorrhage  Comment:   Plan: Adult Gastro Ref - Procedure Only          omeprazole (PRILOSEC) 20 MG DR capsule    (Z87.891) Former cigarette smoker  Comment:   Plan: CT Chest Lung Cancer Scrn Low Dose wo    Patient meeting the guidelines for lung cancer screening  Will call insurance to double check for coverage before scheduling  Quit in 2008    (Z12.2) Screening for lung cancer  Comment:   Plan: CT Chest Lung Cancer Scrn Low Dose wo        as above        (Z23) Need for shingles vaccine  Comment:   Plan: Recommended  patient to have it done at the pharmacy after checking with insurance for coverage.      (E66.9) Obesity, Class I, BMI 30-34.9  Comment:   Plan:   Wt Readings from Last 5 Encounters:   01/31/22 81.6 kg (180 lb)   04/23/21 81.2 kg (179 lb)   04/21/21 81.4 kg (179 lb 8 oz)   04/19/21 77.1 kg (170 lb)   11/16/20 78.1 kg (172 lb 1.6 oz)     Emphasized on weight loss, portion control, low calorie and low fat diet, healthy eating, regular exercises.      (M25.571,  G89.29,  M25.572) Chronic pain of both ankles  Comment:   Plan: Patient is in the plan of consulting at [facility] for further evaluation    Patient has been advised of split billing requirements and indicates understanding: Yes    COUNSELING:  Reviewed preventive health counseling, as reflected in patient instructions  Special attention given to:       Regular exercise       Healthy diet/nutrition       Vision screening       Hearing screening       Dental care       Bladder control       Fall risk prevention       Immunizations    Vaccinated for: Pneumococcal             Alcohol Use        Osteoporosis prevention/bone health       Consider lung cancer  "screening for ages 55-80 years and 30 pack-year smoking history        Colon cancer screening       (Christelle)menopause management       The 10-year ASCVD risk score (David BERNAL JrThi, et al., 2013) is: 5.4%    Values used to calculate the score:      Age: 66 years      Sex: Female      Is Non- : No      Diabetic: No      Tobacco smoker: No      Systolic Blood Pressure: 116 mmHg      Is BP treated: Yes      HDL Cholesterol: 85 mg/dL      Total Cholesterol: 224 mg/dL       Advanced Planning     Estimated body mass index is 34.01 kg/m  as calculated from the following:    Height as of this encounter: 1.549 m (5' 1\").    Weight as of this encounter: 81.6 kg (180 lb).    Weight management plan: Discussed healthy diet and exercise guidelines    She reports that she quit smoking about 13 years ago. Her smoking use included cigarettes. She started smoking about 49 years ago. She has a 54.00 pack-year smoking history. She has never used smokeless tobacco.      Appropriate preventive services were discussed with this patient, including applicable screening as appropriate for cardiovascular disease, diabetes, osteopenia/osteoporosis, and glaucoma.  As appropriate for age/gender, discussed screening for colorectal cancer, prostate cancer, breast cancer, and cervical cancer. Checklist reviewing preventive services available has been given to the patient.    Reviewed patients plan of care and provided an AVS. The Intermediate Care Plan ( asthma action plan, low back pain action plan, and migraine action plan) for Nilda meets the Care Plan requirement. This Care Plan has been established and reviewed with the Patient.    Counseling Resources:  ATP IV Guidelines  Pooled Cohorts Equation Calculator  Breast Cancer Risk Calculator  Breast Cancer: Medication to Reduce Risk  FRAX Risk Assessment  ICSI Preventive Guidelines  Dietary Guidelines for Americans, 2010  USDA's MyPlate  ASA Prophylaxis  Lung CA " Screening    Leonor Muir MD  [facility]    Chart documentation done in part with Dragon Voice recognition Software. Although reviewed after completion, some word and grammatical error may remain.    Identified Health Risks:

## 2022-02-01 NOTE — RESULT ENCOUNTER NOTE
Dear Nilda,  Your lab test showed normal test results for hemoglobin with no concern for anemia, normal fasting blood sugar with no concern for diabetes, normal liver and kidney funct  Your fasting cholesterol numbers are ions and urine exam with no protein leak.  These are good and reassuring.  Your fasting cholesterol numbers are moderately elevated including the total and LDL-bad cholesterol, which is new and worsened from previous years numbers.   Desired or goal levels are:  TOTAL CHOLESTEROL: Desirable is less than 200.   HDL (Good Cholesterol): Desirable is greater than 40 (for men) greater than 50 (for women).  LDL (Bad Cholesterol): Desirable is less than 130 (or less than 100 if you have heart disease or diabetes). Borderline 130-160.  TRIGLYCERIDES: Desirable is less than 150.  Borderline is 150-200..    As you may know, an elevated cholesterol is one factor that increases your risk for heart disease and stroke. You can improve your cholesterol by controlling the amount and type of fat you eat and by increasing your daily activity level.  Here are some ways to improve your nutrition:  Eat less fat (especially butter, Crisco and other saturated fats)  Buy lean cuts of meat, reduce your portions of red meat or substitute poultry or fish  Use skim milk and low-fat dairy products  Eat no more than 4 egg yolks per week  Avoid fried or fast foods that are high in fat  Eat more fruits and vegetables  Also consider starting or increasing your aerobic activity. Aerobic activity is the best way to improve HDL (good) cholesterol.    Let me know if you have any questions. Take care.  Leonor Muir MD

## 2022-02-14 ENCOUNTER — ANCILLARY PROCEDURE (OUTPATIENT)
Dept: CT IMAGING | Facility: CLINIC | Age: 67
End: 2022-02-14
Attending: FAMILY MEDICINE
Payer: COMMERCIAL

## 2022-02-14 ENCOUNTER — ANCILLARY PROCEDURE (OUTPATIENT)
Dept: BONE DENSITY | Facility: CLINIC | Age: 67
End: 2022-02-14
Attending: FAMILY MEDICINE
Payer: COMMERCIAL

## 2022-02-14 ENCOUNTER — ANCILLARY PROCEDURE (OUTPATIENT)
Dept: MAMMOGRAPHY | Facility: CLINIC | Age: 67
End: 2022-02-14
Attending: FAMILY MEDICINE
Payer: COMMERCIAL

## 2022-02-14 DIAGNOSIS — Z12.2 SCREENING FOR LUNG CANCER: ICD-10-CM

## 2022-02-14 DIAGNOSIS — Z87.891 FORMER CIGARETTE SMOKER: ICD-10-CM

## 2022-02-14 DIAGNOSIS — Z78.0 MENOPAUSE: ICD-10-CM

## 2022-02-14 DIAGNOSIS — Z11.59 ENCOUNTER FOR SCREENING FOR OTHER VIRAL DISEASES: Primary | ICD-10-CM

## 2022-02-14 DIAGNOSIS — Z12.31 VISIT FOR SCREENING MAMMOGRAM: ICD-10-CM

## 2022-02-14 PROCEDURE — 71271 CT THORAX LUNG CANCER SCR C-: CPT | Performed by: RADIOLOGY

## 2022-02-14 PROCEDURE — 77067 SCR MAMMO BI INCL CAD: CPT | Performed by: RADIOLOGY

## 2022-02-14 PROCEDURE — 77080 DXA BONE DENSITY AXIAL: CPT | Performed by: RADIOLOGY

## 2022-02-14 NOTE — RESULT ENCOUNTER NOTE
Dear Nilda,  Your DEXA showed reduced bone density- osteopenia, that is in between normal bones and osteoporosis. Please make sure to take CALTRATE-D twice daily along with weight bearing exercises including walking. We will repeat the DEXA in 2 years for recheck.   Let me know if you have any questions. Take care.  Leonor Muir MD

## 2022-02-15 ENCOUNTER — TELEPHONE (OUTPATIENT)
Dept: FAMILY MEDICINE | Facility: CLINIC | Age: 67
End: 2022-02-15
Payer: COMMERCIAL

## 2022-02-15 DIAGNOSIS — K76.89 HEPATIC CYST: Primary | ICD-10-CM

## 2022-02-15 NOTE — TELEPHONE ENCOUNTER
Incoming call from [facility] staff, Courtney. Courtney states the following is an incidental Radiology result for provider review, regarding CT completed today (2/15/22):    Incidental Finding:  Significant Incidental Finding(s):  Category S: Yes.  Multiple hepatic hypodensities, statistically most likely cysts but  incompletely characterized due to lack of contrast. Consider right  upper quadrant ultrasound or contrasted abdomen CT.    JESIKA TorersN, RN

## 2022-02-15 NOTE — TELEPHONE ENCOUNTER
Please update patient-reviewed the chest CT findings  We will recommend to proceed with abdominal ultrasound to evaluate the liver cyst per radiology  Ultrasound ordered, she can call [facility] to set up the appointment

## 2022-02-16 NOTE — TELEPHONE ENCOUNTER
Called patient to relay provider message as written below.     Patient provided with phone number to set up US at  clinic.     Patient verbalized understanding and had no further questions at this time.    Penelope Valdez RN, BSN  [facility]

## 2022-02-16 NOTE — TELEPHONE ENCOUNTER
Patient called back regarding chest CT results, she is wondering if results are anything to worry about in an urgent or emergent way.    Writer read patient CT impression but let her know I am unable to interpret results and provider would like to discuss CT results with patient at upcoming visit.    Patient has abdominal ultrasound scheduled on 2/18 and provider visit scheduled on 2/21.     Bambi Byrnes RN  [facility]

## 2022-02-16 NOTE — RESULT ENCOUNTER NOTE
Calixto Munguia,  Please call or [facility] to schedule for virtual visit this or next week to review your chest CT results and further recommendations.  You can also decide to get the virtual visit after your abdominal ultrasound is done.   Let me know if you have any questions. Take care.  Leonor Muir MD

## 2022-02-16 NOTE — TELEPHONE ENCOUNTER
Please inform patient--no urgent concerning findings, but few incidental findings that needs discussion  Keep the appointment as scheduled for next week

## 2022-02-18 ENCOUNTER — ANCILLARY PROCEDURE (OUTPATIENT)
Dept: ULTRASOUND IMAGING | Facility: CLINIC | Age: 67
End: 2022-02-18
Attending: FAMILY MEDICINE
Payer: COMMERCIAL

## 2022-02-18 DIAGNOSIS — K76.89 HEPATIC CYST: ICD-10-CM

## 2022-02-18 PROCEDURE — 76700 US EXAM ABDOM COMPLETE: CPT | Performed by: RADIOLOGY

## 2022-02-20 PROBLEM — I25.10: Status: ACTIVE | Noted: 2022-02-20

## 2022-02-21 ENCOUNTER — VIRTUAL VISIT (OUTPATIENT)
Dept: FAMILY MEDICINE | Facility: CLINIC | Age: 67
End: 2022-02-21
Payer: COMMERCIAL

## 2022-02-21 ENCOUNTER — LAB (OUTPATIENT)
Dept: LAB | Facility: CLINIC | Age: 67
End: 2022-02-21
Payer: COMMERCIAL

## 2022-02-21 DIAGNOSIS — I70.0 AORTIC ATHEROSCLEROSIS (H): ICD-10-CM

## 2022-02-21 DIAGNOSIS — K76.0 FATTY LIVER: ICD-10-CM

## 2022-02-21 DIAGNOSIS — K76.89 HEPATIC CYST: ICD-10-CM

## 2022-02-21 DIAGNOSIS — R06.09 DOE (DYSPNEA ON EXERTION): ICD-10-CM

## 2022-02-21 DIAGNOSIS — E55.9 VITAMIN D INSUFFICIENCY: ICD-10-CM

## 2022-02-21 DIAGNOSIS — I10 HYPERTENSION GOAL BP (BLOOD PRESSURE) < 140/90: ICD-10-CM

## 2022-02-21 DIAGNOSIS — I25.10 CORONARY ARTERY CALCIFICATION SEEN ON CAT SCAN: Primary | ICD-10-CM

## 2022-02-21 DIAGNOSIS — L65.9 HAIR LOSS: ICD-10-CM

## 2022-02-21 DIAGNOSIS — R76.8 ELEVATED ANTINUCLEAR ANTIBODY (ANA) LEVEL: Primary | ICD-10-CM

## 2022-02-21 DIAGNOSIS — E78.5 HYPERLIPIDEMIA LDL GOAL <100: ICD-10-CM

## 2022-02-21 LAB
FERRITIN SERPL-MCNC: 22 NG/ML (ref 8–252)
TSH SERPL DL<=0.005 MIU/L-ACNC: 1.94 MU/L (ref 0.4–4)
VIT B12 SERPL-MCNC: 909 PG/ML (ref 193–986)

## 2022-02-21 PROCEDURE — 86038 ANTINUCLEAR ANTIBODIES: CPT

## 2022-02-21 PROCEDURE — 86039 ANTINUCLEAR ANTIBODIES (ANA): CPT

## 2022-02-21 PROCEDURE — 82607 VITAMIN B-12: CPT

## 2022-02-21 PROCEDURE — 82306 VITAMIN D 25 HYDROXY: CPT

## 2022-02-21 PROCEDURE — 86225 DNA ANTIBODY NATIVE: CPT

## 2022-02-21 PROCEDURE — 83921 ORGANIC ACID SINGLE QUANT: CPT

## 2022-02-21 PROCEDURE — 99214 OFFICE O/P EST MOD 30 MIN: CPT | Mod: 95 | Performed by: FAMILY MEDICINE

## 2022-02-21 PROCEDURE — 86140 C-REACTIVE PROTEIN: CPT

## 2022-02-21 PROCEDURE — 82728 ASSAY OF FERRITIN: CPT

## 2022-02-21 PROCEDURE — 36415 COLL VENOUS BLD VENIPUNCTURE: CPT

## 2022-02-21 PROCEDURE — 84443 ASSAY THYROID STIM HORMONE: CPT

## 2022-02-21 RX ORDER — ATORVASTATIN CALCIUM 10 MG/1
10 TABLET, FILM COATED ORAL EVERY EVENING
Qty: 90 TABLET | Refills: 3 | Status: SHIPPED | OUTPATIENT
Start: 2022-02-21 | End: 2023-02-22

## 2022-02-21 NOTE — PROGRESS NOTES
Nilda is a 66 year old who is being evaluated via a billable video visit.      How would you like to obtain your AVS? MyChart  If the video visit is dropped, the invitation should be resent by: Text to cell phone: see epic  Will anyone else be joining your video visit? No    Video Start Time: 7:31am    Assessment & Plan     Coronary artery calcification seen on CAT scan  Reviewed chest CT showing right coronary artery calcification and aortic atherosclerosis  Recommended to start on baby aspirin, Lipitor 10 mg daily due to concerns for ongoing dyspnea on exertion., recommended to proceed with the echo and Lexiscan stress test for further evaluation  - Echocardiogram Complete; Future  - NM Lexiscan stress test; Future  - aspirin (ASA) 81 MG EC tablet; Take 1 tablet (81 mg) by mouth daily  - atorvastatin (LIPITOR) 10 MG tablet; Take 1 tablet (10 mg) by mouth every evening    Aortic atherosclerosis (H)  as above    - Echocardiogram Complete; Future  - NM Lexiscan stress test; Future  - aspirin (ASA) 81 MG EC tablet; Take 1 tablet (81 mg) by mouth daily  - atorvastatin (LIPITOR) 10 MG tablet; Take 1 tablet (10 mg) by mouth every evening    PACE (dyspnea on exertion)  as above    - Echocardiogram Complete; Future  - NM Lexiscan stress test; Future    Hyperlipidemia LDL goal <100  LDL Cholesterol Calculated   Date Value Ref Range Status   01/31/2022 152 (H) <=100 mg/dL Final   11/16/2020 129 (H) <100 mg/dL Final     Comment:     Above desirable:  100-129 mg/dl  Borderline High:  130-159 mg/dL  High:             160-189 mg/dL  Very high:       >189 mg/dl       The 10-year ASCVD risk score (Machipongotomi BERNAL Jr., et al., 2013) is: 5.8%    Values used to calculate the score:      Age: 66 years      Sex: Female      Is Non- : No      Diabetic: No      Tobacco smoker: No      Systolic Blood Pressure: 116 mmHg      Is BP treated: Yes      HDL Cholesterol: 84 mg/dL      Total Cholesterol: 249 mg/dL    LDL is not at  goal  Though 10-year cardiovascular risk is less than average, due to coronary calcifications, recommended to start on baby aspirin ibuprofen milligram daily  Recheck lipids in 3 months  Patient verbalised understanding and is agreeable to the plan.    - Echocardiogram Complete; Future  - NM Lexiscan stress test; Future  - aspirin (ASA) 81 MG EC tablet; Take 1 tablet (81 mg) by mouth daily  - atorvastatin (LIPITOR) 10 MG tablet; Take 1 tablet (10 mg) by mouth every evening    Hypertension goal BP (blood pressure) < 140/90  Continue current dose of lisinopril  - Echocardiogram Complete; Future  - NM Lexiscan stress test; Future    Fatty liver  Recent Results (from the past 744 hour(s))   DEXA HIP/PELVIS/SPINE - Future    Narrative    HISTORY: Menopause    COMPARISON:   none    FINDINGS:  Image quality: Adequate    Lumbar spine T-score in region of L1-L4 = -0.5     HIPS:  Mean total hip T-score: -1.6    Left femoral neck T-score = -1.5  Right femoral neck T-score = -1.8     Radius 33% T-score = NA    FRAX:  10 year probability of major osteoporotic fracture: 17.5%  10 year probability of hip fracture: 1.3%  The 10 year probability of fracture may be lower than reported if the  patient has received treatment. FRAX data should be disregarded in  patient's taking bisphosphonates.    World Health Organization definition of osteoporosis and osteopenia  for  women:   Normal: T-score at or above -1.0  Low Bone Mass (Osteopenia): T-score between -1.0 and -2.5.   Osteoporosis: T-score at or below -2.5   T-scores are reported for postmenopausal women and men over 50 years  of age.      Impression    IMPRESSION:    Low bone mass (osteopenia).    KURT FAM MD         SYSTEM ID:  [id number]   CT Chest Lung Cancer Scrn Low Dose wo    Narrative    CT Low Dose Lung Cancer Screening    History:  Lung cancer screening, >= 30 pk-yr smoking history in last  15 yrs (Age 55-80y); Former cigarette smoker; Screening for  "lung  cancer Screening for lung cancer, smoking.    Number of packs-year of smokin  Current or former smoker?: Former  If former, number of years since quit?: 14    Comparison: None    Technique  DLP: 97.0 (mGy*cm)  CTDIvol: 2.82L (mGy)    Findings: [All follow up of nodules are based on ACR guidelines for  lung cancer screening and measurements of each nodule size must be the  mean of the longest axial plane measurement by its perpendicular  measured to the nearest decimal and rounded up to the nearest whole  number. ]  Nodules: None    Emphysema: Trace centrilobular emphysema    Coronary artery calcium: Focal right coronary artery calcification.    Additional findings: Subsegmental atelectasis in the right upper lobe  anteriorly. Scarring or atelectasis in the lingula. Mechanical  fibrosis in the paravertebral right lower lobe adjacent to prominent  thoracic vertebral osteophyte. Mild scarring in the periaortic left  lower lobe. Aortic atherosclerosis. Multiple hepatic hypodensities.  These may represent cysts. These are incompletely characterized  however due to lack of contrast.      Impression    Impression:   1. ACR Assessment Category (v1.1):  Lung-RADS Category 1. Negative      Recommendation:  Lung-RADS Category 1. Negative, continue annual  screening with Lung cancer screening CT (please order exam code  [id number])         2. Significant Incidental Finding(s):  Category S: Yes.  Multiple hepatic hypodensities, statistically most likely cysts but  incompletely characterized due to lack of contrast. Consider right  upper quadrant ultrasound or contrasted abdomen CT.    3. Any moderate or severe Emphysema or bronchial wall thickening or  mosaic attenuation? No      4. Avoidance of tobacco smoke is strongly advised. Please consider  referral for smoking cessation to [facility]  (MTM) if clinically appropriate.      Download the \"LungRADS v.1.1 Assessment Categories\" table at " this  site:   [email or web address] MD         SYSTEM ID:  [id number]   MA Screening Digital Bilateral    Narrative    BILATERAL FULL FIELD DIGITAL SCREENING MAMMOGRAM    Performed on: 2/14/22    Compared to: 10/26/2020, 10/21/2019, 10/01/2018, and 08/29/2015    Technique: This study was evaluated with the assistance of Computer-Aided   Detection.    Findings: The breasts have scattered areas of fibroglandular density.    There is no radiographic evidence of malignancy.     IMPRESSION: ACR BI-RADS Category 1: Negative    RECOMMENDED FOLLOW-UP: Annual routine screening mammogram    The results and recommendations of this examination will be communicated   to the patient.    Based on the pre-exam history questionnaire and medical history, there may   be increased risk for developing breast cancer or other cancers in the   future. The contact for scheduling cancer genetic counseling for risk   assessment and possible genetic testing and supplemental screening is:   [phone].     US Abdomen Complete    Narrative    EXAMINATION: US ABDOMEN COMPLETE, 2/18/2022 3:19 PM     COMPARISON: CT chest 2/14/2022    HISTORY:  Hepatic cyst    TECHNIQUE: The abdomen was scanned in standard fashion with  specialized ultrasound transducer(s) using both gray-scale and limited  color Doppler techniques.    FINDINGS:  Liver: There is diffuse increased liver echogenicity. No focal solid  mass.The main portal vein is patent with antegrade flow, measuring 1.5  cm, top normal. There are numerous anechoic masses compatible with  cysts in both the right and left lobe of the liver.    Gallbladder: No wall thickening, pericholecystic fluid, positive  sonographic Os's sign. No cholelithiasis.    Bile Ducts: Both the intra- and extrahepatic biliary system are of  normal caliber.  The common bile duct measures 3 mm in diameter.    Pancreas:  Visualized portions of the head and body of the pancreas are  unremarkable.     Kidneys: No hydronephrosis.  The craniocaudal dimensions are: right-  9.7 cm, left- 10.0 cm.  No cystic or solid mass.    Spleen: The spleen is normal in size,  measuring 8.9 cm in sagittal  dimension.    Aorta and IVC: The visualized portions of the aorta and IVC are not  dilated..    Fluid: No evidence of ascites or pleural effusions.      Impression    IMPRESSION:   1.  Diffuse increased liver echogenicity, indicative of parenchymal  hepatic disease.    GRICELDA HOWELL MD         SYSTEM ID:  [id number]       Reviewed the etiopath of fatty liver including alcohol.  Recommended to limit alcohol to not more than 1 drink per day or avoid alcohol if possible.  Follow a diet low urine simple sugars, starchy foods.  Reviewed recent normal LFT from last month  Education handouts attached through AVS.    Hepatic cyst    Reviewed benign nature of the hepatic cystic lesions, continue to monitor.  Liver Function Studies - Recent Labs   Lab Test 01/31/22  1153   PROTTOTAL 8.1   ALBUMIN 4.0   BILITOTAL 0.6   ALKPHOS 75   AST 19   ALT 23     Other plan as mentioned above    Hair loss  ddx-female pattern hair loss/low ferritin/deficiency of vitamin D or B12/screen for lupus/thyroid disorder  Due to proximal loosening symptoms, will proceed with labs  - Ferritin; Future  - Vitamin B12; Future  - Methylmalonic Acid; Future  - Vitamin D Deficiency; Future  - Anti Nuclear Azra IgG by IFA with Reflex; Future  TSH with free T4 reflex    Vitamin D insufficiency    - Vitamin D Deficiency; Future    Review of the result(s) of each unique test - Chest CT, ultrasound abdomen, CBC, CMP, lipids from 1/31/2022   :[id number]}     Chart documentation done in part with Dragon Voice recognition Software. Although reviewed after completion, some word and grammatical error may remain.    See Patient Instructions    Return in about 3 months (around 5/21/2022), or if symptoms worsen  or fail to improve, for fasting labs.    Leonor Muir MD  [facility] ORION Munguia is a 66 year old who presents for the following health issues   Patient is here for a video visit instead of in person visit due to the current COVID-19 pandemic.  Patient with past medical history significant for hypertension, previous history of smoking he is here for video visit to review her test results on recent chest CT that we did for lung cancer screening.  Chest CT showed concerning liver lesions which prompted her to get an ultrasound abdomen last week.  Patient reported drinking 1 serving of alcohol daily after dinner, with little more than 1 on the weekends occasionally for a long time.  She denies concerns for abdominal pain, bloating, nausea, vomiting, jaundice, previous history of liver disease.  Her chest CT showed right coronary calcification and aortic atherosclerosis  Patient denies chest pain, chest pressure, shortness of breath but occasionally gets slightly winded during walking and other activities.  She works as a hairdresser.  The last time she felt dyspnea slightly was last Friday.  Has family history of heart disease in brothers, father and nephew.  Denies cough, wheezing, history of asthma, allergies.  Patient is also concerned with progressively worsening hair thinning and hair loss as was noticed by her hairdresser for the past few years.  She does not have a history of abnormal bleeding, anemia, thyroid disorder.      History of Present Illness     Reason for visit:  Follow up    She eats 2-3 servings of fruits and vegetables daily.She consumes 0 sweetened beverage(s) daily.She exercises with enough effort to increase her heart rate 10 to 19 minutes per day.  She exercises with enough effort to increase her heart rate 3 or less days per week.   She is taking medications regularly.       Hyperlipidemia Follow-Up      Are you regularly taking any medication or  supplement to lower your cholesterol?   No    Are you having muscle aches or other side effects that you think could be caused by your cholesterol lowering medication?  N/A        Review of Systems   CONSTITUTIONAL: NEGATIVE for fever, chills, change in weight  INTEGUMENTARY/SKIN: as above  RESP:dyspnea on exertion  CV: History of hypertension  GI: NEGATIVE for nausea, abdominal pain, heartburn, or change in bowel habits and Hx GERD  MUSCULOSKELETAL: NEGATIVE for significant arthralgias or myalgia  NEURO: NEGATIVE for weakness, dizziness or paresthesias  ENDOCRINE: NEGATIVE for temperature intolerance, skin/hair changes  HEME/ALLERGY/IMMUNE: NEGATIVE for bleeding problems  PSYCHIATRIC: NEGATIVE for changes in mood or affect      Objective           Vitals:  No vitals were obtained today due to virtual visit.    Physical Exam   GENERAL: Healthy, alert and no distress  EYES: Eyes grossly normal to inspection  RESP: No audible wheeze, cough, or visible cyanosis.  No visible retractions or increased work of breathing.    SKIN: Visible skin clear. No significant rash, abnormal pigmentation or lesions.  NEURO: Cranial nerves grossly intact.  Mentation and speech appropriate for age.  PSYCH: Mentation appears normal, affect normal/bright, judgement and insight intact, normal speech and appearance well-groomed.                Video-Visit Details    Type of service:  Video Visit    Video End Time:7:46am    Originating Location (pt. Location): Home    Distant Location (provider location):  [facility]     Platform used for Video Visit: InVision

## 2022-02-21 NOTE — PATIENT INSTRUCTIONS
Patient Education     Nonalcoholic Fatty Liver Disease (NAFLD)  Nonalcoholic fatty liver disease (NAFLD) is a common disease of the liver. It occurs when you have too much fat in the liver. If NAFLD is severe, it can cause liver damage that seems like the damage caused by drinking too much alcohol. But NAFLD is not caused by drinking alcohol. This sheet tells you more about NAFLD and how it can be managed.     How the liver works   The liver is an organ in the upper right side of the belly (abdomen). It has many important jobs. These include:     Breaking down (metabolizing) proteins, carbohydrates, and fats    Making a substance called bile that helps break down fats    Storing and releasing sugar (glucose) into the blood to give the body energy    Removing toxins from the blood    Helping with blood clotting  Understanding NAFLD  A healthy liver may contain some fat. But if too much fat builds up in the liver, this causes NAFLD. NAFLD can be mild, causing fatty liver. Or it can be more severe and have inflammation as well as excess fat. This can cause non-alcoholic steatohepatitis (GIL).     Fatty liver. With fatty liver, the liver simply has more fat than normal. This extra fat usually does not harm the liver.    GIL. With GIL, the fatty liver becomes inflamed over time. GIL is serious because it can lead to scarring of the liver (fibrosis). Over time, the scarring may lead to cirrhosis of the liver. This can eventually cause liver failure or liver cancer.  Causes and risk factors of NAFLD  Doctors don't know what causes NAFLD. But certain things make the problem more likely to happen. These include:     Obesity    Prediabetes or diabetes    High levels of fat found in the blood (cholesterol and triglycerides)    Taking certain medicines     Having polycystic ovary syndrome (PCOS)  Symptoms of NAFLD  Most people with NAFLD have no symptoms. If symptoms do occur, they can  include:    Tiredness    Weakness    Weight loss    Loss of appetite    Nausea and vomiting    Belly pain and cramping    Yellowing of the skin and eyes (jaundice)    Dark urine    Light-colored stools that look gray    Swelling in the belly or legs  Diagnosing NAFLD  Your healthcare provider may think you have NAFLD if routine blood tests show high levels of liver enzymes. This may mean you have a liver problem. You may need 1 or more imaging tests, such as an ultrasound, CT, or MRI. You may need more blood tests to look for other causes of liver disease. You may also need a liver biopsy. During this test, a hollow needle is used to remove a tiny tissue sample from your liver. This tissue is then checked in a lab. This test can find signs of damage to liver tissue. It can also help figure out the cause of the damage and tell the difference between fatty liver and GIL.   Treating NAFLD  Treatment for NAFLD varies for each person. The best early treatment is to treat any conditions that are causing metabolic syndrome. This syndrome is a group of conditions that includes:     High blood pressure    High levels of cholesterol and triglycerides    Being overweight or obese    Diabetes  Your healthcare provider will monitor your health and treat any symptoms or underlying health problems you have. Your provider will also work with you to control your risk factors. This will make liver damage less likely. In fact, treating those underlying conditions can often improve liver disease. You may need to take certain medicines, but no medicine will cure NAFLD. This is why treating the underlying conditions is most important. Your plan may include:     Losing excess weight    Getting regular exercise    Controlling diabetes    Controlling high cholesterol or triglyceride levels    Taking medicines and vitamins as prescribed by your provider    Not smoking    Not drinking alcohol    Eating a healthy diet  Living with NAFLD  If  NAFLD is caught early, it can be managed with treatment. Your healthcare provider will discuss further treatment choices with you as needed.   Be sure to ask your provider about recommended vaccines. These include vaccines for viruses that can cause liver disease.   Carlos last reviewed this educational content on 2/1/2020 2000-2021 [employer]. All rights reserved. This information is not intended as a substitute for professional medical care. Always follow your healthcare professional's instructions.

## 2022-02-22 LAB
ANA PAT SER IF-IMP: ABNORMAL
ANA SER QL IF: ABNORMAL
ANA TITR SER IF: ABNORMAL {TITER}
CRP SERPL-MCNC: 4.1 MG/L (ref 0–8)
DEPRECATED CALCIDIOL+CALCIFEROL SERPL-MC: 34 UG/L (ref 20–75)

## 2022-02-23 ENCOUNTER — LAB (OUTPATIENT)
Dept: URGENT CARE | Facility: URGENT CARE | Age: 67
End: 2022-02-23
Attending: FAMILY MEDICINE
Payer: COMMERCIAL

## 2022-02-23 DIAGNOSIS — Z20.822 ENCOUNTER FOR LABORATORY TESTING FOR COVID-19 VIRUS: ICD-10-CM

## 2022-02-23 LAB — DSDNA AB SER-ACNC: 0.8 IU/ML

## 2022-02-23 PROCEDURE — U0003 INFECTIOUS AGENT DETECTION BY NUCLEIC ACID (DNA OR RNA); SEVERE ACUTE RESPIRATORY SYNDROME CORONAVIRUS 2 (SARS-COV-2) (CORONAVIRUS DISEASE [COVID-19]), AMPLIFIED PROBE TECHNIQUE, MAKING USE OF HIGH THROUGHPUT TECHNOLOGIES AS DESCRIBED BY CMS-2020-01-R: HCPCS

## 2022-02-23 PROCEDURE — U0005 INFEC AGEN DETEC AMPLI PROBE: HCPCS

## 2022-02-24 LAB
METHYLMALONATE SERPL-SCNC: 0.2 UMOL/L (ref 0–0.4)
SARS-COV-2 RNA RESP QL NAA+PROBE: NEGATIVE

## 2022-03-11 ENCOUNTER — LAB (OUTPATIENT)
Dept: URGENT CARE | Facility: URGENT CARE | Age: 67
End: 2022-03-11
Payer: COMMERCIAL

## 2022-03-11 DIAGNOSIS — Z11.59 ENCOUNTER FOR SCREENING FOR OTHER VIRAL DISEASES: ICD-10-CM

## 2022-03-11 PROCEDURE — U0005 INFEC AGEN DETEC AMPLI PROBE: HCPCS

## 2022-03-11 PROCEDURE — U0003 INFECTIOUS AGENT DETECTION BY NUCLEIC ACID (DNA OR RNA); SEVERE ACUTE RESPIRATORY SYNDROME CORONAVIRUS 2 (SARS-COV-2) (CORONAVIRUS DISEASE [COVID-19]), AMPLIFIED PROBE TECHNIQUE, MAKING USE OF HIGH THROUGHPUT TECHNOLOGIES AS DESCRIBED BY CMS-2020-01-R: HCPCS

## 2022-03-12 LAB — SARS-COV-2 RNA RESP QL NAA+PROBE: NEGATIVE

## 2022-03-14 ENCOUNTER — HOSPITAL ENCOUNTER (OUTPATIENT)
Facility: AMBULATORY SURGERY CENTER | Age: 67
Discharge: HOME | End: 2022-03-14
Attending: SURGERY | Admitting: SURGERY
Payer: COMMERCIAL

## 2022-03-14 VITALS
SYSTOLIC BLOOD PRESSURE: 124 MMHG | RESPIRATION RATE: 16 BRPM | OXYGEN SATURATION: 97 % | DIASTOLIC BLOOD PRESSURE: 69 MMHG | HEART RATE: 50 BPM | TEMPERATURE: 97.9 F

## 2022-03-14 LAB
COLONOSCOPY: NORMAL
UPPER GI ENDOSCOPY: NORMAL

## 2022-03-14 PROCEDURE — 43239 EGD BIOPSY SINGLE/MULTIPLE: CPT

## 2022-03-14 PROCEDURE — G8918 PT W/O PREOP ORDER IV AB PRO: HCPCS

## 2022-03-14 PROCEDURE — G8907 PT DOC NO EVENTS ON DISCHARG: HCPCS

## 2022-03-14 PROCEDURE — G0121 COLON CA SCRN NOT HI RSK IND: HCPCS

## 2022-03-14 PROCEDURE — 43239 EGD BIOPSY SINGLE/MULTIPLE: CPT | Performed by: SURGERY

## 2022-03-14 PROCEDURE — G0105 COLORECTAL SCRN; HI RISK IND: HCPCS | Performed by: SURGERY

## 2022-03-14 PROCEDURE — G0500 MOD SEDAT ENDO SERVICE >5YRS: HCPCS | Performed by: SURGERY

## 2022-03-14 RX ORDER — FENTANYL CITRATE 50 UG/ML
INJECTION, SOLUTION INTRAMUSCULAR; INTRAVENOUS PRN
Status: DISCONTINUED | OUTPATIENT
Start: 2022-03-14 | End: 2022-03-14 | Stop reason: HOSPADM

## 2022-03-14 RX ORDER — ONDANSETRON 2 MG/ML
4 INJECTION INTRAMUSCULAR; INTRAVENOUS
Status: DISCONTINUED | OUTPATIENT
Start: 2022-03-14 | End: 2022-03-15 | Stop reason: HOSPADM

## 2022-03-14 RX ORDER — LIDOCAINE 40 MG/G
CREAM TOPICAL
Status: DISCONTINUED | OUTPATIENT
Start: 2022-03-14 | End: 2022-03-15 | Stop reason: HOSPADM

## 2022-03-14 NOTE — H&P
[facility] Anesthesia Pre-op History and Physical    Nilda Amaro MRN# [id number]   Age: 66 year old YOB: 1955      Date of Surgery: 3/14/2022     Date of Exam 3/14/2022                Chief Complaint and/or Reason for Procedure:   Conscious sedation         Active problem list:     Patient Active Problem List    Diagnosis Date Noted     Fatty liver 02/21/2022     Priority: Medium     Hyperlipidemia LDL goal <100 02/21/2022     Priority: Medium     Aortic atherosclerosis (H) 02/21/2022     Priority: Medium     PACE (dyspnea on exertion) 02/21/2022     Priority: Medium     Hair loss 02/21/2022     Priority: Medium     Hepatic cyst 02/21/2022     Priority: Medium     Coronary artery calcification seen on CAT scan 02/20/2022     Priority: Medium     Tubular adenoma of colon 01/31/2022     Priority: Medium     Snoring 01/31/2022     Priority: Medium     Family history of Alzheimer's disease 11/16/2020     Priority: Medium     Gastroesophageal reflux disease with esophagitis 10/21/2019     Priority: Medium     Intertriginous candidiasis 10/21/2019     Priority: Medium     Other insomnia 10/21/2019     Priority: Medium     Hearing deficit, bilateral 10/21/2019     Priority: Medium     Hypertension goal BP (blood pressure) < 140/90 10/01/2018     Priority: Medium     Change in color of pigmented skin lesion, left neck, 2mm 09/25/2017     Priority: Medium     Family history of breast cancer in sister 09/25/2017     Priority: Medium     CARDIOVASCULAR SCREENING; LDL GOAL LESS THAN 160 09/15/2017     Priority: Medium     Primary osteoarthritis of both knees 06/27/2016     Priority: Medium     Medial meniscus tear, right, subsequent encounter 02/01/2016     Priority: Medium     Chondromalacia patellae, left 02/01/2016     Priority: Medium     Chondromalacia patellae, right 02/01/2016     Priority: Medium     Chronic GERD 11/05/2015     Priority: Medium     Obesity, Class I, BMI 30-34.9 09/25/2015      Priority: Medium     Advance care planning 08/03/2015     Priority: Medium     Advance Care Planning 8/3/2015: Discussed advance care planning with patient; information given to patient to review. August 3, 2015. Gregorio Swartz MA                        Medications (include herbals and vitamins):      Current Outpatient Medications   Medication Sig     aspirin (ASA) 81 MG EC tablet Take 1 tablet (81 mg) by mouth daily     atorvastatin (LIPITOR) 10 MG tablet Take 1 tablet (10 mg) by mouth every evening     BIOTIN PO Take by mouth daily     Cyanocobalamin (VITAMIN B 12 PO) Take 1 tablet by mouth daily     diclofenac (VOLTAREN) 1 % topical gel 1 gram to affected areas on right foot and ankle 2-3 times daily as needed for pain.     lisinopril (ZESTRIL) 10 MG tablet Take 1 tablet (10 mg) by mouth daily     Multiple Vitamins-Minerals (CENTRUM ADULTS PO) Take 1 tablet by mouth daily     omeprazole (PRILOSEC) 20 MG DR capsule TAKE 1 CAPSULE BY MOUTH ONCE DAILY     triamcinolone (KENALOG) 0.1 % external ointment Apply sparingly to affected area twice daily. Apply sparingly 2-3 weeks as directed.     vitamin B complex with vitamin C (VITAMIN  B COMPLEX) TABS tablet Take 1 tablet by mouth daily     No current facility-administered medications for this encounter.             Allergies:      Allergies   Allergen Reactions     Penicillins                Physical Exam:   All vitals have been reviewed  Patient Vitals for the past 8 hrs:   BP Temp Temp src Resp SpO2   03/14/22 0737 134/75 97.9  F (36.6  C) Temporal 16 97 %     No intake/output data recorded.  Airway assessment:   Patient is able to open mouth wide  Patient is able to stick out tongue}      Lungs:   No increased work of breathing, good air exchange, clear to auscultation bilaterally, no crackles or wheezing     Cardiovascular:   regular rate and rhythm and normal S1 and S2             Anesthetic risk and/or ASA classification:     ASA classification 2    Ramy WINSLOW  Mcintosh, DO

## 2022-03-16 LAB
PATH REPORT.COMMENTS IMP SPEC: NORMAL
PATH REPORT.COMMENTS IMP SPEC: NORMAL
PATH REPORT.FINAL DX SPEC: NORMAL
PATH REPORT.GROSS SPEC: NORMAL
PATH REPORT.MICROSCOPIC SPEC OTHER STN: NORMAL
PATH REPORT.RELEVANT HX SPEC: NORMAL
PHOTO IMAGE: NORMAL

## 2022-03-16 PROCEDURE — 88305 TISSUE EXAM BY PATHOLOGIST: CPT | Performed by: PATHOLOGY

## 2022-03-17 ENCOUNTER — TRANSFERRED RECORDS (OUTPATIENT)
Dept: HEALTH INFORMATION MANAGEMENT | Facility: CLINIC | Age: 67
End: 2022-03-17
Payer: COMMERCIAL

## 2022-03-21 ENCOUNTER — ANCILLARY PROCEDURE (OUTPATIENT)
Dept: CARDIOLOGY | Facility: CLINIC | Age: 67
End: 2022-03-21
Attending: FAMILY MEDICINE
Payer: COMMERCIAL

## 2022-03-21 DIAGNOSIS — R06.09 DOE (DYSPNEA ON EXERTION): ICD-10-CM

## 2022-03-21 DIAGNOSIS — I70.0 AORTIC ATHEROSCLEROSIS (H): ICD-10-CM

## 2022-03-21 DIAGNOSIS — I10 HYPERTENSION GOAL BP (BLOOD PRESSURE) < 140/90: ICD-10-CM

## 2022-03-21 DIAGNOSIS — I25.10 CORONARY ARTERY CALCIFICATION SEEN ON CAT SCAN: ICD-10-CM

## 2022-03-21 DIAGNOSIS — E78.5 HYPERLIPIDEMIA LDL GOAL <100: ICD-10-CM

## 2022-03-21 LAB — LVEF ECHO: NORMAL

## 2022-03-21 PROCEDURE — 93306 TTE W/DOPPLER COMPLETE: CPT | Performed by: INTERNAL MEDICINE

## 2022-03-22 NOTE — RESULT ENCOUNTER NOTE
Calixto Munguia,  Your heart echo showed normal findings except for slightly dilated aorta up to 4 cm.  We will continue to monitor this with a yearly echo to see the trend in size of the aorta.   Let me know if you have any questions. Take care.  Leonor Muir MD

## 2022-04-18 ENCOUNTER — HOSPITAL ENCOUNTER (OUTPATIENT)
Dept: NUCLEAR MEDICINE | Facility: CLINIC | Age: 67
Setting detail: NUCLEAR MEDICINE
Discharge: HOME | End: 2022-04-18
Attending: FAMILY MEDICINE
Payer: COMMERCIAL

## 2022-04-18 ENCOUNTER — HOSPITAL ENCOUNTER (OUTPATIENT)
Dept: CARDIOLOGY | Facility: CLINIC | Age: 67
Discharge: HOME | End: 2022-04-18
Attending: FAMILY MEDICINE
Payer: COMMERCIAL

## 2022-04-18 DIAGNOSIS — E78.5 HYPERLIPIDEMIA LDL GOAL <100: ICD-10-CM

## 2022-04-18 DIAGNOSIS — I25.10 CORONARY ARTERY CALCIFICATION SEEN ON CAT SCAN: ICD-10-CM

## 2022-04-18 DIAGNOSIS — R06.09 DOE (DYSPNEA ON EXERTION): ICD-10-CM

## 2022-04-18 DIAGNOSIS — I70.0 AORTIC ATHEROSCLEROSIS (H): ICD-10-CM

## 2022-04-18 DIAGNOSIS — I10 HYPERTENSION GOAL BP (BLOOD PRESSURE) < 140/90: ICD-10-CM

## 2022-04-18 LAB
CV STRESS MAX HR HE: 78
RATE PRESSURE PRODUCT: 7644
STRESS ECHO BASELINE DIASTOLIC HE: 72
STRESS ECHO BASELINE HR: 57 BPM
STRESS ECHO BASELINE SYSTOLIC BP: 121
STRESS ECHO CALCULATED PERCENT HR: 50 %
STRESS ECHO LAST STRESS DIASTOLIC BP: 62
STRESS ECHO LAST STRESS SYSTOLIC BP: 98
STRESS ECHO TARGET HR: 155

## 2022-04-18 PROCEDURE — 78452 HT MUSCLE IMAGE SPECT MULT: CPT | Mod: 26 | Performed by: RADIOLOGY

## 2022-04-18 PROCEDURE — 250N000011 HC RX IP 250 OP 636: Performed by: STUDENT IN AN ORGANIZED HEALTH CARE EDUCATION/TRAINING PROGRAM

## 2022-04-18 PROCEDURE — 93018 CV STRESS TEST I&R ONLY: CPT | Performed by: STUDENT IN AN ORGANIZED HEALTH CARE EDUCATION/TRAINING PROGRAM

## 2022-04-18 PROCEDURE — A9502 TC99M TETROFOSMIN: HCPCS | Performed by: FAMILY MEDICINE

## 2022-04-18 PROCEDURE — 93017 CV STRESS TEST TRACING ONLY: CPT

## 2022-04-18 PROCEDURE — 78452 HT MUSCLE IMAGE SPECT MULT: CPT

## 2022-04-18 PROCEDURE — 343N000001 HC RX 343: Performed by: FAMILY MEDICINE

## 2022-04-18 PROCEDURE — 93016 CV STRESS TEST SUPVJ ONLY: CPT | Performed by: STUDENT IN AN ORGANIZED HEALTH CARE EDUCATION/TRAINING PROGRAM

## 2022-04-18 RX ORDER — ACYCLOVIR 200 MG/1
0-1 CAPSULE ORAL
Status: DISCONTINUED | OUTPATIENT
Start: 2022-04-18 | End: 2022-04-19 | Stop reason: HOSPADM

## 2022-04-18 RX ORDER — REGADENOSON 0.08 MG/ML
0.4 INJECTION, SOLUTION INTRAVENOUS ONCE
Status: COMPLETED | OUTPATIENT
Start: 2022-04-18 | End: 2022-04-18

## 2022-04-18 RX ORDER — ALBUTEROL SULFATE 90 UG/1
2 AEROSOL, METERED RESPIRATORY (INHALATION) EVERY 5 MIN PRN
Status: DISCONTINUED | OUTPATIENT
Start: 2022-04-18 | End: 2022-04-19 | Stop reason: HOSPADM

## 2022-04-18 RX ORDER — CAFFEINE CITRATE 20 MG/ML
60 SOLUTION INTRAVENOUS
Status: DISCONTINUED | OUTPATIENT
Start: 2022-04-18 | End: 2022-04-19 | Stop reason: HOSPADM

## 2022-04-18 RX ORDER — AMINOPHYLLINE 25 MG/ML
50-100 INJECTION, SOLUTION INTRAVENOUS
Status: DISCONTINUED | OUTPATIENT
Start: 2022-04-18 | End: 2022-04-19 | Stop reason: HOSPADM

## 2022-04-18 RX ADMIN — TETROFOSMIN 10.6 MCI.: 1.38 INJECTION, POWDER, LYOPHILIZED, FOR SOLUTION INTRAVENOUS at 09:49

## 2022-04-18 RX ADMIN — REGADENOSON 0.4 MG: 0.08 INJECTION, SOLUTION INTRAVENOUS at 10:43

## 2022-04-18 RX ADMIN — TETROFOSMIN 41.3 MCI.: 1.38 INJECTION, POWDER, LYOPHILIZED, FOR SOLUTION INTRAVENOUS at 10:48

## 2022-04-18 NOTE — PROGRESS NOTES
Pt here for Lexiscan nuclear stress test.  Medication and side effects reviewed with patient. Lung sounds clear to auscultation bilaterally.  Denied caffeine use.  Patient tolerated Lexiscan dose without any adverse reactions.  VSS.  Monitored post injection and then taken to the gold waiting room and instructed to wait there for nuclear medicine tech for follow up imaging.    Autumn Bishop RN

## 2022-04-19 DIAGNOSIS — I70.0 AORTIC ATHEROSCLEROSIS (H): ICD-10-CM

## 2022-04-19 DIAGNOSIS — I25.10 CORONARY ARTERY CALCIFICATION SEEN ON CAT SCAN: ICD-10-CM

## 2022-04-19 DIAGNOSIS — E78.5 HYPERLIPIDEMIA LDL GOAL <100: ICD-10-CM

## 2022-04-19 DIAGNOSIS — R06.09 DOE (DYSPNEA ON EXERTION): Primary | ICD-10-CM

## 2022-04-19 DIAGNOSIS — I10 HYPERTENSION GOAL BP (BLOOD PRESSURE) < 140/90: ICD-10-CM

## 2022-04-19 NOTE — RESULT ENCOUNTER NOTE
Hi Nilda,  Your stress test showed no concerns for ischemic heart disease, this is good  I would recommend we consult cardiologist to evaluate this further if you continue to have problem with breathing  I placed a referral just in case for the [facility] which you can call to set up the consult.   Let me know if you have any questions. Take care.  Leonor Muir MD

## 2022-04-19 NOTE — RESULT ENCOUNTER NOTE
Nilda,  Your stress test showed no concerns for ischemic heart disease, this is reassuring  It also showed multiple liver lesions  Since he already had the ultrasound 2 months ago showing fatty liver disease, this does not need further testing at this time   Let me know if you have any questions. Take care.  Leonor Muir MD

## 2022-06-20 ENCOUNTER — TRANSFERRED RECORDS (OUTPATIENT)
Dept: HEALTH INFORMATION MANAGEMENT | Facility: CLINIC | Age: 67
End: 2022-06-20

## 2022-09-11 ENCOUNTER — HEALTH MAINTENANCE LETTER (OUTPATIENT)
Age: 67
End: 2022-09-11

## 2022-09-23 DIAGNOSIS — K22.70 BARRETT'S ESOPHAGUS WITHOUT DYSPLASIA: Primary | ICD-10-CM

## 2022-09-28 ENCOUNTER — TELEPHONE (OUTPATIENT)
Dept: GASTROENTEROLOGY | Facility: CLINIC | Age: 67
End: 2022-09-28

## 2022-09-28 NOTE — TELEPHONE ENCOUNTER
Screening Questions  BLUE  KIND OF PREP RED  LOCATION [review exclusion criteria] GREEN  SEDATION TYPE        Yes Are you active on mychart?       [place] Ordering/Referring Provider?        BCBS/Medicare What type of coverage do you have?      N Have you had a positive covid test in the last 90 days?     1. 33.5 BMI  [BMI 40+ - review exclusion criteria]    2. Yes  Are you able to give consent for your medical care? [IF NO,RN REVIEW]        3. N  Are you taking any prescription pain medications on a routine schedule?        3a. NA EXTENDED PREP What kind of prescription?   4. N Do you have any chemical dependencies such as alcohol, street drugs, or methadone?    5. N Do you have any history of post-traumatic stress syndrome, severe anxiety or history of psychosis?      **If yes 3- 5 , please schedule with MAC sedation.**          IF YES TO ANY 6 - 10 - HOSPITAL SETTING ONLY.     6.   N Do you need assistance transferring?     7.   N Have you had a heart or lung transplant?    8.   N Are you currently on dialysis?   9.   N Do you use daily home oxygen?   10. N Do you take nitroglycerin?   10a. NA If yes, how often?     11. [FEMALES]  NA Are you currently pregnant?    11a. NA If yes, how many weeks? [ Greater than 12 weeks, OR NEEDED]    12. N Do you have Pulmonary Hypertension? *NEED PAC APPT AT [facility]*     13. N [review exclusion criteria]  Do you have any implantable devices in your body (pacemaker, defib, LVAD)?    14. N In the past 6 months, have you had any heart related issues including cardiomyopathy or heart attack?     14a. N If yes, did it require cardiac stenting if so when?     15. N Have you had a stroke or Transient ischemic attack (TIA - aka  mini stroke ) within 6 months?      16. N Do you have mod to severe Obstructive Sleep Apnea?  [Hospital only - Ok at [place]]    17. N Do you have SEVERE AND UNCONTROLLED asthma? *NEED PAC APPT AT [facility]*     18. N Are you currently taking any blood thinners?  "    18a. If yes, inform patient to \"follow up w/ ordering provider for bridging instructions.\"    19. N Do you take the medication Phentermine?    19a. If yes, \"Hold for 7 days before procedure.  Please consult your prescribing provider if you have questions about holding this medication.\"     20. N  Do you have chronic kidney disease?      21. N  Do you have a diagnosis of diabetes?     22. NA  On a regular basis do you go 3-5 days between bowel movements?     23.  Preferred LOCAL Pharmacy for Pre Prescription    [ LIST ONLY ONE PHARMACY]        [id number] - [facility] [address] [facility]      - CLOSING REMINDERS -    Informed patient they will need an adult    Cannot take any type of public or medical transportation alone    Conscious Sedation- Needs  for 6 hours after the procedure       MAC/General-Needs  for 24 hours after procedure    Pre-Procedure Covid test to be completed [[facility] PCR Testing Required]    Confirmed Nurse will call to complete assessment        - SCHEDULING DETAILS -     Tl  Surgeon    11/28/22  Date of Procedure  Upper Endoscopy [EGD]  Type of Procedure Scheduled   [facility] Location    CS Sedation Type     NO PAC / Pre-op Required         Additional comments:            "

## 2022-11-28 ENCOUNTER — TELEPHONE (OUTPATIENT)
Dept: GASTROENTEROLOGY | Facility: CLINIC | Age: 67
End: 2022-11-28

## 2022-11-28 ENCOUNTER — HOSPITAL ENCOUNTER (OUTPATIENT)
Facility: AMBULATORY SURGERY CENTER | Age: 67
Discharge: HOME | End: 2022-11-28
Attending: INTERNAL MEDICINE | Admitting: INTERNAL MEDICINE
Payer: COMMERCIAL

## 2022-11-28 VITALS
RESPIRATION RATE: 16 BRPM | TEMPERATURE: 97.2 F | SYSTOLIC BLOOD PRESSURE: 114 MMHG | DIASTOLIC BLOOD PRESSURE: 61 MMHG | OXYGEN SATURATION: 96 % | HEART RATE: 53 BPM

## 2022-11-28 DIAGNOSIS — K21.9 CHRONIC GERD: ICD-10-CM

## 2022-11-28 DIAGNOSIS — K21.00 GASTROESOPHAGEAL REFLUX DISEASE WITH ESOPHAGITIS WITHOUT HEMORRHAGE: ICD-10-CM

## 2022-11-28 LAB — UPPER GI ENDOSCOPY: NORMAL

## 2022-11-28 PROCEDURE — G8918 PT W/O PREOP ORDER IV AB PRO: HCPCS

## 2022-11-28 PROCEDURE — G8907 PT DOC NO EVENTS ON DISCHARG: HCPCS

## 2022-11-28 PROCEDURE — 43239 EGD BIOPSY SINGLE/MULTIPLE: CPT

## 2022-11-28 RX ORDER — NALOXONE HYDROCHLORIDE 0.4 MG/ML
0.4 INJECTION, SOLUTION INTRAMUSCULAR; INTRAVENOUS; SUBCUTANEOUS
Status: DISCONTINUED | OUTPATIENT
Start: 2022-11-28 | End: 2022-11-29 | Stop reason: HOSPADM

## 2022-11-28 RX ORDER — LIDOCAINE 40 MG/G
CREAM TOPICAL
Status: DISCONTINUED | OUTPATIENT
Start: 2022-11-28 | End: 2022-11-29 | Stop reason: HOSPADM

## 2022-11-28 RX ORDER — NALOXONE HYDROCHLORIDE 0.4 MG/ML
0.2 INJECTION, SOLUTION INTRAMUSCULAR; INTRAVENOUS; SUBCUTANEOUS
Status: DISCONTINUED | OUTPATIENT
Start: 2022-11-28 | End: 2022-11-29 | Stop reason: HOSPADM

## 2022-11-28 RX ORDER — ONDANSETRON 2 MG/ML
4 INJECTION INTRAMUSCULAR; INTRAVENOUS
Status: DISCONTINUED | OUTPATIENT
Start: 2022-11-28 | End: 2022-11-29 | Stop reason: HOSPADM

## 2022-11-28 RX ORDER — PROCHLORPERAZINE MALEATE 5 MG
5 TABLET ORAL EVERY 6 HOURS PRN
Status: DISCONTINUED | OUTPATIENT
Start: 2022-11-28 | End: 2022-11-29 | Stop reason: HOSPADM

## 2022-11-28 RX ORDER — ONDANSETRON 4 MG/1
4 TABLET, ORALLY DISINTEGRATING ORAL EVERY 6 HOURS PRN
Status: DISCONTINUED | OUTPATIENT
Start: 2022-11-28 | End: 2022-11-29 | Stop reason: HOSPADM

## 2022-11-28 RX ORDER — FLUMAZENIL 0.1 MG/ML
0.2 INJECTION, SOLUTION INTRAVENOUS
Status: ACTIVE | OUTPATIENT
Start: 2022-11-28 | End: 2022-11-28

## 2022-11-28 RX ORDER — ONDANSETRON 2 MG/ML
4 INJECTION INTRAMUSCULAR; INTRAVENOUS EVERY 6 HOURS PRN
Status: DISCONTINUED | OUTPATIENT
Start: 2022-11-28 | End: 2022-11-29 | Stop reason: HOSPADM

## 2022-11-28 RX ORDER — FENTANYL CITRATE 50 UG/ML
INJECTION, SOLUTION INTRAMUSCULAR; INTRAVENOUS PRN
Status: DISCONTINUED | OUTPATIENT
Start: 2022-11-28 | End: 2022-11-28 | Stop reason: HOSPADM

## 2022-11-28 NOTE — TELEPHONE ENCOUNTER
Rey Boothe MD [place], [place], RN  Caller: Unspecified (Today,  2:52 PM)  20mg BID prior to meals.  Can you let pharmacy know?  Thanks!       Spoke to pharmacist to clarify prilosec dosing, gave clarification as directed by provider above.    [place] Pedroza, OSWALD

## 2022-11-28 NOTE — H&P
ENDOSCOPY PRE-SEDATION H&P FOR OUTPATIENT PROCEDURES    Nilda Amaro  [id number]  1955    Procedure: EGD    Pre-procedure diagnosis: Bernal esophagus    Past medical history:   Past Medical History:   Diagnosis Date     Arthritis      Atherosclerosis of right coronary artery 2/20/2022     Hypertension Last visit       Past surgical history:   Past Surgical History:   Procedure Laterality Date     BIOPSY  2018     COLONOSCOPY  03/21/2016     COLONOSCOPY WITH CO2 INSUFFLATION N/A 03/21/2016    Procedure: COLONOSCOPY WITH CO2 INSUFFLATION;  Surgeon: Geoffrey Blackwell MD;  Location: [facility] OR     COLONOSCOPY WITH CO2 INSUFFLATION N/A 3/14/2022    Procedure: COLONOSCOPY, WITH CO2 INSUFFLATION;  Surgeon: Ramy Murray DO;  Location: [facility] OR     COMBINED ESOPHAGOSCOPY, GASTROSCOPY, DUODENOSCOPY (EGD) WITH CO2 INSUFFLATION N/A 3/14/2022    Procedure: ESOPHAGOGASTRODUODENOSCOPY, WITH CO2 INSUFFLATION;  Surgeon: Ramy Murray DO;  Location: [facility] OR     ENDOSCOPY UPPER, COLONOSCOPY, COMBINED N/A 03/21/2016    Procedure: COMBINED ENDOSCOPY UPPER, COLONOSCOPY;  Surgeon: Geoffrey Blackwell MD;  Location: [facility]     ESOPHAGOSCOPY, GASTROSCOPY, DUODENOSCOPY (EGD), COMBINED N/A 03/21/2016    Procedure: COMBINED ESOPHAGOSCOPY, GASTROSCOPY, DUODENOSCOPY (EGD), BIOPSY SINGLE OR MULTIPLE;  Surgeon: Geoffrey Blackwell MD;  Location: [facility]     ESOPHAGOSCOPY, GASTROSCOPY, DUODENOSCOPY (EGD), COMBINED N/A 3/14/2022    Procedure: ESOPHAGOGASTRODUODENOSCOPY, WITH BIOPSY;  Surgeon: Ramy Murray DO;  Location: [facility] OR     ORTHOPEDIC SURGERY Right 04/23/2018    TKA       Current Outpatient Medications   Medication     aspirin (ASA) 81 MG EC tablet     atorvastatin (LIPITOR) 10 MG tablet     BIOTIN PO     Cyanocobalamin (VITAMIN B 12 PO)     diclofenac (VOLTAREN) 1 % topical gel     lisinopril (ZESTRIL) 10 MG tablet     Multiple Vitamins-Minerals (CENTRUM ADULTS PO)     omeprazole (PRILOSEC) 20 MG DR capsule      triamcinolone (KENALOG) 0.1 % external ointment     vitamin B complex with vitamin C (VITAMIN  B COMPLEX) TABS tablet     Current Facility-Administered Medications   Medication     benzocaine 20% (HURRICAINE/TOPEX) 20 % spray     fentaNYL (PF) (SUBLIMAZE) injection     lidocaine (LMX4) kit     lidocaine 1 % 0.1-1 mL     ondansetron (ZOFRAN) injection 4 mg     sodium chloride (PF) 0.9% PF flush 3 mL     sodium chloride (PF) 0.9% PF flush 3 mL       Allergies   Allergen Reactions     Penicillins        History of Anesthesia/Sedation Problems: no    Physical Exam:    Mental status: alert  Heart: Normal  Lung: Normal  Assessment of patient's airway: Normal  Other as pertinent for procedure: None     ASA Score: See Provation note    Mallampati score:  II - Faucial pillars and soft palate may be seen, but uvula is masked by the base of the tongue    Assessment/Plan:     The patient is an appropriate candidate to receive sedation.    Informed consent was discussed with the patient/family, including the risks, benefits, potential complications and any alternative options associated with sedation.    Patient assessment completed just prior to sedation and while under constant observation by the provider. Condition determined to be adequate for proceeding with sedation.    The specific risks for the procedure were discussed with the patient at the time of informed consent and include but are not limited to perforation which could require surgery, missing significant neoplasm or lesion, hemorrhage and adverse sedative complication.      Rey Boothe MD

## 2022-11-28 NOTE — TELEPHONE ENCOUNTER
Message forwarded to provider to clarify directions on Prilosec script. Will update pharmacy once clarified by provider. Prilosec has 2 sets of directions one says to take 1 time per day and it also says to take 2 times per day.

## 2022-11-28 NOTE — TELEPHONE ENCOUNTER
[facility] Call Center    Phone Message    May a detailed message be left on voicemail: yes     Reason for Call: Medication Question or concern regarding medication   Prescription Clarification  Name of Medication: omeprazole (PRILOSEC) 20 MG DR capsule  Prescribing Provider: Dr. Rey Boothe   Pharmacy: [id number]   What on the order needs clarification? Clarify directions, as there are 2 sets of directions on RX.    Action Taken: Message routed to:  [facility]    Travel Screening: Not Applicable

## 2022-11-30 ENCOUNTER — TELEPHONE (OUTPATIENT)
Dept: RHEUMATOLOGY | Facility: CLINIC | Age: 67
End: 2022-11-30

## 2022-11-30 PROCEDURE — 88305 TISSUE EXAM BY PATHOLOGIST: CPT | Performed by: PATHOLOGY

## 2022-12-05 PROBLEM — K22.70 BARRETT'S ESOPHAGUS WITHOUT DYSPLASIA: Status: ACTIVE | Noted: 2022-12-05

## 2022-12-07 ENCOUNTER — MYC MEDICAL ADVICE (OUTPATIENT)
Dept: GASTROENTEROLOGY | Facility: CLINIC | Age: 67
End: 2022-12-07

## 2022-12-08 NOTE — TELEPHONE ENCOUNTER
Called [facility] pharmacy to confirm that prescription was received. Per Tawana, pharmacy rep- prescription received on 11/28. Tawana also stated that the medication was picked up this AM.     [employer] message sent to patient with the above summary of conversation an requested patient to confirm.     Anahy Sutherland RN

## 2023-01-31 PROBLEM — Z87.891 FORMER SMOKER: Status: ACTIVE | Noted: 2023-01-31

## 2023-01-31 PROBLEM — J43.2 CENTRILOBULAR EMPHYSEMA (H): Status: ACTIVE | Noted: 2023-01-31

## 2023-02-04 ENCOUNTER — ANCILLARY PROCEDURE (OUTPATIENT)
Dept: MAMMOGRAPHY | Facility: CLINIC | Age: 68
End: 2023-02-04
Attending: FAMILY MEDICINE
Payer: COMMERCIAL

## 2023-02-04 DIAGNOSIS — Z12.31 VISIT FOR SCREENING MAMMOGRAM: ICD-10-CM

## 2023-02-04 PROCEDURE — 77063 BREAST TOMOSYNTHESIS BI: CPT | Mod: TC | Performed by: RADIOLOGY

## 2023-02-04 PROCEDURE — 77067 SCR MAMMO BI INCL CAD: CPT | Mod: TC | Performed by: RADIOLOGY

## 2023-02-21 DIAGNOSIS — I70.0 AORTIC ATHEROSCLEROSIS (H): ICD-10-CM

## 2023-02-21 DIAGNOSIS — I25.10 CORONARY ARTERY CALCIFICATION SEEN ON CAT SCAN: ICD-10-CM

## 2023-02-21 DIAGNOSIS — E78.5 HYPERLIPIDEMIA LDL GOAL <100: ICD-10-CM

## 2023-02-22 RX ORDER — ASPIRIN 81 MG/1
TABLET, COATED ORAL
Qty: 90 TABLET | Refills: 0 | Status: SHIPPED | OUTPATIENT
Start: 2023-02-22 | End: 2023-06-01

## 2023-02-22 RX ORDER — ATORVASTATIN CALCIUM 10 MG/1
TABLET, FILM COATED ORAL
Qty: 90 TABLET | Refills: 0 | Status: SHIPPED | OUTPATIENT
Start: 2023-02-22 | End: 2023-03-13

## 2023-03-09 ENCOUNTER — LAB (OUTPATIENT)
Dept: LAB | Facility: CLINIC | Age: 68
End: 2023-03-09
Payer: COMMERCIAL

## 2023-03-09 DIAGNOSIS — I10 HYPERTENSION GOAL BP (BLOOD PRESSURE) < 140/90: ICD-10-CM

## 2023-03-09 DIAGNOSIS — Z13.1 SCREENING FOR DIABETES MELLITUS (DM): ICD-10-CM

## 2023-03-09 DIAGNOSIS — E78.5 HYPERLIPIDEMIA LDL GOAL <100: ICD-10-CM

## 2023-03-09 DIAGNOSIS — Z13.0 SCREENING FOR DEFICIENCY ANEMIA: ICD-10-CM

## 2023-03-09 LAB
ALBUMIN SERPL-MCNC: 3.7 G/DL (ref 3.4–5)
ALP SERPL-CCNC: 66 U/L (ref 40–150)
ALT SERPL W P-5'-P-CCNC: 26 U/L (ref 0–50)
ANION GAP SERPL CALCULATED.3IONS-SCNC: 4 MMOL/L (ref 3–14)
AST SERPL W P-5'-P-CCNC: 19 U/L (ref 0–45)
BILIRUB SERPL-MCNC: 0.6 MG/DL (ref 0.2–1.3)
BUN SERPL-MCNC: 17 MG/DL (ref 7–30)
CALCIUM SERPL-MCNC: 9.7 MG/DL (ref 8.5–10.1)
CHLORIDE BLD-SCNC: 105 MMOL/L (ref 94–109)
CHOLEST SERPL-MCNC: 188 MG/DL
CO2 SERPL-SCNC: 27 MMOL/L (ref 20–32)
CREAT SERPL-MCNC: 0.69 MG/DL (ref 0.52–1.04)
CREAT UR-MCNC: 96 MG/DL
ERYTHROCYTE [DISTWIDTH] IN BLOOD BY AUTOMATED COUNT: 13.1 % (ref 10–15)
FASTING STATUS PATIENT QL REPORTED: YES
GFR SERPL CREATININE-BSD FRML MDRD: >90 ML/MIN/1.73M2
GLUCOSE BLD-MCNC: 94 MG/DL (ref 70–99)
HCT VFR BLD AUTO: 38.9 % (ref 35–47)
HDLC SERPL-MCNC: 80 MG/DL
HGB BLD-MCNC: 13.3 G/DL (ref 11.7–15.7)
LDLC SERPL CALC-MCNC: 97 MG/DL
MCH RBC QN AUTO: 32.1 PG (ref 26.5–33)
MCHC RBC AUTO-ENTMCNC: 34.2 G/DL (ref 31.5–36.5)
MCV RBC AUTO: 94 FL (ref 78–100)
MICROALBUMIN UR-MCNC: 7 MG/L
MICROALBUMIN/CREAT UR: 7.29 MG/G CR (ref 0–25)
NONHDLC SERPL-MCNC: 108 MG/DL
PLATELET # BLD AUTO: 347 10E3/UL (ref 150–450)
POTASSIUM BLD-SCNC: 4.1 MMOL/L (ref 3.4–5.3)
PROT SERPL-MCNC: 7.5 G/DL (ref 6.8–8.8)
RBC # BLD AUTO: 4.14 10E6/UL (ref 3.8–5.2)
SODIUM SERPL-SCNC: 136 MMOL/L (ref 133–144)
TRIGL SERPL-MCNC: 54 MG/DL
WBC # BLD AUTO: 9.6 10E3/UL (ref 4–11)

## 2023-03-09 PROCEDURE — 85027 COMPLETE CBC AUTOMATED: CPT

## 2023-03-09 PROCEDURE — 82043 UR ALBUMIN QUANTITATIVE: CPT

## 2023-03-09 PROCEDURE — 80061 LIPID PANEL: CPT

## 2023-03-09 PROCEDURE — 36415 COLL VENOUS BLD VENIPUNCTURE: CPT

## 2023-03-09 PROCEDURE — 80053 COMPREHEN METABOLIC PANEL: CPT

## 2023-03-09 PROCEDURE — 82570 ASSAY OF URINE CREATININE: CPT

## 2023-03-13 ENCOUNTER — OFFICE VISIT (OUTPATIENT)
Dept: FAMILY MEDICINE | Facility: CLINIC | Age: 68
End: 2023-03-13
Payer: COMMERCIAL

## 2023-03-13 VITALS
BODY MASS INDEX: 36.1 KG/M2 | OXYGEN SATURATION: 99 % | DIASTOLIC BLOOD PRESSURE: 84 MMHG | WEIGHT: 191.2 LBS | HEIGHT: 61 IN | RESPIRATION RATE: 15 BRPM | TEMPERATURE: 97.7 F | HEART RATE: 63 BPM | SYSTOLIC BLOOD PRESSURE: 137 MMHG

## 2023-03-13 DIAGNOSIS — I10 HYPERTENSION GOAL BP (BLOOD PRESSURE) < 140/90: ICD-10-CM

## 2023-03-13 DIAGNOSIS — R06.83 SNORING: ICD-10-CM

## 2023-03-13 DIAGNOSIS — N39.3 URINARY, INCONTINENCE, STRESS FEMALE: ICD-10-CM

## 2023-03-13 DIAGNOSIS — I70.0 AORTIC ATHEROSCLEROSIS (H): ICD-10-CM

## 2023-03-13 DIAGNOSIS — L73.9 FOLLICULITIS: ICD-10-CM

## 2023-03-13 DIAGNOSIS — Z87.891 FORMER SMOKER: ICD-10-CM

## 2023-03-13 DIAGNOSIS — E78.5 HYPERLIPIDEMIA LDL GOAL <100: ICD-10-CM

## 2023-03-13 DIAGNOSIS — F51.02 ADJUSTMENT INSOMNIA: ICD-10-CM

## 2023-03-13 DIAGNOSIS — I25.10 CORONARY ARTERY CALCIFICATION SEEN ON CAT SCAN: ICD-10-CM

## 2023-03-13 DIAGNOSIS — M85.89 OSTEOPENIA OF MULTIPLE SITES: ICD-10-CM

## 2023-03-13 DIAGNOSIS — R40.0 DAYTIME SLEEPINESS: ICD-10-CM

## 2023-03-13 DIAGNOSIS — K21.9 CHRONIC GERD: ICD-10-CM

## 2023-03-13 DIAGNOSIS — D12.6 TUBULAR ADENOMA OF COLON: ICD-10-CM

## 2023-03-13 DIAGNOSIS — Z12.2 SCREENING FOR LUNG CANCER: ICD-10-CM

## 2023-03-13 DIAGNOSIS — I77.810 ASCENDING AORTA DILATATION (H): ICD-10-CM

## 2023-03-13 DIAGNOSIS — K22.70 BARRETT'S ESOPHAGUS WITHOUT DYSPLASIA: ICD-10-CM

## 2023-03-13 DIAGNOSIS — Z00.00 ENCOUNTER FOR MEDICARE ANNUAL WELLNESS EXAM: Primary | ICD-10-CM

## 2023-03-13 DIAGNOSIS — E66.01 MORBID OBESITY (H): ICD-10-CM

## 2023-03-13 DIAGNOSIS — J43.2 CENTRILOBULAR EMPHYSEMA (H): ICD-10-CM

## 2023-03-13 PROCEDURE — G0438 PPPS, INITIAL VISIT: HCPCS | Performed by: FAMILY MEDICINE

## 2023-03-13 PROCEDURE — 99214 OFFICE O/P EST MOD 30 MIN: CPT | Mod: 25 | Performed by: FAMILY MEDICINE

## 2023-03-13 RX ORDER — LISINOPRIL 10 MG/1
10 TABLET ORAL DAILY
Qty: 90 TABLET | Refills: 3 | Status: SHIPPED | OUTPATIENT
Start: 2023-03-13 | End: 2024-03-15

## 2023-03-13 RX ORDER — ATORVASTATIN CALCIUM 10 MG/1
10 TABLET, FILM COATED ORAL EVERY EVENING
Qty: 90 TABLET | Refills: 3 | Status: SHIPPED | OUTPATIENT
Start: 2023-03-13 | End: 2024-03-15

## 2023-03-13 ASSESSMENT — ENCOUNTER SYMPTOMS
PARESTHESIAS: 0
JOINT SWELLING: 1
CHILLS: 0
EYE PAIN: 0
NERVOUS/ANXIOUS: 0
HEMATOCHEZIA: 0
DIZZINESS: 0
HEMATURIA: 0
SHORTNESS OF BREATH: 1
CONSTIPATION: 0
DIARRHEA: 1
HEARTBURN: 1
SORE THROAT: 0
PALPITATIONS: 0
HEADACHES: 0
FEVER: 0
FREQUENCY: 1
MYALGIAS: 0
DYSURIA: 0
COUGH: 0
NAUSEA: 0
ARTHRALGIAS: 1
ABDOMINAL PAIN: 0
WEAKNESS: 0
BREAST MASS: 0

## 2023-03-13 ASSESSMENT — ACTIVITIES OF DAILY LIVING (ADL): CURRENT_FUNCTION: NO ASSISTANCE NEEDED

## 2023-03-13 ASSESSMENT — PAIN SCALES - GENERAL: PAINLEVEL: NO PAIN (0)

## 2023-03-13 NOTE — PROGRESS NOTES
"SUBJECTIVE:   Nilda is a 67 year old who presents for Preventive Visit.    Patient has been advised of split billing requirements and indicates understanding: Yes  Are you in the first 12 months of your Medicare coverage?  No    Healthy Habits:     In general, how would you rate your overall health?  Good    Frequency of exercise:  2-3 days/week    Duration of exercise:  15-30 minutes    Do you usually eat at least 4 servings of fruit and vegetables a day, include whole grains    & fiber and avoid regularly eating high fat or \"junk\" foods?  Yes    Taking medications regularly:  Yes    Medication side effects:  None    Ability to successfully perform activities of daily living:  No assistance needed    Home Safety:  No safety concerns identified    Hearing Impairment:  Difficulty understanding soft or whispered speech    In the past 6 months, have you been bothered by leaking of urine? Yes    In general, how would you rate your overall mental or emotional health?  Good      PHQ-2 Total Score: 0    Additional concerns today:  Yes      Have you ever done Advance Care Planning? (For example, a Health Directive, POLST, or a discussion with a medical provider or your loved ones about your wishes): Yes, advance care planning is on file.       Fall risk  Fallen 2 or more times in the past year?: No  Any fall with injury in the past year?: No  click delete button to remove this line now  Cognitive Screening   1) Repeat 3 items (Leader, Season, Table)    2) Clock draw: NORMAL  3) 3 item recall: Recalls 3 objects  Results: 3 items recalled: COGNITIVE IMPAIRMENT LESS LIKELY    Mini-CogTM Copyright DANILO Pierre. Licensed by the author for use in [facility]; reprinted with permission ([email or web address]). All rights reserved.      Do you have sleep apnea, excessive snoring or daytime drowsiness?: yes    Reviewed and updated as needed this visit by clinical staff    Allergies  Meds              Reviewed and updated as needed " this visit by Provider                 Social History     Tobacco Use     Smoking status: Former     Packs/day: 1.50     Years: 36.00     Pack years: 54.00     Types: Cigarettes     Start date: 9/1/1972     Quit date: 3/1/2008     Years since quitting: 15.0     Smokeless tobacco: Never   Substance Use Topics     Alcohol use: Yes         Alcohol Use 3/13/2023   Prescreen: >3 drinks/day or >7 drinks/week? No   No flowsheet data found.        Hyperlipidemia Follow-Up      Are you regularly taking any medication or supplement to lower your cholesterol?   Yes- Lipitor 10 mg daily    Are you having muscle aches or other side effects that you think could be caused by your cholesterol lowering medication?  No    Hypertension Follow-up      Do you check your blood pressure regularly outside of the clinic? No     Are you following a low salt diet? No    Are your blood pressures ever more than 140 on the top number (systolic) OR more   than 90 on the bottom number (diastolic), for example 140/90?  N/A    Folliculitis-complaining of erythematous slightly tender raised skin lesion on the right upper eyelid for the past few days  It started as a diffusely swollen right upper eyelid, that she then consolidated to 1 small raised lesion  Denies bleeding, drainage, history of trauma, left-sided symptoms, previous mood symptoms in the past    Current providers sharing in care for this patient include:   Patient Care Team:  Leonor Muir MD as PCP - General (Family Practice)  Leonor Muir MD as Assigned PCP  Delroy Rasmussen DPM as Assigned Musculoskeletal Provider  Anahy Nelson PA-C as Physician Assistant (Dermatology)  Rey Boothe MD as MD (Gastroenterology)    The following health maintenance items are reviewed in Epic and correct as of today:  Health Maintenance   Topic Date Due     MEDICARE ANNUAL WELLNESS VISIT  01/31/2023     ZOSTER IMMUNIZATION (2 of 3) 03/04/2024 (Originally 12/5/2016)      COPD ACTION PLAN  03/02/2026 (Originally 1955)     MAMMO SCREENING  02/04/2024     BMP  03/09/2024     LIPID  03/09/2024     MICROALBUMIN  03/09/2024     ANNUAL REVIEW OF HM ORDERS  03/13/2024     FALL RISK ASSESSMENT  03/13/2024     COLORECTAL CANCER SCREENING  03/14/2027     ADVANCE CARE PLANNING  03/13/2028     DTAP/TDAP/TD IMMUNIZATION (4 - Td or Tdap) 01/16/2033     DEXA  02/14/2037     HEPATITIS C SCREENING  Completed     PHQ-2 (once per calendar year)  Completed     INFLUENZA VACCINE  Completed     Pneumococcal Vaccine: 65+ Years  Completed     COVID-19 Vaccine  Completed     IPV IMMUNIZATION  Aged Out     MENINGITIS IMMUNIZATION  Aged Out     SPIROMETRY  Discontinued     PAP  Discontinued     LUNG CANCER SCREENING  Discontinued     Lab work is in process  Labs reviewed in EPIC  BP Readings from Last 3 Encounters:   03/13/23 137/84   11/28/22 114/61   03/14/22 124/69    Wt Readings from Last 3 Encounters:   03/13/23 86.7 kg (191 lb 3.2 oz)   01/31/22 81.6 kg (180 lb)   04/23/21 81.2 kg (179 lb)                  Patient Active Problem List   Diagnosis     Screening for lung cancer     Obesity, Class I, BMI 30-34.9     Chronic GERD     Medial meniscus tear, right, subsequent encounter     Chondromalacia patellae, left     Chondromalacia patellae, right     Primary osteoarthritis of both knees     CARDIOVASCULAR SCREENING; LDL GOAL LESS THAN 160     Change in color of pigmented skin lesion, left neck, 2mm     Family history of breast cancer in sister     Hypertension goal BP (blood pressure) < 140/90     Gastroesophageal reflux disease with esophagitis     Intertriginous candidiasis     Adjustment insomnia     Hearing deficit, bilateral     Family history of Alzheimer's disease     Tubular adenoma of colon     Snoring     Coronary artery calcification seen on CAT scan     Fatty liver     Hyperlipidemia LDL goal <100     Aortic atherosclerosis (H)     PACE (dyspnea on exertion)     Hair loss     Hepatic  cyst     Bernal's esophagus without dysplasia     Centrilobular emphysema (H)     Former smoker     Morbid obesity (H)     Osteopenia of multiple sites     Ascending aorta dilatation (H)     Urinary, incontinence, stress female     Daytime sleepiness     Past Surgical History:   Procedure Laterality Date     BIOPSY  2018     COLONOSCOPY  03/21/2016     COLONOSCOPY WITH CO2 INSUFFLATION N/A 03/21/2016    Procedure: COLONOSCOPY WITH CO2 INSUFFLATION;  Surgeon: Geoffrey Blackwell MD;  Location: [facility]     COLONOSCOPY WITH CO2 INSUFFLATION N/A 3/14/2022    Procedure: COLONOSCOPY, WITH CO2 INSUFFLATION;  Surgeon: Ramy Murray DO;  Location: [facility]     COMBINED ESOPHAGOSCOPY, GASTROSCOPY, DUODENOSCOPY (EGD) WITH CO2 INSUFFLATION N/A 3/14/2022    Procedure: ESOPHAGOGASTRODUODENOSCOPY, WITH CO2 INSUFFLATION;  Surgeon: Ramy Murray DO;  Location: [facility]     COMBINED ESOPHAGOSCOPY, GASTROSCOPY, DUODENOSCOPY (EGD) WITH CO2 INSUFFLATION N/A 11/28/2022    Procedure: ESOPHAGOGASTRODUODENOSCOPY, WITH CO2 INSUFFLATION;  Surgeon: Rey Boothe MD;  Location: [facility]     ENDOSCOPY UPPER, COLONOSCOPY, COMBINED N/A 03/21/2016    Procedure: COMBINED ENDOSCOPY UPPER, COLONOSCOPY;  Surgeon: Geoffrey Blackwell MD;  Location: [facility]     ESOPHAGOSCOPY, GASTROSCOPY, DUODENOSCOPY (EGD), COMBINED N/A 03/21/2016    Procedure: COMBINED ESOPHAGOSCOPY, GASTROSCOPY, DUODENOSCOPY (EGD), BIOPSY SINGLE OR MULTIPLE;  Surgeon: Geoffrey Blackwell MD;  Location: [facility]     ESOPHAGOSCOPY, GASTROSCOPY, DUODENOSCOPY (EGD), COMBINED N/A 3/14/2022    Procedure: ESOPHAGOGASTRODUODENOSCOPY, WITH BIOPSY;  Surgeon: Ramy Murray DO;  Location: [facility]     ESOPHAGOSCOPY, GASTROSCOPY, DUODENOSCOPY (EGD), COMBINED N/A 11/28/2022    Procedure: ESOPHAGOGASTRODUODENOSCOPY, WITH BIOPSY;  Surgeon: Rey Boothe MD;  Location:  OR     ORTHOPEDIC SURGERY Right 04/23/2018    TKA       Social History     Tobacco Use      Smoking status: Former     Packs/day: 1.50     Years: 36.00     Pack years: 54.00     Types: Cigarettes     Start date: 9/1/1972     Quit date: 3/1/2008     Years since quitting: 15.0     Smokeless tobacco: Never   Substance Use Topics     Alcohol use: Yes     Family History   Problem Relation Age of Onset     Ankylosing Spondylitis Brother 57     Heart Disease Brother      Breast Cancer Sister      Heart Disease Sister      Heart Disease Mother      Heart Disease Father      Coronary Artery Disease Father      Hypertension Father      Heart Disease Brother      Diabetes Sister      Hypertension Sister      Diabetes Brother      Hypertension Brother      Coronary Artery Disease Brother      Hypertension Brother      Osteoporosis Brother      Hyperlipidemia Brother      Coronary Artery Disease Brother         Bypass surgery     Hypertension Brother      Hyperlipidemia Brother      Hypertension Sister      Hyperlipidemia Sister      Breast Cancer Sister      Diabetes Nephew      Coronary Artery Disease Nephew          Current Outpatient Medications   Medication Sig Dispense Refill     atorvastatin (LIPITOR) 10 MG tablet Take 1 tablet (10 mg) by mouth every evening 90 tablet 3     BIOTIN PO Take by mouth daily       Cyanocobalamin (VITAMIN B 12 PO) Take 1 tablet by mouth daily       KLS ASPIRIN LOW DOSE 81 MG EC tablet TAKE 1 TABLET BY MOUTH ONCE DAILY 90 tablet 0     lisinopril (ZESTRIL) 10 MG tablet Take 1 tablet (10 mg) by mouth daily 90 tablet 3     Multiple Vitamins-Minerals (CENTRUM ADULTS PO) Take 1 tablet by mouth daily       omeprazole (PRILOSEC) 20 MG DR capsule Take 1 capsule (20 mg) by mouth 2 times daily for 180 days TAKE 1 CAPSULE BY MOUTH ONCE DAILY 180 capsule 1     triamcinolone (KENALOG) 0.1 % external ointment Apply sparingly to affected area twice daily. Apply sparingly 2-3 weeks as directed. 80 g 1     Allergies   Allergen Reactions     Penicillins      Recent Labs   Lab Test 03/09/23  0701  02/21/22  1158 01/31/22  1153 11/16/20  0840 10/21/19  0719 10/10/16  1140 08/03/15  0949   A1C  --   --   --   --   --   --  5.4   LDL 97  --  152* 129* 109*   < > 119   HDL 80  --  84 85 84   < > 78   TRIG 54  --  64 51 39   < > 65   ALT 26  --  23 21 21   < >  --    CR 0.69  --  0.67 0.71 0.62   < >  --    GFRESTIMATED >90  --  >90 >90 >90   < >  --    GFRESTBLACK  --   --   --  >90 >90   < >  --    POTASSIUM 4.1  --  3.8 3.9 4.0   < >  --    TSH  --  1.94  --  2.48  --    < >  --     < > = values in this interval not displayed.      Pneumonia Vaccine: Up-to-date  Mammogram Screening: Mammogram Screening: Recommended mammography every 1-2 years with patient discussion and risk factor consideration  History of abnormal Pap smear: NO - age 66 - see link Cervical Cytology Screening Guidelines  Last 3 Pap and HPV Results:   PAP / HPV Latest Ref Rng & Units 10/1/2018 8/4/2015   PAP (Historical) - NIL NIL   HPV16 NEG:Negative Negative Negative   HPV18 NEG:Negative Negative Negative   HRHPV NEG:Negative Negative Negative       FHS-7:   Breast CA Risk Assessment (FHS-7) 1/30/2022 2/14/2022 2/4/2023 3/13/2023   Did any of your first-degree relatives have breast or ovarian cancer? Yes Yes Yes Yes   Did any of your relatives have bilateral breast cancer? Unknown No No No   Did any man in your family have breast cancer? No No No No   Did any woman in your family have breast and ovarian cancer? Yes Yes No Yes   Did any woman in your family have breast cancer before age 50 y? Yes Yes Yes Yes   Do you have 2 or more relatives with breast and/or ovarian cancer? Yes Yes No No   Do you have 2 or more relatives with breast and/or bowel cancer? Yes No No Unknown     click delete button to remove this line now  Mammogram Screening: Recommended mammography every 1-2 years with patient discussion and risk factor consideration  Pertinent mammograms are reviewed under the imaging tab.    Review of Systems   Constitutional: Negative for  "chills and fever.   HENT: Positive for hearing loss. Negative for congestion, ear pain and sore throat.    Eyes: Negative for pain and visual disturbance.   Respiratory: Positive for shortness of breath. Negative for cough.    Cardiovascular: Positive for peripheral edema. Negative for chest pain and palpitations.   Gastrointestinal: Positive for diarrhea and heartburn. Negative for abdominal pain, constipation, hematochezia and nausea.   Breasts:  Negative for tenderness, breast mass and discharge.   Genitourinary: Positive for frequency and urgency. Negative for dysuria, genital sores, hematuria, pelvic pain, vaginal bleeding and vaginal discharge.   Musculoskeletal: Positive for arthralgias and joint swelling. Negative for myalgias.   Skin: Negative for rash.   Neurological: Negative for dizziness, weakness, headaches and paresthesias.   Psychiatric/Behavioral: Negative for mood changes. The patient is not nervous/anxious.    All other systems reviewed and are negative.    CONSTITUTIONAL: NEGATIVE for fever, chills, change in weight  INTEGUMENTARY/SKIN: as above  EYES: NEGATIVE for vision changes or irritation  ENT/MOUTH: NEGATIVE for ear, mouth and throat problems  RESP: NEGATIVE for significant cough or SOB  BREAST: NEGATIVE for masses, tenderness or discharge  CV: NEGATIVE for chest pain, palpitations or peripheral edema  CV: History of hypertension  GI: History of chronic GERD, tubular adenoma of colon  : Stress urinary incontinence  MUSCULOSKELETAL: Chronic ankle and foot pain  NEURO: NEGATIVE for weakness, dizziness or paresthesias  ENDOCRINE: NEGATIVE for temperature intolerance, skin/hair changes  HEME/ALLERGY/IMMUNE: NEGATIVE for bleeding problems  PSYCHIATRIC: NEGATIVE for changes in mood or affect    OBJECTIVE:   /84   Pulse 63   Temp 97.7  F (36.5  C) (Oral)   Resp 15   Ht 1.549 m (5' 1\")   Wt 86.7 kg (191 lb 3.2 oz)   SpO2 99%   BMI 36.13 kg/m   Estimated body mass index is 36.13 kg/m  " "as calculated from the following:    Height as of this encounter: 1.549 m (5' 1\").    Weight as of this encounter: 86.7 kg (191 lb 3.2 oz).  Physical Exam  GENERAL APPEARANCE: healthy, alert and no distress  EYES: Eyes grossly normal to inspection, PERRL and conjunctivae and sclerae normal  HENT: ear canals and TM's normal, nose and mouth without ulcers or lesions, oropharynx clear and oral mucous membranes moist  NECK: no adenopathy, no asymmetry, masses, or scars and thyroid normal to palpation  RESP: lungs clear to auscultation - no rales, rhonchi or wheezes  BREAST: normal without masses, tenderness or nipple discharge and no palpable axillary masses or adenopathy  CV: regular rate and rhythm, normal S1 S2, no S3 or S4, no murmur, click or rub, no peripheral edema and peripheral pulses strong  ABDOMEN: soft, nontender, no hepatosplenomegaly, no masses and bowel sounds normal  MS: no musculoskeletal defects are noted and gait is age appropriate without ataxia  SKIN: About 4 to 5 mm, erythematous, tender swelling with no fluctuation located on the skin of the right upper eyelid close to the and not canthus  NEURO: Normal strength and tone, sensory exam grossly normal, mentation intact and speech normal  PSYCH: mentation appears normal and affect normal/bright    Diagnostic Test Results:  Labs reviewed in Epic    ASSESSMENT / PLAN:     (Z00.00) Encounter for Medicare annual wellness exam  Comment:   Plan: Discussed on regular exercises, daily calcium intake, healthy eating, self breast exams monthly and routine dental checks    (J43.2) Centrilobular emphysema (H)  (primary encounter diagnosis)  Comment:   Plan: This is a incidental finding in CT, patient does not have symptoms of COPD    (E78.5) Hyperlipidemia LDL goal <100  Comment:   Plan: atorvastatin (LIPITOR) 10 MG tablet          LDL Cholesterol Calculated   Date Value Ref Range Status   03/09/2023 97 <=100 mg/dL Final   11/16/2020 129 (H) <100 mg/dL Final    "  Comment:     Above desirable:  100-129 mg/dl  Borderline High:  130-159 mg/dL  High:             160-189 mg/dL  Very high:       >189 mg/dl       LDL is at goal, continue with current dose of Lipitor, refill sent today  Recheck in 1 year or sooner if needed.  The 10-year ASCVD risk score (Nanda CALDERÓN, et al., 2019) is: 8%    Values used to calculate the score:      Age: 67 years      Sex: Female      Is Non- : No      Diabetic: No      Tobacco smoker: No      Systolic Blood Pressure: 137 mmHg      Is BP treated: Yes      HDL Cholesterol: 80 mg/dL      Total Cholesterol: 188 mg/dL      (I25.10) Coronary artery calcification seen on CAT scan  Comment:   Plan: atorvastatin (LIPITOR) 10 MG tablet        as above  Continue baby aspirin    (I10) Hypertension goal BP (blood pressure) < 140/90  Comment:   Plan: lisinopril (ZESTRIL) 10 MG tablet          BP Readings from Last 6 Encounters:   03/13/23 137/84   11/28/22 114/61   03/14/22 124/69   01/31/22 116/68   12/20/21 134/84   04/21/21 122/78     Last Comprehensive Metabolic Panel:  Lab Results   Component Value Date     03/09/2023    POTASSIUM 4.1 03/09/2023    CHLORIDE 105 03/09/2023    CO2 27 03/09/2023    ANIONGAP 4 03/09/2023    GLC 94 03/09/2023    BUN 17 03/09/2023    CR 0.69 03/09/2023    GFRESTIMATED >90 03/09/2023    HANH 9.7 03/09/2023       Lab Results   Component Value Date    MICROL 7 03/09/2023    MICROL 14 11/16/2020     No results found for: MICROALBUMIN    Initial blood pressures were 152/84 and 153/78.  Rechecked-137/84  Reviewed normal BMP, negative urine microalbumin  Recommended to continue with current dose of lisinopril 10 mg daily  Will follow low salt diet, weight loss and regular exercises.  Recheck in 1 year or sooner if needed    (I70.0) Aortic atherosclerosis (H)  Comment:   Plan: atorvastatin (LIPITOR) 10 MG tablet,         Echocardiogram Complete        Reviewed echo from last year showing arctic dilation of 4 cm  and chest CT showing aortic atherosclerosis  Continue to control hypertension, lipids, continue with baby aspirin, statin, recheck echo this year      (I77.810) Ascending aorta dilatation (H)  Comment:   Plan: Echocardiogram Complete        as above        (R06.83) Snoring  Comment:   Plan: Adult Sleep Eval & Management          Referral        Due to recent onset of increasing insomnia, snoring recommended to proceed with a sleep study to rule out sleep apnea,    (R40.0) Daytime sleepiness  Comment:   Plan: Adult Sleep Eval & Management          Referral        as above      (F51.02) Adjustment insomnia  Comment:   Plan:Adult Sleep Eval & Management          Referral   as above      (E66.01) Morbid obesity (H)  Comment: BMI of 36.13 with underlying history of hypertension, lipidemia  Plan:   Wt Readings from Last 5 Encounters:   03/13/23 86.7 kg (191 lb 3.2 oz)   01/31/22 81.6 kg (180 lb)   04/23/21 81.2 kg (179 lb)   04/21/21 81.4 kg (179 lb 8 oz)   04/19/21 77.1 kg (170 lb)     Emphasized on weight loss, portion control, low calorie and low fat diet, healthy eating, regular exercises.  Discussed about intermittent fasting as an aid to help lose weight    (K21.9) Chronic GERD  Comment:   Plan: Continue to follow-up with GI, on Prilosec 20 mg twice a day, continue with reflux precautions      (K22.70) Bernal's esophagus without dysplasia  Comment:   Plan: as above    (D12.6) Tubular adenoma of colon  Comment:   Plan: Reviewed colonoscopy from last year, recheck colonoscopy in 5 years in 2027      (Z87.891) Former smoker  Comment:   Plan: CT Chest Lung Cancer Scrn Low Dose wo        Patient is meeting the guidelines for lung cancer screening  She quit smoking in 2008 after smoking 1 and half packs per day for 26 years    (Z12.2) Screening for lung cancer  Comment:   Plan: CT Chest Lung Cancer Scrn Low Dose wo        as above      (L73.9) Folliculitis  Comment:   Plan: ,Reassured patient,  continue with warm soaks use topical bacitracin 2-3 times a day  RTC in 1week if no better by then or sooner prn.    Patient verbalised understanding and is agreeable to the plan.      (M85.89) Osteopenia of multiple sites  Comment:   Plan: Reviewed DEXA from last year showing osteopenia, continue with Caltrate D twice a day, vitamin D 2000 units daily, weightbearing exercises including walking, biking    (N39.3) Urinary, incontinence, stress female  Comment:   Plan: Recommended Kegel exercises, weight loss  Consider urology consult  for persistent or worsening concerns  Patient verbalised understanding and is agreeable to the plan.            COUNSELING:  Reviewed preventive health counseling, as reflected in patient instructions  Special attention given to:       Regular exercise       Healthy diet/nutrition       Vision screening       Hearing screening       Dental care       Bladder control       Fall risk prevention       Osteoporosis prevention/bone health       Colon cancer screening       (Christelle)menopause management       The 10-year ASCVD risk score (Nanda CALDERÓN, et al., 2019) is: 8%    Values used to calculate the score:      Age: 67 years      Sex: Female      Is Non- : No      Diabetic: No      Tobacco smoker: No      Systolic Blood Pressure: 137 mmHg      Is BP treated: Yes      HDL Cholesterol: 80 mg/dL      Total Cholesterol: 188 mg/dL       Advanced Planning         She reports that she quit smoking about 15 years ago. Her smoking use included cigarettes. She started smoking about 50 years ago. She has a 54.00 pack-year smoking history. She has never used smokeless tobacco.      Appropriate preventive services were discussed with this patient, including applicable screening as appropriate for cardiovascular disease, diabetes, osteopenia/osteoporosis, and glaucoma.  As appropriate for age/gender, discussed screening for colorectal cancer, prostate cancer, breast cancer, and  cervical cancer. Checklist reviewing preventive services available has been given to the patient.    Reviewed patients plan of care and provided an AVS. The Intermediate care plan including managing chronic medical conditions for Nilda meets the Care Plan requirement. This Care Plan has been established and reviewed with the Patient.    Chart documentation done in part with Dragon Voice recognition Software. Although reviewed after completion, some word and grammatical error may remain.    Leonro Muir MD  [facility]    Identified Health Risks:

## 2023-03-13 NOTE — PROGRESS NOTES
"    The patient was provided with written information regarding signs of hearing loss.  Information on urinary incontinence and treatment options given to patient.  Answers for HPI/ROS submitted by the patient on 3/13/2023  In general, how would you rate your overall physical health?: good  Frequency of exercise:: 2-3 days/week  Do you usually eat at least 4 servings of fruit and vegetables a day, include whole grains & fiber, and avoid regularly eating high fat or \"junk\" foods? : Yes  Taking medications regularly:: Yes  Medication side effects:: None  Activities of Daily Living: no assistance needed  Home safety: no safety concerns identified  Hearing Impairment:: difficulty understanding soft or whispered speech  In the past 6 months, have you been bothered by leaking of urine?: Yes  abdominal pain: No  Blood in stool: No  Blood in urine: No  chest pain: No  chills: No  congestion: No  constipation: No  cough: No  diarrhea: Yes  dizziness: No  ear pain: No  eye pain: No  nervous/anxious: No  fever: No  frequency: Yes  genital sores: No  headaches: No  hearing loss: Yes  heartburn: Yes  arthralgias: Yes  joint swelling: Yes  peripheral edema: Yes  mood changes: No  myalgias: No  nausea: No  dysuria: No  palpitations: No  Skin sensation changes: No  sore throat: No  urgency: Yes  rash: No  shortness of breath: Yes  visual disturbance: No  weakness: No  pelvic pain: No  vaginal bleeding: No  vaginal discharge: No  tenderness: No  breast mass: No  breast discharge: No  In general, how would you rate your overall mental or emotional health?: good  Additional concerns today:: Yes  Duration of exercise:: 15-30 minutes      "

## 2023-03-13 NOTE — PATIENT INSTRUCTIONS
Patient Education   Personalized Prevention Plan  You are due for the preventive services outlined below.  Your care team is available to assist you in scheduling these services.  If you have already completed any of these items, please share that information with your care team to update in your medical record.  Health Maintenance Due   Topic Date Due     Breathing Capacity Test  Never done     COPD Action Plan  Never done     Zoster (Shingles) Vaccine (2 of 3) 12/05/2016     Annual Wellness Visit  01/31/2023     ANNUAL REVIEW OF HM ORDERS  01/31/2023       Signs of Hearing Loss      Hearing much better with one ear can be a sign of hearing loss.   Hearing loss is a problem shared by many people. In fact, it is one of the most common health problems, particularly as people age. Most people age 65 and older have some hearing loss. By age 80, almost everyone does. Hearing loss often occurs slowly over the years. So you may not realize your hearing has gotten worse.  Have your hearing checked  Call your healthcare provider if you:    Have to strain to hear normal conversation    Have to watch other people s faces very carefully to follow what they re saying    Need to ask people to repeat what they ve said    Often misunderstand what people are saying    Turn the volume of the television or radio up so high that others complain    Feel that people are mumbling when they re talking to you    Find that the effort to hear leaves you feeling tired and irritated    Notice, when using the phone, that you hear better with one ear than the other  StayWell last reviewed this educational content on 1/1/2020 2000-2021 [employer]. All rights reserved. This information is not intended as a substitute for professional medical care. Always follow your healthcare professional's instructions.          Urinary Incontinence, Female (Adult)   Urinary incontinence means loss of bladder control. This problem affects  many women, especially as they get older. If you have incontinence, you may be embarrassed to ask for help. But know that this problem can be treated.   Types of Incontinence  There are different types of incontinence. Two of the main types are described here. You can have more than one type.     Stress incontinence. With this type, urine leaks when pressure (stress) is put on the bladder. This may happen when you cough, sneeze, or laugh. Stress incontinence most often occurs because the pelvic floor muscles that support the bladder and urethra are weak. This can happen after pregnancy and vaginal childbirth or a hysterectomy. It can also be due to excess body weight or hormone changes.    Urge incontinence (also called overactive bladder). With this type, a sudden urge to urinate is felt often. This may happen even though there may not be much urine in the bladder. The need to urinate often during the night is common. Urge incontinence most often occurs because of bladder spasms. This may be due to bladder irritation or infection. Damage to bladder nerves or pelvic muscles, constipation, and certain medicines can also lead to urge incontinence.  Treatment depends on the cause. Further evaluation is needed to find the type you have. This will likely include an exam and certain tests. Based on the results, you and your healthcare provider can then plan treatment. Until a diagnosis is made, the home care tips below can help ease symptoms.   Home care    Do pelvic floor muscle exercises, if they are prescribed. The pelvic floor muscles help support the bladder and urethra. Many women find that their symptoms improve when doing special exercises that strengthen these muscles. To do the exercises, contract the muscles you would use to stop your stream of urine. But do this when you re not urinating. Hold for 10 seconds, then relax. Repeat 10 to 20 times in a row, at least 3 times a day. Your healthcare provider may give  you other instructions for how to do the exercises and how often.    Keep a bladder diary. This helps track how often and how much you urinate over a set period of time. Bring this diary with you to your next visit with the provider. The information can help your provider learn more about your bladder problem.    Lose weight, if advised to by your provider. Extra weight puts pressure on the bladder. Your provider can help you create a weight-loss plan that s right for you. This may include exercising more and making certain diet changes.    Don't have foods and drinks that may irritate the bladder. These can include alcohol and caffeinated drinks.    Quit smoking. Smoking and other tobacco use can lead to a long-term (chronic) cough that strains the pelvic floor muscles. Smoking may also damage the bladder and urethra. Talk with your provider about treatments or methods you can use to quit smoking.    If drinking large amounts of fluid makes you have symptoms, you may be advised to limit your fluid intake. You may also be advised to drink most of your fluids during the day and to limit fluids at night.    If you re worried about urine leakage or accidents, you may wear absorbent pads to catch urine. Change the pads often. This helps reduce discomfort. It may also reduce the risk of skin or bladder infections.    Follow-up care  Follow up with your healthcare provider, or as directed. It may take some to find the right treatment for your problem. But healthy lifestyle changes can be made right away. These include such things as exercising on a regular basis, eating a healthy diet, losing weight (if needed), and quitting smoking. Your treatment plan may include special therapies or medicines. Certain procedures or surgery may also be options. Talk about any questions you have with your provider.   When to seek medical advice  Call the healthcare provider right away if any of these occur:    Fever of 100.4 F (38 C) or  higher, or as directed by your provider    Bladder pain or fullness    Belly swelling    Nausea or vomiting    Back pain    Weakness, dizziness, or fainting  Carlos last reviewed this educational content on 1/1/2020 2000-2021 [employer] StayWell [employer]. All rights reserved. This information is not intended as a substitute for professional medical care. Always follow your healthcare professional's instructions.

## 2023-04-10 ENCOUNTER — ANCILLARY PROCEDURE (OUTPATIENT)
Dept: CARDIOLOGY | Facility: CLINIC | Age: 68
End: 2023-04-10
Attending: FAMILY MEDICINE
Payer: COMMERCIAL

## 2023-04-10 ENCOUNTER — ANCILLARY PROCEDURE (OUTPATIENT)
Dept: CT IMAGING | Facility: CLINIC | Age: 68
End: 2023-04-10
Attending: FAMILY MEDICINE
Payer: COMMERCIAL

## 2023-04-10 DIAGNOSIS — Z87.891 FORMER SMOKER: ICD-10-CM

## 2023-04-10 DIAGNOSIS — I77.810 ASCENDING AORTA DILATATION (H): ICD-10-CM

## 2023-04-10 DIAGNOSIS — Z12.2 SCREENING FOR LUNG CANCER: ICD-10-CM

## 2023-04-10 DIAGNOSIS — I70.0 AORTIC ATHEROSCLEROSIS (H): ICD-10-CM

## 2023-04-10 LAB — LVEF ECHO: NORMAL

## 2023-04-10 PROCEDURE — 71271 CT THORAX LUNG CANCER SCR C-: CPT | Mod: GC | Performed by: RADIOLOGY

## 2023-04-10 PROCEDURE — 93306 TTE W/DOPPLER COMPLETE: CPT | Performed by: INTERNAL MEDICINE

## 2023-04-11 NOTE — RESULT ENCOUNTER NOTE
Dear Nilda,  Your heart echo showed normal ejection fraction and normal ventricles  It also showed slight dilation of the origin of the aorta at 4.1 cm, that showed a slight increase in size from 4 cm last year.  We will continue to monitor this along with controlling high blood pressure and cholesterol with current medications.  We will repeat the echo next year   Let me know if you have any questions. Take care.  Leonor Muir MD

## 2023-04-11 NOTE — RESULT ENCOUNTER NOTE
Dear Nilda,  Your chest CT findings are stable from last year, this is reassuring  We will continue with annual lung cancer screening with a low-dose chest CT   Let me know if you have any questions. Take care.  Leonor Muir MD

## 2023-07-07 ENCOUNTER — TELEPHONE (OUTPATIENT)
Dept: SLEEP MEDICINE | Facility: CLINIC | Age: 68
End: 2023-07-07

## 2023-07-07 NOTE — TELEPHONE ENCOUNTER
Pt was called to reschedule appt on 8/2 with Sharon Campo. LVM for them to call back. Mems-ID message sent also.

## 2023-07-14 ENCOUNTER — TELEPHONE (OUTPATIENT)
Dept: SLEEP MEDICINE | Facility: CLINIC | Age: 68
End: 2023-07-14
Payer: COMMERCIAL

## 2023-07-14 NOTE — TELEPHONE ENCOUNTER
Message left for patient to call back if interested in moving consult up to next week as a virtual video visit with Dr. Helms either 7/18 or 7/19.     Left my direct call back number and our [place]  number.     Estefanía Ladd CMA on 7/14/2023 at 10:19 AM

## 2023-07-18 ENCOUNTER — TELEPHONE (OUTPATIENT)
Dept: GASTROENTEROLOGY | Facility: CLINIC | Age: 68
End: 2023-07-18
Payer: COMMERCIAL

## 2023-07-18 NOTE — TELEPHONE ENCOUNTER
Faxed refill request from: [email or web address]   Medication request: Omeprazole 20 mg  Sig: Take 1 capsule by mouth twice daily prior to meals  Last filled: 4/6/2023  Last Qty: 180  Pt's last office visit: 11/28/22 procedure  Next scheduled office visit: None    Rx pended and routed to RNCC for review and approval if appropriate. Unable to pend medication as it is no longer on patients med list.    Elizabeth Fulton LPN

## 2023-07-19 DIAGNOSIS — K22.70 BARRETT'S ESOPHAGUS WITHOUT DYSPLASIA: Primary | ICD-10-CM

## 2023-07-19 NOTE — TELEPHONE ENCOUNTER
Omeprazole (Prilosec) 20mg DR capsule   Take 1 capsule by mouth daily    Last Written Prescription Date:  11/28/2022  *different dosing than recs following EGD  Last Fill Quantity: 180 ,  # refills: 1   Last office visit: Only scopes - last scope 11/28/2022  Future Office Visit: Not scheduled      Routing refill request to provider for review/approval because:  -Drug not active on patient's medication list  -Last script (11/28/2022): 1 cap (20mg) BID; EGD report recs stated 20mg daily.     Anahy Sutherland, RN

## 2023-07-20 NOTE — TELEPHONE ENCOUNTER
Nutrition Assessment   Assessment Type: Follow up  Chart Medications Lab Results Reviewed:  Yes        Current Diet Order: NPO  Nutrition Supplement: n/a  Diet Order Tolerance: NPO status  Food Allergies: No  Priority Points: Status 2    Demographic/Anthropometrics Information  Gender: female   Patient Age: 37 year old  Height:    Ht Readings from Last 1 Encounters:   07/09/22 5' 2\" (1.575 m)      Weight:   Wt Readings from Last 1 Encounters:   07/09/22 97 kg      BMI:   BMI Readings from Last 1 Encounters:   07/09/22 39.11 kg/m²       Estimated Nutritional Needs  Assessment Weight: 62 kg- ideal body weight ; ECMO and renal guidelines  Energy Needs: 25-30 kcal/kg = 9619-6642 kcals/day  Protein Needs: 1.5-2.0 g/kg =  grams/day  Fluid Needs: per MD   Nutrition Diagnosis (PES)  Increased nutrient needs related to Increased nutritional demands for healing as evidenced by Calculated needs    Nutrition Plan  Recommended Nutrition Intervention: enteral nutrition and parenteral nutrition  Monitor: Biochemical data, medical tests, procedures    Discharge Needs: Pending  Care Plan Discussed With: Patient unable to participate in plan of care and RN  Goals: Meet >/= 75% of estimated needs  Goal Progress: Extended  Timeframe to Achieve Goal: 1-3 days    Dietitian Notes/Impressions/Recommendations:  Pt transferred from outside hospital with pancreatitis and then underwent ECMO placement. PMH includes ETOH abuse. Pt was on TPN for a couple days at outside hospital but then held due to hymodynamic instability, Elevated TG noted. Pt is currently intubated and on ECMO and CRRT.  No propofol at this time. NG to LIS. Per rounds no TPN today per MD. Wound care RN to see for wound to sacrum- no staging at this time. Per chart significant increase in weight. Spoke with RN- very fluid overloaded and per chart moderate/ generalized edema present with weeping. Plan to re weigh pt today.   Recommend  - TPN: goal 225gm dextrose, 105gm  Rey Boothe MD  to Me  7/19/23 11:41 AM    Refilled x 6 months.  Ongoing therapy is needed lifelong.  She can get Rx from PCP given there is a clear indication for chronic therapy.  Patient can be seen in GI clinic for Rx management if PCP has concerns.     Moonbasa message sent to patient with above provider response.     Anahy Sutherland RN     amino acid and 50gm lipid  To provide 1685 kcal (100%) and 100% protein needs. Recommend check TG prior to starting lipids. Only start if TG less than 250. At risk for refeeding due to ETOH use     - Enteral nutrition- if able to start feeding recommend Vital AF 1.2@60ml/hr to provide 1728 kcal (100%) and 108gm protein (100%).    NFPE: deferred, ECMO and intubation    7/11:  Pt remains NPO- approximately 5 days. Pt in OR this AM for chest closure. Pt remains intubated and on ECMO and CRRT. Per GI note tube feeding was given, per chart about 10ml/hr. Ok per GI to resume tube feeding.   Recommend  - Vital AF 1.2@60ml/hr to provide 1728 kcal (100%) and 108gm protein (100%)  - If unable to achieve and maintain goal tube feeding recommend TPN.  -- TPN: goal 225gm dextrose, 105gm amino acid and 50gm lipid  To provide 1685 kcal (100%) and 100% protein needs. Recommend check TG prior to starting lipids. Only start lipids if TG less than 250. At risk for refeeding due to ETOH use      TREATMENT PLAN: Monitoring & Interventions   1. Enteral Nutrition: Recommend Vital AF 1.2@60ml/hr   2. TPN: Recommend  225gm Dextrose, 105gm amino acid, 50gm lipids. Volume per MD if unable to tolerate enteral nutrition  3. RD monitoring intake trends, weight, labs. Further recommendations based on clinical course.

## 2023-10-18 ENCOUNTER — MYC MEDICAL ADVICE (OUTPATIENT)
Dept: FAMILY MEDICINE | Facility: CLINIC | Age: 68
End: 2023-10-18
Payer: COMMERCIAL

## 2023-10-20 NOTE — COMMUNITY RESOURCES LIST (ENGLISH)
10/20/2023   [facility]  N/A  For questions about this resource list or additional care needs, please contact your primary care clinic or care manager.  Phone: [phone]   Email: N/A   Address: [address] [place] [id number]   Hours: N/A        Financial Stability       Utility payment assistance  1  Deep [address]  [address] [facility] Distance: 2.95 miles      In-Person   [address] [place] [address]  Language: English, Hebrew  Hours: Mon - Fri 8:00 AM - 3:30 PM  Fees: Free, Self Pay   Phone: (546) 667-4555 Email: Cody@[facility] AreaTaskdoer.[facility] Website: http://[facility].[email or web address]/community/joaquin-Carriere/     2  Community Action [facility] ([facility] - Low Income Home Energy Assistance Program (LIHEAP) Distance: 6.13 miles      In-Person   [address] [place] [address]  Language: English  Hours: Mon 8:00 AM - 4:30 PM , Tue 8:00 AM - 7:00 PM , Wed 8:00 AM - 4:30 PM , Thu 8:00 AM - 7:00 PM , Fri 8:00 AM - 4:30 PM  Fees: Free   Phone: (826) 753-6616 Email: info@[facility] Hospital[facility].fm.World [employer]ital Records Website: https://51edj.World Vital Records/          Important Numbers & Websites       Emergency Services   911  [facility]   311  Poison Control   (281) 663-3359  Suicide Prevention Lifeline   (732) 249-5523 (TALK)  Child Abuse Hotline   (651) 456-5318 (4-A-Child)  Sexual Assault Hotline   (108) 179-5509 (HOPE)  National Runaway Safeline   (756) 628-8157 (RUNAWAY)  All-Options Talkline   (223) 800-7683  Substance Abuse Referral   (909) 812-5532 (HELP)

## 2023-10-23 ENCOUNTER — OFFICE VISIT (OUTPATIENT)
Dept: FAMILY MEDICINE | Facility: CLINIC | Age: 68
End: 2023-10-23
Payer: COMMERCIAL

## 2023-10-23 ENCOUNTER — MYC MEDICAL ADVICE (OUTPATIENT)
Dept: FAMILY MEDICINE | Facility: CLINIC | Age: 68
End: 2023-10-23

## 2023-10-23 ENCOUNTER — TELEPHONE (OUTPATIENT)
Dept: FAMILY MEDICINE | Facility: CLINIC | Age: 68
End: 2023-10-23

## 2023-10-23 VITALS
SYSTOLIC BLOOD PRESSURE: 125 MMHG | WEIGHT: 192 LBS | BODY MASS INDEX: 32.78 KG/M2 | DIASTOLIC BLOOD PRESSURE: 84 MMHG | TEMPERATURE: 98.1 F | HEART RATE: 56 BPM | RESPIRATION RATE: 16 BRPM | HEIGHT: 64 IN | OXYGEN SATURATION: 99 %

## 2023-10-23 DIAGNOSIS — E66.01 MORBID OBESITY (H): ICD-10-CM

## 2023-10-23 DIAGNOSIS — E66.01 MORBID OBESITY (H): Primary | ICD-10-CM

## 2023-10-23 DIAGNOSIS — M72.2 PLANTAR FASCIITIS: Primary | ICD-10-CM

## 2023-10-23 PROCEDURE — 99214 OFFICE O/P EST MOD 30 MIN: CPT | Performed by: INTERNAL MEDICINE

## 2023-10-23 RX ORDER — NAPROXEN 500 MG/1
500 TABLET ORAL 2 TIMES DAILY WITH MEALS
Qty: 60 TABLET | Refills: 0 | Status: SHIPPED | OUTPATIENT
Start: 2023-10-23 | End: 2023-12-01

## 2023-10-23 RX ORDER — RESPIRATORY SYNCYTIAL VIRUS VACCINE 120MCG/0.5
0.5 KIT INTRAMUSCULAR ONCE
Qty: 1 EACH | Refills: 0 | Status: CANCELLED | OUTPATIENT
Start: 2023-10-23 | End: 2023-10-23

## 2023-10-23 ASSESSMENT — PAIN SCALES - GENERAL: PAINLEVEL: MODERATE PAIN (4)

## 2023-10-23 NOTE — COMMUNITY RESOURCES LIST (ENGLISH)
10/23/2023   [facility]  N/A  For additional resource needs, please contact your health insurance member services or your primary care team.  Phone: [phone]   Email: N/A   Address: [facility] [place] [address]   Hours: N/A        Financial Stability       Utility payment assistance  1  [place] [facility] - Energy and Utilities Distance: 20.6 miles      In-Person, Phone/Virtual   [address] [place] [address]  Language: English  Hours: Mon - Fri 8:30 AM - 4:30 PM  Fees: Free   Phone: (304) 903-2667 Website: https://[place].gov/[employer]/energy/consumer-assistance/energy-assistance-program/     2  [place] Public TastyNow.com [facility] - [place]'s Telephone Assistance Plan (TAP) and [facility] Lifeline and Affordable Connectivity Program (ACP) Distance: 24.27 miles      Phone/Virtual   [address] [place] [address]  Language: English  Fees: Free   Phone: (296) 908-7250 Email: long.gisele@[facility].mn. Website: https://[place]/puc/consumers/telephone/          Important Numbers & Websites       [facility]   211 211unitedway.org  Poison Control   (590) 989-3830 [place]poison.[place]  Suicide and Crisis Lifeline   [address] [place], [place] [address]line.org  Childhelp [place] Child Abuse Hotline   209.219.4902 Childhelphotline.org  National Sexual Assault Hotline   (990) 139-9147 (HOPE) Rainn.org  National Runaway Safeline   (915) 363-3620 (RUNAWAY) 1800runaway.org  Pregnancy & Postpartum Support [place]   Call/text 167-814-4491 Ppsupportmn.org  Substance Abuse National Helpline ([facility]   067-486-HELP (3088) Findtreatment.gov  Emergency Services   911

## 2023-10-23 NOTE — PROGRESS NOTES
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  Pain gets worse as the day goes by  Examination is consistent with plantar fasciitis  Advised about wearing soft cushions in the shoes and the not walking barefoot in the house  Advised about some exercises  We will also try some anti-inflammatories for a couple of weeks  Advised to stop aspirin when she takes naproxen  If things do not improve after this we are to consider podiatry referral  - naproxen (NAPROSYN) 500 MG tablet; Take 1 tablet (500 mg) by mouth 2 times daily (with meals)    Morbid obesity (H)  She is interested in exploring treatment options with Wegovy  She has tried diet and exercise without much benefit  She has no history of diabetes  Discussed about the side effects of Wegovy including nausea/lethargy/ constipation  Discussed also about life-threatening complications like pancreatitis  Not sure if the insurance will approve it but I will send the medication to the pharmacy  If she is tolerating it well we can increase the dose to 1 mg after 4 weeks  - Semaglutide-Weight Management (WEGOVY) 0.5 MG/0.5ML pen; Inject 0.5 mg Subcutaneous once a week      30 minutes spent by me on the date of the encounter doing chart review, history and exam, documentation and further activities per the note       BMI:   Estimated body mass index is 32.96 kg/m  as calculated from the following:    Height as of this encounter: 1.626 m (5' 4\").    Weight as of this encounter: 87.1 kg (192 lb).           Radu Talavera MD  [facility] [place] [facility]    Caryl Munguia is a 68 year old, presenting for the following health issues:  Musculoskeletal Problem (Foot pain; bilateral    heals)        10/23/2023     9:54 AM   Additional Questions   Roomed by Diane Lynne Schoenherr RN       History of Present Illness       Reason for " "visit:  Heals or in pain  Symptom onset:  1-2 weeks ago  Symptoms include:  Pain on the bottom of heals all day and some nights  Symptom intensity:  Moderate  Symptom progression:  Staying the same  Had these symptoms before:  No  What makes it worse:  Getting out of the car on a longer drive  What makes it better:  Advil    She eats 0-1 servings of fruits and vegetables daily.She consumes 0 sweetened beverage(s) daily. She exercises with enough effort to increase her heart rate 3 or less days per week.   She is taking medications regularly.         Pain History:  When did you first notice your pain? 3 weeks ago   Have you seen anyone else for your pain? no  How has your pain affected your ability to work? Works fulltime and exhausted by the time she gets home  Where in your body do you have pain?  Bilateral heal pain shoots up ankles at night---left worse than right          Review of Systems   Constitutional, HEENT, cardiovascular, pulmonary, gi and gu systems are negative, except as otherwise noted.      Objective    /84 (BP Location: Right arm, Patient Position: Sitting, Cuff Size: Adult Large)   Pulse 56   Temp 98.1  F (36.7  C) (Oral)   Resp 16   Ht 1.626 m (5' 4\")   Wt 87.1 kg (192 lb)   SpO2 99%   BMI 32.96 kg/m    Body mass index is 32.96 kg/m .  Physical Exam   GENERAL: healthy, alert and no distress  NECK: no adenopathy, no asymmetry, masses, or scars and thyroid normal to palpation  RESP: lungs clear to auscultation - no rales, rhonchi or wheezes  MS: Tender on palpation of both plantar fascial on both heels  NEURO: Normal strength and tone, mentation intact and speech normal  PSYCH: mentation appears normal, affect normal/bright                      "

## 2023-10-23 NOTE — TELEPHONE ENCOUNTER
Prior Authorization Retail Medication Request    Medication/Dose: Semaglutide-Weight Management (WEGOVY) 0.5 MG/0.5ML pen  ICD code (if different than what is on RX):    Previously Tried and Failed:    Rationale:    Morbid obesity          Insurance Name:  [facility]   Insurance ID:  [id number]       Pharmacy Information (if different than what is on RX)  Name:    Phone:    [email or web address]  Key: [id number]  Last Name: Sondra MANRIQUEZ: 1955

## 2023-10-24 ENCOUNTER — TELEPHONE (OUTPATIENT)
Dept: FAMILY MEDICINE | Facility: CLINIC | Age: 68
End: 2023-10-24
Payer: COMMERCIAL

## 2023-10-24 ASSESSMENT — SLEEP AND FATIGUE QUESTIONNAIRES
HOW LIKELY ARE YOU TO NOD OFF OR FALL ASLEEP WHILE SITTING AND READING: MODERATE CHANCE OF DOZING
HOW LIKELY ARE YOU TO NOD OFF OR FALL ASLEEP WHILE SITTING QUIETLY AFTER LUNCH WITHOUT ALCOHOL: SLIGHT CHANCE OF DOZING
HOW LIKELY ARE YOU TO NOD OFF OR FALL ASLEEP IN A CAR, WHILE STOPPED FOR A FEW MINUTES IN TRAFFIC: WOULD NEVER DOZE
HOW LIKELY ARE YOU TO NOD OFF OR FALL ASLEEP WHEN YOU ARE A PASSENGER IN A CAR FOR AN HOUR WITHOUT A BREAK: WOULD NEVER DOZE
HOW LIKELY ARE YOU TO NOD OFF OR FALL ASLEEP WHILE WATCHING TV: HIGH CHANCE OF DOZING
HOW LIKELY ARE YOU TO NOD OFF OR FALL ASLEEP WHILE SITTING INACTIVE IN A PUBLIC PLACE: SLIGHT CHANCE OF DOZING
HOW LIKELY ARE YOU TO NOD OFF OR FALL ASLEEP WHILE LYING DOWN TO REST IN THE AFTERNOON WHEN CIRCUMSTANCES PERMIT: SLIGHT CHANCE OF DOZING
HOW LIKELY ARE YOU TO NOD OFF OR FALL ASLEEP WHILE SITTING AND TALKING TO SOMEONE: WOULD NEVER DOZE

## 2023-10-24 NOTE — TELEPHONE ENCOUNTER
"Prior Authorization Retail Medication Request    Medication/Dose: SAXENDA 18MG/3ML PEN INJECTORS   ICD code (if different than what is on RX):     Previously Tried and Failed:     Rationale:       Insurance Name:  [facility]  Insurance ID:  [id number]     Pharmacy Information (if different than what is on RX)  Name:  [facility]  Phone:  [phone]    A Prior Authorization has been started.  To submit the PA, please follow the instructions below:    Login to [email or web address] and \"enter a Key\"  KEY: [id number]  Patient Last Name: Sondra  : 1955    "

## 2023-10-25 ENCOUNTER — OFFICE VISIT (OUTPATIENT)
Dept: SLEEP MEDICINE | Facility: CLINIC | Age: 68
End: 2023-10-25
Payer: COMMERCIAL

## 2023-10-25 VITALS
SYSTOLIC BLOOD PRESSURE: 136 MMHG | RESPIRATION RATE: 12 BRPM | BODY MASS INDEX: 35.15 KG/M2 | DIASTOLIC BLOOD PRESSURE: 82 MMHG | HEART RATE: 59 BPM | HEIGHT: 62 IN | WEIGHT: 191 LBS | OXYGEN SATURATION: 95 %

## 2023-10-25 DIAGNOSIS — E66.811 CLASS 1 OBESITY DUE TO EXCESS CALORIES WITH SERIOUS COMORBIDITY AND BODY MASS INDEX (BMI) OF 34.0 TO 34.9 IN ADULT: ICD-10-CM

## 2023-10-25 DIAGNOSIS — R06.00 DYSPNEA AND RESPIRATORY ABNORMALITY: Primary | ICD-10-CM

## 2023-10-25 DIAGNOSIS — E66.09 CLASS 1 OBESITY DUE TO EXCESS CALORIES WITH SERIOUS COMORBIDITY AND BODY MASS INDEX (BMI) OF 34.0 TO 34.9 IN ADULT: ICD-10-CM

## 2023-10-25 DIAGNOSIS — R06.89 DYSPNEA AND RESPIRATORY ABNORMALITY: Primary | ICD-10-CM

## 2023-10-25 DIAGNOSIS — R40.0 DAYTIME SLEEPINESS: ICD-10-CM

## 2023-10-25 DIAGNOSIS — R06.83 SNORING: ICD-10-CM

## 2023-10-25 DIAGNOSIS — R53.83 MALAISE AND FATIGUE: ICD-10-CM

## 2023-10-25 DIAGNOSIS — I10 ESSENTIAL HYPERTENSION: ICD-10-CM

## 2023-10-25 DIAGNOSIS — Z72.820 LACK OF ADEQUATE SLEEP: ICD-10-CM

## 2023-10-25 DIAGNOSIS — R53.81 MALAISE AND FATIGUE: ICD-10-CM

## 2023-10-25 DIAGNOSIS — F51.02 ADJUSTMENT INSOMNIA: ICD-10-CM

## 2023-10-25 PROBLEM — I25.10 CORONARY ARTERY CALCIFICATION SEEN ON CAT SCAN: Chronic | Status: ACTIVE | Noted: 2022-02-20

## 2023-10-25 PROBLEM — K22.70 BARRETT'S ESOPHAGUS WITHOUT DYSPLASIA: Chronic | Status: ACTIVE | Noted: 2022-12-05

## 2023-10-25 PROBLEM — I70.0 AORTIC ATHEROSCLEROSIS (H): Chronic | Status: ACTIVE | Noted: 2022-02-21

## 2023-10-25 PROBLEM — I77.810 ASCENDING AORTA DILATATION (H): Chronic | Status: ACTIVE | Noted: 2023-03-13

## 2023-10-25 PROBLEM — H91.93 HEARING DEFICIT, BILATERAL: Chronic | Status: ACTIVE | Noted: 2019-10-21

## 2023-10-25 PROBLEM — K76.0 FATTY LIVER: Chronic | Status: ACTIVE | Noted: 2022-02-21

## 2023-10-25 PROBLEM — K21.00 GASTROESOPHAGEAL REFLUX DISEASE WITH ESOPHAGITIS: Status: RESOLVED | Noted: 2019-10-21 | Resolved: 2023-10-25

## 2023-10-25 PROCEDURE — 99204 OFFICE O/P NEW MOD 45 MIN: CPT | Performed by: PHYSICIAN ASSISTANT

## 2023-10-25 NOTE — NURSING NOTE
"Chief Complaint   Patient presents with    Sleep Problem     Patient presents to clinic today with concerns for extreme tiredness, insomnia, and snoring.       Initial /82   Pulse 59   Resp 12   Ht 1.575 m (5' 2\")   Wt 86.6 kg (191 lb)   SpO2 95%   BMI 34.93 kg/m   Estimated body mass index is 34.93 kg/m  as calculated from the following:    Height as of this encounter: 1.575 m (5' 2\").    Weight as of this encounter: 86.6 kg (191 lb).    Medication Reconciliation: complete  ESS: 8  Neck circumference: 13.75 inches / 35 centimeters.  DME: N/A  Anila Singletary CMA      "

## 2023-10-25 NOTE — TELEPHONE ENCOUNTER
Central Prior Authorization Team   Phone: [phone]    PA Initiation    Medication: SAXENDA 18MG/3ML PEN INJECTORS   Insurance Company: [employer] [place] - Phone [phone] Fax [phone]  Pharmacy Filling the Rx: [id number] - [facility] [place], [place] - [address] [facility]  Filling Pharmacy Phone: [phone]  Filling Pharmacy Fax:    Start Date: 10/25/2023

## 2023-10-25 NOTE — PATIENT INSTRUCTIONS
"          MY TREATMENT INFORMATION FOR SLEEP APNEA-  Nilda Amaro    DOCTOR : JOSE DE JESUS Foley    Am I having a sleep study at a sleep center?  --->Due to normal delays, you will be contacted within 2-4 weeks to schedule    Am I having a home sleep study?  --->Watch the video for the device you are using:    -/drop off device-   https://www.Air Buttonube.com/watch?v=yGGFBdELGhk    -Disposable device sent out require phone/computer application-   https://www.Onovative.com/watch?v=BCce_vbiwxE      Frequently asked questions:  1. What is Obstructive Sleep Apnea (JASPER)? JASPER is the most common type of sleep apnea. Apnea means, \"without breath.\"  Apnea is most often caused by narrowing or collapse of the upper airway as muscles relax during sleep.   Almost everyone has occasional apneas. Most people with sleep apnea have had brief interruptions at night frequently for many years.  The severity of sleep apnea is related to how frequent and severe the events are.   2. What are the consequences of JASPER? Symptoms include: feeling sleepy during the day, snoring loudly, gasping or stopping of breathing, trouble sleeping, and occasionally morning headaches or heartburn at night.  Sleepiness can be serious and even increase the risk of falling asleep while driving. Other health consequences may include development of high blood pressure and other cardiovascular disease in persons who are susceptible. Untreated JASPER  can contribute to heart disease, stroke and diabetes.   3. What are the treatment options? In most situations, sleep apnea is a lifelong disease that must be managed with daily therapy. Medications are not effective for sleep apnea and surgery is generally not considered until other therapies have been tried. Your treatment is your choice . Continuous Positive Airway (CPAP) works right away and is the therapy that is effective in nearly everyone. An oral device to hold your jaw forward is usually the next most " reliable option. Other options include postioning devices (to keep you off your back), weight loss, and surgery including a tongue pacing device. There is more detail about some of these options below.  4. Are my sleep studies covered by insurance? Although we will request verification of coverage, we advise you also check in advance of the study to ensure there is coverage.    Important tips for those choosing CPAP and similar devices  For new devices, sign up for device LENY to monitor your device for your followup visits  We encourage you to utilize the [employer] leny or website (myAir web (resmed.com) ) to monitor your therapy progress and share the data with your healthcare team when you discuss your sleep apnea.                                                    Know your equipment:  CPAP is continuous positive airway pressure that prevents obstructive sleep apnea by keeping the throat from collapsing while you are sleeping. In most cases, the device is  smart  and can slowly self-adjusts if your throat collapses and keeps a record every day of how well you are treated-this information is available to you and your care team.  BPAP is bilevel positive airway pressure that keeps your throat open and also assists each breath with a pressure boost to maintain adequate breathing.  Special kinds of BPAP are used in patients who have inadequate breathing from lung or heart disease. In most cases, the device is  smart  and can slowly self-adjusts to assist breathing. Like CPAP, the device keeps a record of how well you are treated.  Your mask is your connection to the device. You get to choose what feels most comfortable and the staff will help to make sure if fits. Here: are some examples of the different masks that are available:       Key points to remember on your journey with sleep apnea:  Sleep study.  PAP devices often need to be adjusted during a sleep study to show that they are effective and adjusted  right.  Good tips to remember: Try wearing just the mask during a quiet time during the day so your body adapts to wearing it. A humidifier is recommended for comfort in most cases to prevent drying of your nose and throat. Allergy medication from your provider may help you if you are having nasal congestion.  Getting settled-in. It takes more than one night for most of us to get used to wearing a mask. Try wearing just the mask during a quiet time during the day so your body adapts to wearing it. A humidifier is recommended for comfort in most cases. Our team will work with you carefully on the first day and will be in contact within 4 days and again at 2 and 4 weeks for advice and remote device adjustments. Your therapy is evaluated by the device each day.   Use it every night. The more you are able to sleep naturally for 7-8 hours, the more likely you will have good sleep and to prevent health risks or symptoms from sleep apnea. Even if you use it 4 hours it helps. Occasionally all of us are unable to use a medical therapy, in sleep apnea, it is not dangerous to miss one night.   Communicate. Call our skilled team on the number provided on the first day if your visit for problems that make it difficult to wear the device. Over 2 out of 3 patients can learn to wear the device long-term with help from our team. Remember to call our team or your sleep providers if you are unable to wear the device as we may have other solutions for those who cannot adapt to mask CPAP therapy. It is recommended that you sleep your sleep provider within the first 3 months and yearly after that if you are not having problems.   Use it for your health. We encourage use of CPAP masks during daytime quiet periods to allow your face and brain to adapt to the sensation of CPAP so that it will be a more natural sensation to awaken to at night or during naps. This can be very useful during the first few weeks or months of adapting to CPAP  though it does not help medically to wear CPAP during wakefulness and  should not be used as a strategy just to meet guidelines.  Take care of your equipment. Make sure you clean your mask and tubing using directions every day and that your filter and mask are replaced as recommended or if they are not working.     BESIDES CPAP, WHAT OTHER THERAPIES ARE THERE?    Positioning Device  Positioning devices are generally used when sleep apnea is mild and only occurs on your back.This example shows a pillow that straps around the waist. It may be appropriate for those whose sleep study shows milder sleep apnea that occurs primarily when lying flat on one's back. Preliminary studies have shown benefit but effectiveness at home may need to be verified by a home sleep test. These devices are generally not covered by medical insurance.  Examples of devices that maintain sleeping on the back to prevent snoring and mild sleep apnea.    Belt type body positioner  http://[employer]/    Electronic reminder  http://nightshifttherapy.[employer] Website Promotions/            Oral Appliance  What is oral appliance therapy?  An oral appliance device fits on your teeth at night like a retainer used after having braces. The device is made by a specialized dentist and requires several visits over 1-2 months before a manufactured device is made to fit your teeth and is adjusted to prevent your sleep apnea. Once an oral device is working properly, snoring should be improved. A home sleep test may be recommended at that time if to determine whether the sleep apnea is adequately treated.       Some things to remember:  -Oral devices are often, but not always, covered by your medical insurance. Be sure to check with your insurance provider.   -If you are referred for oral therapy, you will be given a list of specialized dentists to consider or you may choose to visit the Web site of the American Academy of Dental Sleep Medicine  -Oral devices are less likely to work  if you have severe sleep apnea or are extremely overweight.     More detailed information  An oral appliance is a small acrylic device that fits over the upper and lower teeth  (similar to a retainer or a mouth guard). This device slightly moves jaw forward, which moves the base of the tongue forward, opens the airway, improves breathing for effective treat snoring and obstructive sleep apnea in perhaps 7 out of 10 people .  The best working devices are custom-made by a dental device  after a mold is made of the teeth 1, 2, 3.  When is an oral appliance indicated?  Oral appliance therapy is recommended as a first-line treatment for patients with primary snoring, mild sleep apnea, and for patients with moderate sleep apnea who prefer appliance therapy to use of CPAP4, 5. Severity of sleep apnea is determined by sleep testing and is based on the number of respiratory events per hour of sleep.   How successful is oral appliance therapy?  The success rate of oral appliance therapy in patients with mild sleep apnea is 75-80% while in patients with moderate sleep apnea it is 50-70%. The chance of success in patients with severe sleep apnea is 40-50%. The research also shows that oral appliances have a beneficial effect on the cardiovascular health of JASPER patients at the same magnitude as CPAP therapy7.  Oral appliances should be a second-line treatment in cases of severe sleep apnea, but if not completely successful then a combination therapy utilizing CPAP plus oral appliance therapy may be effective. Oral appliances tend to be effective in a broad range of patients although studies show that the patients who have the highest success are females, younger patients, those with milder disease, and less severe obesity. 3, 6.   Finding a dentist that practices dental sleep medicine  Specific training is available through the American Academy of Dental Sleep Medicine for dentists interested in working in the field  of sleep. To find a dentist who is educated in the field of sleep and the use of oral appliances, near you, visit the Web site of the American Academy of Dental Sleep Medicine.    References  1. Byron et al. Objectively measured vs self-reported compliance during oral appliance therapy for sleep-disordered breathing. Chest 2013; 144(5): 0341-6576.  2. Hernán, et al. Objective measurement of compliance during oral appliance therapy for sleep-disordered breathing. Thorax 2013; 68(1): 91-96.  3. Whit et al. Mandibular advancement devices in 620 men and women with JASPER and snoring: tolerability and predictors of treatment success. Chest 2004; 125: 0463-5566.  4. Irma et al. Oral appliances for snoring and JASPER: a review. Sleep 2006; 29: 244-262.  5. Francine et al. Oral appliance treatment for JASPER: an update. J Clin Sleep Med 2014; 10(2): 215-227.  6. Camille et al. Predictors of OSAH treatment outcome. J Dent Res 2007; 86: 9948-1634.      Weight Loss:    Weight loss is a long-term strategy that may improve sleep apnea in some patients.    Weight management is a personal decision and the decision should be based on your interest and the potential benefits.  If you are interested in exploring weight loss strategies, the following discussion covers the impact on weight loss on sleep apnea and the approaches that may be successful.    Being overweight does not necessarily mean you will have health consequences.  Those who have BMI over 35 or over 27 with existing medical conditions carries greater risk.   Weight loss decreases severity of sleep apnea in most people with obesity. For those with mild obesity who have developed snoring with weight gain, even 15-30 pound weight loss can improve and occasionally eliminate sleep apnea.  Structured and life-long dietary and health habits are necessary to lose weight and keep healthier weight levels.     Though there may be significant health benefits from  weight loss, long-term weight loss is very difficult to achieve- studies show success with dietary management in less than 10% of people. In addition, substantial weight loss may require years of dietary control and may be difficult if patients have severe obesity. In these cases, surgical management may be considered.  Finally, older individuals who have tolerated obesity without health complications may be less likely to benefit from weight loss strategies.      [unfilled]    Surgery:    Surgery for obstructive sleep apnea is considered generally only when other therapies fail to work. Surgery may be discussed with you if you are having a difficult time tolerating CPAP and or when there is an abnormal structure that requires surgical correction.  Nose and throat surgeries often enlarge the airway to prevent collapse.  Most of these surgeries create pain for 1-2 weeks and up to half of the most common surgeries are not effective throughout life.  You should carefully discuss the benefits and drawbacks to surgery with your sleep provider and surgeon to determine if it is the best solution for you.   More information  Surgery for JASPER is directed at areas that are responsible for narrowing or complete obstruction of the airway during sleep.  There are a wide range of procedures available to enlarge and/or stabilize the airway to prevent blockage of breathing in the three major areas where it can occur: the palate, tongue, and nasal regions.  Successful surgical treatment depends on the accurate identification of the factors responsible for obstructive sleep apnea in each person.  A personalized approach is required because there is no single treatment that works well for everyone.  Because of anatomic variation, consultation with an examination by a sleep surgeon is a critical first step in determining what surgical options are best for each patient.  In some cases, examination during sedation may be recommended in  order to guide the selection of procedures.  Patients will be counseled about risks and benefits as well as the typical recovery course after surgery. Surgery is typically not a cure for a person s JASPER.  However, surgery will often significantly improve one s JASPER severity (termed  success rate ).  Even in the absence of a cure, surgery will decrease the cardiovascular risk associated with OSA7; improve overall quality of life8 (sleepiness, functionality, sleep quality, etc).      Palate Procedures:  Patients with JASPER often have narrowing of their airway in the region of their tonsils and uvula.  The goals of palate procedures are to widen the airway in this region as well as to help the tissues resist collapse.  Modern palate procedure techniques focus on tissue conservation and soft tissue rearrangement, rather than tissue removal.  Often the uvula is preserved in this procedure. Residual sleep apnea is common in patient after pharyngoplasty with an average reduction in sleep apnea events of 33%2.      Tongue Procedures:  ExamWhile patients are awake, the muscles that surround the throat are active and keep this region open for breathing. These muscles relax during sleep, allowing the tongue and other structures to collapse and block breathing.  There are several different tongue procedures available.  Selection of a tongue base procedure depends on characteristics seen on physical exam.  Generally, procedures are aimed at removing bulky tissues in this area or preventing the back of the tongue from falling back during sleep.  Success rates for tongue surgery range from 50-62%3.    Hypoglossal Nerve Stimulation:  Hypoglossal nerve stimulation has recently received approval from the [place] Food and Drug Administration for the treatment of obstructive sleep apnea.  This is based on research showing that the system was safe and effective in treating sleep apnea6.  Results showed that the median AHI score  decreased 68%, from 29.3 to 9.0. This therapy uses an implant system that senses breathing patterns and delivers mild stimulation to airway muscles, which keeps the airway open during sleep.  The system consists of three fully implanted components: a small generator (similar in size to a pacemaker), a breathing sensor, and a stimulation lead.  Using a small handheld remote, a patient turns the therapy on before bed and off upon awakening.    Candidates for this device must be greater than 18 years of age, have moderate to severe JASPER (AHI between 15-65), BMI less than 35, have tried CPAP/oral appliance for at least 8 weeks without success, and have appropriate upper airway anatomy (determined by a sleep endoscopy performed by Dr. Paramjit Garay).    Hypoglossal Nerve Stimulation Pathway:    The sleep surgeon s office will work with the patient through the insurance prior-authorization process (including communications and appeals).    Nasal Procedures:  Nasal obstruction can interfere with nasal breathing during the day and night.  Studies have shown that relief of nasal obstruction can improve the ability of some patients to tolerate positive airway pressure therapy for obstructive sleep apnea1.  Treatment options include medications such as nasal saline, topical corticosteroid and antihistamine sprays, and oral medications such as antihistamines or decongestants. Non-surgical treatments can include external nasal dilators for selected patients. If these are not successful by themselves, surgery can improve the nasal airway either alone or in combination with these other options.      Combination Procedures:  Combination of surgical procedures and other treatments may be recommended, particularly if patients have more than one area of narrowing or persistent positional disease.  The success rate of combination surgery ranges from 66-80%2,3.    References  Sravanthi HERNANDEZ. The Role of the Nose in Snoring and Obstructive  Sleep Apnoea: An Update.  Eur Arch Otorhinolaryngol. 2011; 268: 1365-73.   Edson SM; Mayco JA; Judy JR; Pallanch JF; Deric MB; Hina SG; Rozina KNOWLES. Surgical modifications of the upper airway for obstructive sleep apnea in adults: a systematic review and meta-analysis. SLEEP 2010;33(10):9225-5380. Abbey DE. Hypopharyngeal surgery in obstructive sleep apnea: an evidence-based medicine review.  Arch Otolaryngol Head Neck Surg. 2006 Feb;132(2):206-13.  Odin YH1, Celestino Y, Abner JOSE LUIS. The efficacy of anatomically based multilevel surgery for obstructive sleep apnea. Otolaryngol Head Neck Surg. 2003 Oct;129(4):327-35.  Kezirian E, Goldberg A. Hypopharyngeal Surgery in Obstructive Sleep Apnea: An Evidence-Based Medicine Review. Arch Otolaryngol Head Neck Surg. 2006 Feb;132(2):206-13.  Malorie SAMPSON et al. Upper-Airway Stimulation for Obstructive Sleep Apnea.  N Engl J Med. 2014 Jan 9;370(2):139-49.  Peker Y et al. Increased Incidence of Cardiovascular Disease in Middle-aged Men with Obstructive Sleep Apnea. Am J Respir Crit Care Med; 2002 166: 159-165  Barnett EM et al. Studying Life Effects and Effectiveness of Palatopharyngoplasty (SLEEP) study: Subjective Outcomes of Isolated Uvulopalatopharyngoplasty. Otolaryngol Head Neck Surg. 2011; 144: 623-631.        WHAT IF I ONLY HAVE SNORING?    Mandibular advancement devices, lateral sleep positioning, long-term weight loss and treatment of nasal allergies have been shown to improve snoring.  Exercising tongue muscles with a game (https://apps.[employer].Gigaom/us/leny/soundly-reduce-snoring/bs7362165594) or stimulating the tongue during the day with a device (https://doi.org/10.3390/kjl04063722) have improved snoring in some individuals.    Remember to Drive Safe... Drive Alive     Sleep health profoundly affects your health, mood, and your safety.  Thirty three percent of the population (one in three of us) is not getting enough sleep and many have a sleep disorder. Not getting  enough sleep or having an untreated / undertreated sleep condition may make us sleepy without even knowing it. In fact, our driving could be dramatically impaired due to our sleep health. As your provider, here are some things I would like you to know about driving:     Here are some warning signs for impairment and dangerous drowsy driving:              -Having been awake more than 16 hours               -Looking tired               -Eyelid drooping              -Head nodding (it could be too late at this point)              -Driving for more than 30 minutes     Some things you could do to make the driving safer if you are experiencing some drowsiness:              -Stop driving and rest              -Call for transportation              -Make sure your sleep disorder is adequately treated     Some things that have been shown NOT to work when experiencing drowsiness while driving:              -Turning on the radio              -Opening windows              -Eating any  distracting  /  entertaining  foods (e.g., sunflower seeds, candy, or any other)              -Talking on the phone      Your decision may not only impact your life, but also the life of others. Please, remember to drive safe for yourself and all of us.

## 2023-10-25 NOTE — PROGRESS NOTES
Outpatient Sleep Medicine Consultation:      Name: Nilda Amaro MRN# [id number]   Age: 68 year old YOB: 1955     Date of Consultation: October 25, 2023  Consultation is requested by: No referring provider defined for this encounter. No ref. provider found  Primary care provider: Leonor Muir       Reason for Sleep Consult:     Nilda Amaro is sent by Leonor Muir for a sleep consultation regarding snoring, excessive daytime sleepiness and insomnia.    Patient s Reason for visit  Nilda Amaro main reason for visit: Able to stay askeep. Waking up 1 1/2 hour after sleeping . Waking up every 1-2 houra  Patient states problem(s) started: A few years  Nilda Amaro's goals for this visit: Find a way to stay sleeping           Assessment and Plan:     Summary Sleep Diagnoses & Recommendations:   Patient has features and risk factors for possible obstructive sleep apnea including: obesity with BMI of 34.93, loud snoring, non-refreshing sleep, daytime fatigue/sleepiness (ESS 8), difficulty maintaining sleep, crowded oropharynx and co-morbid HTN. The STOP-BANG score is 4/8. The pathophysiology, diagnosis and treatment of JASPER was discussed. Recommend Polysomnogram (using 4% desaturation/Medicare/ AASM 1B scoring rules) to evaluate for obstructive sleep apnea.    Sleep Maintenance insomnia, we discussed sleep restriction to help better consolidate sleep.   Recommend weight management. We discussed the link between obesity, sleep apnea, and health outcomes.  Weight loss is recommended to address long term effects of obesity and sleep apnea.      Summary Recommendations:  Orders Placed This Encounter   Procedures    Comprehensive Sleep Study     Summary Counseling:    Sleep Testing Reviewed  Obstructive Sleep Apnea Reviewed  Complications of Untreated Sleep Apnea Reviewed    Medical Decision-making:   Educational materials provided in instructions    Total time spent reviewing medical  records, history and physical examination, review of previous testing and interpretation as well as documentation on this date:50 minutes    CC: Leonor Muir          History of Present Illness:     Past Sleep Evaluations: NA    SLEEP-WAKE SCHEDULE:     Work/School Days: Patient goes to school/work: Yes   Usually gets into bed at 9:30  Takes patient about 5-10 minutes to fall asleep  Has trouble falling asleep Maybe one nights per week  Wakes up in the middle of the night At least five times.  Wakes up due to Pain;External stimuli (bed partner, pets, noise, etc);Use the bathroom;Anxiety  She has trouble falling back asleep 3 out of 5 times a week.   It usually takes 16-30 minutes to get back to sleep  Patient is usually up at 5:15  Uses alarm: Yes    Weekends/Non-work Days/All Other Days:  Usually gets into bed at 9:30   Takes patient about 5-10 minutes to fall asleep  Patient is usually up at 5:15  Uses alarm: Yes    Sleep Need  Patient gets  5-6 hiyrs sleep on average   Patient thinks she needs about 7-8 sleep    Nilda Amaro prefers to sleep in this position(s): Side;Stomach   Patient states they do the following activities in bed:      Naps  Patient takes a purposeful nap Rare times a week and naps are usually 20-60 minutes in duration  She feels better after a nap: No  She dozes off unintentionally 3 days per week  Patient has had a driving accident or near-miss due to sleepiness/drowsiness: No      SLEEP DISRUPTIONS:    Breathing/Snoring  Patient snores:Yes  Other people complain about her snoring: Yes  Patient has been told she stops breathing in her sleep:No  She has issues with the following: Morning mouth dryness;Heartburn or reflux at night;Getting up to urinate more than once    Movement:  Patient gets pain, discomfort, with an urge to move:  Yes. Rare  It happens when she is resting:  Yes  It happens more at night:  Yes  Patient has been told she kicks her legs at night:  No     Behaviors in  Sleep:  Nilda Amaro has experienced the following behaviors while sleeping:    She has experienced sudden muscle weakness during the day: No      Is there anything else you would like your sleep provider to know:        CAFFEINE AND OTHER SUBSTANCES:    Patient consumes caffeinated beverages per day:  One coffee  Last caffeine use is usually: 9:00 am  List of any prescribed or over the counter stimulants that patient takes: B12 + vitamin  List of any prescribed or over the counter sleep medication patient takes:    List of previous sleep medications that patient has tried:    Patient drinks alcohol to help them sleep: No  Patient drinks alcohol near bedtime: Yes    Family History:  Patient has a family member been diagnosed with a sleep disorder: Yes  Insure         SCALES:    EPWORTH SLEEPINESS SCALE         10/25/2023     3:00 PM    Wilton Sleepiness Scale ( COLT Martinez  [id number]<br>ESS - [place]/English - Final version - 21 Nov 07 - [facility] [place].)   Wilton Score (Sleep) 8         INSOMNIA SEVERITY INDEX (NADYA)          10/25/2023     3:00 PM   Insomnia Severity Index (NADYA)   Difficulty falling asleep 1   Difficulty staying asleep 3   Problems waking up too early 3   How SATISFIED/DISSATISFIED are you with your CURRENT sleep pattern? 3   How NOTICEABLE to others do you think your sleep problem is in terms of impairing the quality of your life? 2   How WORRIED/DISTRESSED are you about your current sleep problem? 4   To what extent do you consider your sleep problem to INTERFERE with your daily functioning (e.g. daytime fatigue, mood, ability to function at work/daily chores, concentration, memory, mood, etc.) CURRENTLY? 2   NADYA Total Score 18       Guidelines for Scoring/Interpretation:  Total score categories:  0-7 = No clinically significant insomnia   8-14 = Subthreshold insomnia   15-21 = Clinical insomnia (moderate severity)  22-28 = Clinical insomnia (severe)  Used via courtesy of  "www.[facility].va.gov with permission from Aj Jaramillo PhD., [facility]      STOP BANG         10/25/2023     3:00 PM   STOP BANG Questionnaire (  2008, the American Society of Anesthesiologists, Inc. [employer]   Neck Cir (cm) Clinic: 35 cm   B/P Clinic: 136/82   BMI Clinic: 34.93         GAD7         No data to display                  CAGE-AID         No data to display                CAGE-AID reprinted with permission from the [place] Medical Journal, LARISSA Bennett. and ANGELIQUE Gusman, \"Conjoint screening questionnaires for alcohol and drug abuse\" [place] Medical Journal 94: 135-140, 1995.      PATIENT HEALTH QUESTIONNAIRE-9 (PHQ - 9)         No data to display                Developed by Alexandre Hawkins, Sofie Romo, Yonny Ba and colleagues, with an educational kash from [employer] No permission required to reproduce, translate, display or distribute.        Allergies:    Allergies   Allergen Reactions    Penicillins        Medications:    Current Outpatient Medications   Medication Sig Dispense Refill    aspirin (KLS ASPIRIN LOW DOSE) 81 MG EC tablet Take 1 tablet (81 mg) by mouth daily 90 tablet 2    atorvastatin (LIPITOR) 10 MG tablet Take 1 tablet (10 mg) by mouth every evening 90 tablet 3    BIOTIN PO Take by mouth daily      Cyanocobalamin (VITAMIN B 12 PO) Take 1 tablet by mouth daily      lisinopril (ZESTRIL) 10 MG tablet Take 1 tablet (10 mg) by mouth daily 90 tablet 3    Multiple Vitamins-Minerals (CENTRUM ADULTS PO) Take 1 tablet by mouth daily      naproxen (NAPROSYN) 500 MG tablet Take 1 tablet (500 mg) by mouth 2 times daily (with meals) 60 tablet 0    omeprazole (PRILOSEC) 20 MG DR capsule Take 1 capsule (20 mg) by mouth daily 90 capsule 1    triamcinolone (KENALOG) 0.1 % external ointment Apply sparingly to affected area twice daily. Apply sparingly 2-3 weeks as directed. 80 g 1    liraglutide - Weight Management (SAXENDA) 18 MG/3ML pen Inject " 0.6 mg Subcutaneous daily for 7 days, THEN 1.2 mg daily for 7 days, THEN 1.8 mg daily for 7 days, THEN 2.4 mg daily for 7 days, THEN 3 mg daily for 62 days. (Patient not taking: Reported on 10/25/2023) 38 mL 0       Problem List:  Patient Active Problem List    Diagnosis Date Noted    Osteopenia of multiple sites 03/13/2023     Priority: Medium    Ascending aorta dilatation (H24) 03/13/2023     Priority: Medium    Urinary, incontinence, stress female 03/13/2023     Priority: Medium    Daytime sleepiness 03/13/2023     Priority: Medium    Centrilobular emphysema (H) 01/31/2023     Priority: Medium    Former smoker 01/31/2023     Priority: Medium    Bernal's esophagus without dysplasia 12/05/2022     Priority: Medium    Fatty liver 02/21/2022     Priority: Medium    Hyperlipidemia LDL goal <100 02/21/2022     Priority: Medium    Aortic atherosclerosis (H24) 02/21/2022     Priority: Medium    PACE (dyspnea on exertion) 02/21/2022     Priority: Medium    Hepatic cyst 02/21/2022     Priority: Medium    Coronary artery calcification seen on CAT scan 02/20/2022     Priority: Medium    Tubular adenoma of colon 01/31/2022     Priority: Medium    Snoring 01/31/2022     Priority: Medium    Family history of Alzheimer's disease 11/16/2020     Priority: Medium    Intertriginous candidiasis 10/21/2019     Priority: Medium    Adjustment insomnia 10/21/2019     Priority: Medium    Hearing deficit, bilateral 10/21/2019     Priority: Medium    Hypertension goal BP (blood pressure) < 140/90 10/01/2018     Priority: Medium    Change in color of pigmented skin lesion, left neck, 2mm 09/25/2017     Priority: Medium    Family history of breast cancer in sister 09/25/2017     Priority: Medium    CARDIOVASCULAR SCREENING; LDL GOAL LESS THAN 160 09/15/2017     Priority: Medium    Primary osteoarthritis of both knees 06/27/2016     Priority: Medium    Chronic GERD 11/05/2015     Priority: Medium    Obesity, Class I, BMI 30-34.9 09/25/2015      Priority: Medium    Screening for lung cancer 08/03/2015     Priority: Medium     Advance Care Planning 8/3/2015: Discussed advance care planning with patient; information given to patient to review. August 3, 2015. Gregorio Swartz MA                    Past Medical/Surgical History:  Past Medical History:   Diagnosis Date    Arthritis     Atherosclerosis of right coronary artery 02/20/2022    Centrilobular emphysema (H) 01/31/2023    Chondromalacia patellae, left     Chondromalacia patellae, right     Gastroesophageal reflux disease with esophagitis     Hypertension Last visit    Medial meniscus tear, right, subsequent encounter      Past Surgical History:   Procedure Laterality Date    BIOPSY  2018    COLONOSCOPY  03/21/2016    COLONOSCOPY WITH CO2 INSUFFLATION N/A 03/21/2016    Procedure: COLONOSCOPY WITH CO2 INSUFFLATION;  Surgeon: Geoffrey Blackwell MD;  Location: [facility] OR    COLONOSCOPY WITH CO2 INSUFFLATION N/A 3/14/2022    Procedure: COLONOSCOPY, WITH CO2 INSUFFLATION;  Surgeon: Ramy Murray DO;  Location: [facility] OR    COMBINED ESOPHAGOSCOPY, GASTROSCOPY, DUODENOSCOPY (EGD) WITH CO2 INSUFFLATION N/A 3/14/2022    Procedure: ESOPHAGOGASTRODUODENOSCOPY, WITH CO2 INSUFFLATION;  Surgeon: Ramy Murray DO;  Location: [facility]    COMBINED ESOPHAGOSCOPY, GASTROSCOPY, DUODENOSCOPY (EGD) WITH CO2 INSUFFLATION N/A 11/28/2022    Procedure: ESOPHAGOGASTRODUODENOSCOPY, WITH CO2 INSUFFLATION;  Surgeon: Rey Boothe MD;  Location: [facility] OR    ENDOSCOPY UPPER, COLONOSCOPY, COMBINED N/A 03/21/2016    Procedure: COMBINED ENDOSCOPY UPPER, COLONOSCOPY;  Surgeon: Geoffrey Blackwell MD;  Location: [facility]    ESOPHAGOSCOPY, GASTROSCOPY, DUODENOSCOPY (EGD), COMBINED N/A 03/21/2016    Procedure: COMBINED ESOPHAGOSCOPY, GASTROSCOPY, DUODENOSCOPY (EGD), BIOPSY SINGLE OR MULTIPLE;  Surgeon: Geoffrey Blackwell MD;  Location: [facility]    ESOPHAGOSCOPY, GASTROSCOPY, DUODENOSCOPY (EGD), COMBINED N/A 3/14/2022    Procedure:  ESOPHAGOGASTRODUODENOSCOPY, WITH BIOPSY;  Surgeon: Ramy Murray DO;  Location:  OR    ESOPHAGOSCOPY, GASTROSCOPY, DUODENOSCOPY (EGD), COMBINED N/A 11/28/2022    Procedure: ESOPHAGOGASTRODUODENOSCOPY, WITH BIOPSY;  Surgeon: Rey Boothe MD;  Location:  OR    ORTHOPEDIC SURGERY Right 04/23/2018    TKA       Social History:  Social History     Socioeconomic History    Marital status:      Spouse name: Not on file    Number of children: Not on file    Years of education: Not on file    Highest education level: Not on file   Occupational History    Not on file   Tobacco Use    Smoking status: Former     Packs/day: 1.50     Years: 36.00     Additional pack years: 0.00     Total pack years: 54.00     Types: Cigarettes     Start date: 9/1/1972     Quit date: 3/1/2008     Years since quitting: 15.6     Passive exposure: Never    Smokeless tobacco: Never   Vaping Use    Vaping Use: Never used   Substance and Sexual Activity    Alcohol use: Yes    Drug use: No    Sexual activity: Yes     Partners: Male     Birth control/protection: None   Other Topics Concern    Parent/sibling w/ CABG, MI or angioplasty before 65F 55M? Yes     Comment: Father 54 Brother 55   Social History Narrative    Not on file     Social Determinants of Health     Financial Resource Strain: High Risk (10/20/2023)    Financial Resource Strain     Within the past 12 months, have you or your family members you live with been unable to get utilities (heat, electricity) when it was really needed?: Yes   Food Insecurity: Low Risk  (10/20/2023)    Food Insecurity     Within the past 12 months, did you worry that your food would run out before you got money to buy more?: No     Within the past 12 months, did the food you bought just not last and you didn t have money to get more?: No   Transportation Needs: Low Risk  (10/20/2023)    Transportation Needs     Within the past 12 months, has lack of transportation kept you from medical  appointments, getting your medicines, non-medical meetings or appointments, work, or from getting things that you need?: No   Physical Activity: Not on file   Stress: Not on file   Social Connections: Not on file   Interpersonal Safety: Low Risk  (10/23/2023)    Interpersonal Safety     Do you feel physically and emotionally safe where you currently live?: Yes     Within the past 12 months, have you been hit, slapped, kicked or otherwise physically hurt by someone?: No     Within the past 12 months, have you been humiliated or emotionally abused in other ways by your partner or ex-partner?: No   Housing Stability: Low Risk  (10/20/2023)    Housing Stability     Do you have housing? : Yes     Are you worried about losing your housing?: No       Family History:  Family History   Problem Relation Age of Onset    Ankylosing Spondylitis Brother 57    Heart Disease Brother     Breast Cancer Sister     Heart Disease Sister     Heart Disease Mother     Heart Disease Father     Coronary Artery Disease Father     Hypertension Father     Heart Disease Brother     Diabetes Sister     Hypertension Sister     Diabetes Brother     Hypertension Brother     Coronary Artery Disease Brother     Hypertension Brother     Osteoporosis Brother     Hyperlipidemia Brother     Coronary Artery Disease Brother         Bypass surgery    Hypertension Brother     Hyperlipidemia Brother     Hypertension Sister     Hyperlipidemia Sister     Breast Cancer Sister     Diabetes Nephew     Coronary Artery Disease Nephew        Review of Systems:  A complete review of systems reviewed by me is negative with the exeption of what has been mentioned in the history of present illness.  In the last TWO WEEKS have you experienced any of the following symptoms?  Fevers: No  Night Sweats: No  Weight Gain: No  Pain at Night: No  Double Vision: No  Changes in Vision: No  Difficulty Breathing through Nose: No  Sore Throat in Morning: Yes  Dry Mouth in the Morning:  "No  Shortness of Breath Lying Flat: No  Shortness of Breath With Activity: Yes  Awakening with Shortness of Breath: No  Increased Cough: No  Heart Racing at Night: No  Swelling in Feet or Legs: Yes  Diarrhea at Night: No  Heartburn at Night: Yes  Urinating More than Once at Night: Yes  Losing Control of Urine at Night: No  Joint Pains at Night: Yes  Headaches in Morning: No  Weakness in Arms or Legs: Yes  Depressed Mood: No  Anxiety: No     Physical Examination:  Vitals: /82   Pulse 59   Resp 12   Ht 1.575 m (5' 2\")   Wt 86.6 kg (191 lb)   SpO2 95%   BMI 34.93 kg/m    BMI= Body mass index is 34.93 kg/m .    Neck Cir (cm): 35 cm      GENERAL APPEARANCE: alert and no distress  EYES: Eyes grossly normal to inspection  HENT: oropharynx crowded and tongue base enlarged  NECK: no adenopathy, no asymmetry, masses, or scars, and thyroid normal to palpation  RESP: lungs clear to auscultation - no rales, rhonchi or wheezes  CV: regular rates and rhythm, normal S1 S2, no S3 or S4, and no murmur, click or rub  MS: extremities normal- no gross deformities noted  NEURO: mentation intact and speech normal  PSYCH: mentation appears normal and affect normal/bright  Mallampati Class: IV.  Tonsillar Stage:          Data: All pertinent previous laboratory data reviewed     Recent Labs   Lab Test 03/09/23  0701 01/31/22  1153    137   POTASSIUM 4.1 3.8   CHLORIDE 105 104   CO2 27 26   ANIONGAP 4 7   GLC 94 92   BUN 17 14   CR 0.69 0.67   HANH 9.7 9.5       Recent Labs   Lab Test 03/09/23  0701   WBC 9.6   RBC 4.14   HGB 13.3   HCT 38.9   MCV 94   MCH 32.1   MCHC 34.2   RDW 13.1          Recent Labs   Lab Test 03/09/23  0701   PROTTOTAL 7.5   ALBUMIN 3.7   BILITOTAL 0.6   ALKPHOS 66   AST 19   ALT 26       TSH (mU/L)   Date Value   02/21/2022 1.94   11/16/2020 2.48   10/01/2018 2.43       No results found for: \"UAMP\", \"UBARB\", \"BENZODIAZEUR\", \"UCANN\", \"UCOC\", \"OPIT\", \"UPCP\"    Ferritin   Date/Time Value Ref Range " Status   2022 11:58 AM 22 8 - 252 ng/mL Final       Chest CT:   CT CHEST LUNG CANCER SCREEN LOW DOSE WITHOUT 04/10/2023    Narrative  CT Low Dose Lung Cancer Screening    History:  Lung cancer screening; No chest CT for lung cancer screening  in the last year; 50-80 years; >= 20 pack-year smoking history; Former  smoker; Smoking quit date within the last 15 years; Former smoker;  Screening for lung cancer Screening for lung cancer, smoking.    Number of packs-year of smokin  Current or former smoker?: Former  If former, number of years since quit?: 13    Comparison: 2022    Technique: Low dose CT chest. Images reviewed in lung, soft tissue and  bone windows.  DLP: 108 (mGy*cm)  CTDIvol: 3.2 (mGy)    Findings: [All follow up of nodules are based on ACR guidelines for  lung cancer screening and measurements of each nodule size must be the  mean of the longest axial plane measurement by its perpendicular  measured to the nearest decimal and rounded up to the nearest whole  number. ]  Nodules: None    Emphysema: Trace centrilobular emphysema    Coronary artery calcium: trace    Additional findings: Normal thyroid. No lower cervical or axillary  lymphadenopathy. Normal caliber thoracic vessels. There is an  anatomical variant of the left vertebral artery origin directly off  the aortic arch. Unremarkable esophagus. Small hiatal hernia. The  major airways are patent. Fibroatelectasis of the medial right lower  lobe. Subsegmental atelectasis of the lingula. No acute airspace  consolidations. Multiple hypoattenuating hepatic foci are similar to  prior and incompletely evaluated on this study. Otherwise unremarkable  appearance of the upper abdomen. Mild degenerative changes of the  spine without acute osseous abnormalities    Impression  Impression:  1. ACR Assessment Category ():  Lung-RADS Category 1. Negative    Recommendation:  Lung-RADS Category 1. Negative, continue annual  screening with Lung  "cancer screening CT (please order exam code  [id number])        2. Significant Incidental Finding(s):  Category S: No.    3. Any moderate or severe Emphysema or bronchial wall thickening or  mosaic attenuation? No      4. Avoidance of tobacco smoke is strongly advised. Please consider  referral for smoking cessation to [facility] Medication Therapy Management  (MTM) if clinically appropriate.      Download the \"LungRADS 2022 Assessment Categories\" table at this site:    [email or web address]      I have personally reviewed the examination and initial interpretation  and I agree with the findings.    SCOTT SAAVEDRA MD      SYSTEM ID:  [id number]        Sharon Campo PA-C 10/25/2023           "

## 2023-10-25 NOTE — NURSING NOTE
Writer scheduled patient for psg and follow up with provider. Writer handed information to patient.

## 2023-10-26 PROBLEM — D12.6 TUBULAR ADENOMA OF COLON: Chronic | Status: ACTIVE | Noted: 2022-01-31

## 2023-10-26 PROBLEM — M85.89 OSTEOPENIA OF MULTIPLE SITES: Chronic | Status: ACTIVE | Noted: 2023-03-13

## 2023-10-26 NOTE — TELEPHONE ENCOUNTER
PRIOR AUTHORIZATION DENIED    Medication: SAXENDA 18MG/3ML PEN INJECTORS     Denial Date: 10/26/2023    Denial Rational:  Per insurance, medication is excluded from patient's benefit plan and will not be covered. Review and appeal are not available because of this exclusion.                Appeal Information:  N/A

## 2023-11-01 ENCOUNTER — VIRTUAL VISIT (OUTPATIENT)
Dept: FAMILY MEDICINE | Facility: CLINIC | Age: 68
End: 2023-11-01
Payer: COMMERCIAL

## 2023-11-01 DIAGNOSIS — L65.9 HAIR THINNING: ICD-10-CM

## 2023-11-01 DIAGNOSIS — E66.01 MORBID OBESITY (H): Primary | ICD-10-CM

## 2023-11-01 PROCEDURE — 99213 OFFICE O/P EST LOW 20 MIN: CPT | Mod: 95 | Performed by: FAMILY MEDICINE

## 2023-11-01 NOTE — PROGRESS NOTES
"    Instructions Relayed to Patient by Virtual Roomer:     {VVPilotMychart:065473}    Reminded patient to ensure they were logged on to virtual visit by arrival time listed. Documented in appointment notes if patient had flexibility to initiate visit sooner than arrival time. If pediatric virtual visit, ensured pediatric patient along with parent/guardian will be present for video visit.     Patient offered the website [email or web address] and/or phone number to [facility] Help line: [phone]   Nilda is a 68 year old who is being evaluated via a billable video visit.      How would you like to obtain your AVS? [employer]  If the video visit is dropped, the invitation should be resent by: Text to cell phone: [phone]  Will anyone else be joining your video visit? No  {If patient encounters technical issues they should call [phone] :698527}        {PROVIDER CHARTING PREFERENCE:031327}    Subjective   Nilda is a 68 year old, presenting for the following health issues:  Weight Loss  {(!) Visit Details have not yet been documented.  Please enter Visit Details and then use this list to pull in documentation. (Optional):333155}      Patient here to discuss weight loss medication - Liraglutide Injectable     HPI     {SUPERLIST (Optional):844176}  {additonal problems for provider to add (Optional):214326}      Review of Systems   {ROS COMP (Optional):992712}      Objective           Vitals:  No vitals were obtained today due to virtual visit.    Physical Exam   {video visit exam brief selected:530244}    {Diagnostic Test Results (Optional):590501}    {AMBULATORY ATTESTATION (Optional):140293}        Video-Visit Details    Type of service:  Video Visit   Video Start Time: {video visit start/end time for provider to select:720972}  Video End Time:{video visit start/end time for provider to select:242526}    Originating Location (pt. Location): {video visit patient location:444664::\"Home\"}  {PROVIDER " "LOCATION On-site should be selected for visits conducted from your clinic location or adjoining [facility] hospital, academic office, or other nearby [facility] building. Off-site should be selected for all other provider locations, including home:197472}  Distant Location (provider location):  {virtual location provider:588759}  Platform used for Video Visit: {Virtual Visit Platforms:301191::\"AmWell\"}      "

## 2023-11-01 NOTE — PROGRESS NOTES
"Nilda is a 68 year old who is being evaluated via a billable telephone visit.      What phone number would you like to be contacted at?  See epic  How would you like to obtain your AVS? [employer]    Distant Location (provider location):  Off-site    Assessment & Plan     Morbid obesity (H)  Wt Readings from Last 5 Encounters:   10/25/23 86.6 kg (191 lb)   10/23/23 87.1 kg (192 lb)   03/13/23 86.7 kg (191 lb 3.2 oz)   01/31/22 81.6 kg (180 lb)   04/23/21 81.2 kg (179 lb)     Patient is concerned with ongoing weight gain and the inability to do regular exercises due to her foot pain.  She was not Saxenda which was not covered by her insurance  Reviewed and noted that the patient's insurance does not cover other injections including Wegovy, Mounjaro, Ozempic  Recommended patient to consult bariatric weight management clinic for further evaluation and medication management  Patient verbalised understanding and is agreeable to the plan.  Referral made today  - Adult Comprehensive Weight Management  Referral; Future    Hair thinning  Since last year after her COVID-19 infection last fall  Patient had previous evaluation done that was negative  Referred to dermatology for further evaluation  - Adult Dermatology  Referral; Future    Review of the result(s) of each unique test - vitamin D, vitamin B12, CBC, CMP         BMI:   Estimated body mass index is 34.93 kg/m  as calculated from the following:    Height as of 10/25/23: 1.575 m (5' 2\").    Weight as of 10/25/23: 86.6 kg (191 lb).   Weight management plan: Patient referred to endocrine and/or weight management specialty    Chart documentation done in part with Dragon Voice recognition Software. Although reviewed after completion, some word and grammatical error may remain.    See Patient Instructions    Leonor Muir MD  [facility]   Nilda is a 68 year old, presenting for the following health issues:  Weight " Loss  Patient was was rescheduled for video visit, due to technical difficulties, this has to be switched  to a telephone visit  Patient is here for a telephone visit instead of in person visit due to the current COVID-19 pandemic.  Patient is here with concerns of having ongoing aching and the inability to lose weight due to lack of exercises from her foot pain  Patient was started on Saxenda by another primary care provider, this was not covered under insurance.  Unfortunately, patient's insurance does not cover other injection medications either  Past medical history significant for hypertension, hyperlipidemia  Since she had COVID last fall, patient has noted more more hair loss and diffuse hair thinning  Denies scalp itching  Patient had previous lab work-up done that was negative    HPI             Review of Systems   CONSTITUTIONAL: As above  INTEGUMENTARY/SKIN: As above  RESP: NEGATIVE for significant cough or SOB  CV: NEGATIVE for chest pain, palpitations or peripheral edema  CV: History of hypertension  NEURO: NEGATIVE for weakness, dizziness or paresthesias  ENDOCRINE: NEGATIVE for temperature intolerance, skin/hair changes  HEME/ALLERGY/IMMUNE: NEGATIVE for bleeding problems  PSYCHIATRIC: NEGATIVE for changes in mood or affect      Objective           Vitals:  No vitals were obtained today due to virtual visit.    Physical Exam   healthy, alert, and no distress  PSYCH: Alert and oriented times 3; coherent speech, normal   rate and volume, able to articulate logical thoughts, able   to abstract reason, no tangential thoughts, no hallucinations   or delusions  Her affect is normal  RESP: No cough, no audible wheezing, able to talk in full sentences  Remainder of exam unable to be completed due to telephone visits                Phone call duration: 12 minutes

## 2023-11-30 DIAGNOSIS — M72.2 PLANTAR FASCIITIS: ICD-10-CM

## 2023-12-01 ENCOUNTER — PATIENT OUTREACH (OUTPATIENT)
Dept: GASTROENTEROLOGY | Facility: CLINIC | Age: 68
End: 2023-12-01
Payer: COMMERCIAL

## 2023-12-01 RX ORDER — NAPROXEN 500 MG/1
500 TABLET ORAL 2 TIMES DAILY WITH MEALS
Qty: 60 TABLET | Refills: 0 | Status: SHIPPED | OUTPATIENT
Start: 2023-12-01 | End: 2024-02-02

## 2023-12-11 ASSESSMENT — SLEEP AND FATIGUE QUESTIONNAIRES
HOW LIKELY ARE YOU TO NOD OFF OR FALL ASLEEP WHILE SITTING QUIETLY AFTER LUNCH WITHOUT ALCOHOL: WOULD NEVER DOZE
HOW LIKELY ARE YOU TO NOD OFF OR FALL ASLEEP IN A CAR, WHILE STOPPED FOR A FEW MINUTES IN TRAFFIC: WOULD NEVER DOZE
HOW LIKELY ARE YOU TO NOD OFF OR FALL ASLEEP WHILE SITTING INACTIVE IN A PUBLIC PLACE: SLIGHT CHANCE OF DOZING
HOW LIKELY ARE YOU TO NOD OFF OR FALL ASLEEP WHEN YOU ARE A PASSENGER IN A CAR FOR AN HOUR WITHOUT A BREAK: WOULD NEVER DOZE
HOW LIKELY ARE YOU TO NOD OFF OR FALL ASLEEP WHILE WATCHING TV: MODERATE CHANCE OF DOZING
HOW LIKELY ARE YOU TO NOD OFF OR FALL ASLEEP WHILE LYING DOWN TO REST IN THE AFTERNOON WHEN CIRCUMSTANCES PERMIT: MODERATE CHANCE OF DOZING
HOW LIKELY ARE YOU TO NOD OFF OR FALL ASLEEP WHILE SITTING AND READING: MODERATE CHANCE OF DOZING
HOW LIKELY ARE YOU TO NOD OFF OR FALL ASLEEP WHILE SITTING AND TALKING TO SOMEONE: WOULD NEVER DOZE

## 2023-12-15 ENCOUNTER — THERAPY VISIT (OUTPATIENT)
Dept: SLEEP MEDICINE | Facility: CLINIC | Age: 68
End: 2023-12-15
Payer: COMMERCIAL

## 2023-12-15 DIAGNOSIS — E66.09 CLASS 1 OBESITY DUE TO EXCESS CALORIES WITH SERIOUS COMORBIDITY AND BODY MASS INDEX (BMI) OF 34.0 TO 34.9 IN ADULT: ICD-10-CM

## 2023-12-15 DIAGNOSIS — I10 ESSENTIAL HYPERTENSION: ICD-10-CM

## 2023-12-15 DIAGNOSIS — R53.83 MALAISE AND FATIGUE: ICD-10-CM

## 2023-12-15 DIAGNOSIS — R53.81 MALAISE AND FATIGUE: ICD-10-CM

## 2023-12-15 DIAGNOSIS — R06.89 DYSPNEA AND RESPIRATORY ABNORMALITY: ICD-10-CM

## 2023-12-15 DIAGNOSIS — Z72.820 LACK OF ADEQUATE SLEEP: ICD-10-CM

## 2023-12-15 DIAGNOSIS — R06.00 DYSPNEA AND RESPIRATORY ABNORMALITY: ICD-10-CM

## 2023-12-15 DIAGNOSIS — E66.811 CLASS 1 OBESITY DUE TO EXCESS CALORIES WITH SERIOUS COMORBIDITY AND BODY MASS INDEX (BMI) OF 34.0 TO 34.9 IN ADULT: ICD-10-CM

## 2023-12-15 PROCEDURE — 95810 POLYSOM 6/> YRS 4/> PARAM: CPT | Performed by: INTERNAL MEDICINE

## 2024-01-05 ENCOUNTER — PATIENT OUTREACH (OUTPATIENT)
Dept: CARE COORDINATION | Facility: CLINIC | Age: 69
End: 2024-01-05
Payer: COMMERCIAL

## 2024-01-10 PROBLEM — G47.33 OSA (OBSTRUCTIVE SLEEP APNEA): Chronic | Status: ACTIVE | Noted: 2024-01-10

## 2024-01-10 PROBLEM — E78.5 HYPERLIPIDEMIA LDL GOAL <100: Chronic | Status: ACTIVE | Noted: 2022-02-21

## 2024-01-10 PROBLEM — F51.02 ADJUSTMENT INSOMNIA: Status: RESOLVED | Noted: 2019-10-21 | Resolved: 2024-01-10

## 2024-01-10 NOTE — PROCEDURES
"         SLEEP STUDY INTERPRETATION  DIAGNOSTIC POLYSOMNOGRAPHY REPORT      Patient: YULIA BRYANT  YOB: 1955  Study Date: 12/15/2023  MRN: [id number]  Referring Provider: -  Ordering Provider: JOSE DE JESUS Torres    Indications for Polysomnography: The patient is a 68 year old Female who is 5' 2\" and weighs 191.0 lbs. Her BMI is 35.1, Bosler sleepiness scale 8 and neck circumference is 35 cm cm.   A diagnostic polysomnogram was performed to evaluate for loud snoring, non-refreshing sleep, daytime fatigue/sleepiness (ESS 8), difficulty maintaining sleep, crowded oropharynx and co-morbid HTN      Polysomnogram Data: A full night polysomnogram recorded the standard physiologic parameters including EEG, EOG, EMG, ECG, nasal and oral airflow. Respiratory parameters of chest and abdominal movements were recorded with respiratory inductance plethysmography. Oxygen saturation was recorded by pulse oximetry. Hypopnea scoring rule used: 1B 4%.    Sleep Architecture:    The total recording time of the polysomnogram was 493.3 minutes. The total sleep time was 433.5 minutes. Sleep latency was normal at 14.7 minutes with the use of a sleep aid (zolpidem). REM latency was 173.5 minutes. Arousal index was 28.9 arousals per hour. Sleep efficiency was normal at 87.9%. Wake after sleep onset was 45.0 minutes. The patient spent 9.8% of total sleep time in Stage N1, 58.7% in Stage N2, 15.5% in Stage N3, and 16.0% in REM. Time in REM supine was 29.5 minutes.    Respiration:    Events ? The polysomnogram revealed a presence of 6 obstructive, 0 central, and 1 mixed apneas resulting in an apnea index of 1.0 events per hour. There were 85 obstructive hypopneas and 0 central hypopneas resulting in an obstructive hypopnea index of 11.8 and central hypopnea index of 0 events per hour. The combined apnea/hypopnea index was 12.7 events per hour (central apnea/hypopnea index was 0 events per hour). The REM AHI was 33.7 events per hour. The " supine AHI was 20.5 events per hour. The RERA index was 3.5 events per hour.  The RDI was 16.2 events per hour.  Snoring - was reported as moderate and intermittent.  Respiratory rate and pattern - was notable for normal respiratory rate and pattern.  Sustained Sleep Associated Hypoventilation - Transcutaneous carbon dioxide monitoring was not used, however significant hypoventilation was not suggested by oximetry   Sleep Associated Hypoxemia - (Greater than 5 minutes O2 sat at or below 88%) was present. Baseline oxygen saturation was 92.8%. Lowest oxygen saturation was 68.0%. Time spent less than or equal to 88% was 13.3 minutes. Time spent less than or equal to 89% was 21.6 minutes.    Movement Activity:    Periodic Limb Activity - There were 237 PLMs during the entire study. The PLM index was 32.8 movements per hour. The PLM Arousal Index was 10.5 per hour.  REM EMG Activity - Excessive transient/sustained muscle activity was not present.  Nocturnal Behavior - Abnormal sleep related behaviors were not noted    Bruxism - None apparent.        Cardiac Summary:    The average pulse rate was 53.7 bpm. The minimum pulse rate was 45.0 bpm while the maximum pulse rate was 81.0 bpm.  Arrhythmias were not noted.          Assessment:   Mild obstructive sleep apnea with hypoxemia, severe during REM supine sleep  Periodic limb movements of sleep     Recommendations:  Treatment could be empirically initiated with Auto?titrating PAP therapy with a range of 5 to 15 cmH2O. Recommend clinical follow up with sleep management team.  Patient may be a candidate for dental appliance through referral to Sleep Dentistry for the treatment of obstructive sleep apnea and/or socially disruptive snoring.  If devices are not acceptable or effective, patient may benefit from evaluation of possible surgical options. If she is interested, would recommend referral to specialized ENT-Sleep provider.  Advice regarding the risks of drowsy  driving.  Suggest optimizing sleep schedule    Weight management (if BMI > 30).  Pharmacologic therapy should be used for management of restless legs syndrome only if present and clinically indicated and not based on the presence of periodic limb movements alone.    Diagnostic Codes:   Obstructive Sleep Apnea G47.33  Sleep Hypoxemia/Hypoventilation G47.36             _____________________________________   Electronically Signed By: Mustapha Vo MD 1/10/24           Range(%) Time in range (min)   0.0 - 89.0 21.6   0.0 - 88.0 13.3         Stage Min(mm Hg) Max(mm Hg)   Wake - -   NREM(1+2+3) - -   REM - -       Range(mmHg) Time in range (min)   55.0 - 100.0 -   Excluded data <20.0 & >65.0 494.0

## 2024-01-11 LAB — SLPCOMP: NORMAL

## 2024-01-12 DIAGNOSIS — K22.70 BARRETT'S ESOPHAGUS WITHOUT DYSPLASIA: ICD-10-CM

## 2024-01-31 ASSESSMENT — SLEEP AND FATIGUE QUESTIONNAIRES
HOW LIKELY ARE YOU TO NOD OFF OR FALL ASLEEP WHILE WATCHING TV: SLIGHT CHANCE OF DOZING
HOW LIKELY ARE YOU TO NOD OFF OR FALL ASLEEP WHILE SITTING AND READING: MODERATE CHANCE OF DOZING
HOW LIKELY ARE YOU TO NOD OFF OR FALL ASLEEP WHILE LYING DOWN TO REST IN THE AFTERNOON WHEN CIRCUMSTANCES PERMIT: SLIGHT CHANCE OF DOZING
HOW LIKELY ARE YOU TO NOD OFF OR FALL ASLEEP WHEN YOU ARE A PASSENGER IN A CAR FOR AN HOUR WITHOUT A BREAK: SLIGHT CHANCE OF DOZING
HOW LIKELY ARE YOU TO NOD OFF OR FALL ASLEEP IN A CAR, WHILE STOPPED FOR A FEW MINUTES IN TRAFFIC: WOULD NEVER DOZE
HOW LIKELY ARE YOU TO NOD OFF OR FALL ASLEEP WHILE SITTING AND TALKING TO SOMEONE: WOULD NEVER DOZE
HOW LIKELY ARE YOU TO NOD OFF OR FALL ASLEEP WHILE SITTING INACTIVE IN A PUBLIC PLACE: WOULD NEVER DOZE
HOW LIKELY ARE YOU TO NOD OFF OR FALL ASLEEP WHILE SITTING QUIETLY AFTER LUNCH WITHOUT ALCOHOL: WOULD NEVER DOZE

## 2024-02-02 ENCOUNTER — OFFICE VISIT (OUTPATIENT)
Dept: SLEEP MEDICINE | Facility: CLINIC | Age: 69
End: 2024-02-02
Payer: COMMERCIAL

## 2024-02-02 ENCOUNTER — PATIENT OUTREACH (OUTPATIENT)
Dept: CARE COORDINATION | Facility: CLINIC | Age: 69
End: 2024-02-02

## 2024-02-02 VITALS
HEIGHT: 62 IN | HEART RATE: 58 BPM | WEIGHT: 189.2 LBS | BODY MASS INDEX: 34.82 KG/M2 | DIASTOLIC BLOOD PRESSURE: 83 MMHG | OXYGEN SATURATION: 97 % | SYSTOLIC BLOOD PRESSURE: 132 MMHG

## 2024-02-02 DIAGNOSIS — G47.33 OSA (OBSTRUCTIVE SLEEP APNEA): Primary | Chronic | ICD-10-CM

## 2024-02-02 PROCEDURE — 99214 OFFICE O/P EST MOD 30 MIN: CPT | Performed by: PHYSICIAN ASSISTANT

## 2024-02-02 NOTE — PROGRESS NOTES
Sleep Study Follow-Up Visit:    Date on this visit: 2/2/2024    Nilda Amaro comes in today for follow-up of her sleep study done on 12/15/2023 at the [facility] [place].  A diagnostic polysomnogram was performed to evaluate for loud snoring, non-refreshing sleep, daytime fatigue/sleepiness (ESS 8), difficulty maintaining sleep, crowded oropharynx and co-morbid HTN       Polysomnogram as interpreted by Dr. Vo-   Sleep Architecture:    The total recording time of the polysomnogram was 493.3 minutes. The total sleep time was 433.5 minutes. Sleep latency was normal at 14.7 minutes with the use of a sleep aid (zolpidem). REM latency was 173.5 minutes. Arousal index was 28.9 arousals per hour. Sleep efficiency was normal at 87.9%. Wake after sleep onset was 45.0 minutes. The patient spent 9.8% of total sleep time in Stage N1, 58.7% in Stage N2, 15.5% in Stage N3, and 16.0% in REM. Time in REM supine was 29.5 minutes.     Respiration:    Events ? The polysomnogram revealed a presence of 6 obstructive, 0 central, and 1 mixed apneas resulting in an apnea index of 1.0 events per hour. There were 85 obstructive hypopneas and 0 central hypopneas resulting in an obstructive hypopnea index of 11.8 and central hypopnea index of 0 events per hour. The combined apnea/hypopnea index was 12.7 events per hour (central apnea/hypopnea index was 0 events per hour). The REM AHI was 33.7 events per hour. The supine AHI was 20.5 events per hour. The RERA index was 3.5 events per hour.  The RDI was 16.2 events per hour.  Snoring - was reported as moderate and intermittent.  Respiratory rate and pattern - was notable for normal respiratory rate and pattern.  Sustained Sleep Associated Hypoventilation - Transcutaneous carbon dioxide monitoring was not used, however significant hypoventilation was not suggested by oximetry   Sleep Associated Hypoxemia - (Greater than 5 minutes O2 sat at or below 88%) was  present. Baseline oxygen saturation was 92.8%. Lowest oxygen saturation was 68.0%. Time spent less than or equal to 88% was 13.3 minutes. Time spent less than or equal to 89% was 21.6 minutes.     Movement Activity:    Periodic Limb Activity - There were 237 PLMs during the entire study. The PLM index was 32.8 movements per hour. The PLM Arousal Index was 10.5 per hour.  REM EMG Activity - Excessive transient/sustained muscle activity was not present.  Nocturnal Behavior - Abnormal sleep related behaviors were not noted    Bruxism - None apparent.        Cardiac Summary:    The average pulse rate was 53.7 bpm. The minimum pulse rate was 45.0 bpm while the maximum pulse rate was 81.0 bpm.  Arrhythmias were not noted.        Past medical/surgical history, family history, social history, medications and allergies were reviewed.      Problem List:  Patient Active Problem List    Diagnosis Date Noted    JASPER (obstructive sleep apnea) - mild (AHI 12) 01/10/2024     Priority: Medium     12/15/2023 [facility] (191.0 lbs) - AHI 12.7, RDI 16.2, Supine AHI 20.5, REM AHI 33.7, Low O2 68.0%, Time Spent ?88% 13.3 minutes / Time Spent ?89% 21.6 minutes. PLM index was 32.8 movements per hour. The PLM Arousal Index was 10.5 per hour.      Osteopenia of multiple sites 03/13/2023     Priority: Medium    Ascending aorta dilatation (H24) 03/13/2023     Priority: Medium    Urinary, incontinence, stress female 03/13/2023     Priority: Medium    Former smoker 01/31/2023     Priority: Medium    Bernal's esophagus without dysplasia 12/05/2022     Priority: Medium    Fatty liver 02/21/2022     Priority: Medium    Hyperlipidemia LDL goal <100 02/21/2022     Priority: Medium    Aortic atherosclerosis (H24) 02/21/2022     Priority: Medium    Hepatic cyst 02/21/2022     Priority: Medium    Coronary artery calcification seen on CAT scan 02/20/2022     Priority: Medium    Tubular adenoma of colon 01/31/2022     Priority: Medium     Family history of Alzheimer's disease 11/16/2020     Priority: Medium    Intertriginous candidiasis 10/21/2019     Priority: Medium    Hearing deficit, bilateral 10/21/2019     Priority: Medium    Hypertension goal BP (blood pressure) < 140/90 10/01/2018     Priority: Medium    Change in color of pigmented skin lesion, left neck, 2mm 09/25/2017     Priority: Medium    Family history of breast cancer in sister 09/25/2017     Priority: Medium    Primary osteoarthritis of both knees 06/27/2016     Priority: Medium    Chronic GERD 11/05/2015     Priority: Medium    Obesity, Class I, BMI 30-34.9 09/25/2015     Priority: Medium        Impression/Plan:    1. Mild obstructive sleep apnea with sleep related hypoxemia, severe during REM supine sleep.  2. Periodic limb movements of sleep     Polysomnogram was reviewed in detail today with the patient and a copy given to her for her records.     Counseled the patient that mild sleep apnea is not felt to significantly increase long-term cardiovascular risk, but can contribute to excessive daytime sleepiness.    Treatment options discussed today including no treatment, auto-CPAP, oral appliance therapy or polysomnography with full night PAP titration.  Elected treatment of sleep apnea and excessive daytime sleepiness with auto-CPAP 6-16 cm/H20-  She will follow up with me in about 3 month(s).     30 minutes spent on day of encounter doing chart review,  history and exam, counseling, coordinating plan of care, documentation and further activities as noted above.       Sharon Campo PA-C  Sleep Medicine

## 2024-02-02 NOTE — PATIENT INSTRUCTIONS
Your Body mass index is 34.6 kg/m .  Weight management is a personal decision.  If you are interested in exploring weight loss strategies, the following discussion covers the approaches that may be successful. Body mass index (BMI) is one way to tell whether you are at a healthy weight, overweight, or obese. It measures your weight in relation to your height.  A BMI of 18.5 to 24.9 is in the healthy range. A person with a BMI of 25 to 29.9 is considered overweight, and someone with a BMI of 30 or greater is considered obese. More than two-thirds of American adults are considered overweight or obese.  Being overweight or obese increases the risk for further weight gain. Excess weight may lead to heart disease and diabetes.  Creating and following plans for healthy eating and physical activity may help you improve your health.  Weight control is part of healthy lifestyle and includes exercise, emotional health, and healthy eating habits. Careful eating habits lifelong are the mainstay of weight control. Though there are significant health benefits from weight loss, long-term weight loss with diet alone may be very difficult to achieve- studies show long-term success with dietary management in less than 10% of people. Attaining a healthy weight may be especially difficult to achieve in those with severe obesity. In some cases, medications, devices and surgical management might be considered.  What can you do?  If you are overweight or obese and are interested in methods for weight loss, you should discuss this with your provider.   Consider reducing daily calorie intake by 500 calories.   Keep a food journal.   Avoiding skipping meals, consider cutting portions instead.    Diet combined with exercise helps maintain muscle while optimizing fat loss. Strength training is particularly important for building and maintaining muscle mass. Exercise helps reduce stress, increase energy, and improves fitness. Increasing  exercise without diet control, however, may not burn enough calories to loose weight.     Start walking three days a week 10-20 minutes at a time  Work towards walking thirty minutes five days a week   Eventually, increase the speed of your walking for 1-2 minutes at time    In addition, we recommend that you review healthy lifestyles and methods for weight loss available through the National Institutes of Health patient information sites:  http://win.niddk.nih.gov/publications/index.htm    And look into health and wellness programs that may be available through your health insurance provider, employer, local community center, or [employer]hen club.

## 2024-02-02 NOTE — NURSING NOTE
"Chief Complaint   Patient presents with    Study Results       Initial /83   Pulse 58   Ht 1.575 m (5' 2.01\")   Wt 85.8 kg (189 lb 3.2 oz)   SpO2 97%   BMI 34.60 kg/m   Estimated body mass index is 34.6 kg/m  as calculated from the following:    Height as of this encounter: 1.575 m (5' 2.01\").    Weight as of this encounter: 85.8 kg (189 lb 3.2 oz).    Medication Reconciliation: complete    Neck circumference: 14 inches / 35 centimeters.  Glenys Link CMA on 2/2/2024 at 11:57 AM   "

## 2024-02-05 ENCOUNTER — OFFICE VISIT (OUTPATIENT)
Dept: SURGERY | Facility: CLINIC | Age: 69
End: 2024-02-05
Payer: COMMERCIAL

## 2024-02-05 VITALS
WEIGHT: 193.1 LBS | OXYGEN SATURATION: 98 % | BODY MASS INDEX: 36.46 KG/M2 | HEART RATE: 54 BPM | HEIGHT: 61 IN | SYSTOLIC BLOOD PRESSURE: 118 MMHG | DIASTOLIC BLOOD PRESSURE: 78 MMHG

## 2024-02-05 DIAGNOSIS — I10 HYPERTENSION GOAL BP (BLOOD PRESSURE) < 140/90: Chronic | ICD-10-CM

## 2024-02-05 DIAGNOSIS — G47.33 OSA (OBSTRUCTIVE SLEEP APNEA): Chronic | ICD-10-CM

## 2024-02-05 DIAGNOSIS — E78.5 HYPERLIPIDEMIA LDL GOAL <100: Chronic | ICD-10-CM

## 2024-02-05 DIAGNOSIS — E66.812 CLASS 2 SEVERE OBESITY DUE TO EXCESS CALORIES WITH SERIOUS COMORBIDITY AND BODY MASS INDEX (BMI) OF 36.0 TO 36.9 IN ADULT (H): Primary | ICD-10-CM

## 2024-02-05 DIAGNOSIS — E66.01 CLASS 2 SEVERE OBESITY DUE TO EXCESS CALORIES WITH SERIOUS COMORBIDITY AND BODY MASS INDEX (BMI) OF 36.0 TO 36.9 IN ADULT (H): Primary | ICD-10-CM

## 2024-02-05 DIAGNOSIS — Z86.39 HISTORY OF ELEVATED GLUCOSE: ICD-10-CM

## 2024-02-05 DIAGNOSIS — M79.672 BILATERAL FOOT PAIN: ICD-10-CM

## 2024-02-05 DIAGNOSIS — R63.5 WEIGHT GAIN: ICD-10-CM

## 2024-02-05 DIAGNOSIS — M79.671 BILATERAL FOOT PAIN: ICD-10-CM

## 2024-02-05 DIAGNOSIS — K76.0 FATTY LIVER: Chronic | ICD-10-CM

## 2024-02-05 PROCEDURE — 99205 OFFICE O/P NEW HI 60 MIN: CPT | Performed by: PHYSICIAN ASSISTANT

## 2024-02-05 NOTE — ASSESSMENT & PLAN NOTE
Initial MWM. Added baseline TSH and hgA1c, unable to see dietitians d/t insurance. Plan for Zepbound/Wellbutrin/Metformin - to be in touch on MyChart

## 2024-02-05 NOTE — PROGRESS NOTES
"New Medical Weight Management Consult    PATIENT:  Nilda Amaro   MRN:         [id number]   :         1955  DC:         2024      Dear Leonor Muir MD,    I had the pleasure of seeing your patient, Nilda Amaro. Full intake/assessment was done to determine barriers to weight loss success and develop a treatment plan. Nilda Amaro is a 68 year old female interested in treatment of medical problems associated with excess weight. She has a height of 5' 1.25\"[measured 24[, a weight of 193 lbs 1.6 oz, and the calculated Body mass index is 36.19 kg/m .    Assessment & Plan   Problem List Items Addressed This Visit       Hypertension goal BP (blood pressure) < 140/90 (Chronic)     Discussed in clinic visit. Anticipate improvement with weight loss.           Fatty liver (Chronic)     Discussed in clinic visit. Anticipate improvement with weight loss.           Hyperlipidemia LDL goal <100 (Chronic)     Discussed in clinic visit. Anticipate improvement with weight loss.           JASPER (obstructive sleep apnea) - mild (AHI 12) (Chronic)     Discussed in clinic visit. Anticipate improvement with weight loss.           Class 2 severe obesity due to excess calories with serious comorbidity in adult (H) - Primary     Initial MWM. Added baseline TSH and hgA1c, unable to see dietitians d/t insurance. Plan for Zepbound/Wellbutrin/Metformin - to be in touch on MyChart          Other Visit Diagnoses       Bilateral foot pain        Relevant Orders    Physical Therapy Referral    History of elevated glucose        Relevant Orders    Hemoglobin A1c [LAB90] (Future)    TSH with free T4 reflex    Weight gain        Relevant Orders    Hemoglobin A1c [LAB90] (Future)    TSH with free T4 reflex             PROGRAM OVERVIEW  Reviewed options at [facility].   All questions were answered. Education provided on chronic disease management of obesity.    SURGICAL WEIGHT LOSS   Option presented " "given pt BMI and current comorbid conditions. No current interest.     MEDICATIONS:  We discussed healthy habits to assist with weight loss. We reviewed medications associated with weight gain. We discussed the role of pharmacological agents in the treatment of obesity and the \"off-label\" use of medications in this practice. We reviewed medication that may assist with weight loss. Indications, contraindications, risks/benefits, and potential side effects were discussed.   Discussed out of pocket Zepbound at length - Discussed mechanism of action, common side effects, titration guidelines, and monitoring for pancreatitis/gallbladder problems/low sugars/MTC/MEN2. Medication handout given in AVS. Also discussed Naltrexone for snacking, Wellbutrin, and metformin with medication handouts provided. Discussed that medications must always be used together with lifestyle changes. Reviewed rationale for long term use of pharmacotherapy in chronic disease management for obesity.      AOM Considerations:  Phentermine:  History of CAD, avoid  Topiramate: H/O glaucoma:  No - caution runs in family/may be start   GLP-1: Do not appear covered by insurance     Naltrexone:    Wellbutrin:    Metformin: Labs and hydration with lisinopril   Contrave: Does not appear covered by insurance  Qsymia: History of CAD, a void     BP Readings from Last 6 Encounters:   02/05/24 (!) 147/82   02/02/24 132/83   10/25/23 136/82   10/23/23 125/84   03/13/23 137/84   11/28/22 114/61       Pulse Readings from Last 6 Encounters:   02/05/24 54   02/02/24 58   10/25/23 59   10/23/23 56   03/13/23 63   11/28/22 53                PATIENT INSTRUCTIONS:  Plan:  Labs ordered.  Please call [phone] to set up a lab appt.   If interested in Zepbound out of pocket - if interested send me a MyChart   Here is the link for the  website to apply for a savings card:  Savings Card, Cost & Coverage Support  Zepbound (tirzepatide) ([email or web address])   In general " "- read through the below medications and let me know on MyChart your interests in which one to start.    Goals:  I recommend eating breakfast within an hour of waking up and eating every 4-6 hours during the day to keep your metabolism up. Prioritizing veggies and proteins for food choices is also recommended as medically appropriate.  Consider herbal teas, fresh fruits/veggies for snacking in the evenings. Focus on activities that make hard to snack at night.  Recommend 64oz (2L) water daily as medically appropriate (6-8 glasses)  Recommend limiting number of times of having alcohol to twice a week or less.   Recommend pursing regular exercise. 5 minutes of exercise every other day or every 2 days is a great place to start with aiming for 30 minutes 5 times a week as an end goal.  If you can, try to get some strength training twice weekly while losing weight to help preserve your muscle!      FOLLOW-UP:    Please call [phone] to schedule your next visit in 3 months.    73 minutes spent on the date of the encounter doing chart review, history and exam, review test results, counseling, developing plan of care, documentation, and further activities as noted above.      She has the following co-morbidities:        1/30/2024     9:19 PM   --   I have the following health issues associated with obesity High Blood Pressure    GERD (Reflux)    Fatty Liver   I have the following symptoms associated with obesity Knee Pain    Back Pain    Fatigue           1/30/2024     9:19 PM   Patient Goals   If yes, please indicate which surgery? Knee     Had knee surgery 2016 and needs another one was around 160 lbs at that time         1/30/2024     9:19 PM   Referring Provider   Please name the provider who referred you to Medical Weight Management  If you do not know, please answer \"I Don't Know\" Dr Muir           1/30/2024     9:19 PM   Weight History   How concerned are you about your weight? Very Concerned   I became " overweight As an Adult   The following factors have contributed to my weight gain After Quitting Smoking    Eating Wrong Types of Food    Lack of Exercise    Stress   I have tried the following methods to lose weight Watching Portions or Calories    Exercise    Weight Watchers    Medications   My lowest weight since age 19 was 125   My highest weight since age 19 was 193   The most weight I have ever lost was (lbs) 45   I have the following family history of obesity/being overweight My mother is overweight    One or more of my siblings are overweight    Many of my relatives are overweight   How has your weight changed over the last year? Gained   How many pounds? 10           1/30/2024     9:19 PM   Diet Recall Review with Patient   If you do eat breakfast, what types of food do you eat? Eggs toast   If you do eat lunch, what types of food do you typically eat? Salad or sandwiches   If you do eat supper, what types of food do you typically eat? Meet potatoes   If you do snack, what types of food do you typically eat? Chips snacks   How many glasses of juice do you drink in a typical day? 0   How many of glasses of milk do you drink in a typical day? 0   How many 8oz glasses of sugar containing drinks such as Antonio-Aid/sweet tea do you drink in a day? 0   How many cans/bottles of sugar pop/soda/tea/sports drinks do you drink in a day? 0   How many cans/bottles of diet pop/soda/tea or sports drink do you drink in a day? 1   How often do you have a drink of alcohol? 2-3 TImes a Week   If you do drink, how many drinks might you have in a day? 1 or 2     Meals:  Diet Recall -   Woke up 5am  Breakfast 5:30 am Egg Frita, coffee plain  Lunch: 11-1pm hit/miss - if has one will be salad or tuna/crackers or occasional salad  Supper: 5pm - Meat/veggies (often potatoes - 3xweek)    Snacks - protein bars at work, fish crackers, snacks before bed - biggest problem (hubs big potato chip, ice cream). Snacking at night more  boredom/habit related as opposed to hunger. Cards at night/cribbage.         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    Water: meals and 32oz at work     Alcohol: maybe after work and weekends pending activity.         1/30/2024     9:19 PM   Eating Habits   Generally, my meals include foods like these bread, pasta, rice, potatoes, corn, crackers, sweet dessert, pop, or juice Once a Week   Generally, my meals include foods like these fried meats, brats, burgers, french fries, pizza, cheese, chips, or ice cream A Few Times a Week   Eat fast food (like McDonalds, Burger Rosalio, Taco Bell) Less Than Weekly   Eat at a buffet or sit-down restaurant A Few Times a Week   Eat most of my meals in front of the TV or computer A Few Times a Week   Often skip meals, eat at random times, have no regular eating times A Few Times a Week   Rarely sit down for a meal but snack or graze throughout Less Than Weekly   Eat extra snacks between meals Less Than Weekly   Eat most of my food at the end of the day A Few Times a Week   Eat in the middle of the night or wake up at night to eat Never   Eat extra snacks to prevent or correct low blood sugar Never   Eat to prevent acid reflux or stomach pain A Few Times a Week   Worry about not having enough food to eat Never   I eat when I am depressed A Few Times a Week   I eat when I am stressed A Few Times a Week   I eat when I am bored A Few Times a Week   I eat when I am anxious A Few Times a Week   I eat when I am happy or as a reward Less Than Weekly   I feel hungry all the time even if I just have eaten Never   Feeling full is important to me Less Than Weekly   I finish all the food on my plate even if I am already full Never   I can't resist eating delicious food or walk past the good food/smell A Few Times a Week   I eat/snack without noticing that I am eating A Few Times a Week   I eat when I am preparing the meal A Few Times a Week   I eat more than usual when I see others eating Less  Than Weekly   I think about food all day Less Than Weekly   What foods, if any, do you crave? Chips/Crackers           1/30/2024     9:19 PM   Amount of Food   I feel out of control when eating Weekly   I eat a large amount of food, like a loaf of bread, a box of cookies, a pint/quart of ice cream, all at once Never   I eat a large amount of food even when I am not hungry Never   I eat rapidly Almost Everyday   I eat alone because I feel embarrassed and do not want others to see how much I have eaten Never   I eat until I am uncomfortably full Monthly   I feel bad, disgusted, or guilty after I overeat Almost Everyday     Feeling out of control related to cheese and crackers during games and can eat more than intended - in front of you and also mindless eating while playing.     Denies intentionally vomiting after eating/using diuretics/laxatives or other purging type activities.        1/30/2024     9:19 PM   Activity/Exercise History   How much of a typical 12 hour day do you spend sitting? Less Than Half the Day   How much of a typical 12 hour day do you spend lying down? Less Than Half the Day   How much of a typical day do you spend walking/standing? Most of the Day   How many hours (not including work) do you spend on the TV/Video Games/Computer/Tablet/Phone? 2-3 Hours   How many times a week are you active for the purpose of exercise? Once a Week   What keeps you from being more active? Pain    Too tired   How many total minutes do you spend doing some activity for the purpose of exercising when you exercise? 15-30 Minutes     Exercise is limited by plantar fascitis - interested in Get Moving. Has a bike at home - more in the summer.     PAST MEDICAL HISTORY:  Past Medical History:   Diagnosis Date    Arthritis     Atherosclerosis of right coronary artery 02/20/2022    Gastroesophageal reflux disease with esophagitis     Hypertension     JASPER (obstructive sleep apnea) - mild (AHI 12) 1/10/2024            1/30/2024     9:19 PM   Work/Social History Reviewed With Patient   My employment status is Full-Time   My job is Self employed    How much of your job is spent on the computer or phone? Less Than 50%   How many hours do you spend commuting to work daily? Minutes   What is your marital status? /In a Relationship   If in a relationship, is your significant other overweight? No   If you have children, are they overweight? No   Who do you live with?    Who does the food shopping? Both       Social History     Tobacco Use    Smoking status: Former     Packs/day: 1.50     Years: 36.00     Additional pack years: 0.00     Total pack years: 54.00     Types: Cigarettes     Start date: 9/1/1972     Quit date: 3/1/2008     Years since quitting: 15.9     Passive exposure: Never    Smokeless tobacco: Never   Vaping Use    Vaping Use: Never used   Substance Use Topics    Alcohol use: Yes    Drug use: No            1/30/2024     9:19 PM   Mental Health History Reviewed With Patient   Have you ever been physically or sexually abused? No   How often in the past 2 weeks have you felt little interest or pleasure in doing things? Not at all   Over the past 2 weeks how often have you felt down, depressed, or hopeless? For Several Days     Working with a therapist - not right now.         1/30/2024     9:19 PM   Questions Reviewed With Patient   How ready are you to make changes regarding your weight? Number 1 = Not ready at all to make changes up to 10 = very ready. 10   How confident are you that you can change? 1 = Not confident that you will be successful making changes up to 10 = very confident. 6           1/30/2024     9:19 PM   Sleep History Reviewed With Patient   How many hours do you sleep at night? 6     Do you SNORE loudly (louder than talking or loud enough to be heard through closed doors)? No   Do you often feel TIRED, fatigued, or sleepy during daytime? No   Has anyone OBSERVED you stop  breathing during your sleep? No   Do you have or are you being treated for high blood PRESSURE? Yes   BMI more than 35kg/m2? No   AGE over 50 years old? Yes   NECK circumference > 16 inches (40cm)? No   GENDER: Male? No   STOP-BANG Total Score (range: 0 - 8) 3 (High risk of JASPER) Critical      Score 3 - had a sleep study and prescribed a CPAP.     MEDICATIONS:   Current Outpatient Medications   Medication Sig Dispense Refill    atorvastatin (LIPITOR) 10 MG tablet Take 1 tablet (10 mg) by mouth every evening 90 tablet 3    BIOTIN PO Take by mouth daily      Cyanocobalamin (VITAMIN B 12 PO) Take 1 tablet by mouth daily      Ketoconazole-Hydrocortisone (KETOCON PLUS EX)       lisinopril (ZESTRIL) 10 MG tablet Take 1 tablet (10 mg) by mouth daily 90 tablet 3    Multiple Vitamins-Minerals (CENTRUM ADULTS PO) Take 1 tablet by mouth daily      Multiple Vitamins-Minerals (OCUVITE ADULT FORMULA PO)       omeprazole (PRILOSEC) 20 MG DR capsule Take 1 capsule (20 mg) by mouth daily 90 capsule 2    triamcinolone (KENALOG) 0.1 % external ointment Apply sparingly to affected area twice daily. Apply sparingly 2-3 weeks as directed. 80 g 1       ALLERGIES:   Allergies   Allergen Reactions    Penicillins        ROS:    HEENT  H/O glaucoma:  No - caution runs in family/may be start  Cardiovascular  CAD:   yes  Palpitations:   no  HTN:    yes  Gastrointestinal  GERD:   yes  Constipation:   No  Gastroparesis:  No  Liver Dz:   Yes - fatty liver  H/O Pancreatitis:  no  H/O Gallbladder Dz: no  Psychiatric  Moods Stable:  yes  Anxiety:   Yes/occasional/not a problem   Depression:  no  Bipolar:  yes  H/O ETOH/Drug Abuse: no  H/O eating disorder: no  Endocrine  PMH/FMH of MTC or MEN2:  no  Neurologic:  H/O seizures:   no  Headaches:  Yes - occasional  Memory Impairment:  yes    H/O kidney stones:  no  Kidney disease:  no  Current birth control:  Post-menopausal     LABS/RECORDS REVIEWED:  Labs reviewed in Epic    3/9/2023 lipids within  "normal limits  CMP within normal limits  CBC within normal limits    2/21/2022 TSH normal  Vitamin D normal    Hemoglobin A1c last checked 8 years ago and normal    BP Readings from Last 6 Encounters:   02/05/24 (!) 147/82   02/02/24 132/83   10/25/23 136/82   10/23/23 125/84   03/13/23 137/84   11/28/22 114/61       Pulse Readings from Last 6 Encounters:   02/05/24 54   02/02/24 58   10/25/23 59   10/23/23 56   03/13/23 63   11/28/22 53       PHYSICAL EXAM:  BP (!) 147/82 (BP Location: Left arm, Patient Position: Sitting, Cuff Size: Adult Regular)   Pulse 54   Ht 5' 1.25\" (1.556 m)   Wt 193 lb 1.6 oz (87.6 kg)   SpO2 98%   BMI 36.19 kg/m    GENERAL: Healthy, alert and no distress  EYES: Eyes grossly normal to inspection.    RESP: No audible wheeze, cough, or visible cyanosis.  No increased work of breathing. Lungs clear to auscultation  Heart: regular rate and rhythm. No significant murmurs, rubs, or gallops  SKIN: Visible skin clear.   NEURO: Mentation and speech appropriate for age.  PSYCH: Mentation appears normal, affect normal/bright, judgement and insight intact, normal speech and appearance well-groomed.      COUNSELING:   Reviewed obesity as a chronic disease and comprehensive management stratagies.      We discussed Bariatric Basics including:  -eating 3 meals daily    Weight Management Tips Handout given in AVS      We discussed the importance of restorative sleep and stress management in maintaining a healthy weight.  We discussed insulin resistance and glycemic index as it relates to appetite and weight control.   We discussed the importance of physical activity including cardiovascular and strength training in maintaining a healthier weight and explored viable options.  Patient education of above written in AVS.      Sincerely,    Sol Bianchi PA-C        "

## 2024-02-05 NOTE — NURSING NOTE
Patient's measurements today were:    Neck = 14.5 inches    Waist = 41 inches    Hips = 47.5 inches  Blanka Eldridge MA on 2/5/2024 at 8:47 AM

## 2024-02-07 ENCOUNTER — ANCILLARY PROCEDURE (OUTPATIENT)
Dept: MAMMOGRAPHY | Facility: CLINIC | Age: 69
End: 2024-02-07
Attending: FAMILY MEDICINE
Payer: COMMERCIAL

## 2024-02-07 DIAGNOSIS — Z12.31 VISIT FOR SCREENING MAMMOGRAM: ICD-10-CM

## 2024-02-07 PROCEDURE — 77063 BREAST TOMOSYNTHESIS BI: CPT | Performed by: STUDENT IN AN ORGANIZED HEALTH CARE EDUCATION/TRAINING PROGRAM

## 2024-02-07 PROCEDURE — 77067 SCR MAMMO BI INCL CAD: CPT | Performed by: STUDENT IN AN ORGANIZED HEALTH CARE EDUCATION/TRAINING PROGRAM

## 2024-02-10 ENCOUNTER — LAB (OUTPATIENT)
Dept: LAB | Facility: CLINIC | Age: 69
End: 2024-02-10
Payer: COMMERCIAL

## 2024-02-10 DIAGNOSIS — Z86.39 HISTORY OF ELEVATED GLUCOSE: ICD-10-CM

## 2024-02-10 DIAGNOSIS — I25.10 CORONARY ARTERY CALCIFICATION SEEN ON CAT SCAN: ICD-10-CM

## 2024-02-10 DIAGNOSIS — R63.5 WEIGHT GAIN: ICD-10-CM

## 2024-02-10 DIAGNOSIS — I10 HYPERTENSION GOAL BP (BLOOD PRESSURE) < 140/90: Primary | ICD-10-CM

## 2024-02-10 LAB
HBA1C MFR BLD: 5.5 % (ref 0–5.6)
TSH SERPL DL<=0.005 MIU/L-ACNC: 1.71 UIU/ML (ref 0.3–4.2)

## 2024-02-10 PROCEDURE — 84443 ASSAY THYROID STIM HORMONE: CPT

## 2024-02-10 PROCEDURE — 36415 COLL VENOUS BLD VENIPUNCTURE: CPT

## 2024-02-10 PROCEDURE — 83036 HEMOGLOBIN GLYCOSYLATED A1C: CPT

## 2024-02-12 ENCOUNTER — DOCUMENTATION ONLY (OUTPATIENT)
Dept: SLEEP MEDICINE | Facility: CLINIC | Age: 69
End: 2024-02-12
Payer: COMMERCIAL

## 2024-02-12 ENCOUNTER — PATIENT OUTREACH (OUTPATIENT)
Dept: CARE COORDINATION | Facility: CLINIC | Age: 69
End: 2024-02-12

## 2024-02-12 DIAGNOSIS — I10 BENIGN HYPERTENSION: Primary | ICD-10-CM

## 2024-02-12 DIAGNOSIS — G47.33 OSA (OBSTRUCTIVE SLEEP APNEA): Chronic | ICD-10-CM

## 2024-02-12 NOTE — PROGRESS NOTES
Patient was offered choice of vendor and chose [facility].  Patient Nilda Amaro was set up at [place]  on February 12, 2024. Patient received a Resmed Airsense 11 Pressures were set at  6-16 cm H2O.   Patient s ramp is 5 cm H2O for Auto and FLEX/EPR is EPR, 2.  Patient received a Resmed Mask name: Airfit [id number]  Nasal mask size Medium, heated tubing and heated humidifier.  Patient has the following compliance requirements: using and visit requirements  Patient has a follow up on 5/23/24 with CAROL Mason

## 2024-02-13 ENCOUNTER — TELEPHONE (OUTPATIENT)
Dept: FAMILY MEDICINE | Facility: CLINIC | Age: 69
End: 2024-02-13

## 2024-02-13 NOTE — TELEPHONE ENCOUNTER
FYI - Status Update    Who is Calling: patient    Update: patient had made a mistake when trying to schedule [facility] [employer], last one was 3/15/23 patient would really like to try and keep it close to that time frame. Was wondering if clinic could schedule this anytime around March 2024    Does caller want a call/response back: Yes     Could we send this information to you in [facility] or would you prefer to receive a phone call?:   Patient would prefer a phone call   Okay to leave a detailed message?: Yes at Cell number on file:    Telephone Information:   Mobile [phone]

## 2024-02-13 NOTE — TELEPHONE ENCOUNTER
Okay to use same-day/new patient slots on my schedule as long as patient's insurance cover her physical around that time.

## 2024-02-15 ENCOUNTER — DOCUMENTATION ONLY (OUTPATIENT)
Dept: SLEEP MEDICINE | Facility: CLINIC | Age: 69
End: 2024-02-15
Payer: COMMERCIAL

## 2024-02-15 DIAGNOSIS — G47.33 OSA (OBSTRUCTIVE SLEEP APNEA): Primary | Chronic | ICD-10-CM

## 2024-02-15 NOTE — PROGRESS NOTES
3 day Sleep therapy management telephone visit    Diagnostic AHI: 12.7  PSG    Confirmed with patient at time of call- N/A Patient is still interested in STM service       Message left for patient to return call    Order settings:  CPAP MIN CPAP MAX   6 cm H2O 16 cm H2O       Device settings:  CPAP MIN CPAP MAX EPR RESMED SOFT RESPONSE SETTING   6.0 cm  H20 16.0 cm  H20 TWO OFF       Compliance 100 %    Assessment: Nightly usage over four hours     Patient has the following upcoming sleep appts:  Future Sleep Appointments         Provider Department    5/23/2024 2:30 PM (Arrive by 2:15 PM) Sharon Campo PA [facility] [place]            Replacement device: No  STM ordered by provider: Yes     Total time spent on accessing and  interpreting remote patient PAP therapy data  10 minutes    Total time spent counseling, coaching  and reviewing PAP therapy data with patient  1 minutes    [id number] no

## 2024-02-16 ENCOUNTER — MYC MEDICAL ADVICE (OUTPATIENT)
Dept: SLEEP MEDICINE | Facility: CLINIC | Age: 69
End: 2024-02-16
Payer: COMMERCIAL

## 2024-02-16 NOTE — TELEPHONE ENCOUNTER
Pt returned message. She has been struggling with mask fit and possible mouth leak. Download does not really indicate mouth leak, but she does have mask leak correlated with pressure. Provided mask fit information and she will utilize mask fit test.

## 2024-02-26 ENCOUNTER — PATIENT OUTREACH (OUTPATIENT)
Dept: CARE COORDINATION | Facility: CLINIC | Age: 69
End: 2024-02-26
Payer: COMMERCIAL

## 2024-02-27 ENCOUNTER — DOCUMENTATION ONLY (OUTPATIENT)
Dept: SLEEP MEDICINE | Facility: CLINIC | Age: 69
End: 2024-02-27
Payer: COMMERCIAL

## 2024-02-27 DIAGNOSIS — G47.33 OSA (OBSTRUCTIVE SLEEP APNEA): Primary | Chronic | ICD-10-CM

## 2024-02-27 NOTE — PROGRESS NOTES
14  DAY STM VISIT    Diagnostic AHI: 12.7  PSG    Message left for patient to return call     Assessment: Pt not meeting objective benchmarks for leak     Action plan: waiting for patient to return call.  and pt to have 30 day STM visit.      Device type: Auto-CPAP    PAP settings:  CPAP MIN CPAP MAX 95TH % PRESSURE EPR RESMED SOFT RESPONSE SETTING   6.0 cm  H20 16.0 cm  H20 10.9 cm  H20  TWO OFF     Mask type:  Nasal Mask    Objective measures: 14 day rolling measures   COMPLIANCE LEAK AHI AVERAGE USE IN MINUTES   100 % 32.56 2.06 425   GOAL >70% GOAL < 24 LPM GOAL <5 GOAL >240      Patient has the following upcoming sleep appts:  Future Sleep Appointments         Provider Department    5/23/2024 2:30 PM (Arrive by 2:15 PM) Sharon Campo PA [facility] [place]            Total time spent on accessing and interpreting remote patient PAP therapy data  10 minutes    Total time spent counseling, coaching  and reviewing PAP therapy data with patient  1 minute    [id number]  [id number]  no (3 day STM)

## 2024-03-05 ENCOUNTER — OFFICE VISIT (OUTPATIENT)
Dept: URGENT CARE | Facility: URGENT CARE | Age: 69
End: 2024-03-05
Payer: COMMERCIAL

## 2024-03-05 VITALS
DIASTOLIC BLOOD PRESSURE: 84 MMHG | WEIGHT: 191.6 LBS | BODY MASS INDEX: 35.91 KG/M2 | SYSTOLIC BLOOD PRESSURE: 163 MMHG | OXYGEN SATURATION: 97 % | HEART RATE: 60 BPM | TEMPERATURE: 98.3 F

## 2024-03-05 DIAGNOSIS — R39.15 URINARY URGENCY: ICD-10-CM

## 2024-03-05 DIAGNOSIS — N30.01 ACUTE CYSTITIS WITH HEMATURIA: Primary | ICD-10-CM

## 2024-03-05 LAB
ALBUMIN UR-MCNC: 100 MG/DL
APPEARANCE UR: ABNORMAL
BACTERIA #/AREA URNS HPF: ABNORMAL /HPF
BILIRUB UR QL STRIP: NEGATIVE
COLOR UR AUTO: YELLOW
GLUCOSE UR STRIP-MCNC: NEGATIVE MG/DL
HGB UR QL STRIP: ABNORMAL
KETONES UR STRIP-MCNC: NEGATIVE MG/DL
LEUKOCYTE ESTERASE UR QL STRIP: ABNORMAL
NITRATE UR QL: NEGATIVE
PH UR STRIP: 6 [PH] (ref 5–7)
RBC #/AREA URNS AUTO: >100 /HPF
SP GR UR STRIP: 1.02 (ref 1–1.03)
SQUAMOUS #/AREA URNS AUTO: ABNORMAL /LPF
UROBILINOGEN UR STRIP-ACNC: 0.2 E.U./DL
WBC #/AREA URNS AUTO: ABNORMAL /HPF

## 2024-03-05 PROCEDURE — 99213 OFFICE O/P EST LOW 20 MIN: CPT | Performed by: PHYSICIAN ASSISTANT

## 2024-03-05 PROCEDURE — 87086 URINE CULTURE/COLONY COUNT: CPT | Performed by: PHYSICIAN ASSISTANT

## 2024-03-05 PROCEDURE — 81001 URINALYSIS AUTO W/SCOPE: CPT | Performed by: PHYSICIAN ASSISTANT

## 2024-03-05 RX ORDER — NITROFURANTOIN 25; 75 MG/1; MG/1
100 CAPSULE ORAL 2 TIMES DAILY
Qty: 14 CAPSULE | Refills: 0 | Status: SHIPPED | OUTPATIENT
Start: 2024-03-05 | End: 2024-03-12

## 2024-03-06 NOTE — PROGRESS NOTES
Chief Complaint   Patient presents with    Hematuria     Patient seen today for blood in her urine. She states it started last night. No she has the urgency to go.         Results for orders placed or performed in visit on 03/05/24   UA Macroscopic with reflex to Microscopic and Culture - Clinic Collect     Status: Abnormal    Specimen: Urine, Midstream   Result Value Ref Range    Color Urine Yellow Colorless, Straw, Light Yellow, Yellow    Appearance Urine Slightly Cloudy (A) Clear    Glucose Urine Negative Negative mg/dL    Bilirubin Urine Negative Negative    Ketones Urine Negative Negative mg/dL    Specific Gravity Urine 1.025 1.003 - 1.035    Blood Urine Large (A) Negative    pH Urine 6.0 5.0 - 7.0    Protein Albumin Urine 100 (A) Negative mg/dL    Urobilinogen Urine 0.2 0.2, 1.0 E.U./dL    Nitrite Urine Negative Negative    Leukocyte Esterase Urine Moderate (A) Negative   Urine Microscopic Exam     Status: Abnormal   Result Value Ref Range    Bacteria Urine Moderate (A) None Seen /HPF    RBC Urine >100 (A) 0-2 /HPF /HPF    WBC Urine 10-25 (A) 0-5 /HPF /HPF    Squamous Epithelials Urine Few (A) None Seen /LPF             ASSESSMENT:    ICD-10-CM    1. Acute cystitis with hematuria  N30.01 nitroFURantoin macrocrystal-monohydrate (MACROBID) 100 MG capsule      2. Urinary urgency  R39.15 UA Macroscopic with reflex to Microscopic and Culture - Clinic Collect     Urine Microscopic Exam     Urine Culture     nitroFURantoin macrocrystal-monohydrate (MACROBID) 100 MG capsule              PLAN: UTI.  Urine culture pending.  Macrobid.  As per ordered above.  Drink plenty of fluids.  Prevention and treatment of UTI's discussed. Follow up with primary care physician if not improving.  Advised about symptoms which might herald more serious problems.          Catrina Rapp PA-C        Subjective:   68-year-old female presents for urinary urgency that started last night.  Has had some blood in her urine.  Did have some diarrhea  lately.  No fever, nausea, vomiting, flank pain.  Has never had a bladder infection.      Allergies   Allergen Reactions    Penicillins        Past Medical History:   Diagnosis Date    Arthritis     Atherosclerosis of right coronary artery 2022    Gastroesophageal reflux disease with esophagitis     Hypertension     JASPER (obstructive sleep apnea) - mild (AHI 12) 1/10/2024       atorvastatin (LIPITOR) 10 MG tablet, Take 1 tablet (10 mg) by mouth every evening  BIOTIN PO, Take by mouth daily  Cyanocobalamin (VITAMIN B 12 PO), Take 1 tablet by mouth daily  Ketoconazole-Hydrocortisone (KETOCON PLUS EX),   lisinopril (ZESTRIL) 10 MG tablet, Take 1 tablet (10 mg) by mouth daily  Multiple Vitamins-Minerals (CENTRUM ADULTS PO), Take 1 tablet by mouth daily  Multiple Vitamins-Minerals (OCUVITE ADULT FORMULA PO),   omeprazole (PRILOSEC) 20 MG DR capsule, Take 1 capsule (20 mg) by mouth daily  triamcinolone (KENALOG) 0.1 % external ointment, Apply sparingly to affected area twice daily. Apply sparingly 2-3 weeks as directed.    No current facility-administered medications on file prior to visit.      Social History     Tobacco Use    Smoking status: Former     Packs/day: 1.50     Years: 36.00     Additional pack years: 0.00     Total pack years: 54.00     Types: Cigarettes     Start date: 1972     Quit date: 3/1/2008     Years since quittin.0     Passive exposure: Never    Smokeless tobacco: Never   Vaping Use    Vaping Use: Never used   Substance Use Topics    Alcohol use: Yes    Drug use: No       ROS:  General: negative for fever  ABD: Denies abd pain  : as above    OBJECTIVE:  BP (!) 163/84 (BP Location: Left arm, Patient Position: Sitting, Cuff Size: Adult Large)   Pulse 60   Temp 98.3  F (36.8  C) (Tympanic)   Wt 86.9 kg (191 lb 9.6 oz)   SpO2 97%   BMI 35.91 kg/m     General:   awake, alert, and cooperative.  NAD.   Head: Normocephalic, atraumatic.  Eyes: Conjunctiva clear, non icteric.   ABD: soft,  no tenderness to palpation , no rigidity, guarding or rebound . No CVAT  Neuro: Alert and oriented - normal speech.

## 2024-03-07 DIAGNOSIS — G47.33 OSA (OBSTRUCTIVE SLEEP APNEA): Primary | ICD-10-CM

## 2024-03-07 LAB — BACTERIA UR CULT: NORMAL

## 2024-03-08 SDOH — HEALTH STABILITY: PHYSICAL HEALTH: ON AVERAGE, HOW MANY MINUTES DO YOU ENGAGE IN EXERCISE AT THIS LEVEL?: 10 MIN

## 2024-03-08 SDOH — HEALTH STABILITY: PHYSICAL HEALTH: ON AVERAGE, HOW MANY DAYS PER WEEK DO YOU ENGAGE IN MODERATE TO STRENUOUS EXERCISE (LIKE A BRISK WALK)?: 2 DAYS

## 2024-03-08 ASSESSMENT — SOCIAL DETERMINANTS OF HEALTH (SDOH): HOW OFTEN DO YOU GET TOGETHER WITH FRIENDS OR RELATIVES?: THREE TIMES A WEEK

## 2024-03-15 ENCOUNTER — OFFICE VISIT (OUTPATIENT)
Dept: FAMILY MEDICINE | Facility: CLINIC | Age: 69
End: 2024-03-15
Payer: COMMERCIAL

## 2024-03-15 VITALS
HEIGHT: 65 IN | BODY MASS INDEX: 31.65 KG/M2 | RESPIRATION RATE: 16 BRPM | TEMPERATURE: 98 F | OXYGEN SATURATION: 98 % | HEART RATE: 61 BPM | WEIGHT: 190 LBS | DIASTOLIC BLOOD PRESSURE: 85 MMHG | SYSTOLIC BLOOD PRESSURE: 124 MMHG

## 2024-03-15 DIAGNOSIS — E66.811 CLASS 1 OBESITY DUE TO EXCESS CALORIES WITH SERIOUS COMORBIDITY AND BODY MASS INDEX (BMI) OF 31.0 TO 31.9 IN ADULT: ICD-10-CM

## 2024-03-15 DIAGNOSIS — M85.89 OSTEOPENIA OF MULTIPLE SITES: Chronic | ICD-10-CM

## 2024-03-15 DIAGNOSIS — G47.33 OSA (OBSTRUCTIVE SLEEP APNEA): Chronic | ICD-10-CM

## 2024-03-15 DIAGNOSIS — I77.810 ASCENDING AORTA DILATATION (H): Chronic | ICD-10-CM

## 2024-03-15 DIAGNOSIS — I25.10 CORONARY ARTERY CALCIFICATION SEEN ON CAT SCAN: Chronic | ICD-10-CM

## 2024-03-15 DIAGNOSIS — N95.2 ATROPHIC VAGINITIS: ICD-10-CM

## 2024-03-15 DIAGNOSIS — I10 HYPERTENSION GOAL BP (BLOOD PRESSURE) < 140/90: Chronic | ICD-10-CM

## 2024-03-15 DIAGNOSIS — Z87.891 PERSONAL HISTORY OF NICOTINE DEPENDENCE: ICD-10-CM

## 2024-03-15 DIAGNOSIS — E78.5 HYPERLIPIDEMIA LDL GOAL <100: Chronic | ICD-10-CM

## 2024-03-15 DIAGNOSIS — Z00.00 ENCOUNTER FOR MEDICARE ANNUAL WELLNESS EXAM: Primary | ICD-10-CM

## 2024-03-15 DIAGNOSIS — L30.9 DERMATITIS: ICD-10-CM

## 2024-03-15 DIAGNOSIS — K22.70 BARRETT'S ESOPHAGUS WITHOUT DYSPLASIA: Chronic | ICD-10-CM

## 2024-03-15 DIAGNOSIS — N39.3 URINARY, INCONTINENCE, STRESS FEMALE: ICD-10-CM

## 2024-03-15 DIAGNOSIS — I70.0 AORTIC ATHEROSCLEROSIS (H): Chronic | ICD-10-CM

## 2024-03-15 DIAGNOSIS — Z12.2 ENCOUNTER FOR SCREENING FOR LUNG CANCER: ICD-10-CM

## 2024-03-15 DIAGNOSIS — E66.09 CLASS 1 OBESITY DUE TO EXCESS CALORIES WITH SERIOUS COMORBIDITY AND BODY MASS INDEX (BMI) OF 31.0 TO 31.9 IN ADULT: ICD-10-CM

## 2024-03-15 DIAGNOSIS — K21.9 CHRONIC GERD: ICD-10-CM

## 2024-03-15 DIAGNOSIS — H91.93 HEARING DEFICIT, BILATERAL: Chronic | ICD-10-CM

## 2024-03-15 PROBLEM — E66.812 CLASS 2 SEVERE OBESITY DUE TO EXCESS CALORIES WITH SERIOUS COMORBIDITY IN ADULT (H): Status: RESOLVED | Noted: 2024-02-05 | Resolved: 2024-03-15

## 2024-03-15 PROBLEM — E66.01 CLASS 2 SEVERE OBESITY DUE TO EXCESS CALORIES WITH SERIOUS COMORBIDITY IN ADULT (H): Status: RESOLVED | Noted: 2024-02-05 | Resolved: 2024-03-15

## 2024-03-15 PROCEDURE — 80061 LIPID PANEL: CPT

## 2024-03-15 PROCEDURE — 82570 ASSAY OF URINE CREATININE: CPT

## 2024-03-15 PROCEDURE — 80048 BASIC METABOLIC PNL TOTAL CA: CPT

## 2024-03-15 PROCEDURE — 99214 OFFICE O/P EST MOD 30 MIN: CPT | Mod: 25

## 2024-03-15 PROCEDURE — 82043 UR ALBUMIN QUANTITATIVE: CPT

## 2024-03-15 PROCEDURE — G0439 PPPS, SUBSEQ VISIT: HCPCS

## 2024-03-15 PROCEDURE — 36415 COLL VENOUS BLD VENIPUNCTURE: CPT

## 2024-03-15 RX ORDER — ESTRADIOL 0.1 MG/G
2 CREAM VAGINAL
Qty: 42.5 G | Refills: 3 | Status: SHIPPED | OUTPATIENT
Start: 2024-03-18 | End: 2024-05-23

## 2024-03-15 RX ORDER — TRIAMCINOLONE ACETONIDE 1 MG/G
OINTMENT TOPICAL
Qty: 80 G | Refills: 1 | Status: SHIPPED | OUTPATIENT
Start: 2024-03-15

## 2024-03-15 RX ORDER — ATORVASTATIN CALCIUM 10 MG/1
10 TABLET, FILM COATED ORAL EVERY EVENING
Qty: 90 TABLET | Refills: 3 | Status: SHIPPED | OUTPATIENT
Start: 2024-03-15

## 2024-03-15 RX ORDER — LISINOPRIL 10 MG/1
10 TABLET ORAL DAILY
Qty: 90 TABLET | Refills: 3 | Status: SHIPPED | OUTPATIENT
Start: 2024-03-15

## 2024-03-15 ASSESSMENT — PAIN SCALES - GENERAL: PAINLEVEL: NO PAIN (0)

## 2024-03-15 NOTE — PATIENT INSTRUCTIONS
Preventive Care Advice   This is general advice given by our system to help you stay healthy. However, your care team may have specific advice just for you. Please talk to your care team about your preventive care needs.  Nutrition  Eat 5 or more servings of fruits and vegetables each day.  Try wheat bread, brown rice and whole grain pasta (instead of white bread, rice, and pasta).  Get enough calcium and vitamin D. Check the label on foods and aim for 100% of the RDA (recommended daily allowance).  Lifestyle  Exercise at least 150 minutes each week   (30 minutes a day, 5 days a week).  Do muscle strengthening activities 2 days a week. These help control your weight and prevent disease.  No smoking.  Wear sunscreen to prevent skin cancer.  Have a dental exam and cleaning every 6 months.  Yearly exams  See your health care team every year to talk about:  Any changes in your health.  Any medicines your care team has prescribed.  Preventive care, family planning, and ways to prevent chronic diseases.  Shots (vaccines)   HPV shots (up to age 26), if you've never had them before.  Hepatitis B shots (up to age 59), if you've never had them before.  COVID-19 shot: Get this shot when it's due.  Flu shot: Get a flu shot every year.  Tetanus shot: Get a tetanus shot every 10 years.  Pneumococcal, hepatitis A, and RSV shots: Ask your care team if you need these based on your risk.  Shingles shot (for age 50 and up).  General health tests  Diabetes screening:  Starting at age 35, Get screened for diabetes at least every 3 years.  If you are younger than age 35, ask your care team if you should be screened for diabetes.  Cholesterol test: At age 39, start having a cholesterol test every 5 years, or more often if advised.  Bone density scan (DEXA): At age 50, ask your care team if you should have this scan for osteoporosis (brittle bones).  Hepatitis C: Get tested at least once in your life.  STIs (sexually transmitted  infections)  Before age 24: Ask your care team if you should be screened for STIs.  After age 24: Get screened for STIs if you're at risk. You are at risk for STIs (including HIV) if:  You are sexually active with more than one person.  You don't use condoms every time.  You or a partner was diagnosed with a sexually transmitted infection.  If you are at risk for HIV, ask about PrEP medicine to prevent HIV.  Get tested for HIV at least once in your life, whether you are at risk for HIV or not.  Cancer screening tests  Cervical cancer screening: If you have a cervix, begin getting regular cervical cancer screening tests at age 21. Most people who have regular screenings with normal results can stop after age 65. Talk about this with your provider.  Breast cancer scan (mammogram): If you've ever had breasts, begin having regular mammograms starting at age 40. This is a scan to check for breast cancer.  Colon cancer screening: It is important to start screening for colon cancer at age 45.  Have a colonoscopy test every 10 years (or more often if you're at risk) Or, ask your provider about stool tests like a FIT test every year or Cologuard test every 3 years.  To learn more about your testing options, visit: https://[email or web address]/100685.pdf.  For help making a decision, visit: https://bit.ly/zp62321.  Prostate cancer screening test: If you have a prostate and are age 55 to 69, ask your provider if you would benefit from a yearly prostate cancer screening test.  Lung cancer screening: If you are a current or former smoker age 50 to 80, ask your care team if ongoing lung cancer screenings are right for you.  For informational purposes only. Not to replace the advice of your health care provider. Copyright   2023 [employer]. All rights reserved. Clinically reviewed by the [facility]itions [facility]. [employer] 426014 - REV 01/24.    Preventing Falls: Care Instructions  Injuries and health  problems such as trouble walking or poor eyesight can increase your risk of falling. So can some medicines. But there are things you can do to help prevent falls. You can exercise to get stronger. You can also arrange your home to make it safer.    Talk to your doctor about the medicines you take. Ask if any of them increase the risk of falls and whether they can be changed or stopped.   Try to exercise regularly. It can help improve your strength and balance. This can help lower your risk of falling.     Practice fall safety and prevention.    Wear low-heeled shoes that fit well and give your feet good support. Talk to your doctor if you have foot problems that make this hard.  Carry a cellphone or wear a medical alert device that you can use to call for help.  Use stepladders instead of chairs to reach high objects. Don't climb if you're at risk for falls. Ask for help, if needed.  Wear the correct eyeglasses, if you need them.    Make your home safer.    Remove rugs, cords, clutter, and furniture from walkways.  Keep your house well lit. Use night-lights in hallways and bathrooms.  Install and use sturdy handrails on stairways.  Wear nonskid footwear, even inside. Don't walk barefoot or in socks without shoes.    Be safe outside.    Use handrails, curb cuts, and ramps whenever possible.  Keep your hands free by using a shoulder bag or backpack.  Try to walk in well-lit areas. Watch out for uneven ground, changes in pavement, and debris.  Be careful in the winter. Walk on the grass or gravel when sidewalks are slippery. Use de-icer on steps and walkways. Add non-slip devices to shoes.    Put grab bars and nonskid mats in your shower or tub and near the toilet. Try to use a shower chair or bath bench when bathing.   Get into a tub or shower by putting in your weaker leg first. Get out with your strong side first. Have a phone or medical alert device in the bathroom with you.   Where can you learn more?  Go to  "https://www.[employer].net/patiented  Enter G117 in the search box to learn more about \"Preventing Falls: Care Instructions.\"  Current as of: July 17, 2023               Content Version: 14.0    2958-8327 [employer].   Care instructions adapted under license by your healthcare professional. If you have questions about a medical condition or this instruction, always ask your healthcare professional. [employer] disclaims any warranty or liability for your use of this information.      Hearing Loss: Care Instructions  Overview     Hearing loss is a sudden or slow decrease in how well you hear. It can range from slight to profound. Permanent hearing loss can occur with aging. It also can happen when you are exposed long-term to loud noise. Examples include listening to loud music, riding motorcycles, or being around other loud machines.  Hearing loss can affect your work and home life. It can make you feel lonely or depressed. You may feel that you have lost your independence. But hearing aids and other devices can help you hear better and feel connected to others.  Follow-up care is a key part of your treatment and safety. Be sure to make and go to all appointments, and call your doctor if you are having problems. It's also a good idea to know your test results and keep a list of the medicines you take.  How can you care for yourself at home?  Avoid loud noises whenever possible. This helps keep your hearing from getting worse.  Always wear hearing protection around loud noises.  Wear a hearing aid as directed.  A professional can help you pick a hearing aid that will work best for you.  You can also get hearing aids over the counter for mild to moderate hearing loss.  Have hearing tests as your doctor suggests. They can show whether your hearing has changed. Your hearing aid may need to be adjusted.  Use other devices as needed. These may include:  Telephone amplifiers and hearing aids that " "can connect to a television, stereo, radio, or microphone.  Devices that use lights or vibrations. These alert you to the doorbell, a ringing telephone, or a baby monitor.  Television closed-captioning. This shows the words at the bottom of the screen. Most new TVs can do this.  TTY (text telephone). This lets you type messages back and forth on the telephone instead of talking or listening. These devices are also called TDD. When messages are typed on the keyboard, they are sent over the phone line to a receiving TTY. The message is shown on a monitor.  Use text messaging, social media, and email if it is hard for you to communicate by telephone.  Try to learn a listening technique called speechreading. It is not lipreading. You pay attention to people's gestures, expressions, posture, and tone of voice. These clues can help you understand what a person is saying. Face the person you are talking to, and have them face you. Make sure the lighting is good. You need to see the other person's face clearly.  Think about counseling if you need help to adjust to your hearing loss.  When should you call for help?  Watch closely for changes in your health, and be sure to contact your doctor if:    You think your hearing is getting worse.     You have new symptoms, such as dizziness or nausea.   Where can you learn more?  Go to https://www.[employer].net/patiented  Enter R798 in the search box to learn more about \"Hearing Loss: Care Instructions.\"  Current as of: September 27, 2023               Content Version: 14.0    2342-3358 [employer].   Care instructions adapted under license by your healthcare professional. If you have questions about a medical condition or this instruction, always ask your healthcare professional. [employer] disclaims any warranty or liability for your use of this information.      Learning About Stress  What is stress?     Stress is your body's response to a hard " situation. Your body can have a physical, emotional, or mental response. Stress is a fact of life for most people, and it affects everyone differently. What causes stress for you may not be stressful for someone else.  A lot of things can cause stress. You may feel stress when you go on a job interview, take a test, or run a race. This kind of short-term stress is normal and even useful. It can help you if you need to work hard or react quickly. For example, stress can help you finish an important job on time.  Long-term stress is caused by ongoing stressful situations or events. Examples of long-term stress include long-term health problems, ongoing problems at work, or conflicts in your family. Long-term stress can harm your health.  How does stress affect your health?  When you are stressed, your body responds as though you are in danger. It makes hormones that speed up your heart, make you breathe faster, and give you a burst of energy. This is called the fight-or-flight stress response. If the stress is over quickly, your body goes back to normal and no harm is done.  But if stress happens too often or lasts too long, it can have bad effects. Long-term stress can make you more likely to get sick, and it can make symptoms of some diseases worse. If you tense up when you are stressed, you may develop neck, shoulder, or low back pain. Stress is linked to high blood pressure and heart disease.  Stress also harms your emotional health. It can make you cota, tense, or depressed. Your relationships may suffer, and you may not do well at work or school.  What can you do to manage stress?  You can try these things to help manage stress:   Do something active. Exercise or activity can help reduce stress. Walking is a great way to get started. Even everyday activities such as housecleaning or yard work can help.  Try yoga or isai chi. These techniques combine exercise and meditation. You may need some training at first to  learn them.  Do something you enjoy. For example, listen to music or go to a movie. Practice your hobby or do volunteer work.  Meditate. This can help you relax, because you are not worrying about what happened before or what may happen in the future.  Do guided imagery. Imagine yourself in any setting that helps you feel calm. You can use online videos, books, or a teacher to guide you.  Do breathing exercises. For example:  From a standing position, bend forward from the waist with your knees slightly bent. Let your arms dangle close to the floor.  Breathe in slowly and deeply as you return to a standing position. Roll up slowly and lift your head last.  Hold your breath for just a few seconds in the standing position.  Breathe out slowly and bend forward from the waist.  Let your feelings out. Talk, laugh, cry, and express anger when you need to. Talking with supportive friends or family, a counselor, or a leah leader about your feelings is a healthy way to relieve stress. Avoid discussing your feelings with people who make you feel worse.  Write. It may help to write about things that are bothering you. This helps you find out how much stress you feel and what is causing it. When you know this, you can find better ways to cope.  What can you do to prevent stress?  You might try some of these things to help prevent stress:  Manage your time. This helps you find time to do the things you want and need to do.  Get enough sleep. Your body recovers from the stresses of the day while you are sleeping.  Get support. Your family, friends, and community can make a difference in how you experience stress.  Limit your news feed. Avoid or limit time on social media or news that may make you feel stressed.  Do something active. Exercise or activity can help reduce stress. Walking is a great way to get started.  Where can you learn more?  Go to https://www.healthwise.net/patiented  Enter N032 in the search box to learn more  "about \"Learning About Stress.\"  Current as of: October 24, 2023               Content Version: 14.0    3012-8647 [employer].   Care instructions adapted under license by your healthcare professional. If you have questions about a medical condition or this instruction, always ask your healthcare professional. [employer] disclaims any warranty or liability for your use of this information.      Bladder Training: Care Instructions  Your Care Instructions     Bladder training is used to treat urge incontinence and stress incontinence. Urge incontinence means that the need to urinate comes on so fast that you can't get to a toilet in time. Stress incontinence means that you leak urine because of pressure on your bladder. For example, it may happen when you laugh, cough, or lift something heavy.  Bladder training can increase how long you can wait before you have to urinate. It can also help your bladder hold more urine. And it can give you better control over the urge to urinate.  It is important to remember that bladder training takes a few weeks to a few months to make a difference. You may not see results right away, but don't give up.  Follow-up care is a key part of your treatment and safety. Be sure to make and go to all appointments, and call your doctor if you are having problems. It's also a good idea to know your test results and keep a list of the medicines you take.  How can you care for yourself at home?  Work with your doctor to come up with a bladder training program that is right for you. You may use one or more of the following methods.  Delayed urination  In the beginning, try to keep from urinating for 5 minutes after you first feel the need to go.  While you wait, take deep, slow breaths to relax. Kegel exercises can also help you delay the need to go to the bathroom.  After some practice, when you can easily wait 5 minutes to urinate, try to wait 10 minutes before you " "urinate.  Slowly increase the waiting period until you are able to control when you have to urinate.  Scheduled urination  Empty your bladder when you first wake up in the morning.  Schedule times throughout the day when you will urinate.  Start by going to the bathroom every hour, even if you don't need to go.  Slowly increase the time between trips to the bathroom.  When you have found a schedule that works well for you, keep doing it.  If you wake up during the night and have to urinate, do it. Apply your schedule to waking hours only.  Kegel exercises  These tighten and strengthen pelvic muscles, which can help you control the flow of urine. (If doing these exercises causes pain, stop doing them and talk with your doctor.) To do Kegel exercises:  Squeeze your muscles as if you were trying not to pass gas. Or squeeze your muscles as if you were stopping the flow of urine. Your belly, legs, and buttocks shouldn't move.  Hold the squeeze for 3 seconds, then relax for 5 to 10 seconds.  Start with 3 seconds, then add 1 second each week until you are able to squeeze for 10 seconds.  Repeat the exercise 10 times a session. Do 3 to 8 sessions a day.  When should you call for help?  Watch closely for changes in your health, and be sure to contact your doctor if:    Your incontinence is getting worse.     You do not get better as expected.   Where can you learn more?  Go to https://www.[employer].net/patiented  Enter V684 in the search box to learn more about \"Bladder Training: Care Instructions.\"  Current as of: November 15, 2023               Content Version: 14.0    9569-2413 [employer]   Care instructions adapted under license by your healthcare professional. If you have questions about a medical condition or this instruction, always ask your healthcare professional. [employer] disclaims any warranty or liability for your use of this information.      "

## 2024-03-15 NOTE — PROGRESS NOTES
Preventive Care Visit  [facility]  LESLIE Pickett CNP, Family Medicine  Mar 15, 2024      Assessment & Plan     Encounter for Medicare annual wellness exam  - Health maintenance and lifestyle reviewed. Updated medical and family history.  - Follow up in one year or sooner as needed.    - REVIEW OF HEALTH MAINTENANCE PROTOCOL ORDERS  - PRIMARY CARE FOLLOW-UP SCHEDULING    Dermatitis  - Moisture dermatitis in gluteal crease. Also using antifungal. Skin cares discussed.  - triamcinolone (KENALOG) 0.1 % external ointment  Dispense: 80 g; Refill: 1    JASPER (obstructive sleep apnea) - mild (AHI 12)  - Recent diagnosis. Has been using CPAP. Does not report noticeable changes in sleep quality.    Hearing deficit, bilateral  - Difficulty with conversations in loud environments only. She does not yet feel like hearing aids are needed at this time.    Bernal's esophagus without dysplasia  - Continue PPI.     Class 1 obesity due to excess calories with serious comorbidity and body mass index (BMI) of 31.0 to 31.9 in adult  - Diet and exercise guidelines discussed. She has seen weight management specialty and has been watching her diet.     Hyperlipidemia LDL goal <100  - Tolerating atorvastatin without adverse effects.  - atorvastatin (LIPITOR) 10 MG tablet  Dispense: 90 tablet; Refill: 3  - Lipid panel reflex to direct LDL Fasting    Hypertension goal BP (blood pressure) < 140/90  - BP at goal. Tolerating lisinopril. Refilled at current dose.   - lisinopril (ZESTRIL) 10 MG tablet  Dispense: 90 tablet; Refill: 3  - Basic metabolic panel  (Ca, Cl, CO2, Creat, Gluc, K, Na, BUN)  - Albumin Random Urine Quantitative with Creat Ratio    Coronary artery calcification seen on CAT scan  - Asymptomatic. Continue atorvastatin.  - atorvastatin (LIPITOR) 10 MG tablet  Dispense: 90 tablet; Refill: 3    Aortic atherosclerosis (H24)  - Asymptomatic. Continue atorvastatin  - atorvastatin (LIPITOR) 10 MG  tablet  Dispense: 90 tablet; Refill: 3    Ascending aorta dilatation (H24)  - Annual follow up  ordered. Last echo 4/10/23, showed normal EF and ventricles. Dilation of ascending aorta of 4.1 cm.   - Echocardiogram Complete    Osteopenia of multiple sites  - Discussed adequate calcium and vitamin D intake.   - calcium citrate-vitamin D (CITRACAL) 200-6.25 MG-MCG TABS per tablet    Chronic GERD  - Continue PPI    Encounter for screening for lung cancer  Personal history of nicotine dependence  - Patient meets criteria for lung cancer screening. Discussed with patient. Last imaging 4/10/23.   - CT Chest Lung Cancer Scrn Low Dose wo    Atrophic vaginitis  - Pain and dryness with intercourse not improved with lubricants. Estradiol cream discussed.  - estradiol (ESTRACE) 0.1 MG/GM vaginal cream  Dispense: 42.5 g; Refill: 3    Urinary, incontinence, stress female  - Urine leakage when coughing, laughing, sneezing, with exercise. Referral to pelvic floor PT discussed.   - Physical Therapy  Referral    Counseling  Appropriate preventive services were discussed with this patient, including applicable screening as appropriate for fall prevention, nutrition, physical activity, Tobacco-use cessation, weight loss and cognition.  Checklist reviewing preventive services available has been given to the patient.  Reviewed patient's diet, addressing concerns and/or questions.   She is at risk for lack of exercise and has been provided with information to increase physical activity for the benefit of her well-being.   The patient was provided with written information regarding signs of hearing loss.   Information on urinary incontinence and treatment options given to patient.     Caryl Munguia is a 68 year old, presenting for the following:  Physical        3/15/2024     7:51 AM   Additional Questions   Roomed by edmundo   Accompanied by self     Health Care Directive  Patient does not have a Health Care Directive or  Living Will: Discussed advance care planning with patient; information given to patient to review.    HPI        3/8/2024   General Health   How would you rate your overall physical health? Good   Feel stress (tense, anxious, or unable to sleep) To some extent   (!) STRESS CONCERN      3/8/2024   Nutrition   Diet: Regular (no restrictions)         3/8/2024   Exercise   Days per week of moderate/strenous exercise 2 days   Average minutes spent exercising at this level 10 min   (!) EXERCISE CONCERN      3/8/2024   Social Factors   Frequency of gathering with friends or relatives Three times a week   Worry food won't last until get money to buy more No   Food not last or not have enough money for food? No   Do you have housing?  Yes   Are you worried about losing your housing? No   Lack of transportation? No   Unable to get utilities (heat,electricity)? No         3/15/2024   Fall Risk   Gait Speed Test (Document in seconds) 3.4   Gait Speed Test Interpretation Less than or equal to 5.00 seconds - PASS          3/8/2024   Activities of Daily Living- Home Safety   Needs help with the following daily activites None of the above   Safety concerns in the home None of the above         3/8/2024   Dental   Dentist two times every year? Yes         3/8/2024   Hearing Screening   Hearing concerns? (!) IT'S HARD TO FOLLOW A CONVERSATION IN A NOISY RESTAURANT OR CROWDED ROOM.         3/8/2024   Driving Risk Screening   Patient/family members have concerns about driving No         3/8/2024   General Alertness/Fatigue Screening   Have you been more tired than usual lately? No         3/8/2024   Urinary Incontinence Screening   Bothered by leaking urine in past 6 months Yes         3/8/2024   TB Screening   Were you born outside of [place]?  No     Today's PHQ-2 Score:       3/14/2024     9:38 AM   PHQ-2 ( 1999 Pf[employer])   Q1: Little interest or pleasure in doing things 0   Q2: Feeling down, depressed or hopeless 0   PHQ-2 Score 0   Q1:  Little interest or pleasure in doing things Not at all   Q2: Feeling down, depressed or hopeless Not at all   PHQ-2 Score 0         3/8/2024   Substance Use   Alcohol more than 3/day or more than 7/wk No   Do you have a current opioid prescription? No   How severe/bad is pain from 1 to 10? 3/10   Do you use any other substances recreationally? No     Social History     Tobacco Use    Smoking status: Former     Packs/day: 1.50     Years: 36.00     Additional pack years: 0.00     Total pack years: 54.00     Types: Cigarettes     Start date: 1972     Quit date: 3/1/2008     Years since quittin.0     Passive exposure: Never    Smokeless tobacco: Never   Vaping Use    Vaping Use: Never used   Substance Use Topics    Alcohol use: Yes    Drug use: No         2024   LAST FHS-7 RESULTS   1st degree relative breast or ovarian cancer Yes   Any relative bilateral breast cancer No   Any male have breast cancer No   Any ONE woman have BOTH breast AND ovarian cancer Yes   Any woman with breast cancer before 50yrs Yes   2 or more relatives with breast AND/OR ovarian cancer No   2 or more relatives with breast AND/OR bowel cancer No     Mammogram Screening - Mammogram every 1-2 years updated in Health Maintenance based on mutual decision making    ASCVD Risk   The 10-year ASCVD risk score (Nanda DK, et al., 2019) is: 9.7%    Values used to calculate the score:      Age: 68 years      Sex: Female      Is Non- : No      Diabetic: No      Tobacco smoker: No      Systolic Blood Pressure: 142 mmHg      Is BP treated: Yes      HDL Cholesterol: 80 mg/dL      Total Cholesterol: 188 mg/dL    Reviewed and updated as needed this visit by Provider   Tobacco   Meds  Problems  Med Hx   Fam Hx  Soc Hx Sexual Activity        Current providers sharing in care for this patient include:  Patient Care Team:  Leonor uMir MD as PCP - General (Family Practice)  Leonor Muir MD as Assigned  "PCP  Anahy Nelson PA-C as Physician Assistant (Dermatology)  Rey Boothe MD as MD (Gastroenterology)  Sol Bianchi PA-C as Assigned Surgical Provider    The following health maintenance items are reviewed in Epic and correct as of today:  Health Maintenance   Topic Date Due    COVID-19 Vaccine (5 - 2023-24 season) 09/01/2023    BMP  03/09/2024    LIPID  03/09/2024    MICROALBUMIN  03/09/2024    ANNUAL REVIEW OF HM ORDERS  03/13/2024    MEDICARE ANNUAL WELLNESS VISIT  03/13/2024    RSV VACCINE (Pregnancy & 60+) (1 - 1-dose 60+ series) 11/01/2024 (Originally 8/21/2016)    MAMMO SCREENING  02/07/2025    FALL RISK ASSESSMENT  03/15/2025    GLUCOSE  03/09/2026    COLORECTAL CANCER SCREENING  03/14/2027    ADVANCE CARE PLANNING  03/13/2028    DTAP/TDAP/TD IMMUNIZATION (4 - Td or Tdap) 01/16/2033    DEXA  02/14/2037    HEPATITIS C SCREENING  Completed    PHQ-2 (once per calendar year)  Completed    INFLUENZA VACCINE  Completed    Pneumococcal Vaccine: 65+ Years  Completed    ZOSTER IMMUNIZATION  Completed    IPV IMMUNIZATION  Aged Out    HPV IMMUNIZATION  Aged Out    MENINGITIS IMMUNIZATION  Aged Out    RSV MONOCLONAL ANTIBODY  Aged Out    PAP  Discontinued    LUNG CANCER SCREENING  Discontinued       Review of Systems  Constitutional, HEENT, cardiovascular, pulmonary, gi and gu systems are negative, except as otherwise noted.     Objective    Exam  /85   Pulse 61   Temp 98  F (36.7  C) (Tympanic)   Resp 16   Ht 1.645 m (5' 4.75\")   Wt 86.2 kg (190 lb)   SpO2 98%   BMI 31.86 kg/m     Estimated body mass index is 31.86 kg/m  as calculated from the following:    Height as of this encounter: 1.645 m (5' 4.75\").    Weight as of this encounter: 86.2 kg (190 lb).    Physical Exam  GENERAL: alert and no distress  EYES: Eyes grossly normal to inspection, PERRL and conjunctivae and sclerae normal  HENT: ear canals and TM's normal, nose and mouth without ulcers or lesions  NECK: no " adenopathy, no asymmetry, masses, or scars  RESP: lungs clear to auscultation - no rales, rhonchi or wheezes  CV: regular rate and rhythm, normal S1 S2, no S3 or S4, no murmur, click or rub, no peripheral edema  ABDOMEN: soft, nontender, no hepatosplenomegaly, no masses and bowel sounds normal  MS: no gross musculoskeletal defects noted, no edema  SKIN: no suspicious lesions or rashes  NEURO: Normal strength and tone, mentation intact and speech normal  PSYCH: mentation appears normal, affect normal/bright        3/15/2024   Mini Cog   Clock Draw Score 2 Normal   3 Item Recall 3 objects recalled   Mini Cog Total Score 5        Signed Electronically by: LESLIE Pickett CNP

## 2024-03-16 LAB
ANION GAP SERPL CALCULATED.3IONS-SCNC: 12 MMOL/L (ref 7–15)
BUN SERPL-MCNC: 15.7 MG/DL (ref 8–23)
CALCIUM SERPL-MCNC: 9.6 MG/DL (ref 8.8–10.2)
CHLORIDE SERPL-SCNC: 104 MMOL/L (ref 98–107)
CHOLEST SERPL-MCNC: 184 MG/DL
CREAT SERPL-MCNC: 0.74 MG/DL (ref 0.51–0.95)
CREAT UR-MCNC: 129 MG/DL
DEPRECATED HCO3 PLAS-SCNC: 25 MMOL/L (ref 22–29)
EGFRCR SERPLBLD CKD-EPI 2021: 88 ML/MIN/1.73M2
FASTING STATUS PATIENT QL REPORTED: NORMAL
GLUCOSE SERPL-MCNC: 85 MG/DL (ref 70–99)
HDLC SERPL-MCNC: 77 MG/DL
LDLC SERPL CALC-MCNC: 97 MG/DL
MICROALBUMIN UR-MCNC: <12 MG/L
MICROALBUMIN/CREAT UR: NORMAL MG/G{CREAT}
NONHDLC SERPL-MCNC: 107 MG/DL
POTASSIUM SERPL-SCNC: 4.4 MMOL/L (ref 3.4–5.3)
SODIUM SERPL-SCNC: 141 MMOL/L (ref 135–145)
TRIGL SERPL-MCNC: 52 MG/DL

## 2024-03-18 RX ORDER — ASPIRIN 81 MG/1
81 TABLET ORAL DAILY
Qty: 30 TABLET | Refills: 3 | Status: SHIPPED | OUTPATIENT
Start: 2024-03-18

## 2024-03-18 RX ORDER — ASPIRIN 81 MG/1
81 TABLET ORAL DAILY
COMMUNITY
End: 2024-03-18

## 2024-04-15 NOTE — PROGRESS NOTES
PHYSICAL THERAPY EVALUATION  Type of Visit: Evaluation    Patient Name: Nilda Amaro, 68 year old, female  Todays Date: Apr 24, 2024    Referral Diagnosis:  Urinary, incontinence, stress female    Relevant Medical History:   Patient Active Problem List   Diagnosis    Obesity, Class I, BMI 30-34.9    Chronic GERD    Primary osteoarthritis of both knees    Change in color of pigmented skin lesion, left neck, 2mm    Family history of breast cancer in sister    Hypertension goal BP (blood pressure) < 140/90    Intertriginous candidiasis    Hearing deficit, bilateral    Family history of Alzheimer's disease    Tubular adenoma of colon    Coronary artery calcification seen on CAT scan    Fatty liver    Hyperlipidemia LDL goal <100    Aortic atherosclerosis (H24)    Hepatic cyst    Bernal's esophagus without dysplasia    Former smoker    Osteopenia of multiple sites    Ascending aorta dilatation (H24)    Urinary, incontinence, stress female    JASPER (obstructive sleep apnea) - mild (AHI 12)   Hx of PT for the foot         Subjective:    Presenting condition or subjective complaint:        Pt endorses urinary leakage. She notes this has been going on for years and now she has to go to the bathroom all the time now.       She notes lower back and hip pain but is wondering is that is from her feet.       Date of onset:  passage of time: many years}       Mechanism of Injury/Issue: none, insidious onset    Prior therapy history for the same diagnosis, illness or injury:    none     Other related Services: Other Outpatient Physical Therapy        PAIN:     Mechanism of Injury/Issue: insidious     Changes in symptoms since onset: stable    Effects on Sleep: in the mornings needs to get up     Aggravating Factors: standing long periods, sitting long periods, lifting     Easing Factors: stretching           Pregnancy and Birth History:    Obstetric History    G  3 and P  2    Number of C-sections: 0    Number vaginal delivery:  2    Birth Injury? Did have tearing with her son, she notes lot of stitches, did episiotomy, as well as tearing with her daughter     Hormone Medications:No    Pain with Pelvic Exams:No     Regular Menstrual Cycle: None       Pelvic Floor Complaints    Urinary Incontinence: Urinary Leakage: Yes. Urinary Incontinence: Stress and Urge. Frequency of Leakage: Daily or almost daily, Amount of Leakage: Variable from small to moderate, Leakage Occurs With: Leakage occurs with: coughing, sneezing, laughing, lifting, with urge, with a full bladder, walking to the bathroom, Pad Use: 3 per day     Urinary Urgency/Frequency: Pt sometimes leaks on the way to the toilet      Voiding Habits: Voids Per Day: 6-7  Night time Voids: 1-2       Voiding Symptoms: Need to immediately revoid Maladaptive Bladder Habits: Maladaptive Bladder Habits: Just in Case Voids, Decreasing Fluids, Toilet Mapping, and Waiting too Long (intentional or intentional)      Fluid intake per day: ~32-64 ounces    Caffeine: Coffee   Carbonated Beverages: None          Sexual Function History:    Sexually active:  Yes      Pain with Penetration: Yes: Marinoff Scale: 1- Discomfort that does not affect completion     Pain is with: Initial      Pelvic Floor Heaviness: No  Prolapse Symptoms: No symptoms      Bowel History:  Frequency of bowel movement:  1 x/day      Fecal Incontinence : No    Other bowel issues:  none       Occupation: retired, does work part time,      Recreational Activities and exercise: golf, walking, biking, scrapbooking, cards,     Social History: house, has stairs, only leaks if she has to go really bad     Abuse History: none     Goal for Therapy: improve leaking     Review of Systems: negative      Discussed reason for referral regarding pelvic health needs and external/internal pelvic floor muscle examination with patient/guardian.  Opportunity provided to ask questions and verbal consent for assessment and intervention was  given.      Objective:       PELVIC EXAM    External Visual Inspection:      Active Pelvic Floor Muscles:    Voluntary contraction: Present and Unsustained    Voluntary Relaxation: Absent and Unsustained    Cough( Involuntary Contraction): Absent      Integumentary:     Tissue Concerns: atrophic    Introitus: Loose    ]      Internal Digital Palpation:    Muscles Assessed Comments   1st Layer Urogenital Triangle(ischiocavernosus, Bulbospongiosus, Superficial Transverse Perineal Muscles) Soft tissues, inconsistent contraction, breath holding   Levator Ani (pubococcygeus, iliococcygeus, puborectalis)   Soft tissues,    Obturator Internus  Non tender          Response to Bearing Down :     Response to Bearing Down: NT      Strength of Pelvic Floor Musculature:     Laycock's Modified Oxford Scale Score: Pelvic Floor Strength: pelvic MMT: 1    Grade / MMT   Descriptions    0  / Absent       No palpable contraction    1 /  Trace          Trace flicker or pulse    2 / Poor            Poor contraction and no lift    3 /  Fair Moderate contraction and posterior wall lift > anterior wall and side walls pressure    4 / Good           Good contraction with displacement of all (posterior, anterior and side) walls.    5 /  Strong        Full  circumference of finger compressed and cephalic lift with resistance    Compensations: Breath holding    Endurance: Can maintain contraction for 0-1 sec     Patient is able to perform 3-4 quick contractions in 10 seconds.     HEP:     Access Code: [email or web address]  URL: [email or web address]  Date: 04/24/2024  Prepared by:     Exercises  - Seated Pelvic Floor Contraction  - 1 x daily - 7 x weekly - 3-4 sets - 10 reps - 1 hold  - Seated Quick Flick Pelvic Floor Contractions  - 1 x daily - 7 x weekly - 3 sets - 5 reps    Assessment & Plan   CLINICAL IMPRESSIONS  Medical Diagnosis: Urinary incontinence    Treatment Diagnosis: Mixed incontinence   Impression/Assessment: Patient is a 68 year old  female with mixed incontinence complaints.  The following significant findings have been identified: Decreased strength and Impaired muscle performance. These impairments interfere with their ability to perform self care tasks, work tasks, and recreational activities as compared to previous level of function.     Clinical Decision Making (Complexity):  Clinical Presentation: Stable/Uncomplicated  Clinical Presentation Rationale: based on medical and personal factors listed in PT evaluation  Clinical Decision Making (Complexity): Low complexity    PLAN OF CARE  Treatment Interventions:  Interventions: Gait Training, Manual Therapy, Neuromuscular Re-education, Therapeutic Activity, Therapeutic Exercise, Self-Care/Home Management    Long Term Goals     PT Goal 1  Goal Identifier: Urinary Leakage  Goal Description: Pt will report 95% improvement with leaking while walking to the bathroomwith urge  Rationale: to maximize safety and independence with performance of ADLs and functional tasks;to maximize safety and independence within the home;to maximize safety and independence within the community;to maximize safety and independence with self cares  Target Date: 07/23/24      Frequency of Treatment: 1x a week for 3-4 weeks then monthly follow ups  Duration of Treatment: 90 days    Recommended Referrals to Other Professionals:  None  Education Assessment:   Learner/Method: Patient;Pictures/Video    Risks and benefits of evaluation/treatment have been explained.   Patient/Family/caregiver agrees with Plan of Care.     Evaluation Time:     PT Eval, Low Complexity Minutes (52717): 40       Signing Clinician: Ralph Wilson PT      [facility]                                                                                   OUTPATIENT PHYSICAL THERAPY      PLAN OF TREATMENT FOR OUTPATIENT REHABILITATION   Patient's Last Name, First Name, Nilda Lott YOB: 1955    Provider's Name   [facility]   Medical Record No.  [id number]     Onset Date: 03/15/24 (Date of Referral)  Start of Care Date: 04/24/24     Medical Diagnosis:  Urinary incontinence      PT Treatment Diagnosis:  Mixed incontinence Plan of Treatment  Frequency/Duration: 1x a week for 3-4 weeks then monthly follow ups/ 90 days    Certification date from 04/24/24 to 07/23/24         See note for plan of treatment details and functional goals     Ralph Wilson, PT                         I CERTIFY THE NEED FOR THESE SERVICES FURNISHED UNDER        THIS PLAN OF TREATMENT AND WHILE UNDER MY CARE     (Physician attestation of this document indicates review and certification of the therapy plan).              Referring Provider:  Sunshine Sotelo    Initial Assessment  See Epic Evaluation- Start of Care Date: 04/24/24

## 2024-04-16 ENCOUNTER — MYC MEDICAL ADVICE (OUTPATIENT)
Dept: FAMILY MEDICINE | Facility: CLINIC | Age: 69
End: 2024-04-16
Payer: COMMERCIAL

## 2024-04-17 ASSESSMENT — ACTIVITIES OF DAILY LIVING (ADL)
HEAVY_WORK: SLIGHT DIFFICULTY
SWELLING: THE SYMPTOM AFFECTS MY ACTIVITY SLIGHTLY
GOING_DOWN_1_FLIGHT_OF_STAIRS: MODERATE DIFFICULTY
CARRYING_A_HEAVY_OBJECT_OF_10_POUNDS: 8
WASHING_YOUR_BACK: 7
WALKING_UP_STEEP_HILLS: SLIGHT DIFFICULTY
RECREATIONAL_ACTIVITIES: SLIGHT DIFFICULTY
CUTTING/LATERAL_MOVEMENTS: SLIGHT DIFFICULTY
SITTING FOR 15 MINUTES: NO DIFFICULTY AT ALL
STANDING_FOR_15_MINUTES: SLIGHT DIFFICULTY
GETTING INTO AND OUT OF AN AVERAGE CAR: SLIGHT DIFFICULTY
GOING UP 1 FLIGHT OF STAIRS: MODERATE DIFFICULTY
ROLLING OVER IN BED: SLIGHT DIFFICULTY
ABILITY_TO_PERFORM_ACTIVITY_WITH_YOUR_NORMAL_TECHNIQUE: SLIGHT DIFFICULTY
LIGHT_TO_MODERATE_WORK: SLIGHT DIFFICULTY
PUSHING_WITH_THE_INVOLVED_ARM: 6
KNEE_ACTIVITY_OF_DAILY_LIVING_SCORE: 72.86
HOW_WOULD_YOU_RATE_THE_CURRENT_FUNCTION_OF_YOUR_KNEE_DURING_YOUR_USUAL_DAILY_ACTIVITIES_ON_A_SCALE_FROM_0_TO_100_WITH_100_BEING_YOUR_LEVEL_OF_KNEE_FUNCTION_PRIOR_TO_YOUR_INJURY_AND_0_BEING_THE_INABILITY_TO_PERFORM_ANY_OF_YOUR_USUAL_DAILY_ACTIVITIES?: 45
HOW_WOULD_YOU_RATE_YOUR_CURRENT_LEVEL_OF_FUNCTION?: ABNORMAL
RUNNING_ONE_MILE: UNABLE TO DO
REMOVING_SOMETHING_FROM_YOUR_BACK_POCKET: 3
STEPPING UP AND DOWN CURBS: SLIGHT DIFFICULTY
HOS_ADL_HIGHEST_POTENTIAL_SCORE: 68
GO DOWN STAIRS: ACTIVITY IS MINIMALLY DIFFICULT
SPORTS_HIGHEST_POTENTIAL_SCORE: 36
HOW_WOULD_YOU_RATE_THE_OVERALL_FUNCTION_OF_YOUR_KNEE_DURING_YOUR_USUAL_DAILY_ACTIVITIES?: ABNORMAL
AT_ITS_WORST?: 5
TWISTING/PIVOTING_ON_INVOLVED_LEG: SLIGHT DIFFICULTY
STANDING FOR 15 MINUTES: SLIGHT DIFFICULTY
WALKING_APPROXIMATELY_10_MINUTES: SLIGHT DIFFICULTY
GIVING WAY, BUCKLING OR SHIFTING OF KNEE: THE SYMPTOM AFFECTS MY ACTIVITY SLIGHTLY
ADL_HIGHEST_POTENTIAL_SCORE: 68
ROLLING_OVER_IN_BED: SLIGHT DIFFICULTY
GIVING WAY, BUCKLING OR SHIFTING OF KNEE: THE SYMPTOM AFFECTS MY ACTIVITY SLIGHTLY
STAND: ACTIVITY IS MINIMALLY DIFFICULT
WALKING_UP_STEEP_HILLS: SLIGHT DIFFICULTY
WALKING_INITIALLY: NO DIFFICULTY AT ALL
WALKING_15_MINUTES_OR_GREATER: SLIGHT DIFFICULTY
LOW_IMPACT_ACTIVITIES_LIKE_FAST_WALKING: SLIGHT DIFFICULTY
PAIN: I HAVE THE SYMPTOM BUT IT DOES NOT AFFECT MY ACTIVITY
WALKING_15_MINUTES_OR_GREATER: SLIGHT DIFFICULTY
STIFFNESS: THE SYMPTOM AFFECTS MY ACTIVITY SLIGHTLY
WEAKNESS: THE SYMPTOM AFFECTS MY ACTIVITY SLIGHTLY
PUTTING ON SOCKS AND SHOES: SLIGHT DIFFICULTY
SIT WITH YOUR KNEE BENT: ACTIVITY IS MINIMALLY DIFFICULT
WEAKNESS: THE SYMPTOM AFFECTS MY ACTIVITY SLIGHTLY
HOW_WOULD_YOU_RATE_YOUR_CURRENT_LEVEL_OF_FUNCTION_DURING_YOUR_USUAL_ACTIVITIES_OF_DAILY_LIVING_FROM_0_TO_100_WITH_100_BEING_YOUR_LEVEL_OF_FUNCTION_PRIOR_TO_YOUR_HIP_PROBLEM_AND_0_BEING_THE_INABILITY_TO_PERFORM_ANY_OF_YOUR_USUAL_DAILY_ACTIVITIES?: 50
RECREATIONAL ACTIVITIES: SLIGHT DIFFICULTY
WALKING_FOR_APPROXIMATELY_10_MINUTES: SLIGHT DIFFICULTY
GO UP STAIRS: ACTIVITY IS MINIMALLY DIFFICULT
DEEP SQUATTING: SLIGHT DIFFICULTY
KNEEL ON THE FRONT OF YOUR KNEE: ACTIVITY IS MINIMALLY DIFFICULT
REACHING_FOR_SOMETHING_ON_A_HIGH_SHELF: 5
PLACING_AN_OBJECT_ON_A_HIGH_SHELF: 8
LANDING: SLIGHT DIFFICULTY
AS_A_RESULT_OF_YOUR_KNEE_INJURY,_HOW_WOULD_YOU_RATE_YOUR_CURRENT_LEVEL_OF_DAILY_ACTIVITY?: NEARLY NORMAL
PUTTING_ON_SOCKS_AND_SHOES: SLIGHT DIFFICULTY
PLEASE_INDICATE_YOR_PRIMARY_REASON_FOR_REFERRAL_TO_THERAPY:: SHOULDER
ADL_TOTAL_ITEM_SCORE: 0
HOS_ADL_SCORE(%): 73.53
GO UP STAIRS: ACTIVITY IS MINIMALLY DIFFICULT
WALKING_DOWN_STEEP_HILLS: SLIGHT DIFFICULTY
LIMPING: THE SYMPTOM AFFECTS MY ACTIVITY SLIGHTLY
LIMPING: THE SYMPTOM AFFECTS MY ACTIVITY SLIGHTLY
TWISTING/PIVOTING ON INVOLVED LEG: SLIGHT DIFFICULTY
KNEEL ON THE FRONT OF YOUR KNEE: ACTIVITY IS MINIMALLY DIFFICULT
HEAVY_WORK: SLIGHT DIFFICULTY
GETTING_INTO_AND_OUT_OF_A_BATHTUB: SLIGHT DIFFICULTY
GO DOWN STAIRS: ACTIVITY IS MINIMALLY DIFFICULT
SQUAT: ACTIVITY IS MINIMALLY DIFFICULT
SWINGING_OBJECTS_LIKE_A_GOLF_CLUB: SLIGHT DIFFICULTY
AS_A_RESULT_OF_YOUR_KNEE_INJURY,_HOW_WOULD_YOU_RATE_YOUR_CURRENT_LEVEL_OF_DAILY_ACTIVITY?: NEARLY NORMAL
WHEN_LYING_ON_THE_INVOLVED_SIDE: 7
STARTING_AND_STOPPING_QUICKLY: SLIGHT DIFFICULTY
STAND: ACTIVITY IS MINIMALLY DIFFICULT
PLEASE_INDICATE_YOR_PRIMARY_REASON_FOR_REFERRAL_TO_THERAPY:: HIP
HOS_ADL_ITEM_SCORE_TOTAL: 50
ABILITY_TO_PARTICIPATE_IN_YOUR_DESIRED_SPORT_AS_LONG_AS_YOU_WOULD_LIKE: MODERATE DIFFICULTY
HOW_WOULD_YOU_RATE_YOUR_CURRENT_LEVEL_OF_FUNCTION_DURING_YOUR_SPORTS_RELATED_ACTIVITIES_FROM_0_TO_100_WITH_100_BEING_YOUR_LEVEL_OF_FUNCTION_PRIOR_TO_YOUR_HIP_PROBLEM_AND_0_BEING_THE_INABILITY_TO_PERFORM_ANY_OF_YOUR_USUAL_DAILY_ACTIVITIES?: 45
GETTING_INTO_AND_OUT_OF_AN_AVERAGE_CAR: SLIGHT DIFFICULTY
LIGHT_TO_MODERATE_WORK: SLIGHT DIFFICULTY
HOW_WOULD_YOU_RATE_THE_OVERALL_FUNCTION_OF_YOUR_KNEE_DURING_YOUR_USUAL_DAILY_ACTIVITIES?: ABNORMAL
STEPPING_UP_AND_DOWN_CURBS: SLIGHT DIFFICULTY
SITTING_FOR_15_MINUTES: NO DIFFICULTY AT ALL
JUMPING: UNABLE TO DO
SQUAT: ACTIVITY IS MINIMALLY DIFFICULT
PUTTING_ON_AN_UNDERSHIRT_OR_A_PULLOVER_SWEATER: 4
HOW_WOULD_YOU_RATE_THE_CURRENT_FUNCTION_OF_YOUR_KNEE_DURING_YOUR_USUAL_DAILY_ACTIVITIES_ON_A_SCALE_FROM_0_TO_100_WITH_100_BEING_YOUR_LEVEL_OF_KNEE_FUNCTION_PRIOR_TO_YOUR_INJURY_AND_0_BEING_THE_INABILITY_TO_PERFORM_ANY_OF_YOUR_USUAL_DAILY_ACTIVITIES?: 45
SIT WITH YOUR KNEE BENT: ACTIVITY IS MINIMALLY DIFFICULT
GOING DOWN 1 FLIGHT OF STAIRS: MODERATE DIFFICULTY
DEEP_SQUATTING: SLIGHT DIFFICULTY
WALKING_DOWN_STEEP_HILLS: SLIGHT DIFFICULTY
SPORTS_COUNT: 9
WALKING_INITIALLY: NO DIFFICULTY AT ALL
ADL_SCORE(%): 0
RISE FROM A CHAIR: ACTIVITY IS MINIMALLY DIFFICULT
STIFFNESS: THE SYMPTOM AFFECTS MY ACTIVITY SLIGHTLY
ADL_COUNT: 17
RISE FROM A CHAIR: ACTIVITY IS MINIMALLY DIFFICULT
SPORTS_TOTAL_ITEM_SCORE: 0
WALK: ACTIVITY IS MINIMALLY DIFFICULT
GETTING_INTO_AND_OUT_OF_A_BATHTUB: SLIGHT DIFFICULTY
GOING_UP_1_FLIGHT_OF_STAIRS: MODERATE DIFFICULTY
PAIN: I HAVE THE SYMPTOM BUT IT DOES NOT AFFECT MY ACTIVITY
KNEE_ACTIVITY_OF_DAILY_LIVING_SUM: 51
RAW_SCORE: 51
SPORTS_SCORE(%): 0
WALK: ACTIVITY IS MINIMALLY DIFFICULT
PLEASE_INDICATE_YOR_PRIMARY_REASON_FOR_REFERRAL_TO_THERAPY:: KNEE
HOW_WOULD_YOU_RATE_YOUR_CURRENT_LEVEL_OF_FUNCTION_DURING_YOUR_USUAL_ACTIVITIES_OF_DAILY_LIVING_FROM_0_TO_100_WITH_100_BEING_YOUR_LEVEL_OF_FUNCTION_PRIOR_TO_YOUR_HIP_PROBLEM_AND_0_BEING_THE_INABILITY_TO_PERFORM_ANY_OF_YOUR_USUAL_DAILY_ACTIVITIES?: 50
SWELLING: THE SYMPTOM AFFECTS MY ACTIVITY SLIGHTLY

## 2024-04-18 ENCOUNTER — ANCILLARY PROCEDURE (OUTPATIENT)
Dept: CT IMAGING | Facility: CLINIC | Age: 69
End: 2024-04-18
Payer: COMMERCIAL

## 2024-04-18 ENCOUNTER — ANCILLARY PROCEDURE (OUTPATIENT)
Dept: CARDIOLOGY | Facility: CLINIC | Age: 69
End: 2024-04-18
Payer: COMMERCIAL

## 2024-04-18 DIAGNOSIS — I77.810 ASCENDING AORTA DILATATION (H): Chronic | ICD-10-CM

## 2024-04-18 DIAGNOSIS — Z12.2 ENCOUNTER FOR SCREENING FOR LUNG CANCER: ICD-10-CM

## 2024-04-18 DIAGNOSIS — Z87.891 PERSONAL HISTORY OF NICOTINE DEPENDENCE: ICD-10-CM

## 2024-04-18 LAB — LVEF ECHO: NORMAL

## 2024-04-18 PROCEDURE — 93306 TTE W/DOPPLER COMPLETE: CPT | Performed by: INTERNAL MEDICINE

## 2024-04-18 PROCEDURE — 71271 CT THORAX LUNG CANCER SCR C-: CPT | Mod: GC | Performed by: RADIOLOGY

## 2024-04-19 DIAGNOSIS — R91.8 PULMONARY NODULES: Primary | ICD-10-CM

## 2024-04-22 ENCOUNTER — THERAPY VISIT (OUTPATIENT)
Dept: PHYSICAL THERAPY | Facility: CLINIC | Age: 69
End: 2024-04-22
Attending: PHYSICIAN ASSISTANT
Payer: COMMERCIAL

## 2024-04-22 DIAGNOSIS — M79.671 BILATERAL FOOT PAIN: ICD-10-CM

## 2024-04-22 DIAGNOSIS — M79.672 BILATERAL FOOT PAIN: ICD-10-CM

## 2024-04-22 PROCEDURE — 97161 PT EVAL LOW COMPLEX 20 MIN: CPT | Mod: GP | Performed by: PHYSICAL THERAPIST

## 2024-04-22 PROCEDURE — 97110 THERAPEUTIC EXERCISES: CPT | Mod: GP | Performed by: PHYSICAL THERAPIST

## 2024-04-22 PROCEDURE — 97140 MANUAL THERAPY 1/> REGIONS: CPT | Mod: GP | Performed by: PHYSICAL THERAPIST

## 2024-04-22 NOTE — PROGRESS NOTES
PHYSICAL THERAPY EVALUATION  Type of Visit: Evaluation    See electronic medical record for Abuse and Falls Screening details.    Subjective Patient reports a long history of foot pain >10 years. She reports no events preceding the onset. She reports the pain in generally over the forefoot on both feet. She reports having heel pain in the past, but that is not a problem now. 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 She reports walking >15 minutes will provoke her bilateral foot pain. She reports occasional tingling over her forefeet when she stand/walks for long periods of time.     She reports no history of trauma or surgeries to her feet.     She also reports occasional low back pain, right shoulder pain, and right hip pain. She reports having her right knee replaced in .     She currently walks for exercise 1 mile 1-2 days per week and golf 2 x per week in season and she uses a cart.         Presenting condition or subjective complaint: moving  in general  Date of onset: 24    Relevant medical history: Arthritis; Bladder or bowel problems; Change in skin color   Dates & types of surgery: knee replacement     Prior diagnostic imaging/testing results: X-ray     Prior therapy history for the same diagnosis, illness or injury: Yes Physical therapy    Prior Level of Function  Transfers:   Ambulation:   ADL:   IADL:     Living Environment  Social support:     Type of home: House   Stairs to enter the home: Yes 7 Is there a railing: Yes   Ramp: No   Stairs inside the home: Yes 7 Is there a railing: Yes   Help at home: None  Equipment owned:       Employment: No    Hobbies/Interests: [employer]ING [employer]IKING    Patient goals for therapy: exercise more frequently    Pain assessment: Location: left and right great toe/bilateral Metatarsal heads/Ratin/10     Objective   FOOT/ANKLE  EVALUATION    INTEGUMENTARY (edema, incisions): WNL  POSTURE: WNL  GAIT:   Weightbearing Status:   Assistive Device(s):   Gait Deviations:  decreased toe off at midstance    ROM:  WNL throughout bilateral ankles and feet except great toe extension on right 5 deg and great toe extension on right is <5 deg. Great toe flexion <5 deg bilaterally    STRENGTH:  grossly 4+/5 bilaterally  FLEXIBILITY:   SPECIAL TESTS: WNL  FUNCTIONAL TESTS:   PALPATION:  tenderness over the 1st MTP joints bilaterally. No other tenderness throughout the foot and ankle noted bilaterally  JOINT MOBILITY:  hypomobile great toe bilaterally    Assessment & Plan   CLINICAL IMPRESSIONS  Medical Diagnosis: Bilateral foot pain (M79.671, M79.672)    Treatment Diagnosis: bilateral foot pain   Impression/Assessment: Patient is a 68 year old female with bilateral foot pain complaints.  The following significant findings have been identified: Pain, Decreased ROM/flexibility, Decreased joint mobility, Decreased strength, and Decreased activity tolerance. These impairments interfere with their ability to perform self care tasks, work tasks, recreational activities, household chores, household mobility, and community mobility as compared to previous level of function.     Clinical Decision Making (Complexity):  Clinical Presentation: Stable/Uncomplicated  Clinical Presentation Rationale: based on medical and personal factors listed in PT evaluation  Clinical Decision Making (Complexity): Low complexity    PLAN OF CARE  Treatment Interventions:  Interventions: Manual Therapy, Neuromuscular Re-education, Therapeutic Activity, Therapeutic Exercise    Long Term Goals     PT Goal 1  Goal Identifier: standing  Goal Description: patient will be able to stand for >2 hours with 0/10 bilateral foot pain  Rationale: to maximize safety and independence with performance of ADLs and functional tasks;to maximize safety and independence within the home;to maximize safety  and independence within the community;to maximize safety and independence with transportation;to maximize safety and independence with self cares  Goal Progress: standign 15 minutes with 3/10 bilateral foot pain  Target Date: 06/17/24      Frequency of Treatment: 1x per week  Duration of Treatment: 8 weeks    Recommended Referrals to Other Professionals:   Education Assessment:   Learner/Method: Listening;Demonstration    Risks and benefits of evaluation/treatment have been explained.   Patient/Family/caregiver agrees with Plan of Care.     Evaluation Time:     PT Eval, Low Complexity Minutes (13368): 15       Signing Clinician: Dallas Mckeon, PT      [facility]                                                                                   OUTPATIENT PHYSICAL THERAPY      PLAN OF TREATMENT FOR OUTPATIENT REHABILITATION   Patient's Last Name, First Name, Nilda Lott YOB: 1955   Provider's Name   [facility]   Medical Record No.  [id number]     Onset Date: 02/05/24  Start of Care Date: 04/22/24     Medical Diagnosis:  Bilateral foot pain (M79.671, M79.672)      PT Treatment Diagnosis:  bilateral foot pain Plan of Treatment  Frequency/Duration: 1x per week/ 8 weeks    Certification date from 04/22/24 to 06/17/24         See note for plan of treatment details and functional goals     Dallas Mckeon, PT                         I CERTIFY THE NEED FOR THESE SERVICES FURNISHED UNDER        THIS PLAN OF TREATMENT AND WHILE UNDER MY CARE     (Physician attestation of this document indicates review and certification of the therapy plan).              Referring Provider:  Sol Bianchi    Initial Assessment  See Epic Evaluation- Start of Care Date: 04/22/24

## 2024-04-24 ENCOUNTER — THERAPY VISIT (OUTPATIENT)
Dept: PHYSICAL THERAPY | Facility: CLINIC | Age: 69
End: 2024-04-24
Payer: COMMERCIAL

## 2024-04-24 DIAGNOSIS — N39.3 URINARY, INCONTINENCE, STRESS FEMALE: ICD-10-CM

## 2024-04-24 PROCEDURE — 97161 PT EVAL LOW COMPLEX 20 MIN: CPT | Mod: GP

## 2024-04-24 PROCEDURE — 97110 THERAPEUTIC EXERCISES: CPT | Mod: GP

## 2024-04-29 ENCOUNTER — MYC MEDICAL ADVICE (OUTPATIENT)
Dept: FAMILY MEDICINE | Facility: CLINIC | Age: 69
End: 2024-04-29
Payer: COMMERCIAL

## 2024-04-29 NOTE — PROGRESS NOTES
Patient Name: Nilda Aamro, 68 year old, female  Todays Date: May 1, 2024    Referral Diagnosis:  Urinary Incontinence     Relevant Medical History:   Patient Active Problem List   Diagnosis    Obesity, Class I, BMI 30-34.9    Chronic GERD    Primary osteoarthritis of both knees    Change in color of pigmented skin lesion, left neck, 2mm    Family history of breast cancer in sister    Hypertension goal BP (blood pressure) < 140/90    Intertriginous candidiasis    Hearing deficit, bilateral    Family history of Alzheimer's disease    Tubular adenoma of colon    Coronary artery calcification seen on CAT scan    Fatty liver    Hyperlipidemia LDL goal <100    Aortic atherosclerosis (H24)    Hepatic cyst    Bernal's esophagus without dysplasia    Former smoker    Osteopenia of multiple sites    Ascending aorta dilatation (H24)    Urinary, incontinence, stress female    JASPER (obstructive sleep apnea) - mild (AHI 12)       Precautions/Limitations: none     Plan of Care through: 7/23/24          1 2 3 4 5 6 8   4/24/24 5/1/24            Subjective:     She notes first round of exercises went well. She notes the slower ones were hard. She notes the urge suppression went well.       Objective:     Inconsistent rib cage and abdomen movement with diaphragm breathing       Procedures:     Time Details of Activity      Therapeutic Exercise  Pt instructed in the following exercises in order to improve strength, ROM, improve tissue load tolerance.   38 Lateral band walk with kegel   Squat with kegel   Heel drop with kege  Reivew of urge suppression   Diaphragm breathing      Therapeutic Activity   Pt instructed in the following in order to improve functional performance in daily life and work life.        Neuromuscular Re-education  Pt instructed in the following in order to improve motor control, recruitment, sensorimotor control, with cueing.          Manual Therapy   Manual techniques performed to improve pain, ROM,  desensitization,          Goal:     Long Term Goals     PT Goal 1  Goal Identifier: Urinary Leakage  Goal Description: Pt will report 95% improvement with leaking while walking to the bathroomwith urge  Rationale: to maximize safety and independence with performance of ADLs and functional tasks;to maximize safety and independence within the home;to maximize safety and independence within the community;to maximize safety and independence with self cares  Goal Progress: See Note  Target Date: 07/23/24     Assessment and Goal Progress:       Pt is doing well with urge suppression with slightly less leaking. Pt did well today with progression of hip and pelvic stregnthening     Plan for next visit:     Pelvic floor relaxation, pelvic floor coordination, lateral band walks with band around feet     Home Exercise Program:     Access Code: [email or web address]  URL: [email or web address]  Date: 05/01/2024  Prepared by:     Exercises  - Seated Pelvic Floor Contraction  - 1 x daily - 7 x weekly - 3-4 sets - 10 reps - 1 hold  - Seated Quick Flick Pelvic Floor Contractions  - 1 x daily - 7 x weekly - 3 sets - 5 reps  - Supine Diaphragmatic Breathing  - 1 x daily - 7 x weekly - 3 sets - 4 reps  - Sidestepping with Pelvic Floor Contraction and Resistance  - 1 x daily - 3 x weekly - 6 sets - 2 reps  - Heel Drop  - 1 x daily - 3 x weekly - 3 sets - 5-6 reps  - Mini Squat with Pelvic Floor Contraction  - 1 x daily - 3 x weekly - 3 sets - 6 reps                  Ralph Wilson, PT, DPT OCS

## 2024-04-29 NOTE — TELEPHONE ENCOUNTER
Writer called the patient to review the provider's message regarding the lung CT.  Patient stated she had no other questions beyond what the provider wrote in the interpretation.  She verbalized understanding of the interpretation.      Kristina Kjellberg, MSN, RN

## 2024-04-30 ENCOUNTER — DOCUMENTATION ONLY (OUTPATIENT)
Dept: SLEEP MEDICINE | Facility: CLINIC | Age: 69
End: 2024-04-30
Payer: COMMERCIAL

## 2024-04-30 NOTE — PROGRESS NOTES
4/30/24- JL- EXPLAINED SHE WILL USE MORE WATER IN WINTER VS SUMMER DUE TO HUMIDITY. AMBIENT HUMIDITY IS MODERATE AND DEVICE IS PREFORMING AS EXPECTED. SHE IS USING N20 AND WE WENT OVER HOW TO ADJUST HEADGEAR BY CHECKS AND TEMPLES, SHE WILL USE MORE WATER ON DAYS WHEN MASK IS LEAKING. SHE STATES SHE WILL START TO ADJUST HEADGEAR EVERYTIME SHE PUTS IT ON.

## 2024-05-01 ENCOUNTER — THERAPY VISIT (OUTPATIENT)
Dept: PHYSICAL THERAPY | Facility: CLINIC | Age: 69
End: 2024-05-01
Payer: COMMERCIAL

## 2024-05-01 DIAGNOSIS — N39.3 URINARY, INCONTINENCE, STRESS FEMALE: Primary | ICD-10-CM

## 2024-05-01 PROCEDURE — 97110 THERAPEUTIC EXERCISES: CPT | Mod: GP

## 2024-05-06 ENCOUNTER — THERAPY VISIT (OUTPATIENT)
Dept: PHYSICAL THERAPY | Facility: CLINIC | Age: 69
End: 2024-05-06
Payer: COMMERCIAL

## 2024-05-06 DIAGNOSIS — M79.671 BILATERAL FOOT PAIN: Primary | ICD-10-CM

## 2024-05-06 DIAGNOSIS — M79.672 BILATERAL FOOT PAIN: Primary | ICD-10-CM

## 2024-05-06 PROCEDURE — 97112 NEUROMUSCULAR REEDUCATION: CPT | Mod: GP | Performed by: PHYSICAL THERAPIST

## 2024-05-06 PROCEDURE — 97110 THERAPEUTIC EXERCISES: CPT | Mod: GP | Performed by: PHYSICAL THERAPIST

## 2024-05-20 ENCOUNTER — TELEPHONE (OUTPATIENT)
Dept: SURGERY | Facility: CLINIC | Age: 69
End: 2024-05-20

## 2024-05-20 ENCOUNTER — OFFICE VISIT (OUTPATIENT)
Dept: SURGERY | Facility: CLINIC | Age: 69
End: 2024-05-20
Payer: COMMERCIAL

## 2024-05-20 VITALS
BODY MASS INDEX: 35.5 KG/M2 | DIASTOLIC BLOOD PRESSURE: 81 MMHG | HEART RATE: 53 BPM | WEIGHT: 188 LBS | SYSTOLIC BLOOD PRESSURE: 121 MMHG | HEIGHT: 61 IN | OXYGEN SATURATION: 95 %

## 2024-05-20 DIAGNOSIS — G47.33 OSA (OBSTRUCTIVE SLEEP APNEA): ICD-10-CM

## 2024-05-20 DIAGNOSIS — E66.812 CLASS 2 SEVERE OBESITY DUE TO EXCESS CALORIES WITH SERIOUS COMORBIDITY AND BODY MASS INDEX (BMI) OF 35.0 TO 35.9 IN ADULT (H): Primary | ICD-10-CM

## 2024-05-20 DIAGNOSIS — K76.0 FATTY LIVER: ICD-10-CM

## 2024-05-20 DIAGNOSIS — E78.5 HYPERLIPIDEMIA LDL GOAL <100: ICD-10-CM

## 2024-05-20 DIAGNOSIS — M79.672 BILATERAL FOOT PAIN: ICD-10-CM

## 2024-05-20 DIAGNOSIS — M79.671 BILATERAL FOOT PAIN: ICD-10-CM

## 2024-05-20 DIAGNOSIS — I10 HYPERTENSION GOAL BP (BLOOD PRESSURE) < 140/90: ICD-10-CM

## 2024-05-20 DIAGNOSIS — K21.9 CHRONIC GERD: ICD-10-CM

## 2024-05-20 DIAGNOSIS — I25.10 CORONARY ARTERY CALCIFICATION SEEN ON CAT SCAN: Chronic | ICD-10-CM

## 2024-05-20 DIAGNOSIS — E66.01 CLASS 2 SEVERE OBESITY DUE TO EXCESS CALORIES WITH SERIOUS COMORBIDITY AND BODY MASS INDEX (BMI) OF 35.0 TO 35.9 IN ADULT (H): Primary | ICD-10-CM

## 2024-05-20 PROCEDURE — 99214 OFFICE O/P EST MOD 30 MIN: CPT | Performed by: PHYSICIAN ASSISTANT

## 2024-05-20 RX ORDER — SEMAGLUTIDE 0.5 MG/.5ML
0.5 INJECTION, SOLUTION SUBCUTANEOUS WEEKLY
Qty: 2 ML | Refills: 1 | Status: SHIPPED | OUTPATIENT
Start: 2024-05-20 | End: 2024-05-23

## 2024-05-20 RX ORDER — SEMAGLUTIDE 0.25 MG/.5ML
0.25 INJECTION, SOLUTION SUBCUTANEOUS WEEKLY
Qty: 2 ML | Refills: 0 | Status: SHIPPED | OUTPATIENT
Start: 2024-05-20 | End: 2024-05-23

## 2024-05-20 RX ORDER — SEMAGLUTIDE 1 MG/.5ML
1 INJECTION, SOLUTION SUBCUTANEOUS WEEKLY
Qty: 2 ML | Refills: 1 | Status: SHIPPED | OUTPATIENT
Start: 2024-05-20 | End: 2024-05-23

## 2024-05-20 NOTE — PROGRESS NOTES
2024      Return Medical Weight Management Note     Nilda Amaro  MRN:  [id number]  :  1955    Assessment & Plan   Problem List Items Addressed This Visit       Hypertension goal BP (blood pressure) < 140/90 (Chronic)    Relevant Medications    Semaglutide-Weight Management (WEGOVY) 0.25 MG/0.5ML pen    Semaglutide-Weight Management (WEGOVY) 0.5 MG/0.5ML pen    Semaglutide-Weight Management (WEGOVY) 1 MG/0.5ML pen    Coronary artery calcification seen on CAT scan (Chronic)    Relevant Medications    Semaglutide-Weight Management (WEGOVY) 0.25 MG/0.5ML pen    Semaglutide-Weight Management (WEGOVY) 0.5 MG/0.5ML pen    Semaglutide-Weight Management (WEGOVY) 1 MG/0.5ML pen    Fatty liver (Chronic)    Relevant Medications    Semaglutide-Weight Management (WEGOVY) 0.25 MG/0.5ML pen    Semaglutide-Weight Management (WEGOVY) 0.5 MG/0.5ML pen    Semaglutide-Weight Management (WEGOVY) 1 MG/0.5ML pen    Hyperlipidemia LDL goal <100 (Chronic)    Relevant Medications    Semaglutide-Weight Management (WEGOVY) 0.25 MG/0.5ML pen    Semaglutide-Weight Management (WEGOVY) 0.5 MG/0.5ML pen    Semaglutide-Weight Management (WEGOVY) 1 MG/0.5ML pen    JASPER (obstructive sleep apnea) - mild (AHI 12) (Chronic)    Relevant Medications    Semaglutide-Weight Management (WEGOVY) 0.25 MG/0.5ML pen    Semaglutide-Weight Management (WEGOVY) 0.5 MG/0.5ML pen    Semaglutide-Weight Management (WEGOVY) 1 MG/0.5ML pen    Chronic GERD    Relevant Medications    Semaglutide-Weight Management (WEGOVY) 0.25 MG/0.5ML pen    Semaglutide-Weight Management (WEGOVY) 0.5 MG/0.5ML pen    Semaglutide-Weight Management (WEGOVY) 1 MG/0.5ML pen    Class 2 severe obesity due to excess calories with serious comorbidity in adult (H) - Primary     Healthy habits to assist with further weight loss were discussed. Nilda will try for Wegovy to see if covered under new Medicare coverage for Wegovy. Discussed off label [place] compounded semaglutide (not FDA  approved and w/o insurance) as another back option. Off label Naltrexone or Metformin discussed as other options.           Relevant Medications    Semaglutide-Weight Management (WEGOVY) 0.25 MG/0.5ML pen    Semaglutide-Weight Management (WEGOVY) 0.5 MG/0.5ML pen    Semaglutide-Weight Management (WEGOVY) 1 MG/0.5ML pen     Other Visit Diagnoses       Bilateral foot pain        Relevant Medications    Semaglutide-Weight Management (WEGOVY) 0.25 MG/0.5ML pen    Semaglutide-Weight Management (WEGOVY) 0.5 MG/0.5ML pen    Semaglutide-Weight Management (WEGOVY) 1 MG/0.5ML pen             AOM Considerations:  Phentermine:   History of CAD, avoid  Topiramate: H/O glaucoma:   No - caution runs in family/may be start           GLP-1: Do not appear covered by insurance               Naltrexone:     option   Wellbutrin:   H/O glaucoma:   No - caution runs in family/may be start          Metformin: Labs and hydration with lisinopril option but no preDM/mental health meds  Contrave: Does not appear covered by insurance  Qsymia: History of CAD, a void     Medicare patient  History of coronary artery calcification - discussed may meet criteria for Wegovy coverage w/medicare for cardiovascular benefit as well as weight management.     PATIENT INSTRUCTIONS:  Pt Instructions:  If approved, start Wegovy:  Start Wegovy (semaglutide)   0.25 mg once weekly for 4 weeks,   if tolerating increase to 0.5 mg weekly for 4 weeks,   if tolerating increase to 1 mg weekly    May use famotidine 20 mg twice daily as needed for nausea/heartburn when starting the medication.     If you get your medication pen and have questions about doing the injection - call or Mychart our office for the nurses to set up a virtual teaching session with you.        Goals:  Focus on healthy food choices - prioritizing protein and veggies in your meals. I recommend eating every 4-6 hours to reduce the risk of low blood sugars and try to minimize snacking between meals.  Stay well hydrated though the day as well.  Great job with exercising - please continue to invest in yourself with regular exercise. Continue to strive for a range for 150-300 minutes of exercise for weight maintenance and incorporating strength training twice a week to help preserve muscle!        Follow up:    Call [phone] to schedule next visit in 3-4 months     36 minutes spent on the date of the encounter doing chart review, history and exam, result review, counseling, developing plan of care, documentation, and further activities as noted      INTERVAL HISTORY:  Nilda returns for medical weight management follow up.  Last seen on 2/5/2024 by myself.  Considerations at that time were Zepbound versus naltrexone/Wellbutrin or metformin.    WEIGHT METRICS:  Body mass index is 35.23 kg/m .   Current Weight: 188 lb (85.3 kg)  Last Visits Weight: 193 lb (87.5 kg)  Initial Weight (lbs): 193.1 lbs  Cumulative weight loss (lbs): 5.1  Weight Loss Percentage: 2.64%    Wt Readings from Last 10 Encounters:   05/20/24 188 lb (85.3 kg)   03/15/24 190 lb (86.2 kg)   03/05/24 191 lb 9.6 oz (86.9 kg)   02/05/24 193 lb 1.6 oz (87.6 kg)   02/02/24 189 lb 3.2 oz (85.8 kg)   10/25/23 191 lb (86.6 kg)   10/23/23 192 lb (87.1 kg)   03/13/23 191 lb 3.2 oz (86.7 kg)   01/31/22 180 lb (81.6 kg)   04/23/21 179 lb (81.2 kg)                 5/13/2024     4:33 PM   Weight Loss Medication History Reviewed With Patient   If you are not taking a weight loss medication that was prescribed to you, please indicate why: The cost is too much for me           5/13/2024     4:33 PM   Changes and Difficulties   I have made the following changes to my diet since my last visit: Awareness   With regards to my diet, I am still struggling with: Sweets   I have made the following changes to my activity/exercise since my last visit: seeing a pt   With regards to my activity/exercise, I am still struggling with: Kneea and feet       LABS:  Reviewed in  "Epic    3/15/2024 BMP normal    BP Readings from Last 6 Encounters:   05/20/24 121/81   03/15/24 124/85   03/05/24 (!) 163/84   02/05/24 118/78   02/02/24 132/83   10/25/23 136/82       Pulse Readings from Last 6 Encounters:   05/20/24 53   03/15/24 61   03/05/24 60   02/05/24 54   02/02/24 58   10/25/23 59       PE:  /81   Pulse 53   Ht 5' 1.25\" (1.556 m)   Wt 188 lb (85.3 kg)   SpO2 95%   BMI 35.23 kg/m    GENERAL: Healthy, alert and no distress  EYES: Eyes grossly normal to inspection.    RESP: No audible wheeze, cough, or visible cyanosis.  No increased work of breathing.    SKIN: Visible skin clear. No significant rash, abnormal pigmentation or lesions.  NEURO: Mentation and speech appropriate for age.  PSYCH: Mentation appears normal, affect normal/bright, judgement and insight intact, normal speech and appearance well-groomed.      Sincerely,      Sol Bianchi PA-C       "

## 2024-05-20 NOTE — ASSESSMENT & PLAN NOTE
Healthy habits to assist with further weight loss were discussed. Nilda will try for Wegovy to see if covered under new Medicare coverage for Wegovy. Discussed off label [place] compounded semaglutide (not FDA approved and w/o insurance) as another back option. Off label Naltrexone or Metformin discussed as other options.

## 2024-05-20 NOTE — PATIENT INSTRUCTIONS
"Nice to talk with you today. Below is the plan discussed.-  Sol Bianchi PA-C     Pt Instructions:  If approved, start Wegovy:  Start Wegovy (semaglutide)   0.25 mg once weekly for 4 weeks,   if tolerating increase to 0.5 mg weekly for 4 weeks,   if tolerating increase to 1 mg weekly    May use famotidine 20 mg twice daily as needed for nausea/heartburn when starting the medication.     If you get your medication pen and have questions about doing the injection - call or Mychart our office for the nurses to set up a virtual teaching session with you.        Goals:  Focus on healthy food choices - prioritizing protein and veggies in your meals. I recommend eating every 4-6 hours to reduce the risk of low blood sugars and try to minimize snacking between meals. Stay well hydrated though the day as well.  Great job with exercising - please continue to invest in yourself with regular exercise. Continue to strive for a range for 150-300 minutes of exercise for weight maintenance and incorporating strength training twice a week to help preserve muscle!        Follow up:    Call [phone] to schedule next visit in 3-4 months     WEGOVY (semaglutide)      Wegovy (semaglutide) injection 2.4 mg is an injectable prescription medicine used for adults with obesity (BMI ?30) or overweight (excess weight) (BMI ?27) who also have weight-related medical problems to help them lose weight and keep the weight off.  It is a GLP-1 agonist medication. GLP1 agonists stimulate the hormone GLP-1 in your body to allow you to feel full quickly and stay full longer.    Due to the shortage, You may need to be persistent and patient to get these initial dosages due to the shortage.  Once you are able to obtain the 0.25 and 0.5 mg dose \"8 weeks of therapy\" you can begin treatment.     Directions:  Start Wegovy (semaglutide) 0.25 mg once weekly for 4 weeks, then if tolerating increase to 0.5 mg weekly for 4 weeks, then if tolerating increase to " 1 mg weekly for 4 weeks, then if tolerating increase to 1.7 mg weekly for 4 weeks, then if tolerating increase to 2.4 mg weekly thereafter.      -Each Wegovy pen is a once weekly single-dose prefilled pen with a pen injector already built within the pen. Discard the Wegovy pen after use in sharps container.     Common Side Effects:    nausea, headache, diarrhea, stomach upset.  If these become unmanageable call or mychart.    Serious Side effects:   Pancreatitis (inflammation of the pancreas) has been associated with this type of medication, but is very rare.Symptoms of pancreatitis include: Pain in your upper stomach area which may travel to your back and be worse after eating.     Storage:   Store the prescription in the refrigerator. Once it is time to use the Wegovy pen, you can keep the pen at room temperature and it is good for up to 28 days at room temperature.     How to inject:  For a video on how to use the pen please go to:  https://www.[employer]/about-wegovy/how-to-use-the-wegovy-pen.html#itemTwo       For any questions or concerns please send a [employer] [facility] message to our team or call  [phone].  For emergencies please 911 or seek immediate medical care.     There is a small chance you may have some low blood sugar after taking the medication.   The signs of low blood sugar are:  Weakness  Shaky   Hungry  Sweating  Confusion      See below for ways to treat low blood sugar without adding in lots of extra calories.      Treating Low Blood Sugar    If you have symptoms of low blood sugar (sweating, shaking, dizzy, confused) eat 15 grams of carbs and wait 15 minutes:    Glucose Tabs are best for sugars under 70 -  Dex4 or BD Glucose tablets are good, you will need to take 3-4 of these to equal 15 grams.     One small box of raisins  4 oz fruit juice box or   cup fruit juice  1 small apple  1 small banana    cup canned fruit in water    English muffin or a slice of bread with jelly   1 low fat frozen waffle  with sugar-free syrup    cup cottage cheese with   cup frozen or fresh blueberries  1 cup skim or low-fat milk    cup whole grain cereal  4-6 crackers such as Triscuits      This medication is usually not covered by insurance and can be quite expensive. Sometimes a prior authorization is required, which may take up to 1-2 weeks for an insurance company to make a decision if they will cover the medication. Please be patient, you will be notified after a decision has been made.    Contact the nurse via CME or call [phone] if you have any questions or concerns. (Do not stop taking it if you don't think it's working. For some people it works without them knowing it.)     In order to get refills of this or any medication we prescribe you must be seen in the medical weight mgmt clinic every 2-4 months.    Using Your Wegovy Pen  Medicine (semaglutide)    Storing your pens  Store your pens in the refrigerator until you are ready to use them. Don't let them freeze.  Your pen may be stored at room temperature for 28 days or fewer. Just make sure the temperature doesn't get higher than 86 or lower than 46 degrees Fahrenheit.   Protect the pens from light.  Never use any pens that have .    Check your pen before use:  The liquid in the pen window should be clear and colorless. Bubbles are okay to see.   Do not use the pen if you can see the window contains any specks or it is cloudy or has changed color.  Make sure you have the medication and dose your health care provider prescribed.    Getting ready to inject:   1. Wash and dry your hands well.  2. Make sure the counter you use to place your supplies on is clean.  3. Make sure your injection time will not be interrupted by children or pets.  4. Have alcohol wipes or alcohol and cotton balls available to clean the injection site.   5. Choose your injection site. It can be on your stomach, back of upper arms, or upper legs. Remember to change your injection site  each time you inject. Try to be at least 1 inch away from the previous one. Stay at least 1 inch away from your belly button.     Inject your dose:  1. Pull off the grey cap off the pen. Throw it in the trash. Do not put the cap back on the pen.   2. Clean the injection site with alcohol.   3. Push the grey part of the pen firmly against your skin. This will start the injection.  4. When the pen is injecting, you will hear 2 clicks. The first click tells you the injection has started. The second one tells you the injection is continuing.   5. The injection is done when the yellow bar in the glass window at the bottom of the pen has stopped moving.   6. This injection is given 1 day a week. The pens come in  5 doses ranging from 0.25 mg to 2.4 mg. Each dose comes in a different color pen.  7. If you miss a dose, take is as soon as you remember. Do not take a missed dose, if it is within 2 days of the next dose.    8. Your injection may be best tolerated if given at night.     Disposing of your pens:  Dispose of your pens in a puncture-resistant container (hard plastic bottle) or Sharps container.  Check with the county you live in on how to dispose of the container.  Do not recycle the container with used pens.     Of note, you may not be able to  your medication right away. It may require a prior authorization from your insurance company. This may take a week or more.             Compound Semaglutide at [facility]  [facility] is now offering compounded semaglutide during the time of Wegovy national shortage/limited supply. Semaglutide is the generic name of Wegovy. [place] is following the highest standards for sterility and compounding practices. Not all compounding practices are equal. Therefore, [facility] will not be prescribing compounded semaglutide outside of the [facility]. Compounding of semaglutide is legal for as  long as Wegovy is on the FDA's national shortage list. When/if Wegovy is taken off the FDA's shortage list, compounded semaglutide will no longer be legal to manufacture. When this occurs, patients will have to turn to acquiring Wegovy via its available manufactured pen, look into alternative weight loss medication(s), or stop the medication. Compound semaglutide will be available as a pre-filled syringe. Due to high demand of compounded semaglutide, orders may take 1-2 weeks to obtain from time of prescribing. Each dose of the medication will require a separate prescription.     As with any weight loss medication(s), there is a risk of weight regain should you stop semaglutide. It is important to be aware of this risk should you stop compounded semaglutide with no plans to transition back to an alternative injectable option as the use of semaglutide is intended for long term weight management with the intention of remaining on this injectable long term.        Obtaining Medication and Storage:   The pharmacy must speak to the patient directly prior to shipping medication to walk through administration, shipping and cost. Pre-filled syringes of compounded semaglutide and needles will be mailed from the compounding pharmacy to your home in a refrigerated box. The pre-filled syringes should be stored in the refrigerator until time of injection. The medication is good for at least 30 days in refrigerator.     Dosing:  Week 1-4: Inject 0.25 mg subcutaneously once weekly  Week 5-8: If tolerating, increase to 0.5 mg once weekly  Week 9-12: If tolerating, increase to 1 mg once weekly  Week 13-15: If tolerating, increase to 1.5 mg once weekly  Week 16-19: If tolerating, increase to 2 mg once weekly  Week 20 & on: If tolerating, increase to 2.5 mg once weekly   *If you are having some nausea or other side effects to where you are hesitant to move up to the next dose, stay at the same dose you are on for an additional 4 weeks  to see if side effect(s) improves/resolves. Make sure to take this time to hydrate and utilizing smaller more consistent meals, such as 4-6 small meals per day.  It may be advantageous to stay at the same dose if you are seeing good efficacy (both on and off the scale) and having minimal/manageable side effects. If you do not have additional refills on that dose's prescription, please reach out to the clinic.    Common Side Effects:  Side effect profile is the same as Wegovy. Monitor for nausea, diarrhea, constipation, headache, indigestion, tiredness (fatigue), stomach upset or abdominal pain. Less commonly, semaglutide can cause low blood sugar (symptoms: shaky, dizzy, sweaty, agitation). Please reach out to the care team should you feel like this is occurring. It is important to ensure that you are eating consistent meals and not skipping meals. Ensure you are getting at least 64 oz water daily. If any side effects become unmanageable, contact the care team immediately.    Administration:  Wash your hands.   Obtain compound semaglutide syringe, needle and alcohol swab. Remove pre-filled syringe from refrigerator. Keep all other unused syringes in the refrigerator until time of use.   Inspect the syringe to ensure medication in syringe is clear/colorless and the clear shell cap on top of red syringe cap is intact.  Unlock the cap by pulling the clear outer shell straight off and remove the red syringe cap by twisting counter clockwise.  Attach needle to syringe by twisting needle onto syringe clockwise. Do not remove needle cap.   Choose injection site. Clean injection site with alcohol swab.    Appropriate areas of injection are: abdomen (at least 2 inches away from belly button) or front middle thigh.   Remove needle cap from syringe/needle.   Hold the syringe like a pencil. Insert the needle into skin at a 90-degree angle.  Using your pointer finger, push the syringe plunger down to inject the medicine.  Count  "to six. Then, remove the syringe from your skin.   Immediately place the syringe in a sharps container.   You can purchase a sharps container from your local pharmacy or [employer]. If you don't have a sharps container, you can use a plastic detergent container with a lid. The container should seal tightly, hold objects without leaking, breaking or cracking, and clearly be labelled \"sharps\".     Compounded Semaglutide monthly (4 pre-filled syringes) cost:   0.25 mg ~$230   0.5 mg ~$260   1 mg ~$306   1.5 mg ~$342   2 mg ~$395   2.5 mg ~$438    [facility] Phone:  [phone]    "

## 2024-05-20 NOTE — Clinical Note
Can you make sure prior Auth was started?  Will need to try w/cardiovascular dx coronary artery calcifications on CT dx to see if it will be approved.   Thanks!  Sol Bianchi PA-C

## 2024-05-20 NOTE — TELEPHONE ENCOUNTER
"Prior Authorization Retail Medication Request    Medication/Dose: Wegovy 0.25, 0.5, and 1 mg doses.     ICD code (if different than what is on RX):  E66.01; 125.10; G47.33; K76.0; E78.5; 110; K21.9; M79.671, M79.672    Previously Tried and Failed:  Diet and exercise  Rationale:  cardiovascular disease with coronary artery calcifications on CT     Hx of obesity  Ht: 5'1.25\"  Body mass index is 35.23 kg/m .   Current Weight:  188 lb (85.3 kg)     Insurance Name:[employer] MEDICARE ADVANTAGE   Insurance ID:  [id number]       Pharmacy Information (if different than what is on RX)  Name:  [id number] - [place] - [address]     Phone:  [phone]     "

## 2024-05-22 ASSESSMENT — SLEEP AND FATIGUE QUESTIONNAIRES
HOW LIKELY ARE YOU TO NOD OFF OR FALL ASLEEP WHILE SITTING AND READING: MODERATE CHANCE OF DOZING
HOW LIKELY ARE YOU TO NOD OFF OR FALL ASLEEP IN A CAR, WHILE STOPPED FOR A FEW MINUTES IN TRAFFIC: WOULD NEVER DOZE
HOW LIKELY ARE YOU TO NOD OFF OR FALL ASLEEP WHILE SITTING AND TALKING TO SOMEONE: WOULD NEVER DOZE
HOW LIKELY ARE YOU TO NOD OFF OR FALL ASLEEP WHILE SITTING INACTIVE IN A PUBLIC PLACE: WOULD NEVER DOZE
HOW LIKELY ARE YOU TO NOD OFF OR FALL ASLEEP WHILE SITTING QUIETLY AFTER LUNCH WITHOUT ALCOHOL: WOULD NEVER DOZE
HOW LIKELY ARE YOU TO NOD OFF OR FALL ASLEEP WHILE LYING DOWN TO REST IN THE AFTERNOON WHEN CIRCUMSTANCES PERMIT: SLIGHT CHANCE OF DOZING
HOW LIKELY ARE YOU TO NOD OFF OR FALL ASLEEP WHEN YOU ARE A PASSENGER IN A CAR FOR AN HOUR WITHOUT A BREAK: SLIGHT CHANCE OF DOZING
HOW LIKELY ARE YOU TO NOD OFF OR FALL ASLEEP WHILE WATCHING TV: MODERATE CHANCE OF DOZING

## 2024-05-23 ENCOUNTER — OFFICE VISIT (OUTPATIENT)
Dept: SLEEP MEDICINE | Facility: CLINIC | Age: 69
End: 2024-05-23
Payer: COMMERCIAL

## 2024-05-23 VITALS
RESPIRATION RATE: 12 BRPM | HEART RATE: 54 BPM | WEIGHT: 186 LBS | HEIGHT: 61 IN | BODY MASS INDEX: 35.12 KG/M2 | DIASTOLIC BLOOD PRESSURE: 74 MMHG | SYSTOLIC BLOOD PRESSURE: 121 MMHG | OXYGEN SATURATION: 98 %

## 2024-05-23 DIAGNOSIS — G47.33 OSA (OBSTRUCTIVE SLEEP APNEA): Primary | Chronic | ICD-10-CM

## 2024-05-23 PROCEDURE — 99213 OFFICE O/P EST LOW 20 MIN: CPT | Performed by: PHYSICIAN ASSISTANT

## 2024-05-23 NOTE — NURSING NOTE
DME orders have been automatically faxed to [facility]. 1 year appointment reminder will be sent via My Chart.   Anila Singletary CMA

## 2024-05-23 NOTE — NURSING NOTE
"Chief Complaint   Patient presents with    CPAP Follow Up       Initial /74   Pulse 54   Resp 12   Ht 1.556 m (5' 1.25\")   Wt 84.4 kg (186 lb)   SpO2 98%   BMI 34.86 kg/m   Estimated body mass index is 34.86 kg/m  as calculated from the following:    Height as of this encounter: 1.556 m (5' 1.25\").    Weight as of this encounter: 84.4 kg (186 lb).    Medication Reconciliation: complete  ESS: 6  Neck circumference: 13.75 inches / 35 centimeters.  DME: FRANCESCA Singletary CMA      "

## 2024-05-23 NOTE — PATIENT INSTRUCTIONS
Your Body mass index is 34.86 kg/m .  Weight management is a personal decision.  If you are interested in exploring weight loss strategies, the following discussion covers the approaches that may be successful. Body mass index (BMI) is one way to tell whether you are at a healthy weight, overweight, or obese. It measures your weight in relation to your height.  A BMI of 18.5 to 24.9 is in the healthy range. A person with a BMI of 25 to 29.9 is considered overweight, and someone with a BMI of 30 or greater is considered obese. More than two-thirds of American adults are considered overweight or obese.  Being overweight or obese increases the risk for further weight gain. Excess weight may lead to heart disease and diabetes.  Creating and following plans for healthy eating and physical activity may help you improve your health.  Weight control is part of healthy lifestyle and includes exercise, emotional health, and healthy eating habits. Careful eating habits lifelong are the mainstay of weight control. Though there are significant health benefits from weight loss, long-term weight loss with diet alone may be very difficult to achieve- studies show long-term success with dietary management in less than 10% of people. Attaining a healthy weight may be especially difficult to achieve in those with severe obesity. In some cases, medications, devices and surgical management might be considered.  What can you do?  If you are overweight or obese and are interested in methods for weight loss, you should discuss this with your provider.   Consider reducing daily calorie intake by 500 calories.   Keep a food journal.   Avoiding skipping meals, consider cutting portions instead.    Diet combined with exercise helps maintain muscle while optimizing fat loss. Strength training is particularly important for building and maintaining muscle mass. Exercise helps reduce stress, increase energy, and improves fitness. Increasing  exercise without diet control, however, may not burn enough calories to loose weight.     Start walking three days a week 10-20 minutes at a time  Work towards walking thirty minutes five days a week   Eventually, increase the speed of your walking for 1-2 minutes at time    In addition, we recommend that you review healthy lifestyles and methods for weight loss available through the National Institutes of Health patient information sites:  http://win.niddk.nih.gov/publications/index.htm    And look into health and wellness programs that may be available through your health insurance provider, employer, local community center, or stephen club.

## 2024-05-23 NOTE — PROGRESS NOTES
Sleep Apnea - Follow-up Visit:    Impression/Plan:  Mild obstructive sleep apnea-  Excellent CPAP compliance and AHI is well controlled on CPAP 6-16 cm/H20. Daytime symptoms are improved.   Continue current treatment.   Comprehensive DME order placed.      Nilda Amaro will follow up in about 1 year or sooner if any concerns.     20 minutes spent on day of encounter doing chart review,  history and exam, counseling, coordinating plan of care, documentation and further activities as noted above.       Sharon Campo PA-C  Sleep Medicine     History of Present Illness:  Chief Complaint   Patient presents with    CPAP Follow Up       Nilda Amaro presents for follow-up of their mild sleep apnea, managed with CPAP.     She presented with loud snoring, non-refreshing sleep, daytime fatigue/sleepiness (ESS 8), difficulty maintaining sleep, crowded oropharynx and co-morbid HTN.    12/15/2023 [place] [facility] Sleep Study (191.0 lbs) - AHI 12.7, RDI 16.2, Supine AHI 20.5, REM AHI 33.7, Low O2 68.0%, Time Spent less than 88% 13.3 minutes / Time Spent less than 89% 21.6 minutes. PLM index was 32.8 movements per hour. The PLM Arousal Index was 10.5 per hour.    February 12, 2024. Patient received a Resmed Airsense 11 Pressures were set at  6-16 cm H2O.    Do you use a CPAP Machine at home: Yes  Overall, on a scale of 0-10 how would you rate your CPAP (0 poor, 10 great): 5    What type of mask do you use: Nasal Mask  Is your mask comfortable: No  If not, why: marks left on my face when it is tighter when I loosen the mask i feel it is to loose    Is your mask leaking: No  If yes, where do you feel it:    How many night per week does the mask leak (0-7):      Do you notice snoring with mask on: Yes  Do you notice gasping arousals with mask on: No  Are you having significant oral or nasal dryness: Yes  Is the pressure setting comfortable: Yes  If not, why:      What is your typical bedtime: 9:30-10:00  How long does it  take you to go to sleep on PAP therapy: a few minutes  What time do you typically get out of bed for the day: 6:30  How many hours on average per night are you using PAP therapy: 6-8  How many hours are you sleeping per night: 5-6  Do you feel well rested in the morning: No      ResMed   Auto-PAP 6.0 - 16.0 cmH2O 30 day usage data:    90% of days with > 4 hours of use. 1/30 days with no use.   Average use 413 minutes per day.   95%ile Leak 29.4 L/min.   CPAP 95% pressure 10.5 cm.   AHI 2.54 events per hour.       EPWORTH SLEEPINESS SCALE         5/23/2024    10:00 AM    [place] Sleepiness Scale ( COLT Martinez  [id number]<br>ESS - [place]/English - Final version - 21 Nov 07 - [facility] [place].)   [place]ains Score (Sleep) 6       INSOMNIA SEVERITY INDEX (NADYA)          5/23/2024    10:00 AM   Insomnia Severity Index (NADYA)   Difficulty falling asleep 0   Difficulty staying asleep 2   Problems waking up too early 2   How SATISFIED/DISSATISFIED are you with your CURRENT sleep pattern? 3   How NOTICEABLE to others do you think your sleep problem is in terms of impairing the quality of your life? 0   How WORRIED/DISTRESSED are you about your current sleep problem? 2   To what extent do you consider your sleep problem to INTERFERE with your daily functioning (e.g. daytime fatigue, mood, ability to function at work/daily chores, concentration, memory, mood, etc.) CURRENTLY? 2   NADYA Total Score 11       Guidelines for Scoring/Interpretation:  Total score categories:  0-7 = No clinically significant insomnia   8-14 = Subthreshold insomnia   15-21 = Clinical insomnia (moderate severity)  22-28 = Clinical insomnia (severe)  Used via courtesy of www.AndrewBurnett.com Ltdealth.va.gov with permission from Aj Jaramillo PhD., [facility]        Past medical/surgical history, family history, social history, medications and allergies were reviewed.        Problem List:  Patient Active Problem List    Diagnosis Date Noted    Class 2 severe obesity  "due to excess calories with serious comorbidity in adult (H) 05/20/2024     Priority: Medium    JASPER (obstructive sleep apnea) - mild (AHI 12) 01/10/2024     Priority: Medium     12/15/2023 [place] [facility] Sleep Study (191.0 lbs) - AHI 12.7, RDI 16.2, Supine AHI 20.5, REM AHI 33.7, Low O2 68.0%, Time Spent ?88% 13.3 minutes / Time Spent ?89% 21.6 minutes. PLM index was 32.8 movements per hour. The PLM Arousal Index was 10.5 per hour.      Osteopenia of multiple sites 03/13/2023     Priority: Medium    Ascending aorta dilatation (H24) 03/13/2023     Priority: Medium    Urinary, incontinence, stress female 03/13/2023     Priority: Medium    Former smoker 01/31/2023     Priority: Medium    Bernal's esophagus without dysplasia 12/05/2022     Priority: Medium    Fatty liver 02/21/2022     Priority: Medium    Hyperlipidemia LDL goal <100 02/21/2022     Priority: Medium    Aortic atherosclerosis (H24) 02/21/2022     Priority: Medium    Hepatic cyst 02/21/2022     Priority: Medium    Coronary artery calcification seen on CAT scan 02/20/2022     Priority: Medium    Tubular adenoma of colon 01/31/2022     Priority: Medium    Family history of Alzheimer's disease 11/16/2020     Priority: Medium    Intertriginous candidiasis 10/21/2019     Priority: Medium    Hearing deficit, bilateral 10/21/2019     Priority: Medium    Hypertension goal BP (blood pressure) < 140/90 10/01/2018     Priority: Medium    Change in color of pigmented skin lesion, left neck, 2mm 09/25/2017     Priority: Medium    Family history of breast cancer in sister 09/25/2017     Priority: Medium    Primary osteoarthritis of both knees 06/27/2016     Priority: Medium    Chronic GERD 11/05/2015     Priority: Medium    Obesity, Class I, BMI 30-34.9 09/25/2015     Priority: Medium        /74   Pulse 54   Resp 12   Ht 1.556 m (5' 1.25\")   Wt 84.4 kg (186 lb)   SpO2 98%   BMI 34.86 kg/m     "

## 2024-05-31 NOTE — TELEPHONE ENCOUNTER
Retail Pharmacy Prior Authorization Team   Phone: [phone]    PA Initiation    Medication: WEGOVY 0.25 MG/0.5ML SC SOAJ  Insurance Company: Other (see comments)Comment:  [employer] [phone]  Pharmacy Filling the Rx: [facility] # [id number] - [place], [place] - [address] [address]  Filling Pharmacy Phone: [phone]  Filling Pharmacy Fax:    Start Date: 5/31/2024    Patient received a letter from insurance saying medication was denied and discussed other options with the provider.  Patient opted to take oral medication.

## 2024-11-06 ENCOUNTER — MYC MEDICAL ADVICE (OUTPATIENT)
Dept: FAMILY MEDICINE | Facility: CLINIC | Age: 69
End: 2024-11-06
Payer: COMMERCIAL

## 2024-11-06 DIAGNOSIS — K22.70 BARRETT'S ESOPHAGUS WITHOUT DYSPLASIA: ICD-10-CM

## 2024-11-06 DIAGNOSIS — E78.5 HYPERLIPIDEMIA LDL GOAL <100: Primary | ICD-10-CM

## 2024-11-07 NOTE — TELEPHONE ENCOUNTER
Nurse SBAR Triage    Situation: Pt requesting refill of Omeprazole 20mg caps.    Background: GI provider has been prescribing it. Last Rx was 1/17/24 for #90 with 2 refills, so pt is due and says she is out.    Assessment: Pt is wondering if PCP will take over prescribing her Omeprazole and send in a new Rx.    Recommendation: Pharmacy and medication pended for PCP review and approval/denial.    Ghazala James RN, BSN  [facility]

## 2024-11-07 NOTE — TELEPHONE ENCOUNTER
"Called patient and relayed provider's message below. Patient states she had her annual wellness visit with Sunshine Sotelo at  on 3/15/24 due to no available with PCP at that time. Patient concerned insurance will not cover another annual visit this year.     Noted patient is not due for annual visit until 3/15/25 as shown below.       Advise if she wants PCP to take over Omeprazole prescription and would need a medication follow up visit to discuss this. Patient agreed and scheduled for 12/16/24.     Patient requesting PCP to advise if she is due for any \"liver test or heart checks\" at this time? Please advise if patient due for those tests, any other recommended labs, or if these can wait until next visits?     OSWALD Douglas  [facility]      "

## 2024-11-07 NOTE — TELEPHONE ENCOUNTER
Patient is overdue for annual wellness exam since March 2023  We have not seen her for more than a year  Last virtual visit was November 2023  I sent a 90-day supply of the Prilosec  Please help patient reschedule her appointment from April 2025 to something this or next month   [facility]

## 2024-11-08 ENCOUNTER — OFFICE VISIT (OUTPATIENT)
Dept: DERMATOLOGY | Facility: CLINIC | Age: 69
End: 2024-11-08
Payer: COMMERCIAL

## 2024-11-08 DIAGNOSIS — D22.9 MULTIPLE BENIGN NEVI: ICD-10-CM

## 2024-11-08 DIAGNOSIS — L82.0 INFLAMED SEBORRHEIC KERATOSIS: ICD-10-CM

## 2024-11-08 DIAGNOSIS — L70.0 ACNE VULGARIS: Primary | ICD-10-CM

## 2024-11-08 DIAGNOSIS — D48.9 NEOPLASM OF UNCERTAIN BEHAVIOR: ICD-10-CM

## 2024-11-08 DIAGNOSIS — L81.4 LENTIGINES: ICD-10-CM

## 2024-11-08 DIAGNOSIS — Z12.83 ENCOUNTER FOR SCREENING FOR MALIGNANT NEOPLASM OF SKIN: ICD-10-CM

## 2024-11-08 DIAGNOSIS — D18.01 CHERRY ANGIOMA: ICD-10-CM

## 2024-11-08 DIAGNOSIS — L82.1 SEBORRHEIC KERATOSES: ICD-10-CM

## 2024-11-08 RX ORDER — TRETINOIN 1 MG/G
CREAM TOPICAL DAILY
Qty: 45 G | Refills: 11 | Status: SHIPPED | OUTPATIENT
Start: 2024-11-08

## 2024-11-08 NOTE — TELEPHONE ENCOUNTER
Noted, thank you for the update on the wellness exam.  Yes we should wait until next March for the annual wellness exam  We will discuss about other concerns at the upcoming visit next month for her med check  It does not look like she had the liver test done this year at the time of wellness exam I will put the lab orders for liver function test, she can have it done 1 or 2 days before the visit next month.

## 2024-11-08 NOTE — LETTER
11/8/2024      Nilda Amaro  [id number]  [place] [id number]      Dear Colleague,    Thank you for referring your patient, Nilda Amaro, to the [facility]. Please see a copy of my visit note below.    [facility] Dermatology Note  Encounter Date: Nov 8, 2024  Office Visit     Reviewed patients past medical history and pertinent chart review prior to patients visit today.     Dermatology Problem List:  NUB left axilla, shave biopsy 11/08/24 .   ISK right cheek  Comedonal acne on back, BPO wash and tretinoin 0.1% cream started 11/8/2024   ____________________________________________    Assessment & Plan:     # Neoplasm of uncertain behavior:  left axilla  DDx includes dysplastic nevus vs inflamed Seborrheic keratosis rule out malignancy. Shave biopsy today.    Procedure Note: Biopsy by shave technique  The risks and benefits of the procedure were described to the patient. These include but are not limited to bleeding, infection, scar, incomplete removal, and non-diagnostic biopsy. Verbal informed consent was obtained. The above site(s) was cleansed with an alcohol pad and injected with 1% lidocaine with epinephrine. Once anesthesia was obtained, a biopsy(ies) was performed with Gilette blade. The tissue(s) was placed in a labeled container(s) with formalin and sent to pathology. Hemostasis was achieved with aluminum chloride. Vaseline and a bandage were applied to the wound(s). The patient tolerated the procedure well and was given post biopsy care instructions.     # Acne vulgaris, mostly comedonal  -Recommend benzoyl peroxide 10% wash to all areas of acne. Patient counseled medication can bleach towels and clothing.  -Start tretinoin 0.1% cream. Apply once every other day and increase to nightly as tolerate.  Waiting a few minutes after washing affected areas will decrease irritation. Method of application, side effects and expected results were discussed. The  patient will apply pea size amount to the entire face, avoid areas around the eyes, corners of nose and mouth. Discussed side effects including photosensitivity and irritation.   -moisturize with a cream such as Cetaphil cream, Cera Ve Cream or Vanicream nightly and more PRN for dryness.      # Irritated and inflamed Seborrheic Keratosis, right cheek. Discussed treatment options with patient including no treatment, cryotherapy, and shave removal. Patient prefers cryotherapy today due to irritation and itching. After verbal consent and discussion of risks and benefits including but no limited to dyspigmentation/scar, blister, and pain, 1 was(were) treated with 1-2mm freeze border for 2 cycles with liquid nitrogen. Post cryotherapy instructions were provided.       # Benign skin findings including: seborrheic keratoses, cherry angioma, lentigines and benign nevi.   - No further intervention required. Patient to report changes.   - Patient reassured of the benign nature of these lesions.    #Signs and Symptoms of non-melanoma skin cancer and ABCDEs of melanoma reviewed with patient. Patient encouraged to perform monthly self skin exams and educated on how to perform them. UV precautions reviewed with patient. Patient was asked about new or changing moles/lesions on body.     #Reviewed Sunscreen: Apply 20 minutes prior to going outdoors and reapply every two hours, when wet or sweating. We recommend using an SPF 30 or higher, and to use one that is water resistant.       Follow-up:  1 years for follow up full body skin exam, prn for new or changing lesions or new concerns    Claudia Hernandez CNP  Dermatology     ____________________________________________    CC: Skin Check    HPI:  Ms. Nilda Amaro is a(n) 69 year old female who presents today as a new patient for a full body skin cancer screening. Patient has concerns today about a spot on her left armpit and right cheek. Both of these areas have new spots that  sometimes irritates. She also gets many blackheads on her back and is self conscious about it.     Patient is otherwise feeling well, without additional skin concerns.     Physical Exam:  SKIN: Total skin excluding the genitalia areas was performed. The exam included the head/face, neck, both arms, chest, back, abdomen, both legs, digits, mons pubis, buttock and nails.   -left axilla has an elongated about 6 mm pink/brown papule with color variation and no regular pigment network  -right cheek, pink cerebriform papule  -many open and closed comedones on back with a few less than 1 cm subcutaneous papules  -several 1-2mm red dome shaped symmetric papules scattered on the trunk  -multiple tan/brown flat round macules and raised papules scattered throughout trunk, extremities and head. No worrisome features for malignancy noted on examination.  -scattered tan, homogenous macules scattered on sun exposed areas of trunk, extremities and face.   -scattered waxy, stuck on tan/brown papules and patches on the trunk     - No other lesions of concern on areas examined.     Medications:  Current Outpatient Medications   Medication Sig Dispense Refill     atorvastatin (LIPITOR) 10 MG tablet Take 1 tablet (10 mg) by mouth every evening 90 tablet 3     BIOTIN PO Take by mouth daily       calcium citrate-vitamin D (CITRACAL) 200-6.25 MG-MCG TABS per tablet Take 1 tablet by mouth daily       Cyanocobalamin (VITAMIN B 12 PO) Take 1 tablet by mouth daily       lisinopril (ZESTRIL) 10 MG tablet Take 1 tablet (10 mg) by mouth daily 90 tablet 3     MAGNESIUM PO Take 1 tablet by mouth daily       Multiple Vitamins-Minerals (CENTRUM ADULTS PO) Take 1 tablet by mouth daily       omeprazole (PRILOSEC) 20 MG DR capsule Take 1 capsule (20 mg) by mouth daily. 90 capsule 0     triamcinolone (KENALOG) 0.1 % external ointment Apply sparingly to affected area twice daily. Apply sparingly 2-3 weeks as directed. 80 g 1     aspirin (ASPIRIN LOW DOSE)  81 MG EC tablet Take 1 tablet (81 mg) by mouth daily (Patient not taking: Reported on 5/23/2024) 30 tablet 3     Ketoconazole-Hydrocortisone (KETOCON PLUS EX)        Multiple Vitamins-Minerals (OCUVITE ADULT FORMULA PO)        No current facility-administered medications for this visit.      Past Medical History:   Patient Active Problem List   Diagnosis     Obesity, Class I, BMI 30-34.9     Chronic GERD     Primary osteoarthritis of both knees     Change in color of pigmented skin lesion, left neck, 2mm     Family history of breast cancer in sister     Hypertension goal BP (blood pressure) < 140/90     Intertriginous candidiasis     Hearing deficit, bilateral     Family history of Alzheimer's disease     Tubular adenoma of colon     Coronary artery calcification seen on CAT scan     Fatty liver     Hyperlipidemia LDL goal <100     Aortic atherosclerosis (H)     Hepatic cyst     Bernal's esophagus without dysplasia     Former smoker     Osteopenia of multiple sites     Ascending aorta dilatation (H)     Urinary, incontinence, stress female     JASPER (obstructive sleep apnea) - mild (AHI 12)     Class 2 severe obesity due to excess calories with serious comorbidity in adult (H)     Past Medical History:   Diagnosis Date     Arthritis      Atherosclerosis of right coronary artery 02/20/2022     Gastroesophageal reflux disease with esophagitis      Hypertension      JASPER (obstructive sleep apnea) - mild (AHI 12) 1/10/2024       CC Referred Self, MD  No address on file on close of this encounter.      Again, thank you for allowing me to participate in the care of your patient.        Sincerely,        LESLIE Kaye CNP

## 2024-11-08 NOTE — PATIENT INSTRUCTIONS
Proper skin care from [facility]:    -Eliminate harsh soaps as they strip the natural oils from the skin, often resulting in dry itchy skin ( i.e. Dial, Zest, [place])  -Use mild soaps such as Cetaphil or Dove Sensitive Skin in the shower. You do not need to use soap on arms, legs, and trunk every time you shower unless visibly soiled.   -Avoid hot or cold showers.  -After showering, lightly dry off and apply moisturizing within 2-3 minutes. This will help trap moisture in the skin.   -Aggressive use of a moisturizer at least 1-2 times a day to the entire body (including -Vanicream, Cetaphil, Aquaphor or Cerave) and moisturize hands after every washing.  -We recommend using moisturizers that come in a tub that needs to be scooped out, not a pump. This has more of an oil base. It will hold moisture in your skin much better than a water base moisturizer. The above recommended are non-pore clogging.      Wear a sunscreen with at least SPF 30 on your face, ears, neck and V of the chest daily. Wear sunscreen on other areas of the body if those areas are exposed to the sun throughout the day. Sunscreens can contain physical and/or chemical blockers. Physical blockers are less likely to clog pores, these include zinc oxide and titanium dioxide. Reapply every two hour and after swimming.     Sunscreen examples: https://www.ewg.org/sunscreen/    UV radiation  UVA radiation remains constant throughout the day and throughout the year. It is a longer wavelength than UVB and therefore penetrates deeper into the skin leading to immediate and delayed tanning, photoaging, and skin cancer. 70-80% of UVA and UVB radiation occurs between the hours of 10am-2pm.  UVB radiation  UVB radiation causes the most harmful effects and is more significant during the summer months. However, snow and ice can reflect UVB radiation leading to skin damage during the winter months as well. UVB radiation is responsible for tanning,  burning, inflammation, delayed erythema (pinkness), pigmentation (brown spots), and skin cancer.     I recommend self monthly full body exams and yearly full body exams with a dermatology provider. If you develop a new or changing lesion please follow up for examination. Most skin cancers are pink and scaly or pink and pearly. However, we do see blue/brown/black skin cancers.  Consider the ABCDEs of melanoma when giving yourself your monthly full body exam ( don't forget the groin, buttocks, feet, toes, etc). A-asymmetry, B-borders, C-color, D-diameter, E-elevation or evolving. If you see any of these changes please follow up in clinic. If you cannot see your back I recommend purchasing a hand held mirror to use with a larger wall mirror.       Checking for Skin Cancer  You can find cancer early by checking your skin each month. There are 3 kinds of skin cancer. They are melanoma, basal cell carcinoma, and squamous cell carcinoma. Doing monthly skin checks is the best way to find new marks or skin changes. Follow the instructions below for checking your skin.   The ABCDEs of checking moles for melanoma   Check your moles or growths for signs of melanoma using ABCDE:   Asymmetry: the sides of the mole or growth don t match  Border: the edges are ragged, notched, or blurred  Color: the color within the mole or growth varies  Diameter: the mole or growth is larger than 6 mm (size of a pencil eraser)  Evolving: the size, shape, or color of the mole or growth is changing (evolving is not shown in the images below)    Checking for other types of skin cancer  Basal cell carcinoma or squamous cell carcinoma have symptoms such as:     A spot or mole that looks different from all other marks on your skin  Changes in how an area feels, such as itching, tenderness, or pain  Changes in the skin's surface, such as oozing, bleeding, or scaliness  A sore that does not heal  New swelling or redness beyond the border of a  mole    Who s at risk?  Anyone can get skin cancer. But you are at greater risk if you have:   Fair skin, light-colored hair, or light-colored eyes  Many moles or abnormal moles on your skin  A history of sunburns from sunlight or tanning beds  A family history of skin cancer  A history of exposure to radiation or chemicals  A weakened immune system  If you have had skin cancer in the past, you are at risk for recurring skin cancer.   How to check your skin  Do your monthly skin checkups in front of a full-length mirror. Check all parts of your body, including your:   Head (ears, face, neck, and scalp)  Torso (front, back, and sides)  Arms (tops, undersides, upper, and lower armpits)  Hands (palms, backs, and fingers, including under the nails)  Buttocks and genitals  Legs (front, back, and sides)  Feet (tops, soles, toes, including under the nails, and between toes)  If you have a lot of moles, take digital photos of them each month. Make sure to take photos both up close and from a distance. These can help you see if any moles change over time.   Most skin changes are not cancer. But if you see any changes in your skin, call your doctor right away. Only he or she can diagnose a problem. If you have skin cancer, seeing your doctor can be the first step toward getting the treatment that could save your life.   [employer] last reviewed this educational content on 4/1/2019 2000-2020 [employer]. [address] [place] [address], [place] [address]. All rights reserved. This information is not intended as a substitute for professional medical care. Always follow your healthcare professional's instructions.       When should I call my doctor?  If you are worsening or not improving, please, contact us or seek urgent care as noted below.     Who should I call with questions (adults)?    [facility] [phone]  For urgent needs outside of business hours call the [facility] at  [phone] and ask for the dermatology resident on call to be paged  If this is a medical emergency and you are unable to reach an ER, Call 911      If you need a prescription refill, please contact your pharmacy. Refills are approved or denied by our Physicians during normal business hours, Monday through Fridays  Per office policy, refills will not be granted if you have not been seen within the past year (or sooner depending on your child's condition)      -----------------------------------------------------------------    Acne vulgaris    Start over-the-counter benzoyl peroxide 10% wash on the back and any other areas with blackheads.  If 10% is too irritating you can use the 5%. ([employer] makes this product. It is available here at the pharmacy or at [employer]). This medication can bleach your towels and clothing.     It is found in a purple tube in the acne aisle.              *Allow skin to fully dry again before applying medications. Applying retinods to moist skin will cause added dryness.     *Apply prescription tretinoin) A pea sized amount will be enough for the entire face. More is not better, it will just add to the dryness. Apply to chest and back areas as well if affected. This is not a spot treatment so make sure you're covering the entire area that is affected by acne. When you're first starting a retinoid you WILL be dry and may have some flaking of the skin. This is an expected response so keep using the medication. If the dryness is not controlled with moisturizing often, you may want to decrease how often you use it temporarially and slowly increase the frequency to nightly use as the dryness subsides. For most people I would recommend starting use every 3rd night for a few weeks, then increasing to every other night for a few weeks then when you feel like you can tolerate it go up to nightly use. This should help the dryness.     *Apply a gentle moisturizing CREAM. Cera Ve cream, Cetaphil  Cream, or Vanicream are good options.         It may take 2-3 months to see 50-70% improvement. It is important to give the topical medications time to clean out your pores and prevent new acne from forming. Sometimes people flare with more acne after starting a new regimen and this is okay. If the flare is severe please call our office and speak with a nurse about your acne.  Otherwise, please continue use of this regimen for the next 3 months and come back for a reevaluation with me and we can adjust your plan at that time.       -----------------------------------------------------------------------------------------------    Wound Care After a Biopsy    What is a skin biopsy?  A skin biopsy allows the doctor to examine a very small piece of tissue under the microscope to determine the diagnosis and the best treatment for the skin condition. A local anesthetic (numbing medicine)  is injected with a very small needle into the skin area to be tested. A small piece of skin is taken from the area. Sometimes a suture (stitch) is used.     What are the risks of a skin biopsy?  I will experience scar, bleeding, swelling, pain, crusting and redness. I may experience incomplete removal or recurrence. Risks of this procedure are excessive bleeding, bruising, infection, nerve damage, numbness, thick (hypertrophic or keloidal) scar and non-diagnostic biopsy.    How should I care for my wound for the first 24 hours?  Keep the wound dry and covered for 24 hours  If it bleeds, hold direct pressure on the area for 15 minutes. If bleeding does not stop then go to the emergency room  Avoid strenuous exercise the first 1-2 days or as your doctor instructs you    How should I care for the wound after 24 hours?  After 24 hours, remove the bandage  You may bathe or shower as normal  If you had a scalp biopsy, you can shampoo as usual and can use shower water to clean the biopsy site daily  Clean the wound twice a day with gentle soap  and water  Do not scrub, be gentle  Apply white petroleum/Vaseline after cleaning the wound with a cotton swab or a clean finger, and keep the site covered with a Bandaid /bandage. Bandages are not necessary with a scalp biopsy  If you are unable to cover the site with a Bandaid /bandage, re-apply ointment 2-3 times a day to keep the site moist. Moisture will help with healing  Avoid strenuous activity for first 1-2 days  Avoid lakes, rivers, pools, and oceans until the stitches are removed or the site is healed    How do I clean my wound?  Wash hands thoroughly with soap or use hand  before all wound care  Clean the wound with gentle soap and water  Apply white petroleum/Vaseline  to wound after it is clean  Replace the Bandaid /bandage to keep the wound covered for the first few days or as instructed by your doctor  If you had a scalp biopsy, warm shower water to the area on a daily basis should suffice    What should I use to clean my wound?   Cotton-tipped applicators (Qtips )  White petroleum jelly (Vaseline ). Use a clean new container and use Q-tips to apply.  Bandaids   as needed  Gentle soap     How should I care for my wound long term?  Do not get your wound dirty  Keep up with wound care for one week or until the area is healed.  A small scab will form and fall off by itself when the area is completely healed. The area will be red and will become pink in color as it heals. Sun protection is very important for how your scar will turn out. Sunscreen with an SPF 30 or greater is recommended once the area is healed.  If you have stitches, stitches need to be removed in 7 days on the face/neck, or 10-14 days on the body days. You may return to our clinic for this or you may have it done locally at your doctor s office.  You should have some soreness but it should be mild and slowly go away over several days. Talk to your doctor about using tylenol for pain,    When should I call my doctor?  If you  have increased:   Pain or swelling  Pus or drainage (clear or slightly yellow drainage is ok)  Temperature over 100F  Spreading redness or warmth around wound    When will I hear about my results?  The biopsy results can take 2 weeks to come back.  Your results will automatically release to [employer] before your provider has even reviewed them.  The clinic will call you with the results, send you a [employer] message, or have you schedule a follow-up clinic or phone time to discuss the results.  Contact our clinics if you do not hear from us in 2 weeks.    Who should I call with questions?  [facility]: [phone]  [facility]: [phone]  For urgent needs outside of business hours call the [facility] at [phone] and ask for the dermatology resident on call       ____________    Cryotherapy    What is it?  Use of a very cold liquid, such as liquid nitrogen, to freeze and destroy abnormal skin cells that need to be removed.    What should I expect?  Tenderness and redness  A small blister that might grow and fill with dark purple blood. There may be crusting.  More than one treatment may be needed if the lesions do not go away.    How do I care for the treated area?  Gently wash the area with your hands when bathing.  Use a thin layer of Vaseline or Aquaphor to help with healing. You may use a Band-Aid.   The area should heal within 7-10 days and may leave behind a pink or lighter color.   Do not use an antibiotic or Neosporin ointment.   You may take acetaminophen (Tylenol) for pain.     Call your doctor if you have:  Questions or concerns  Severe pain  Signs of infection  Increasing:   Pain or swelling  Pus or drainage (clear or slightly yellow drainage is ok)  Temperature over 100F  Spreading redness or warmth around wound    Call the clinic as soon as possible if experiencing any of the symptoms listed.        Who should I call with  questions?  [facility]: [phone]    Please call or send a [employer] message for questions or concerns.  If sending a [employer] message, please attach a photo of the wound to your message, if possible. This will help us better assist you.         For urgent needs outside of business hours call the [facility] at [phone] and ask for the dermatology resident on call

## 2024-11-08 NOTE — TELEPHONE ENCOUNTER
Patient notified provider message as shown:        Lab appointment scheduled. Patient verbalized understanding and agrees with plan. Advised to call with questions or concerns. Elieser Isaacs RN, BSN

## 2024-11-08 NOTE — PROGRESS NOTES
[facility] Dermatology Note  Encounter Date: Nov 8, 2024  Office Visit     Reviewed patients past medical history and pertinent chart review prior to patients visit today.     Dermatology Problem List:  NUB left axilla, shave biopsy 11/08/24 .   ISK right cheek  Comedonal acne on back, BPO wash and tretinoin 0.1% cream started 11/8/2024   ____________________________________________    Assessment & Plan:     # Neoplasm of uncertain behavior:  left axilla  DDx includes dysplastic nevus vs inflamed Seborrheic keratosis rule out malignancy. Shave biopsy today.    Procedure Note: Biopsy by shave technique  The risks and benefits of the procedure were described to the patient. These include but are not limited to bleeding, infection, scar, incomplete removal, and non-diagnostic biopsy. Verbal informed consent was obtained. The above site(s) was cleansed with an alcohol pad and injected with 1% lidocaine with epinephrine. Once anesthesia was obtained, a biopsy(ies) was performed with Gilette blade. The tissue(s) was placed in a labeled container(s) with formalin and sent to pathology. Hemostasis was achieved with aluminum chloride. Vaseline and a bandage were applied to the wound(s). The patient tolerated the procedure well and was given post biopsy care instructions.     # Acne vulgaris, mostly comedonal  -Recommend benzoyl peroxide 10% wash to all areas of acne. Patient counseled medication can bleach towels and clothing.  -Start tretinoin 0.1% cream. Apply once every other day and increase to nightly as tolerate.  Waiting a few minutes after washing affected areas will decrease irritation. Method of application, side effects and expected results were discussed. The patient will apply pea size amount to the entire face, avoid areas around the eyes, corners of nose and mouth. Discussed side effects including photosensitivity and irritation.   -moisturize with a cream such as Cetaphil cream, Cera Ve  Cream or Vanicream nightly and more PRN for dryness.      # Irritated and inflamed Seborrheic Keratosis, right cheek. Discussed treatment options with patient including no treatment, cryotherapy, and shave removal. Patient prefers cryotherapy today due to irritation and itching. After verbal consent and discussion of risks and benefits including but no limited to dyspigmentation/scar, blister, and pain, 1 was(were) treated with 1-2mm freeze border for 2 cycles with liquid nitrogen. Post cryotherapy instructions were provided.       # Benign skin findings including: seborrheic keratoses, cherry angioma, lentigines and benign nevi.   - No further intervention required. Patient to report changes.   - Patient reassured of the benign nature of these lesions.    #Signs and Symptoms of non-melanoma skin cancer and ABCDEs of melanoma reviewed with patient. Patient encouraged to perform monthly self skin exams and educated on how to perform them. UV precautions reviewed with patient. Patient was asked about new or changing moles/lesions on body.     #Reviewed Sunscreen: Apply 20 minutes prior to going outdoors and reapply every two hours, when wet or sweating. We recommend using an SPF 30 or higher, and to use one that is water resistant.       Follow-up:  1 years for follow up full body skin exam, prn for new or changing lesions or new concerns    Claudia Hernandez CNP  Dermatology     ____________________________________________    CC: Skin Check    HPI:  Ms. Nilda Amaro is a(n) 69 year old female who presents today as a new patient for a full body skin cancer screening. Patient has concerns today about a spot on her left armpit and right cheek. Both of these areas have new spots that sometimes irritates. She also gets many blackheads on her back and is self conscious about it.     Patient is otherwise feeling well, without additional skin concerns.     Physical Exam:  SKIN: Total skin excluding the genitalia areas was  performed. The exam included the head/face, neck, both arms, chest, back, abdomen, both legs, digits, mons pubis, buttock and nails.   -left axilla has an elongated about 6 mm pink/brown papule with color variation and no regular pigment network  -right cheek, pink cerebriform papule  -many open and closed comedones on back with a few less than 1 cm subcutaneous papules  -several 1-2mm red dome shaped symmetric papules scattered on the trunk  -multiple tan/brown flat round macules and raised papules scattered throughout trunk, extremities and head. No worrisome features for malignancy noted on examination.  -scattered tan, homogenous macules scattered on sun exposed areas of trunk, extremities and face.   -scattered waxy, stuck on tan/brown papules and patches on the trunk     - No other lesions of concern on areas examined.     Medications:  Current Outpatient Medications   Medication Sig Dispense Refill    atorvastatin (LIPITOR) 10 MG tablet Take 1 tablet (10 mg) by mouth every evening 90 tablet 3    BIOTIN PO Take by mouth daily      calcium citrate-vitamin D (CITRACAL) 200-6.25 MG-MCG TABS per tablet Take 1 tablet by mouth daily      Cyanocobalamin (VITAMIN B 12 PO) Take 1 tablet by mouth daily      lisinopril (ZESTRIL) 10 MG tablet Take 1 tablet (10 mg) by mouth daily 90 tablet 3    MAGNESIUM PO Take 1 tablet by mouth daily      Multiple Vitamins-Minerals (CENTRUM ADULTS PO) Take 1 tablet by mouth daily      omeprazole (PRILOSEC) 20 MG DR capsule Take 1 capsule (20 mg) by mouth daily. 90 capsule 0    triamcinolone (KENALOG) 0.1 % external ointment Apply sparingly to affected area twice daily. Apply sparingly 2-3 weeks as directed. 80 g 1    aspirin (ASPIRIN LOW DOSE) 81 MG EC tablet Take 1 tablet (81 mg) by mouth daily (Patient not taking: Reported on 5/23/2024) 30 tablet 3    Ketoconazole-Hydrocortisone (KETOCON PLUS EX)       Multiple Vitamins-Minerals (OCUVITE ADULT FORMULA PO)        No current  facility-administered medications for this visit.      Past Medical History:   Patient Active Problem List   Diagnosis    Obesity, Class I, BMI 30-34.9    Chronic GERD    Primary osteoarthritis of both knees    Change in color of pigmented skin lesion, left neck, 2mm    Family history of breast cancer in sister    Hypertension goal BP (blood pressure) < 140/90    Intertriginous candidiasis    Hearing deficit, bilateral    Family history of Alzheimer's disease    Tubular adenoma of colon    Coronary artery calcification seen on CAT scan    Fatty liver    Hyperlipidemia LDL goal <100    Aortic atherosclerosis (H)    Hepatic cyst    Bernal's esophagus without dysplasia    Former smoker    Osteopenia of multiple sites    Ascending aorta dilatation (H)    Urinary, incontinence, stress female    JASPER (obstructive sleep apnea) - mild (AHI 12)    Class 2 severe obesity due to excess calories with serious comorbidity in adult (H)     Past Medical History:   Diagnosis Date    Arthritis     Atherosclerosis of right coronary artery 02/20/2022    Gastroesophageal reflux disease with esophagitis     Hypertension     JASPER (obstructive sleep apnea) - mild (AHI 12) 1/10/2024       CC Referred Self, MD  No address on file on close of this encounter.

## 2024-11-12 LAB
PATH REPORT.COMMENTS IMP SPEC: NORMAL
PATH REPORT.COMMENTS IMP SPEC: NORMAL
PATH REPORT.FINAL DX SPEC: NORMAL
PATH REPORT.GROSS SPEC: NORMAL
PATH REPORT.MICROSCOPIC SPEC OTHER STN: NORMAL
PATH REPORT.RELEVANT HX SPEC: NORMAL

## 2024-11-14 ENCOUNTER — TELEPHONE (OUTPATIENT)
Dept: DERMATOLOGY | Facility: CLINIC | Age: 69
End: 2024-11-14
Payer: COMMERCIAL

## 2024-11-14 DIAGNOSIS — C44.91 BCC (BASAL CELL CARCINOMA OF SKIN): Primary | ICD-10-CM

## 2024-11-14 NOTE — LETTER
[facility]  [address] [place] [address]  [phone]      November 14, 2024    Nilda Amaro  [address] [place] [address]      Dear Nilda      You are scheduled for Mohs Surgery on Monday December 9th, 2024 at 8 AM      Please check in at 2nd floor Dermatology Clinic.     Be sure to eat a good breakfast and bathe and wash your hair prior to Surgery. Please bring  with you if this is above your neck    If you are taking any anti-coagulants that are prescribed by your Doctor (such as Coumadin/warfarin, Plavix, Aspirin, Ibuprofen), please continue taking them.     However, If you are taking anti-coagulants over the counter without  a Doctor's order for a Medical condition, please discontinue them 10 days prior to Surgery.      Please wear loose comfortable clothing as it could possibly be 4-6 hours until your surgery is completed depending upon how many layers of tissue need to be removed.     If you need any mobility assistance (getting on the exam chair or toilet) please bring a caregiver, family member, or staff member to assist you. We are not equipped to transfer patients.    Thank you,    Jose Manzano MD

## 2024-11-14 NOTE — TELEPHONE ENCOUNTER
----- Message from Agatha Hernandez sent at 11/13/2024  4:11 PM CST -----  Results given to patient.  Option explained for ED&C versus excision.  Patient prefers excision.   Please refer or call for excision with derm surg.     A(1). Skin, left axilla, shave:  - Basal cell carcinoma

## 2024-11-14 NOTE — TELEPHONE ENCOUNTER
Spoke with patient, she prefers to go to [facility] for removal.   Mohs procedure explained and all questions answered.    Patient expressed understanding.     -Appointment scheduled on Monday December 9th, 2024 at 8 AM   -MOHS information mailed & sent via MyChart  -Derm Surg referral placed and linked to appointment.     Farzaneh OBRIEN RN BSN  [facility] Dermatology  [phone]

## 2024-12-09 ENCOUNTER — OFFICE VISIT (OUTPATIENT)
Dept: DERMATOLOGY | Facility: CLINIC | Age: 69
End: 2024-12-09
Payer: COMMERCIAL

## 2024-12-09 DIAGNOSIS — C44.519 BASAL CELL CARCINOMA (BCC) OF AXILLA: ICD-10-CM

## 2024-12-09 DIAGNOSIS — L81.4 LENTIGO: ICD-10-CM

## 2024-12-09 DIAGNOSIS — D18.01 ANGIOMA OF SKIN: ICD-10-CM

## 2024-12-09 DIAGNOSIS — D23.9 DERMAL NEVUS: Primary | ICD-10-CM

## 2024-12-09 DIAGNOSIS — L82.1 SEBORRHEIC KERATOSES: ICD-10-CM

## 2024-12-09 PROCEDURE — 99213 OFFICE O/P EST LOW 20 MIN: CPT | Mod: 25 | Performed by: DERMATOLOGY

## 2024-12-09 PROCEDURE — 17313 MOHS 1 STAGE T/A/L: CPT | Performed by: DERMATOLOGY

## 2024-12-09 NOTE — PATIENT INSTRUCTIONS
Open Wound Care             No strenuous activity for 48 hours    Take Tylenol as needed for discomfort.                                                .         Do not drink alcoholic beverages for 48 hours.    Keep the pressure bandage in place for 24 hours. If the bandage becomes blood tinged or loose, reinforce it with gauze and tape.        (Refer to the reverse side of this page for management of bleeding).    Remove bandage in 24 hours and begin wound care as follows:     Clean area with tap water using a Q tip or gauze pad, (shower / bathe normally)  Dry wound with Q tip or gauze pad  Apply Aquaphor, Vaseline, Polysporin or Bacitracin Ointment with a Q tip  Do NOT use Neosporin Ointment *  Cover the wound with a band-aid or nonstick gauze pad and paper tape.  Repeat wound care once a day until wound is completely healed.    It is an old wives tale that a wound heals better when it is exposed to air and allowed to dry out. The wound will heal faster with a better cosmetic result if it is kept moist with ointment and covered with a bandage.  Do not let the wound dry out.      Supplies Needed:                Qtips or gauze pads                Polysporin or Bacitracin Ointment                Bandaids or nonstick gauze pads and paper tape    Wound care kits and brown paper tape are available for purchase at   the pharmacy.    BLEEDING:    Use tightly rolled up gauze or cloth to apply direct pressure over the bandage for 20   minutes.  Reapply pressure for an additional 20 minutes if necessary  Call the office or go to the nearest emergency room if pressure fails to stop the bleeding.  Use additional gauze and tape to maintain pressure once the bleeding has stopped.  Begin wound care 24 hours after surgery as directed.                  WOUND HEALING    One week after surgery a pink / red halo will form around the outside of the wound.   This is new skin.  The center of the wound will appear yellowish white and  produce some drainage.  The pink halo will slowly migrate in toward the center of the wound until the wound is covered with new shiny pink skin.  There will be no more drainage when the wound is completely healed.  It will take six months to one year for the redness to fade.  The scar may be itchy, tight and sensitive to extreme temperatures for a year after the surgery.  Massaging the area several times a day for several minutes after the wound is completely healed will help the scar soften and normalize faster. Begin massage only after healing is complete.      In case of emergency call: Dr Manzano: [phone]    [facility]: [phone]    [facility]:[phone]

## 2024-12-09 NOTE — PROGRESS NOTES
Nilda Amaro , a 69 year old year old female patient, I was asked to see by SANTINO Hernandez for basal cell carcinoma on left axilla.  Patient has no other skin complaints today.  Remainder of the HPI, Meds, PMH, Allergies, FH, and SH was reviewed in chart.      Past Medical History:   Diagnosis Date    Arthritis     Atherosclerosis of right coronary artery 02/20/2022    Basal cell carcinoma     Gastroesophageal reflux disease with esophagitis     Hypertension     JASPER (obstructive sleep apnea) - mild (AHI 12) 01/10/2024       Past Surgical History:   Procedure Laterality Date    BIOPSY  2018    COLONOSCOPY  03/21/2016    COLONOSCOPY WITH CO2 INSUFFLATION N/A 03/21/2016    Procedure: COLONOSCOPY WITH CO2 INSUFFLATION;  Surgeon: Geoffrey Blackwell MD;  Location: [facility] OR    COLONOSCOPY WITH CO2 INSUFFLATION N/A 3/14/2022    Procedure: COLONOSCOPY, WITH CO2 INSUFFLATION;  Surgeon: Ramy Murray DO;  Location: [facility]    COMBINED ESOPHAGOSCOPY, GASTROSCOPY, DUODENOSCOPY (EGD) WITH CO2 INSUFFLATION N/A 3/14/2022    Procedure: ESOPHAGOGASTRODUODENOSCOPY, WITH CO2 INSUFFLATION;  Surgeon: Ramy Murray DO;  Location: [facility]    COMBINED ESOPHAGOSCOPY, GASTROSCOPY, DUODENOSCOPY (EGD) WITH CO2 INSUFFLATION N/A 11/28/2022    Procedure: ESOPHAGOGASTRODUODENOSCOPY, WITH CO2 INSUFFLATION;  Surgeon: Rey Boothe MD;  Location: [facility]    ENDOSCOPY UPPER, COLONOSCOPY, COMBINED N/A 03/21/2016    Procedure: COMBINED ENDOSCOPY UPPER, COLONOSCOPY;  Surgeon: Geoffrey Blackwell MD;  Location: [facility]    ESOPHAGOSCOPY, GASTROSCOPY, DUODENOSCOPY (EGD), COMBINED N/A 03/21/2016    Procedure: COMBINED ESOPHAGOSCOPY, GASTROSCOPY, DUODENOSCOPY (EGD), BIOPSY SINGLE OR MULTIPLE;  Surgeon: Geoffrey Blackwell MD;  Location: [facility]    ESOPHAGOSCOPY, GASTROSCOPY, DUODENOSCOPY (EGD), COMBINED N/A 3/14/2022    Procedure: ESOPHAGOGASTRODUODENOSCOPY, WITH BIOPSY;  Surgeon: Ramy Murray DO;  Location: [facility]    ESOPHAGOSCOPY,  GASTROSCOPY, DUODENOSCOPY (EGD), COMBINED N/A 2022    Procedure: ESOPHAGOGASTRODUODENOSCOPY, WITH BIOPSY;  Surgeon: Rey Boothe MD;  Location: [facility] OR    ORTHOPEDIC SURGERY Right 2018    TKA        Family History   Problem Relation Age of Onset    Ankylosing Spondylitis Brother 57    Heart Disease Brother     Breast Cancer Sister     Heart Disease Sister     Heart Disease Mother     Heart Disease Father     Coronary Artery Disease Father     Hypertension Father     Heart Disease Brother     Diabetes Sister     Hypertension Sister     Diabetes Brother     Hypertension Brother     Coronary Artery Disease Brother     Hypertension Brother     Osteoporosis Brother     Hyperlipidemia Brother     Coronary Artery Disease Brother         Bypass surgery    Hypertension Brother     Hyperlipidemia Brother     Hypertension Sister     Hyperlipidemia Sister     Breast Cancer Sister     Diabetes Nephew     Coronary Artery Disease Nephew        Social History     Socioeconomic History    Marital status:      Spouse name: Not on file    Number of children: Not on file    Years of education: Not on file    Highest education level: Not on file   Occupational History    Not on file   Tobacco Use    Smoking status: Former     Current packs/day: 0.00     Average packs/day: 1.5 packs/day for 36.0 years (54.0 ttl pk-yrs)     Types: Cigarettes     Start date: 1972     Quit date: 3/1/2008     Years since quittin.7     Passive exposure: Never    Smokeless tobacco: Never   Vaping Use    Vaping status: Never Used   Substance and Sexual Activity    Alcohol use: Yes     Alcohol/week: 5.0 standard drinks of alcohol     Types: 5 Standard drinks or equivalent per week    Drug use: No    Sexual activity: Not Currently     Partners: Male     Birth control/protection: None   Other Topics Concern    Parent/sibling w/ CABG, MI or angioplasty before 65F 55M? Yes     Comment: Father 54 Brother 55   Social History  Narrative    Not on file     Social Drivers of Health     Financial Resource Strain: Low Risk  (3/8/2024)    Financial Resource Strain     Within the past 12 months, have you or your family members you live with been unable to get utilities (heat, electricity) when it was really needed?: No   Food Insecurity: Low Risk  (3/8/2024)    Food Insecurity     Within the past 12 months, did you worry that your food would run out before you got money to buy more?: No     Within the past 12 months, did the food you bought just not last and you didn t have money to get more?: No   Transportation Needs: Low Risk  (3/8/2024)    Transportation Needs     Within the past 12 months, has lack of transportation kept you from medical appointments, getting your medicines, non-medical meetings or appointments, work, or from getting things that you need?: No   Physical Activity: Insufficiently Active (3/8/2024)    Exercise Vital Sign     Days of Exercise per Week: 2 days     Minutes of Exercise per Session: 10 min   Stress: Stress Concern Present (3/8/2024)    [employer] [place] [employer] - Occupational Stress Questionnaire     Feeling of Stress : To some extent   Social Connections: Unknown (3/8/2024)    Social Connection and Isolation Panel [NHANES]     Frequency of Communication with Friends and Family: Not on file     Frequency of Social Gatherings with Friends and Family: Three times a week     Attends Mosque Services: Not on file     Active Member of Clubs or Organizations: Not on file     Attends Club or Organization Meetings: Not on file     Marital Status: Not on file   Interpersonal Safety: Low Risk  (3/15/2024)    Interpersonal Safety     Do you feel physically and emotionally safe where you currently live?: Yes     Within the past 12 months, have you been hit, slapped, kicked or otherwise physically hurt by someone?: No     Within the past 12 months, have you been humiliated or emotionally abused in other ways  by your partner or ex-partner?: No   Housing Stability: Low Risk  (3/8/2024)    Housing Stability     Do you have housing? : Yes     Are you worried about losing your housing?: No       Outpatient Encounter Medications as of 12/9/2024   Medication Sig Dispense Refill    aspirin (ASPIRIN LOW DOSE) 81 MG EC tablet Take 1 tablet (81 mg) by mouth daily (Patient not taking: Reported on 5/23/2024) 30 tablet 3    atorvastatin (LIPITOR) 10 MG tablet Take 1 tablet (10 mg) by mouth every evening 90 tablet 3    BIOTIN PO Take by mouth daily      calcium citrate-vitamin D (CITRACAL) 200-6.25 MG-MCG TABS per tablet Take 1 tablet by mouth daily      Cyanocobalamin (VITAMIN B 12 PO) Take 1 tablet by mouth daily      Ketoconazole-Hydrocortisone (KETOCON PLUS EX)       lisinopril (ZESTRIL) 10 MG tablet Take 1 tablet (10 mg) by mouth daily 90 tablet 3    MAGNESIUM PO Take 1 tablet by mouth daily      Multiple Vitamins-Minerals (CENTRUM ADULTS PO) Take 1 tablet by mouth daily      Multiple Vitamins-Minerals (OCUVITE ADULT FORMULA PO)       omeprazole (PRILOSEC) 20 MG DR capsule Take 1 capsule (20 mg) by mouth daily. 90 capsule 0    tretinoin (RETIN-A) 0.1 % external cream Apply topically daily. To acne on back 45 g 11    triamcinolone (KENALOG) 0.1 % external ointment Apply sparingly to affected area twice daily. Apply sparingly 2-3 weeks as directed. 80 g 1     No facility-administered encounter medications on file as of 12/9/2024.             Review Of Systems  Skin: As above  Eyes: negative  Ears/Nose/Throat: negative  Respiratory: No shortness of breath, dyspnea on exertion, cough, or hemoptysis  Cardiovascular: negative  Gastrointestinal: negative  Genitourinary: negative  Musculoskeletal: negative  Neurologic: negative  Psychiatric: negative  Hematologic/Lymphatic/Immunologic: negative  Endocrine: negative      O:   NAD, WDWN, Alert & Oriented, Mood & Affect wnl, Vitals stable   General appearance juan alberto ii   Vitals  stable   Alert, oriented and in no acute distress   L axilla 6mm pink pearly papule    Stuck on papules and brown macules on trunk and ext    Red papules on trunk   Flesh colored papules on trunk          Eyes: Conjunctivae/lids:Normal     ENT: Lips, mucosa: normal    MSK:Normal    Cardiovascular: peripheral edema none    Pulm: Breathing Normal    Neuro/Psych: Orientation:Normal; Mood/Affect:Normal      A/P:  1. Seborrheic keratosis, lentigo, angioma, dermal nevus  2. Basal cell carcinoma axilla  It was a pleasure speaking to Nilda Amaro today.  Previous clinic  notes and pertinent laboratory tests were reviewed prior to Nilda Amaro's visit.  Signs and Symptoms of skin cancer discussed with patient.  Patient encouraged to perform monthly skin exams.  UV precautions reviewed with patient.  Risks of non-melanoma skin cancer discussed with patient   Return to clinic 6 months  PROCEDURE NOTE  L axilla basal cell carcinoma   MOHS:   Location    The rationale for Mohs surgery was discussed with the patient and consent was obtained.  The risks and benefits as well as alternatives to therapy were discussed, in detail.  Specifically, the risks of infection, scarring, bleeding, prolonged wound healing, incomplete removal, allergy to anesthesia, nerve injury and recurrence were addressed.  Indication for Mohs was Location. Prior to the procedure, the treatment site was clearly identified and, if available, confirmed with previous photos and confirmed by the patient   All components of the Universal Protocol/PAUSE rule were completed.  The Mohs surgeon operated in two distinct and integrated capacities as the surgeon and pathologist.      The area was prepped with Betasept.  A rim of normal appearing skin was marked circumferentially around the lesion.  The area was infiltrated with local anesthesia.  The tumor was first debulked to remove all clinically apparent tumor.  An incision following the standard Mohs  approach was done and the specimen was oriented,mapped and placed in 1 block(s).  Each specimen was then chromacoded and processed in the Mohs laboratory using standard Mohs technique and submitted for frozen section histology.  Frozen section analysis showed no residual tumor but CLEAR MARGINS.      The tumor was excised using standard Mohs technique in 1 stages(s).  CLEAR MARGINS OBTAINED and Final defect size was 1 cm.     We discussed the options for wound management in full with the patient including risks/benefits/ possible outcomes.      REPAIR SECOND INTENT: We discussed the options for wound management in full with the patient including risks/benefits/possible outcomes. Decision made to allow the wound to heal by second intention. Cautery was used for for hemostasis. EBL minimal; complications none; wound care routine.  The patient was discharged in good condition and will return in one month or prn for wound evaluation.

## 2024-12-09 NOTE — LETTER
12/9/2024      Nilda Amaro  [address]  [facility] [address]      Dear Colleague,    Thank you for referring your patient, Nilda Amaro, to the [facility]. Please see a copy of my visit note below.    Surgical Office Location :   [facility]  [address] [place] [address]      Nilda Amaro , a 69 year old year old female patient, I was asked to see by SANTINO Hernandez for basal cell carcinoma on left axilla.  Patient has no other skin complaints today.  Remainder of the HPI, Meds, PMH, Allergies, FH, and SH was reviewed in chart.      Past Medical History:   Diagnosis Date     Arthritis      Atherosclerosis of right coronary artery 02/20/2022     Basal cell carcinoma      Gastroesophageal reflux disease with esophagitis      Hypertension      JASPER (obstructive sleep apnea) - mild (AHI 12) 01/10/2024       Past Surgical History:   Procedure Laterality Date     BIOPSY  2018     COLONOSCOPY  03/21/2016     COLONOSCOPY WITH CO2 INSUFFLATION N/A 03/21/2016    Procedure: COLONOSCOPY WITH CO2 INSUFFLATION;  Surgeon: Geoffrey Blackwell MD;  Location: [facility] OR     COLONOSCOPY WITH CO2 INSUFFLATION N/A 3/14/2022    Procedure: COLONOSCOPY, WITH CO2 INSUFFLATION;  Surgeon: Ramy Murray DO;  Location: [facility] OR     COMBINED ESOPHAGOSCOPY, GASTROSCOPY, DUODENOSCOPY (EGD) WITH CO2 INSUFFLATION N/A 3/14/2022    Procedure: ESOPHAGOGASTRODUODENOSCOPY, WITH CO2 INSUFFLATION;  Surgeon: Ramy Murray DO;  Location: [facility] OR     COMBINED ESOPHAGOSCOPY, GASTROSCOPY, DUODENOSCOPY (EGD) WITH CO2 INSUFFLATION N/A 11/28/2022    Procedure: ESOPHAGOGASTRODUODENOSCOPY, WITH CO2 INSUFFLATION;  Surgeon: Rey Boothe MD;  Location: [facility] OR     ENDOSCOPY UPPER, COLONOSCOPY, COMBINED N/A 03/21/2016    Procedure: COMBINED ENDOSCOPY UPPER, COLONOSCOPY;  Surgeon: Geoffrey Blackwell MD;  Location: [facility]     ESOPHAGOSCOPY, GASTROSCOPY, DUODENOSCOPY (EGD), COMBINED N/A 03/21/2016     Procedure: COMBINED ESOPHAGOSCOPY, GASTROSCOPY, DUODENOSCOPY (EGD), BIOPSY SINGLE OR MULTIPLE;  Surgeon: Geoffrey Blackwell MD;  Location: [facility]     ESOPHAGOSCOPY, GASTROSCOPY, DUODENOSCOPY (EGD), COMBINED N/A 3/14/2022    Procedure: ESOPHAGOGASTRODUODENOSCOPY, WITH BIOPSY;  Surgeon: Ramy Murray DO;  Location: [facility]     ESOPHAGOSCOPY, GASTROSCOPY, DUODENOSCOPY (EGD), COMBINED N/A 2022    Procedure: ESOPHAGOGASTRODUODENOSCOPY, WITH BIOPSY;  Surgeon: Rey Boothe MD;  Location: [facility]     ORTHOPEDIC SURGERY Right 2018    TKA        Family History   Problem Relation Age of Onset     Ankylosing Spondylitis Brother 57     Heart Disease Brother      Breast Cancer Sister      Heart Disease Sister      Heart Disease Mother      Heart Disease Father      Coronary Artery Disease Father      Hypertension Father      Heart Disease Brother      Diabetes Sister      Hypertension Sister      Diabetes Brother      Hypertension Brother      Coronary Artery Disease Brother      Hypertension Brother      Osteoporosis Brother      Hyperlipidemia Brother      Coronary Artery Disease Brother         Bypass surgery     Hypertension Brother      Hyperlipidemia Brother      Hypertension Sister      Hyperlipidemia Sister      Breast Cancer Sister      Diabetes Nephew      Coronary Artery Disease Nephew        Social History     Socioeconomic History     Marital status:      Spouse name: Not on file     Number of children: Not on file     Years of education: Not on file     Highest education level: Not on file   Occupational History     Not on file   Tobacco Use     Smoking status: Former     Current packs/day: 0.00     Average packs/day: 1.5 packs/day for 36.0 years (54.0 ttl pk-yrs)     Types: Cigarettes     Start date: 1972     Quit date: 3/1/2008     Years since quittin.7     Passive exposure: Never     Smokeless tobacco: Never   Vaping Use     Vaping status: Never Used   Substance  and Sexual Activity     Alcohol use: Yes     Alcohol/week: 5.0 standard drinks of alcohol     Types: 5 Standard drinks or equivalent per week     Drug use: No     Sexual activity: Not Currently     Partners: Male     Birth control/protection: None   Other Topics Concern     Parent/sibling w/ CABG, MI or angioplasty before 65F 55M? Yes     Comment: Father 54 Brother 55   Social History Narrative     Not on file     Social Drivers of Health     Financial Resource Strain: Low Risk  (3/8/2024)    Financial Resource Strain      Within the past 12 months, have you or your family members you live with been unable to get utilities (heat, electricity) when it was really needed?: No   Food Insecurity: Low Risk  (3/8/2024)    Food Insecurity      Within the past 12 months, did you worry that your food would run out before you got money to buy more?: No      Within the past 12 months, did the food you bought just not last and you didn t have money to get more?: No   Transportation Needs: Low Risk  (3/8/2024)    Transportation Needs      Within the past 12 months, has lack of transportation kept you from medical appointments, getting your medicines, non-medical meetings or appointments, work, or from getting things that you need?: No   Physical Activity: Insufficiently Active (3/8/2024)    Exercise Vital Sign      Days of Exercise per Week: 2 days      Minutes of Exercise per Session: 10 min   Stress: Stress Concern Present (3/8/2024)    [employer] - Occupational Stress Questionnaire      Feeling of Stress : To some extent   Social Connections: Unknown (3/8/2024)    Social Connection and Isolation Panel [NHANES]      Frequency of Communication with Friends and Family: Not on file      Frequency of Social Gatherings with Friends and Family: Three times a week      Attends Voodoo Services: Not on file      Active Member of Clubs or Organizations: Not on file      Attends Club or Organization  Meetings: Not on file      Marital Status: Not on file   Interpersonal Safety: Low Risk  (3/15/2024)    Interpersonal Safety      Do you feel physically and emotionally safe where you currently live?: Yes      Within the past 12 months, have you been hit, slapped, kicked or otherwise physically hurt by someone?: No      Within the past 12 months, have you been humiliated or emotionally abused in other ways by your partner or ex-partner?: No   Housing Stability: Low Risk  (3/8/2024)    Housing Stability      Do you have housing? : Yes      Are you worried about losing your housing?: No       Outpatient Encounter Medications as of 12/9/2024   Medication Sig Dispense Refill     aspirin (ASPIRIN LOW DOSE) 81 MG EC tablet Take 1 tablet (81 mg) by mouth daily (Patient not taking: Reported on 5/23/2024) 30 tablet 3     atorvastatin (LIPITOR) 10 MG tablet Take 1 tablet (10 mg) by mouth every evening 90 tablet 3     BIOTIN PO Take by mouth daily       calcium citrate-vitamin D (CITRACAL) 200-6.25 MG-MCG TABS per tablet Take 1 tablet by mouth daily       Cyanocobalamin (VITAMIN B 12 PO) Take 1 tablet by mouth daily       Ketoconazole-Hydrocortisone (KETOCON PLUS EX)        lisinopril (ZESTRIL) 10 MG tablet Take 1 tablet (10 mg) by mouth daily 90 tablet 3     MAGNESIUM PO Take 1 tablet by mouth daily       Multiple Vitamins-Minerals (CENTRUM ADULTS PO) Take 1 tablet by mouth daily       Multiple Vitamins-Minerals (OCUVITE ADULT FORMULA PO)        omeprazole (PRILOSEC) 20 MG DR capsule Take 1 capsule (20 mg) by mouth daily. 90 capsule 0     tretinoin (RETIN-A) 0.1 % external cream Apply topically daily. To acne on back 45 g 11     triamcinolone (KENALOG) 0.1 % external ointment Apply sparingly to affected area twice daily. Apply sparingly 2-3 weeks as directed. 80 g 1     No facility-administered encounter medications on file as of 12/9/2024.             Review Of Systems  Skin: As above  Eyes: negative  Ears/Nose/Throat:  negative  Respiratory: No shortness of breath, dyspnea on exertion, cough, or hemoptysis  Cardiovascular: negative  Gastrointestinal: negative  Genitourinary: negative  Musculoskeletal: negative  Neurologic: negative  Psychiatric: negative  Hematologic/Lymphatic/Immunologic: negative  Endocrine: negative      O:   NAD, WDWN, Alert & Oriented, Mood & Affect wnl, Vitals stable   General appearance juan alberto ii   Vitals stable   Alert, oriented and in no acute distress   L axilla 6mm pink pearly papule    Stuck on papules and brown macules on trunk and ext    Red papules on trunk   Flesh colored papules on trunk          Eyes: Conjunctivae/lids:Normal     ENT: Lips, mucosa: normal    MSK:Normal    Cardiovascular: peripheral edema none    Pulm: Breathing Normal    Neuro/Psych: Orientation:Normal; Mood/Affect:Normal      A/P:  1. Seborrheic keratosis, lentigo, angioma, dermal nevus  2. Basal cell carcinoma axilla  It was a pleasure speaking to Nilda Amaro today.  Previous clinic  notes and pertinent laboratory tests were reviewed prior to Nilda Amaro's visit.  Signs and Symptoms of skin cancer discussed with patient.  Patient encouraged to perform monthly skin exams.  UV precautions reviewed with patient.  Risks of non-melanoma skin cancer discussed with patient   Return to clinic 6 months  PROCEDURE NOTE  L axilla basal cell carcinoma   MOHS:   Location    The rationale for Mohs surgery was discussed with the patient and consent was obtained.  The risks and benefits as well as alternatives to therapy were discussed, in detail.  Specifically, the risks of infection, scarring, bleeding, prolonged wound healing, incomplete removal, allergy to anesthesia, nerve injury and recurrence were addressed.  Indication for Mohs was Location. Prior to the procedure, the treatment site was clearly identified and, if available, confirmed with previous photos and confirmed by the patient   All components of the Universal  Protocol/PAUSE rule were completed.  The Mohs surgeon operated in two distinct and integrated capacities as the surgeon and pathologist.      The area was prepped with Betasept.  A rim of normal appearing skin was marked circumferentially around the lesion.  The area was infiltrated with local anesthesia.  The tumor was first debulked to remove all clinically apparent tumor.  An incision following the standard Mohs approach was done and the specimen was oriented,mapped and placed in 1 block(s).  Each specimen was then chromacoded and processed in the Mohs laboratory using standard Mohs technique and submitted for frozen section histology.  Frozen section analysis showed no residual tumor but CLEAR MARGINS.      The tumor was excised using standard Mohs technique in 1 stages(s).  CLEAR MARGINS OBTAINED and Final defect size was 1 cm.     We discussed the options for wound management in full with the patient including risks/benefits/ possible outcomes.      REPAIR SECOND INTENT: We discussed the options for wound management in full with the patient including risks/benefits/possible outcomes. Decision made to allow the wound to heal by second intention. Cautery was used for for hemostasis. EBL minimal; complications none; wound care routine.  The patient was discharged in good condition and will return in one month or prn for wound evaluation.      Again, thank you for allowing me to participate in the care of your patient.        Sincerely,        Jose Manzano MD

## 2024-12-13 ENCOUNTER — LAB (OUTPATIENT)
Dept: LAB | Facility: CLINIC | Age: 69
End: 2024-12-13
Payer: COMMERCIAL

## 2024-12-13 DIAGNOSIS — I25.10 CORONARY ARTERY CALCIFICATION SEEN ON CAT SCAN: ICD-10-CM

## 2024-12-13 DIAGNOSIS — E78.5 HYPERLIPIDEMIA LDL GOAL <100: ICD-10-CM

## 2024-12-13 DIAGNOSIS — I10 HYPERTENSION GOAL BP (BLOOD PRESSURE) < 140/90: ICD-10-CM

## 2024-12-13 LAB
ALBUMIN SERPL BCG-MCNC: 4.1 G/DL (ref 3.5–5.2)
ALP SERPL-CCNC: 76 U/L (ref 40–150)
ALT SERPL W P-5'-P-CCNC: 18 U/L (ref 0–50)
ANION GAP SERPL CALCULATED.3IONS-SCNC: 14 MMOL/L (ref 7–15)
AST SERPL W P-5'-P-CCNC: 28 U/L (ref 0–45)
BILIRUB DIRECT SERPL-MCNC: <0.2 MG/DL (ref 0–0.3)
BILIRUB SERPL-MCNC: 0.4 MG/DL
BUN SERPL-MCNC: 15.2 MG/DL (ref 8–23)
CALCIUM SERPL-MCNC: 9.6 MG/DL (ref 8.8–10.4)
CHLORIDE SERPL-SCNC: 102 MMOL/L (ref 98–107)
CHOLEST SERPL-MCNC: 168 MG/DL
CREAT SERPL-MCNC: 0.78 MG/DL (ref 0.51–0.95)
CREAT UR-MCNC: 62.7 MG/DL
EGFRCR SERPLBLD CKD-EPI 2021: 82 ML/MIN/1.73M2
FASTING STATUS PATIENT QL REPORTED: YES
FASTING STATUS PATIENT QL REPORTED: YES
GLUCOSE SERPL-MCNC: 92 MG/DL (ref 70–99)
HCO3 SERPL-SCNC: 22 MMOL/L (ref 22–29)
HDLC SERPL-MCNC: 76 MG/DL
LDLC SERPL CALC-MCNC: 77 MG/DL
MICROALBUMIN UR-MCNC: <12 MG/L
MICROALBUMIN/CREAT UR: NORMAL MG/G{CREAT}
NONHDLC SERPL-MCNC: 92 MG/DL
POTASSIUM SERPL-SCNC: 4.1 MMOL/L (ref 3.4–5.3)
PROT SERPL-MCNC: 7.2 G/DL (ref 6.4–8.3)
SODIUM SERPL-SCNC: 138 MMOL/L (ref 135–145)
TRIGL SERPL-MCNC: 74 MG/DL

## 2024-12-13 PROCEDURE — 82570 ASSAY OF URINE CREATININE: CPT

## 2024-12-13 PROCEDURE — 80061 LIPID PANEL: CPT

## 2024-12-13 PROCEDURE — 82043 UR ALBUMIN QUANTITATIVE: CPT

## 2024-12-13 PROCEDURE — 36415 COLL VENOUS BLD VENIPUNCTURE: CPT

## 2024-12-13 PROCEDURE — 82248 BILIRUBIN DIRECT: CPT

## 2024-12-13 PROCEDURE — 80053 COMPREHEN METABOLIC PANEL: CPT

## 2024-12-16 ENCOUNTER — OFFICE VISIT (OUTPATIENT)
Dept: FAMILY MEDICINE | Facility: CLINIC | Age: 69
End: 2024-12-16
Payer: COMMERCIAL

## 2024-12-16 VITALS
OXYGEN SATURATION: 99 % | BODY MASS INDEX: 36.34 KG/M2 | DIASTOLIC BLOOD PRESSURE: 77 MMHG | HEART RATE: 60 BPM | WEIGHT: 197.5 LBS | TEMPERATURE: 96.9 F | SYSTOLIC BLOOD PRESSURE: 125 MMHG | RESPIRATION RATE: 16 BRPM | HEIGHT: 62 IN

## 2024-12-16 DIAGNOSIS — E66.01 CLASS 2 SEVERE OBESITY DUE TO EXCESS CALORIES WITH SERIOUS COMORBIDITY AND BODY MASS INDEX (BMI) OF 36.0 TO 36.9 IN ADULT (H): ICD-10-CM

## 2024-12-16 DIAGNOSIS — I10 HYPERTENSION GOAL BP (BLOOD PRESSURE) < 140/90: Chronic | ICD-10-CM

## 2024-12-16 DIAGNOSIS — E66.812 CLASS 2 SEVERE OBESITY DUE TO EXCESS CALORIES WITH SERIOUS COMORBIDITY AND BODY MASS INDEX (BMI) OF 36.0 TO 36.9 IN ADULT (H): ICD-10-CM

## 2024-12-16 DIAGNOSIS — E78.5 HYPERLIPIDEMIA LDL GOAL <100: Chronic | ICD-10-CM

## 2024-12-16 DIAGNOSIS — K76.0 HEPATIC STEATOSIS: ICD-10-CM

## 2024-12-16 DIAGNOSIS — K22.70 BARRETT'S ESOPHAGUS WITHOUT DYSPLASIA: Primary | ICD-10-CM

## 2024-12-16 DIAGNOSIS — I70.0 AORTIC ATHEROSCLEROSIS (H): Chronic | ICD-10-CM

## 2024-12-16 DIAGNOSIS — I25.10 CORONARY ARTERY CALCIFICATION SEEN ON CAT SCAN: Chronic | ICD-10-CM

## 2024-12-16 PROCEDURE — G2211 COMPLEX E/M VISIT ADD ON: HCPCS | Performed by: FAMILY MEDICINE

## 2024-12-16 PROCEDURE — 99214 OFFICE O/P EST MOD 30 MIN: CPT | Performed by: FAMILY MEDICINE

## 2024-12-16 RX ORDER — LISINOPRIL 10 MG/1
10 TABLET ORAL DAILY
Qty: 90 TABLET | Refills: 3 | Status: SHIPPED | OUTPATIENT
Start: 2024-12-16

## 2024-12-16 RX ORDER — ATORVASTATIN CALCIUM 10 MG/1
10 TABLET, FILM COATED ORAL EVERY EVENING
Qty: 90 TABLET | Refills: 3 | Status: SHIPPED | OUTPATIENT
Start: 2024-12-16

## 2024-12-16 RX ORDER — ASPIRIN 81 MG/1
81 TABLET ORAL DAILY
Qty: 90 TABLET | Refills: 3 | Status: SHIPPED | OUTPATIENT
Start: 2024-12-16

## 2024-12-16 RX ORDER — ATORVASTATIN CALCIUM 10 MG/1
10 TABLET, FILM COATED ORAL EVERY EVENING
Qty: 90 TABLET | Refills: 1 | Status: SHIPPED | OUTPATIENT
Start: 2024-12-16 | End: 2024-12-16

## 2024-12-16 ASSESSMENT — PAIN SCALES - GENERAL: PAINLEVEL_OUTOF10: NO PAIN (0)

## 2024-12-16 NOTE — PROGRESS NOTES
"Assessment & Plan     Bernal's esophagus without dysplasia  Stable, reviewed upper GI endoscopy from 2022, continue with precautions, continue with current dose of Prilosec 20 mg daily, recheck endoscopy in November 2025  - omeprazole (PRILOSEC) 20 MG DR capsule; Take 1 capsule (20 mg) by mouth every morning (before breakfast).    Hypertension goal BP (blood pressure) < 140/90    BP Readings from Last 6 Encounters:   12/16/24 125/77   05/23/24 121/74   05/20/24 121/81   03/15/24 124/85   03/05/24 (!) 163/84   02/05/24 118/78     Last Comprehensive Metabolic Panel:  Lab Results   Component Value Date     12/13/2024    POTASSIUM 4.1 12/13/2024    CHLORIDE 102 12/13/2024    CO2 22 12/13/2024    ANIONGAP 14 12/13/2024    GLC 92 12/13/2024    BUN 15.2 12/13/2024    CR 0.78 12/13/2024    GFRESTIMATED 82 12/13/2024    HANH 9.6 12/13/2024       Lab Results   Component Value Date    MICROL <12.0 12/13/2024    MICROL 7 03/09/2023    MICROL 14 11/16/2020     No results found for: \"MICROALBUMIN\"  Blood pressure is at goal, reviewed normal BMP including kidney functions and potassium, reviewed and negative my urine microalbumin  Continue with current dose of lisinopril, low-salt diet, regular exercises  Refills given, recheck in 1 year or sooner if needed  - lisinopril (ZESTRIL) 10 MG tablet; Take 1 tablet (10 mg) by mouth daily.    Hyperlipidemia LDL goal <100  LDL Cholesterol Calculated   Date Value Ref Range Status   12/13/2024 77 <100 mg/dL Final   11/16/2020 129 (H) <100 mg/dL Final     Comment:     Above desirable:  100-129 mg/dl  Borderline High:  130-159 mg/dL  High:             160-189 mg/dL  Very high:       >189 mg/dl       LDL is at goal, continue current dose of Lipitor 10 mg daily, recheck in 1 year or sooner if needed  - atorvastatin (LIPITOR) 10 MG tablet; Take 1 tablet (10 mg) by mouth every evening.    Coronary artery calcification seen on CAT scan  Reviewed chest CT from April 2024 showing mild coronary " "calcification, aortic atherosclerosis recommended to start back on baby aspirin, continue current dose of Lipitor    - aspirin (ASPIRIN LOW DOSE) 81 MG EC tablet; Take 1 tablet (81 mg) by mouth daily.  - atorvastatin (LIPITOR) 10 MG tablet; Take 1 tablet (10 mg) by mouth every evening.    Aortic atherosclerosis (H)  As above  - aspirin (ASPIRIN LOW DOSE) 81 MG EC tablet; Take 1 tablet (81 mg) by mouth daily.  - atorvastatin (LIPITOR) 10 MG tablet; Take 1 tablet (10 mg) by mouth every evening.    Class 2 severe obesity due to excess calories with serious comorbidity and body mass index (BMI) of 36.0 to 36.9 in adult (H)  Wt Readings from Last 5 Encounters:   12/16/24 89.6 kg (197 lb 8 oz)   05/23/24 84.4 kg (186 lb)   05/20/24 85.3 kg (188 lb)   03/15/24 86.2 kg (190 lb)   03/05/24 86.9 kg (191 lb 9.6 oz)     Patient's insurance did not cover semaglutide's  Recommended patient to reach out to the weight management team to start on oral medications as she already discussed with  Patient verbalised understanding and is agreeable to the plan.      Hepatic steatosis  Liver Function Studies -   Recent Labs   Lab Test 12/13/24  0845   PROTTOTAL 7.2   ALBUMIN 4.1   BILITOTAL 0.4   ALKPHOS 76   AST 28   ALT 18     Reviewed normal LFT  Recommended to avoid or limit alcohol, avoid sweetened beverages follow your diet free of sugar and low in simple carbohydrates, and saturated fats continue with efforts on healthy eating, regular exercises and weight loss.            BMI  Estimated body mass index is 36.71 kg/m  as calculated from the following:    Height as of this encounter: 1.562 m (5' 1.5\").    Weight as of this encounter: 89.6 kg (197 lb 8 oz).   Weight management plan: Patient referred to endocrine and/or weight management specialty      Work on weight loss  Regular exercise  Chart documentation done in part with Dragon Voice recognition Software. Although reviewed after completion, some word and grammatical error may " remain.    See Patient Instructions    Subjective   Nilda is a 69 year old, presenting for the following health issues:  Recheck Medication    History of Present Illness       Reason for visit:  Check up on my omeprazole prescription    She eats 0-1 servings of fruits and vegetables daily.She consumes 1 sweetened beverage(s) daily.She exercises with enough effort to increase her heart rate 9 or less minutes per day.  She exercises with enough effort to increase her heart rate 3 or less days per week.   She is taking medications regularly.         Medication Followup of Omeprazole 20 mg   Taking Medication as prescribed: yes  Side Effects:  None  Medication Helping Symptoms:  yes  Hyperlipidemia Follow-Up    Are you regularly taking any medication or supplement to lower your cholesterol?   Yes- Lipitor  Are you having muscle aches or other side effects that you think could be caused by your cholesterol lowering medication?  No    Hypertension Follow-up    Do you check your blood pressure regularly outside of the clinic? RARELY   Are you following a low salt diet? No  Are your blood pressures ever more than 140 on the top number (systolic) OR more   than 90 on the bottom number (diastolic), for example 140/90? No      Review of Systems  CONSTITUTIONAL: NEGATIVE for fever, chills, change in weight  RESP: NEGATIVE for significant cough or SOB  CV: NEGATIVE for chest pain, palpitations or peripheral edema  CV: Hx HTN  GI: NEGATIVE for nausea, abdominal pain, heartburn, or change in bowel habits and Hx GERD  MUSCULOSKELETAL: NEGATIVE for significant arthralgias or myalgia  NEURO: NEGATIVE for weakness, dizziness or paresthesias  ENDOCRINE: NEGATIVE for temperature intolerance, skin/hair changes  HEME/ALLERGY/IMMUNE: NEGATIVE for bleeding problems  PSYCHIATRIC: NEGATIVE for changes in mood or affect      Objective    There were no vitals taken for this visit.  There is no height or weight on file to calculate BMI.  Physical  "Exam   GENERAL: alert and no distress  RESP: lungs clear to auscultation - no rales, rhonchi or wheezes  CV: regular rate and rhythm, normal S1 S2, no S3 or S4, no murmur, click or rub, no peripheral edema   MS: no gross musculoskeletal defects noted, no edema  PSYCH: mentation appears normal, affect normal/bright    Last Comprehensive Metabolic Panel:  Lab Results   Component Value Date     12/13/2024    POTASSIUM 4.1 12/13/2024    CHLORIDE 102 12/13/2024    CO2 22 12/13/2024    ANIONGAP 14 12/13/2024    GLC 92 12/13/2024    BUN 15.2 12/13/2024    CR 0.78 12/13/2024    GFRESTIMATED 82 12/13/2024    HANH 9.6 12/13/2024       Lab Results   Component Value Date    CHOL 168 12/13/2024    CHOL 224 11/16/2020     Lab Results   Component Value Date    HDL 76 12/13/2024    HDL 85 11/16/2020     Lab Results   Component Value Date    LDL 77 12/13/2024     11/16/2020     Lab Results   Component Value Date    TRIG 74 12/13/2024    TRIG 51 11/16/2020     Lab Results   Component Value Date    CHOLHDLRATIO 2.7 08/03/2015     Lab Results   Component Value Date    MICROL <12.0 12/13/2024    MICROL 7 03/09/2023    MICROL 14 11/16/2020     No results found for: \"MICROALBUMIN\"  Liver Function Studies -   Recent Labs   Lab Test 12/13/24  0845   PROTTOTAL 7.2   ALBUMIN 4.1   BILITOTAL 0.4   ALKPHOS 76   AST 28   ALT 18             Signed Electronically by: Leonor Muir MD    "

## 2024-12-17 ENCOUNTER — TELEPHONE (OUTPATIENT)
Dept: SURGERY | Facility: CLINIC | Age: 69
End: 2024-12-17
Payer: COMMERCIAL

## 2024-12-17 NOTE — TELEPHONE ENCOUNTER
General Call    Contacts       Contact Date/Time Type Contact Phone/Fax    12/17/2024 12:50 PM CST Phone (Incoming) Nilda Amaro (Self) [phone] (M)          Reason for Call:  call back     What are your questions or concerns:  pt would like a call back to discuss why she can't start meds before an appt. offered an appt and waitlist and pt wants to talk to someone first    [facility] or would you prefer to receive a phone call?:   Patient would prefer a phone call   Okay to leave a detailed message?: Yes at Cell number on file:    Telephone Information:   Mobile [phone]

## 2024-12-17 NOTE — TELEPHONE ENCOUNTER
Pt already informed why can't be prescribed meds w/o being seen.  Sent another msg stating the same as nothing has changed and cannot get pt in sooner.  Stella Dash, MS, RD, RN

## 2024-12-18 ENCOUNTER — TELEPHONE (OUTPATIENT)
Dept: SURGERY | Facility: CLINIC | Age: 69
End: 2024-12-18
Payer: COMMERCIAL

## 2024-12-18 NOTE — TELEPHONE ENCOUNTER
Order/Referral Request    Who is requesting: patient    Orders being requested: NEW MTM    Reason service is needed/diagnosis: medication    When are orders needed by: asap    Has this been discussed with Provider: [facility]    Does patient have a preference on a Group/Provider/Facility? [facility]    Does patient have an appointment scheduled?: No    Where to send orders: Place orders within Epic    Could we send this information to you in [employer] or would you prefer to receive a phone call?:   Patient would like to be contacted via [employer]

## 2025-01-03 ENCOUNTER — VIRTUAL VISIT (OUTPATIENT)
Dept: PHARMACY | Facility: CLINIC | Age: 70
End: 2025-01-03
Attending: PHYSICIAN ASSISTANT
Payer: COMMERCIAL

## 2025-01-03 VITALS — HEIGHT: 62 IN | WEIGHT: 195 LBS | BODY MASS INDEX: 35.88 KG/M2

## 2025-01-03 DIAGNOSIS — E66.01 CLASS 2 SEVERE OBESITY DUE TO EXCESS CALORIES WITH SERIOUS COMORBIDITY AND BODY MASS INDEX (BMI) OF 36.0 TO 36.9 IN ADULT (H): Primary | ICD-10-CM

## 2025-01-03 DIAGNOSIS — E66.812 CLASS 2 SEVERE OBESITY DUE TO EXCESS CALORIES WITH SERIOUS COMORBIDITY AND BODY MASS INDEX (BMI) OF 36.0 TO 36.9 IN ADULT (H): Primary | ICD-10-CM

## 2025-01-03 DIAGNOSIS — Z78.9 TAKES DIETARY SUPPLEMENTS: ICD-10-CM

## 2025-01-03 DIAGNOSIS — I10 HYPERTENSION GOAL BP (BLOOD PRESSURE) < 140/90: Chronic | ICD-10-CM

## 2025-01-03 DIAGNOSIS — K21.9 CHRONIC GERD: ICD-10-CM

## 2025-01-03 DIAGNOSIS — E78.5 HYPERLIPIDEMIA LDL GOAL <100: Chronic | ICD-10-CM

## 2025-01-03 ASSESSMENT — PAIN SCALES - GENERAL: PAINLEVEL_OUTOF10: NO PAIN (0)

## 2025-01-03 NOTE — PATIENT INSTRUCTIONS
"Recommendations from MTM Pharmacist visit:                                                    MTM (medication therapy management) is a service provided by a clinical pharmacist designed to help you get the most of out of your medicines.  You may be sent a phone or email survey evaluating today's visit.  Please provide feedback you have for the service he received today if you are able.      Orders sent to your [facility] pharmacy for naltrexone and metformin     Please start taking naltrexone first, start with quarter tablet (12.5 mg) for two days then increase to a half tablet for a few days until you feel you are tolerating, then increase to full 50 mg tablet daily    Once you are tolerating the naltrexone please start taking your metformin.  Start with 500 mg ER tablet daily with dinner (or a meal) for 1-2 weeks, once tolerating then increase to two tablets daily with a meal.     If you have any questions or concerns please reach out     Will put orders in for Wegovy to reattempt insurance coverage of this medication we will keep you posted on outcome    Could consider trying to get Zepbound approved by insurance with Sleep Apnea indication in the upcoming months    Follow-up:  - MTM MyChart check-in late January    - Next MTM 2/24/25 at 9 am      - Next provider visit: 5/16/2025 Sol Bianchi PA-C       It was great speaking with you today.  I value your experience and would be very thankful for your time in providing feedback in our clinic survey. In the next few days, you may receive an email or text message from [facility] [employer] with a link to a survey related to your  clinical pharmacist.\"     To schedule another MTM appointment, please call the clinic directly ([facility] Phone Number: [phone] (schedules for [facility] and [place] clinics - providers, dietitians, health coaches) or you may call the MTM scheduling line at [phone] or toll-free at [phone].     My " "Clinical Pharmacist's contact information:                                                      Please feel free to contact me with any questions or concerns you have.      Maria T Valdez, PharmD    Medication Therapy Management Pharmacist   [facility]      NALTREXONE    We are considering starting Naltrexone. Start with 1/2 tab 1-2 hours prior to the time you have the most trouble with cravings or extra hunger. If you are doing well you may switch to a whole tablet taken at the same time period.     WARNING: This medication blocks the action of opioid type pain medications. If you routinely take any medication like Codeine, Oxycontin,Percocet,Morphine,Dilaudid or Methodone, do not take this until you have talked with weight management staff. If you are planning surgery you should stop Naltrexone 4 days prior to the surgery. If you have an injury that requires pain medication, make sure the health care staff knows you take Naltrexone.     Naltrexone is a medication that is used most often to help people who are troubled by dependence on prescription pain killers or alcohol. It has also been found to help with weight loss. Although it's not currently FDA approved for weight loss, it has been used safely for a number of years to help people who are carrying extra weight.     Just how Naltrexone helps with weight loss has not been exactly determined.  It seems to work by quieting down brain signals related to strong food cravings. Many of our patients use the word \"addiction\" to describe their feelings and constant thoughts about food. It makes sense then to treat the feeling of dependence on food, outside of real hunger, with a medication designed to help with other sorts of dependence.     Our patients on Naltrexone find that they:    >feel less interest in food   >think less about food and eating and have more time to think of other things   >find it easier to push the " plate away   >have an easier time eating less    For some of our patients, these feelings are very immediate. Other patients, don't feel much of a change but find they've lost weight. Like all weight loss medications, Naltrexone works best when you help it work. This means:  1. Having less tempting high calorie (fattening) food around the house or office. (For people with strong cravings this is very important.)   2. Staying away from situations or people that may trigger your cravings .   3. Eating out only one time or less each week.  4. Eating your meals at a table with the TV or computer off.    Side-effects. Naltrexone is generally well tolerated. The main side-effect we see is  nausea or a woozy feeling. A small number of people feel quite ill. Most people have a mild reaction and some people have no reaction at all.  The good news is that this feeling does go away.     In order to avoid nausea, please start the medication with half a pill for the first few days. Go on to a full pill if you are feeling well.      If you  are nauseated on 1/2 a pill it is okay to cut back to 1/4 pill ( a very small amount). Take this for a couple of days and work your way back up to a 1/2 pill and then a whole pill. Taking the medication at night or with food  to start also may help prevent the feeling of nausea.       For any questions or concerns please send a [employer] message to our team or call our weight management call center at [phone] during regular business hours. For questions during evenings or weekends your messages will be addressed during the next business day.  For emergencies please call 911 or seek immediate medical care.     (Do not stop taking it if you don't think it's working. For some people it works without them knowing it.)      Please refer to the pharmacy insert for more information on side-effects. Since many pharmacists are not familiar with the use of naltrexone in weight loss, calling the nurse  at [phone] will get you the most accurate information.  In order to get refills of this or any medication we prescribe you must be seen in the medical weight mgmt clinic every 2-3 months. Please have your pharmacy fax a refill request to [phone].      MEDICATION STARTED AT THIS APPOINTMENT    We are starting metformin. Starting instructions:    Immediate release tablet: Take 1 tablet by mouth daily with a meal for 1 week, then increase to 1 tablet twice daily with meals.   Extended release tablet: Take 1 tablet by mouth daily with a meal for 1 week, then increase to 2 tablets daily with a meal or 1 tablet twice daily with meals.    Call the nurse at [phone] if you have any questions or concerns.     Metformin is a medication that is used to assist with lowering blood sugars in patients that have Pre-Diabetes or Diabetes. It has also been found to help with weight loss.    Metformin helps to lower blood sugars by lowering the amount of sugar (glucose) your liver produces that would otherwise cause your blood sugar to increase. It also makes your body respond better to insulin (the product that lowers your blood sugar). It is unclear how metformin assists with weight loss.     Side-effects. Metformin is generally well tolerated. The main side-effects we see are:   Diarrhea, nausea and stomach upset - this tends to subside over time as your body gets  used to the medication. Taking this medications with food will lower these side effects.    Low blood sugar (hypoglycemia) is rare to occur with metformin, but can occur if you do not eat enough or are taking other medications that assist to lower blood sugar. The signs of low blood sugar are:  Weakness  Shaky   Hungry  Sweating  Confusion      See below for ways to treat low blood sugar without adding in lots of extra calories.    Treating Low Blood Sugar    If you have symptoms of low blood sugar (sweating, shaking, dizzy, confused) eat 15 grams of carbs  and wait 15 minutes:    Glucose Tabs are best for sugars under 70 -  Dex4 or BD Glucose tablets are good, you will need to take 3-4 of these to equal 15 grams.     One small box of raisins  4 oz fruit juice box or   cup fruit juice  1 small apple  1 small banana    cup canned fruit in water    English muffin or a slice of bread with jelly   1 low fat frozen waffle with sugar-free syrup    cup cottage cheese with   cup frozen or fresh blueberries  1 cup skim or low-fat milk    cup whole grain cereal  4-6 crackers such as Triscuits      Please refer to the pharmacy insert for more information on side-effects. Please call the clinic should you have more questions regarding this medication.      In order to get refills of this or any medication we prescribe you must be seen in the medical weight mgmt clinic every 2-3 months. Please have your pharmacy fax a refill request to [phone].

## 2025-01-03 NOTE — PROGRESS NOTES
Medication Therapy Management (MTM) Encounter    ASSESSMENT:                            Medication Adherence/Access: See below for considerations.    Weight Management   Nilda would benefit from starting therapy with naltrexone and metformin.  These therapies were previously discussed with her weight management provider Sol Bianchi PA-C, along with Wegovy which was not covered by insurance.  She is now interested in starting therapy, discussed side effects and staggered start due to increased risk of nausea / intolerance if started simultaneously.  Will start naltrexone given she feels food noise / brain hunger is her greatest barrier to weight loss, then metformin once tolerating naltrexone.  Discussed attempting again for approval of Wegovy given new year and she would like to pursue this as well.  Discussed could consider attempting coverage of Zepbound with JASPER indication in the future as well.     Hypertension   At goal of <140/90 mmHg.      Hyperlipidemia   At LDL goal of <100 mg/dL. Tolerating statin.     GERD:   Stable.     Supplements:  No concerns.     PLAN:                            Orders sent to your [facility] pharmacy for naltrexone and metformin     Please start taking naltrexone first, start with quarter tablet (12.5 mg) for two days then increase to a half tablet for a few days until you feel you are tolerating, then increase to full 50 mg tablet daily    Once you are tolerating the naltrexone please start taking your metformin.  Start with 500 mg ER tablet daily with dinner (or a meal) for 1-2 weeks, once tolerating then increase to two tablets daily with a meal.     If you have any questions or concerns please reach out     Will put orders in for Wegovy to reattempt insurance coverage of this medication we will keep you posted on outcome    Could consider trying to get Zepbound approved by insurance with Sleep Apnea indication in the upcoming months    Follow-up:  - MTM MyChart check-in late  January    - Next MTM 2/24/25 at 9 am      - Next provider visit: 5/16/2025 Sol Bianchi PA-C       SUBJECTIVE/OBJECTIVE:                          Nilda Amaro is a 69 year old female seen for an initial visit. She was referred to me from Sol Bianchi PA-C.      Reason for visit: comprehensive medication review and weight loss medication initiation.    Allergies/ADRs: Reviewed in chart  Past Medical History: Reviewed in chart  Tobacco: She reports that she quit smoking about 16 years ago. Her smoking use included cigarettes. She started smoking about 52 years ago. She has a 54 pack-year smoking history. She has never been exposed to tobacco smoke. She has never used smokeless tobacco.  Alcohol: 1-3 beverages / week  Caffeine: 1 cup coffee per day    Medication Adherence/Access:   - Patient uses pill box(es).  - Patient takes medications 2 time(s) per day.   - Per patient, misses medication 0 time(s) per week.     Weight Management   - no medications currently    Has been on banana drops and keto gummy bears to suppress appetite for 3 weeks but were not effective.  Discussed Wegovy re-attempt with with insurance with new year, she would like to pursue this.  Would like to start oral therapy now.  Feels she boredom eats, more brain hunger than physical hunger.  Discussed naltrexone and would like to start, also discussed metformin and not starting both together due to both causing nausea.  She would like to start naltrexone first, then metformin. Compound semaglutide not affordable.     Nutrition/Eating Habits:  not addressed  Sleep:  CPAP, 6 hours / night  Exercise/Activity: has bad knees, would like to try yoga.   Medications Tried/Failed:  None.     - Last provider visit: 5/20/24 Sol Bianchi PA-C    - Next provider visit: 5/16/2025 Sol Bianchi PA-C       Initial Consult Weight: 193 lb (5/20/24)     Current weight today: 195 lbs 0 oz  Cumulative Weight Gain: +2 lb, +1% from baseline    Wt Readings from Last 4 Encounters:  "  01/03/25 195 lb (88.5 kg)   12/16/24 197 lb 8 oz (89.6 kg)   05/23/24 186 lb (84.4 kg)   05/20/24 188 lb (85.3 kg)     Estimated body mass index is 36.25 kg/m  as calculated from the following:    Height as of this encounter: 5' 1.5\" (1.562 m).    Weight as of this encounter: 195 lb (88.5 kg).    Hypertension   - Lisinopril 10 mg once daily     Patient reports no current medication side effects  Patient self monitors blood pressure.  Home BP monitoring once every other month, typically 120/70s, sometimes higher .    BP Readings from Last 3 Encounters:   12/16/24 125/77   05/23/24 121/74   05/20/24 121/81      Hyperlipidemia   - Atorvastatin 10 mg daily    Patient reports no significant myalgias or other side effects.    LDL Cholesterol Calculated   Date Value Ref Range Status   12/13/2024 77 <100 mg/dL Final   11/16/2020 129 (H) <100 mg/dL Final     Comment:     Above desirable:  100-129 mg/dl  Borderline High:  130-159 mg/dL  High:             160-189 mg/dL  Very high:       >189 mg/dl        GERD    - Omeprazole 20 mg once daily     Patient reports no current symptoms.  Used to take twice daily, doing well at once daily.   Patient feels that current regimen is effective.  The patient does notice symptoms if they miss a dose.  Patient has not tried a trial off of therapy and is interested in doing after losing some weight.     Supplements   - biotin 10,000 mg / day  - Calcium-Vitamin D (Citracal) twice daily   - Magnesium complex 400 mg once daily   - vitamin B12 once daily   - Multivitamin once daily       No reported issues at this time.        Today's Vitals: Ht 5' 1.5\" (1.562 m)   Wt 195 lb (88.5 kg)   BMI 36.25 kg/m    ----------------      I spent 55 minutes with this patient today. All changes were made via collaborative practice agreement with Sol Bianchi.     A summary of these recommendations was sent via [employer].    Maria T Valdez, PharmD    Medication Therapy Management Pharmacist  [facility]      Telemedicine Visit Details  The patient's medications can be safely assessed via a telemedicine encounter.  Type of service:  Telephone visit  Originating Location (pt. Location): Home    Distant Location (provider location):  Off-site  Start Time: 1:39 PM  End Time: 2:34 PM     Medication Therapy Recommendations  No medication therapy recommendations to display

## 2025-01-03 NOTE — Clinical Note
Initiated naltrexone and will also start metformin once tolerating naltrexone, per your prev visit and her request to start therapy.  Wegovy approval was prev denied, do not see denial reason in Epic, she got letter, will attempt again in 2025 with Cardio indications.  Next MTEDISON 2/24 and You 5/16 - Maria T ROSARIO

## 2025-01-03 NOTE — NURSING NOTE
Current patient location: [facility] [address]  [facility] [address]    Is the patient currently in the state of [place]? YES    Visit mode:TELEPHONE    If the visit is dropped, the patient can be reconnected by:TELEPHONE VISIT: Phone number:   Telephone Information:   Mobile [phone]       Will anyone else be joining the visit? NO  (If patient encounters technical issues they should call [phone])    Are changes needed to the allergy or medication list? No    Are refills needed on medications prescribed by this physician? NO    Rooming Documentation:  Questionnaire(s) not pre-assigned    Reason for visit: Medication Therapy Management    Annabella SEE

## 2025-01-05 RX ORDER — NALTREXONE HYDROCHLORIDE 50 MG/1
TABLET, FILM COATED ORAL
Qty: 30 TABLET | Refills: 2 | Status: SHIPPED | OUTPATIENT
Start: 2025-01-05

## 2025-01-05 RX ORDER — SEMAGLUTIDE 0.25 MG/.5ML
0.25 INJECTION, SOLUTION SUBCUTANEOUS WEEKLY
Qty: 2 ML | Refills: 0 | OUTPATIENT
Start: 2025-01-05

## 2025-01-05 RX ORDER — METFORMIN HYDROCHLORIDE 500 MG/1
500 TABLET, EXTENDED RELEASE ORAL
Qty: 180 TABLET | Refills: 1 | Status: SHIPPED | OUTPATIENT
Start: 2025-01-05

## 2025-01-06 ENCOUNTER — TELEPHONE (OUTPATIENT)
Dept: SURGERY | Facility: CLINIC | Age: 70
End: 2025-01-06
Payer: COMMERCIAL

## 2025-01-06 NOTE — LETTER
"January 15, 2025    To:   Clinical Review Department  [address]  [place] [address]    RE: Nilda Amaro  [address] [place] [address]  : 1955  MRN: [id number]  Policy #: [id number]   Case/Reference: [id number]    To Whom It May Concern,    I am writing on behalf of my patient, Nilda Amaro  to document the medical necessity of Wegovy for the treatment of Cardiovascular disease . This letter provides information about the patient's medical history and diagnosis and a statement summarizing my treatment rationale.     Summary of Patient History and Diagnosis  Nilda Amaro is a 69 year old female with a diagnosis of Cardiovascular disease  with Coronary artery calcification seen on CAT scan (I25.10) and Aortic atherosclerosis (I70.).     Estimated body mass index is 36.25 kg/m  as calculated from the following:    Height as of 1/3/25: 5' 1.5\" (1.562 m).    Weight as of 1/3/25: 195 lb (88.5 kg).    Treatment Rationale  The recent FDA approval of Wegovy for cardiovascular protection, based on the compelling findings of the SELECT trial, approves Wegovy's critical role in addressing not only weight management but also in reducing the risk of cardiovascular events in individuals struggling with excess weight. As the prescribing provider, I firmly believe that Wegovy represents a pivotal intervention in cardiovascular risk management for my patient.    The SELECT trial, a randomized clinical trial, unequivocally demonstrated the efficacy of Wegovy in reducing the risk of major adverse cardiovascular events (MACE) in individuals who are overweight or obese and have existing cardiovascular disease or multiple cardiovascular risk factors. The trial's results revealed a remarkable reduction in MACE by over 20% compared to placebo, establishing Wegovy as a significant advancement in cardiovascular medicine.    Furthermore, Wegovy's safety profile has been thoroughly evaluated and " deemed acceptable by regulatory authorities. The benefits of Wegovy extend beyond weight loss, offering a comprehensive approach to addressing the complex interplay between obesity and cardiovascular risk factors, such as hypertension, dyslipidemia, and insulin resistance.    Denying coverage for Wegovy not only hinders the patient's ability to achieve optimal health outcomes but also contradicts the principles of evidence-based medicine and equitable access to healthcare. I respectfully urge you to reconsider your decision and provide coverage for Wegovy as a medically necessary intervention for cardiovascular protection in individuals who are overweight or obese. By covering Wegovy, you will not only empower patients to make informed healthcare decisions but also demonstrate your commitment to promoting patient-centric care and reducing the burden of cardiovascular disease.    Duration  12 months     Summary  In summary, Wegovy is medically necessary for this patient s medical condition. Please call my office at [phone]  if I can provide you with any additional information to approve my request. I look forward to receiving your timely response and approval of this request.      Sincerely,    Sol Bianchi PA-C

## 2025-01-08 ENCOUNTER — PATIENT OUTREACH (OUTPATIENT)
Dept: CARE COORDINATION | Facility: CLINIC | Age: 70
End: 2025-01-08
Payer: COMMERCIAL

## 2025-01-11 NOTE — TELEPHONE ENCOUNTER
PRIOR AUTHORIZATION DENIED    Medication: WEGOVY 0.25 MG/0.5ML SC SOAJ  Insurance Company: [employer] - Phone [phone] Fax [phone]  Denial Date: 1/8/2025  Denial Reason(s):   Appeal Information:   Patient Notified: yes

## 2025-01-13 NOTE — TELEPHONE ENCOUNTER
Will pursue appeal, denied as not approved for weight loss only.  Wegovy being ordered with cardiac indications, not solely for weight loss.     Maria T Valdez, PharmD    Medication Therapy Management Pharmacist  [facility]

## 2025-01-15 NOTE — TELEPHONE ENCOUNTER
Routed appeal to liaison pool.     Maria T Valdez, PharmD    Medication Therapy Management Pharmacist  [facility]

## 2025-01-16 NOTE — TELEPHONE ENCOUNTER
Medication Appeal Initiation    Medication: WEGOVY 0.25 MG/0.5ML SC SOAJ  Appeal Start Date:  1/16/2025  Insurance Company: [id number]   Insurance Phone: [phone]  Insurance Fax: [phone]  Comments:    Faxed appeal to [facility]

## 2025-01-20 NOTE — TELEPHONE ENCOUNTER
MEDICATION APPEAL APPROVED    Medication: WEGOVY 0.25 MG/0.5ML SC SOAJ  Authorization Effective Date:    Authorization Expiration Date:    Approved Dose/Quantity: 2  Reference #: [id number]   [facility] Insurance Company: [employer]  Expected CoPay: $       CoPay Card Available:    Financial Assistance Needed:    Filling Pharmacy:    Patient Notified:   Comments:

## 2025-02-17 ENCOUNTER — VIRTUAL VISIT (OUTPATIENT)
Dept: PHARMACY | Facility: CLINIC | Age: 70
End: 2025-02-17
Attending: PHYSICIAN ASSISTANT
Payer: COMMERCIAL

## 2025-02-17 VITALS — WEIGHT: 191 LBS | HEIGHT: 62 IN | BODY MASS INDEX: 35.15 KG/M2

## 2025-02-17 DIAGNOSIS — E66.812 CLASS 2 SEVERE OBESITY DUE TO EXCESS CALORIES WITH SERIOUS COMORBIDITY AND BODY MASS INDEX (BMI) OF 36.0 TO 36.9 IN ADULT (H): Primary | ICD-10-CM

## 2025-02-17 DIAGNOSIS — E66.01 CLASS 2 SEVERE OBESITY DUE TO EXCESS CALORIES WITH SERIOUS COMORBIDITY AND BODY MASS INDEX (BMI) OF 36.0 TO 36.9 IN ADULT (H): Primary | ICD-10-CM

## 2025-02-17 ASSESSMENT — PAIN SCALES - GENERAL: PAINLEVEL_OUTOF10: NO PAIN (0)

## 2025-02-17 NOTE — PROGRESS NOTES
Medication Therapy Management (MTM) Encounter    ASSESSMENT:                            Medication Adherence/Access: See below for considerations.    Weight Management   Nilda would benefit from increasing therapy with Wegovy to 0.5 mg weekly for weight management given she is tolerating, is modestly effective at current dose (- 1 %), and class II obesity.  Recommend maintaining GLP1/GIP therapy as data to support most significant weight loss therapy available. Nilda is experiencing some modest benefit from reduction in food noise, cravings, and appetite, and increased satiety. She started naltrexone prior to finding Wegovy to be covered and found naltrexone helpful for cravings / hunger which returned not long after discontinuing.  She would like to restart naltrexone while Wegovy continues to be optimized which is reasonable, discussed Wegovy could be effective monotherapy once optimized.  Will restart titration of naltrexone. Education provided on continued dietary and behavioral modifications with focus on protein and water intake along with strength training to optimize effectiveness of Wegovy.     PLAN:                            Provider Plan:     Increase Wegovy to 0.5 mg weekly     Restart naltrexone to help with cravings while Wegovy is being optimized    Patient Plan:      Please increase to Wegovy 0.5 mg dose once you complete your doses of 0.25 mg  - Reach out if you're having any intolerable side effects with the dose increase   - Wegovy 0.5 mg order sent to [facility]    Okay to restart naltrexone with 1/2 tablet for 4 days then increase to full tablet, okay to refill this and continue for additional month while Wegovy continues to be optimized and find effective dose for cravings    Protein goal:  20-30 grams protein / meal, minimum 60 gm, ideally 90 gm per day   - try to eat within 60 min of waking up for the day, include protein    - Orgain or FairLife protein drinks   - start with protein portion of  your  meal, then veggie / fiber, then starch   - try to eat slowly to prevent getting overly full with Wegovy therapy    Increase strength training to maintain muscle or reduce muscle loss with weight loss   - Review at home exercises     Water goal:  at least 64 oz per day    - Keep up the great work!     Follow-up:  - Next MTM 3/17/25 at 10:30 am      - Next provider visit: 5/16/2025 Sol Bianchi PA-C       SUBJECTIVE/OBJECTIVE:                          Nilda Amaro is a 69 year old female seen for a follow-up visit. She was referred to me from Sol Bianchi PA-C.      Reason for visit:  Wegovy maintenance and naltrexone restart  Allergies/ADRs: Reviewed in chart  Past Medical History: Reviewed in chart  Tobacco: She reports that she quit smoking about 16 years ago. Her smoking use included cigarettes. She started smoking about 52 years ago. She has a 54 pack-year smoking history. She has never been exposed to tobacco smoke. She has never used smokeless tobacco.  Alcohol: 1-3 beverages / week  Caffeine: 1 cup coffee per day    Medication Adherence/Access:   - Wegovy $600 / month, once hits deductible     Weight Management   - naltrexone 50 mg once daily  - started both naltrexone and metformin but stopped both when started Wegovy, did find benefit with naltrexone with cravings / hunger  - Wegovy 0.25 mg weekly on Wednesday    - med start:  1/2025, dose start: 1/2025, start wt: 193 lb   - completed 3 doses thus far   - [facility] pharmacy    No injection site reaction, no nausea, no vomiting, no constipation (baseline: 1-2 BM / day and loose, now 1 / day, less loose), slight diarrhea (less than baseline), no heartburn (on omeprazole already), no headache, and no fatigue. Experiencing appetite suppression / portion reduction (smaller portions), and reduction with food noise / cravings (very slight, into sweets less).  Found naltrexone help for cravings / appetite (no desire for soda pop or alcohol while taking, also helped  "with boredom eating, returned when stopped naltrexone), when started no side effects.     Nutrition/Eating Habits:  knows what needs to do, just make self do it, discussed importance of protein   - Water: 4 x 16 Oz glasses  Sleep:  CPAP, 6 hours / night  Exercise/Activity: has bad knees, would like to try yoga.   Medications Tried/Failed:  None.     - Last provider visit: 5/20/24 Sol Bianchi PA-C    - Next provider visit: 5/16/2025 Sol Bianchi PA-C       Initial Consult Weight: 193 lb (5/20/24)     Current weight today: 191 lbs 0 oz  Cumulative Weight Gain: -2 lb, -1% from baseline    Wt Readings from Last 4 Encounters:   02/17/25 191 lb (86.6 kg)   01/03/25 195 lb (88.5 kg)   12/16/24 197 lb 8 oz (89.6 kg)   05/23/24 186 lb (84.4 kg)     Estimated body mass index is 35.5 kg/m  as calculated from the following:    Height as of this encounter: 5' 1.5\" (1.562 m).    Weight as of this encounter: 191 lb (86.6 kg).      Today's Vitals: Ht 5' 1.5\" (1.562 m)   Wt 191 lb (86.6 kg)   BMI 35.50 kg/m    ----------------    I spent 30 minutes with this patient today. All changes were made via collaborative practice agreement with Sol Bianchi.     A summary of these recommendations was sent via [employer]    Maria T Valdez, PharmD    Medication Therapy Management Pharmacist  [facility]      Telemedicine Visit Details  The patient's medications can be safely assessed via a telemedicine encounter.  Type of service:  Telephone visit  Originating Location (pt. Location): Home    Distant Location (provider location):  Off-site  Start Time: 10:12 AM  End Time: 10:42 AM     Medication Therapy Recommendations  Class 2 severe obesity due to excess calories with serious comorbidity in adult (H)   1 Current Medication: semaglutide-weight management (WEGOVY) 0.25 MG/0.5ML pen   Current Medication Sig: Inject 0.5 mLs (0.25 mg) subcutaneously once a week.   Rationale: Dose too low - Dosage too low - Effectiveness "   Recommendation: Increase Dose - Wegovy 0.5 MG/0.5ML Soaj   Status: Accepted per CPA   Identified Date: 2/17/2025 Completed Date: 2/17/2025

## 2025-02-17 NOTE — Clinical Note
Starting Wegovy going well, will increase to 0.5 mg after 0.25 mg doses done due to modest effect thus far.  Discussed lifestyle modifications to increase effect.  She found naltrexone helpful for cravings, agreed to restart to help until Wegovy is optimized then could try to see if Wegovy is effective as monotherapy in future. ABRAHAM 3/17, Sol 5/16 - Maria T RamanD, [place]

## 2025-02-17 NOTE — PATIENT INSTRUCTIONS
"Recommendations from MTM Pharmacist visit:                                                    MTM (medication therapy management) is a service provided by a clinical pharmacist designed to help you get the most of out of your medicines.  You may be sent a phone or email survey evaluating today's visit.  Please provide feedback you have for the service he received today if you are able.      Please increase to Wegovy 0.5 mg dose once you complete your doses of 0.25 mg  - Reach out if you're having any intolerable side effects with the dose increase   - Wegovy 0.5 mg order sent to [facility]    Okay to restart naltrexone with 1/2 tablet for 4 days then increase to full tablet, okay to refill this and continue for additional month while Wegovy continues to be optimized and find effective dose for cravings    Protein goal:  20-30 grams protein / meal, minimum 60 gm, ideally 90 gm per day (see below)   - try to eat within 60 min of waking up for the day, include protein    - Orgain or FairLife protein drinks   - start with protein portion of your  meal, then veggie / fiber, then starch   - try to eat slowly to prevent getting overly full with Wegovy therapy    Increase strength training to maintain muscle or reduce muscle loss with weight loss   - Review at home exercises (see below)     Water goal:  at least 64 oz per day    - Keep up the great work!     Follow-up:  - Next MTM 3/17/25 at 10:30 am      - Next provider visit: 5/16/2025 Sol Bianchi PA-C       It was great speaking with you today.  I value your experience and would be very thankful for your time in providing feedback in our clinic survey. In the next few days, you may receive an email or text message from [employer] with a link to a survey related to your  clinical pharmacist.\"     To schedule another MTM appointment, please call the clinic directly ([facility] Phone Number: [phone] (schedules for [place], [place] and " [facility] - providers, dietitians, health coaches) or you may call the [facility] scheduling line at [phone] or toll-free at [phone].     My Clinical Pharmacist's contact information:                                                      Please feel free to contact me with any questions or concerns you have.      Maria T Valdez, PharmD    Medication Therapy Management Pharmacist   [facility]    Exercise Recommendations set by The American Heart Association:   Get at least 150 minutes per week of moderate-intensity aerobic activity or 75 minutes per week of vigorous aerobic activity, or a combination of both, preferably spread throughout the week.  Add moderate- to high-intensity muscle-strengthening activity (such as resistance or weights) on at least 2 days per week.  Spend less time sitting. Even light-intensity activity can offset some of the risks of being sedentary.  Gain even more benefits by being active at least 300 minutes (5 hours) per week.  Increase amount and intensity gradually over time.    Community Fitness Resources:  WAPA [facility]  Do you want the guidance of a  or the moral support of a team? Sign up for a no-cost community class at [facility].  https://[email or web address]/broussard/stpaul/    Size-Inclusive Gyms and Fitness Centers:  Fitness spaces that advertise as size-inclusive or have trainers trained in size-inclusive fitness.   [facility]  Balance for Life - [facility] Strength - [place]       Adaptive Fitness Resources  Adaptive Recreation at [facility]: https://www.[facility].org/recreation/adaptive-recreation/#2023-programs     From their website: Join us for an active session of adaptive fitness!  This class is intended for individuals of all ages with a developmental disability. Participants will enjoy staying active through various fitness  games and activities, modified for all abilities.  We will focus on learning different exercises and techniques within a fun group dynamic!      [employer] for Resources:  Https://www.Times pace Intelligent TechnologyforIndexTank.org/     From their website: [facility] offers a variety of adaptive recreation programs speci?yadi adapted to meet the needs of people with developmental disabilities in [place] and the surrounding areas! The programs are designed to promote physical health, gain con?dence, enhance team building skills and to have fun! [facility] offers a non-competitive environment and programs are designed for a variety of ages.     Adaptive Sports at [facility]:  https://account.Bindo/services/184#aboutthisservice     From their website: Adaptive sports offer barrier-free opportunities for you to explore new skills or mateus your competitive edge in a new sport or one you already enjoy.     Adaptive Recreation in [place]  https://www.[facility]/departments/broussard-and-recreation/recreation-centers/qapih-ezwamzoshv-jmwrlsso/adaptive-recreation      At Home Exercise Resources  Algolytics Library - Exercises by Muscle Group  https://www.[employer]ek[employer].org/resources/everyone/exercise-library/    Channels with Exercise Modifications:  Coach Reid (strengthening) https://www.Yuyutoube.com/@CoaJerel  HASfit (strengthening): https://www.Yuyutoube.com/channel/DCTSO9-ZHKRa23AJ-e0TGgwG  Yoga with Zelinda: https://www.Yuyutoube.com/@yogawithzelinda  Bzn3Wffc (strengthening for any age, functional strength training, exercise for seniors): https://www.Yuyutoube.com/@gcp7fmfp  Improved Health (low-impact, chair exercise, exercise for seniors): https://www.youHousing.comube.com/@ImprovedHealth  Seated core exercise: https://www.Trak.io.UrGift/blog/10-minute-chair-core-workout/    Size-Inclusive Fitness Routines:   Beginner Workout - Standing (low impact)  https://www.youHousing.comube.com/watch?v=7CmHTFz9HQG   "    Yoga  https://www.Comuni-Chiamoube.com/watch?v=VdIX8auOH_M&t=36s  https://www.Comuni-Chiamoube.com/watch?v=zUnjJdJitPw    Pilates  https://www.Comuni-Chiamoube.com/watch?v=dZVoboQB2Fw    Full Body Strengthening:  https://www.Comuni-Chiamoube.com/watch?v=9OvGT3g-vu6   https://www.Comuni-Chiamoube.com/watch?v=Y3b40gDcjzI    Other Fitness Channels:  Dance Workouts: Platypi   https://www.iNeoMarketing.2 Minutes/user/TheSelvin    HIIT Workouts: Adjudica  https://www.iNeoMarketing.2 Minutes/user/zipcodemailer.comtvfit    Pilates: Blogilates  https://www.iNeoMarketing.2 Minutes/user/blogilates    Yoga: Yoga with Kaylynn   https://www.iNeoMarketing.2 Minutes/user/yogawithadriene/featured    Apps:  FitOn - free byron with various exercise videos, instructional videos, challenges and exercise tracking.   Caliber - free and premium options. Track strengthening progress (weights, reps, sets).  Byron also gives instructional videos and recommendations for improving progress.       Handouts Relating to Exercise :    1.     Learning About Being Physically Active     2.      Muscle Conditionin Exercises    3.     Resistance Training with Free Weights: 3 Exercises    4.      Resistance Training with Surgical Tubin Exercises    5.     Stretchin Exercises    6.     Seated Exercises for Arms and Legs: 11 Exercises      Protein:    Quick/Easy Protein Sources:  Hard boiled eggs  Part-skim cheese sticks  Baby Bell cheese rounds  Low-fat/low-sugar Greek yogurt  Low-fat cottage cheese  Lean deli meat (chicken/turkey/ham)  Roasted chickpeas or lentils  Nuts   Turkey meat stick  Protein shake/bar  \"P3\" snack (cheese, nuts, deli meat)  Aldi's \"Protein Bread\"   \"Egglife\" egg white wrap    Tuna/salmon can/packet     Protein Supplements:   Unflavored protein powder: Genepro, Isopure (25-30 gm protein per 1 scoop); Vital Proteins collagen powder (1 tbsp = 5 gm pro)  You may also use flavored protein powder if you prefer.   Protein shots: https://store.Innovashop.tv.2 Minutes/collections/protein-shots  Pre-made " protein shakes (no more than 210 Calories, at least 20 grams of protein, and less than 10 grams of sugar):   Premier Protein (160 Calories, 30 g protein)  Boost/Ensure Max (160 calories, 30 gm protein)   Fairlife Core Power (170 calories, 26 gm protein)  Aldi's Elevation Protein Shake (160 calories, 30 gm protein)   Equate Protein Shake (160 calories, 30 gm protein)  Slim Fast Advanced Nutrition (180 Calories, 20 g protein)  Muscle Milk, lactose-free, 17 oz bottle (210 Calories, 30 g protein)  Glucerna Protein Smart (150 alfred, 30 gm protein)  Clear Protein Drinks:  BiPro  Premier Protein clear  [id number]  M Health Protein 15 Concentrate  Bone broth  Beneprotein protein powder mixed with 4-6 oz of fluid  [id number]  Gatorade Zero with Protein   Vital Proteins collagen powder mixed with clear fluid    Meal Replacement Shake Options:   *Protein Shake Criteria: no more than 210 Calories, at least 20 grams of protein, and less than 10 grams of sugar   Premier Protein (160 Calories, 30 g protein)  Slim Fast Advanced Nutrition (180 Calories, 20 g protein)  Muscle Milk, lactose-free, 17 oz bottle (210 Calories, 30 g protein)  Integrated Supplements, no artificial sugars (110 Calories, 20 g protein)  Boost/Ensure Max (160 calories, 30 gm protein)   Fairlife Protein Shakes (160-230 calories, 26-42 gm protein)  Aldi's Elevation Protein Powder (180 calories, 30 gm protein)   Orgain Protein Shakes (130-160 calories, 20-26 gm protein)     Meal Replacement Bar Options:  Quest Protein Bars (190 Calories, 20 g protein)  Built Bar (170 Calories, 15-20 g protein)  One Protein Bar (210 calories, 20 g protein)  Darvin Signature Protein Bar (Costco) (190 Calories, 21 g protein)  Pure Protein Bars (180 Calories, 21 g protein)    Low Calorie Frozen Meal:  Healthy Choice Power Bowls  Lean Cuisine  Smart Ones  Leif Egan    ---------------------------------------------------------------  Tips to Increase the Protein in Your  Diet  You may need more protein in your diet to help you heal from an illness, surgery or wound. Extra protein can also help you gain weight. Here are some ideas for adding high-protein foods to your meals.  Meat and fish  Add chopped cooked meat to vegetables, salads, casseroles, soups, sauces and biscuit dough.  Use in omelets, soufflés, quiches, sandwich fillings and chicken or turkey stuffing.  Wrap in pie crust or biscuit dough to make a turnover.  Add to stuffed baked potatoes.  Make a dip with diced meat or flaked fish mixed with sour cream and spices.  Chopped, hard-cooked eggs  Add to salads.  Use for snacks and sandwich filling.  High-protein milk  To make high-protein milk, mix 1 quart whole milk with 1 cup powdered milk.  Add to cream soups, mashed potatoes, scrambled eggs, cereals and dried eggnog mix.  Use as an ingredient in puddings, custards, hot chocolate, milk shakes and pancakes.  Powdered milk  If you don't have any high-protein milk on hand, you can use powdered milk. Add 3 tablespoons to:  gravies, sauces, cream soups, mayonnaise  casseroles, meat patties, meatloaf, tuna salad, deviled ham  scalloped or mashed potatoes, creamed spinach  scrambled eggs, egg salad  cereals  yogurt, milk drinks, ice cream, frozen desserts, puddings, custards.  Add 4 to 6 tablespoons powdered milk to make:  cream sauces  breads, muffins, pancakes, waffles, cookies, cakes  cream pies, frostings, cake fillings  fruit cobblers, bread or rice pudding, gelatin desserts.  For high-protein eggnog, add 3 to 6 tablespoons powdered milk to prepared eggnog.  Hard or soft cheese  Melt on sandwiches, breads, tortillas, hamburgers, hot dogs, other meats, vegetables, eggs and pies.  Grate into soups, chili, sauces, casseroles, vegetables, potatoes, rice, noodles or meatloaf.  Eat with toast or crackers, or melt for ramesh dip.  Cottage cheese or ricotta cheese  Mix with or scoop on top of fruits and vegetables.  Add to  casseroles, lasagna, spaghetti, noodles and egg dishes (omelets, scrambled eggs, soufflés).  Use in gelatin, pudding-type desserts, cheesecake and pancake batter.  Use to stuff crepes, pasta shells or manicotti.  Fruit yogurt  Blend with fruits for a fruit smoothie.  Use as a dip for fruits and vegetables.  Scoop on top of pancakes or waffles.  Tofu  Blend silken tofu with fruits and juices for a smoothie.  Add chunks of firm tofu to soups and stews, or crumble into meatloaf.  Blend dried onion soup mix into soft or silken tofu for dip.  Use pureed silken tofu for part of the mayonnaise, sour cream, cream cheese or ricotta cheese called for in recipes.  Beans  Use cooked beans or peas in soups, casseroles, pasta, tacos and burritos.  Nuts and seeds  Note: For children under 3, discuss with the child's care team.  Use in casseroles, breads, muffins, pancakes, cookies and waffles.  Sprinkle on fruits, cereals, ice cream, yogurt, vegetables and salads.  Mix with raisins, dried fruits and chocolate chips for a snack.  Nut butters  Note: For children under 3, discuss with the child's care team.  Spread on sandwiches, toast, muffins, crackers, waffles, pancakes and fruit slices.  Use as a dip for raw vegetables.  Blend with milk drinks, or swirl through ice cream, yogurt or hot cereal.  Nutrition supplements (nutrition bars, drinks and powders)  Add powders to milk drinks and desserts.  Mix with ice cream, milk and fruit for a high-protein milk shake.    For informational purposes only. Not to replace the advice of your health care provider. Clinically reviewed by Aleida Robbins RD, DERICK, and the Clinical Nutrition Service Line. Copyright   2005 [place] [employer] [facility]. All rights reserved. [employer] 592678 - REV 04/24.  -----------------------------------------------------------------------------------------------------------------  Learning About High-Protein Foods  What foods are high in protein?     The foods  "you eat contain nutrients, such as vitamins and minerals. Protein is a nutrient. Your body needs the right amount to stay healthy and work as it should. You can use the list below to help you make choices about which foods to eat.  Here are some examples of foods that are high in protein.  Dairy and dairy alternatives  Cheese  Milk  Soy milk  Yogurt (especially Greek)  Meat  Beef  Chicken  Ham  Lamb  Lunch meat  Pork  Sausage  Turkey  Other protein foods  Beans (black, garbanzo, kidney, lima)  Eggs  Hummus  Lentils  Nuts  Peanut butter and other nut butters  Peas  Soybeans  Tofu  Veggie or soy rolo (Check the nutrition label for the amount of protein in each serving.)  Seafood  Anchovies  Cod  Crab  Halibut  Stephenson  Sardines  Shrimp  Tilapia  Sigel  Tuna  Protein supplements  Bars (Check the nutrition label for the amount of protein in each serving.)  Drinks  Powders  Work with your doctor to find out how much of this nutrient you need. Depending on your health, you may need more or less of it in your diet.  Where can you learn more?  Go to https://www.[employer].net/patiented  Enter P335 in the search box to learn more about \"Learning About High-Protein Foods.\"  Current as of: September 20, 2023               Content Version: 14.0    8238-2200 [employer].   Care instructions adapted under license by your healthcare professional. If you have questions about a medical condition or this instruction, always ask your healthcare professional. Healthwise, [employer] disclaims any warranty or liability for your use of this information.   "

## 2025-02-17 NOTE — NURSING NOTE
Is the patient currently in the state of [place]? YES    Visit mode:TELEPHONE    If the visit is dropped, the patient can be reconnected by: TELEPHONE VISIT: Phone number:   Telephone Information:   Mobile [phone]       Will anyone else be joining the visit? NO  (If patient encounters technical issues they should call [phone]    How would you like to obtain your AVS? MyChart    Are changes needed to the allergy or medication list? No    Are refills needed on medications prescribed by this physician? NO    Reason for visit: Medication Therapy Management    Keke SEE

## 2025-02-21 ENCOUNTER — ANCILLARY PROCEDURE (OUTPATIENT)
Dept: MAMMOGRAPHY | Facility: CLINIC | Age: 70
End: 2025-02-21
Attending: FAMILY MEDICINE
Payer: COMMERCIAL

## 2025-02-21 DIAGNOSIS — Z12.31 VISIT FOR SCREENING MAMMOGRAM: ICD-10-CM

## 2025-02-21 PROCEDURE — 77067 SCR MAMMO BI INCL CAD: CPT | Mod: TC | Performed by: RADIOLOGY

## 2025-02-21 PROCEDURE — 77063 BREAST TOMOSYNTHESIS BI: CPT | Mod: TC | Performed by: RADIOLOGY

## 2025-04-05 SDOH — HEALTH STABILITY: PHYSICAL HEALTH: ON AVERAGE, HOW MANY MINUTES DO YOU ENGAGE IN EXERCISE AT THIS LEVEL?: 20 MIN

## 2025-04-05 SDOH — HEALTH STABILITY: PHYSICAL HEALTH: ON AVERAGE, HOW MANY DAYS PER WEEK DO YOU ENGAGE IN MODERATE TO STRENUOUS EXERCISE (LIKE A BRISK WALK)?: 3 DAYS

## 2025-04-05 ASSESSMENT — SOCIAL DETERMINANTS OF HEALTH (SDOH): HOW OFTEN DO YOU GET TOGETHER WITH FRIENDS OR RELATIVES?: THREE TIMES A WEEK

## 2025-04-07 ENCOUNTER — OFFICE VISIT (OUTPATIENT)
Dept: FAMILY MEDICINE | Facility: CLINIC | Age: 70
End: 2025-04-07
Payer: COMMERCIAL

## 2025-04-07 VITALS
RESPIRATION RATE: 16 BRPM | SYSTOLIC BLOOD PRESSURE: 131 MMHG | TEMPERATURE: 97.2 F | OXYGEN SATURATION: 99 % | HEART RATE: 55 BPM | WEIGHT: 180 LBS | HEIGHT: 61 IN | BODY MASS INDEX: 33.99 KG/M2 | DIASTOLIC BLOOD PRESSURE: 85 MMHG

## 2025-04-07 DIAGNOSIS — I70.0 AORTIC ATHEROSCLEROSIS: Chronic | ICD-10-CM

## 2025-04-07 DIAGNOSIS — M85.80 OSTEOPENIA, UNSPECIFIED LOCATION: ICD-10-CM

## 2025-04-07 DIAGNOSIS — E78.5 HYPERLIPIDEMIA LDL GOAL <100: Chronic | ICD-10-CM

## 2025-04-07 DIAGNOSIS — Z00.00 ENCOUNTER FOR MEDICARE ANNUAL WELLNESS EXAM: Primary | ICD-10-CM

## 2025-04-07 DIAGNOSIS — I25.10 CORONARY ARTERY CALCIFICATION SEEN ON CAT SCAN: Chronic | ICD-10-CM

## 2025-04-07 DIAGNOSIS — L65.9 HAIR LOSS: ICD-10-CM

## 2025-04-07 DIAGNOSIS — I10 HYPERTENSION GOAL BP (BLOOD PRESSURE) < 140/90: Chronic | ICD-10-CM

## 2025-04-07 DIAGNOSIS — M89.9 DISORDER OF BONE AND CARTILAGE: ICD-10-CM

## 2025-04-07 DIAGNOSIS — K22.70 BARRETT'S ESOPHAGUS WITHOUT DYSPLASIA: ICD-10-CM

## 2025-04-07 DIAGNOSIS — M94.9 DISORDER OF BONE AND CARTILAGE: ICD-10-CM

## 2025-04-07 DIAGNOSIS — Z71.89 ADVANCE CARE PLANNING: ICD-10-CM

## 2025-04-07 DIAGNOSIS — H91.93 HEARING DEFICIT, BILATERAL: ICD-10-CM

## 2025-04-07 DIAGNOSIS — Z12.11 COLON CANCER SCREENING: ICD-10-CM

## 2025-04-07 DIAGNOSIS — Z78.0 MENOPAUSE: ICD-10-CM

## 2025-04-07 PROCEDURE — 99214 OFFICE O/P EST MOD 30 MIN: CPT | Mod: 25 | Performed by: FAMILY MEDICINE

## 2025-04-07 PROCEDURE — 1126F AMNT PAIN NOTED NONE PRSNT: CPT | Performed by: FAMILY MEDICINE

## 2025-04-07 PROCEDURE — G0439 PPPS, SUBSEQ VISIT: HCPCS | Performed by: FAMILY MEDICINE

## 2025-04-07 PROCEDURE — 3075F SYST BP GE 130 - 139MM HG: CPT | Performed by: FAMILY MEDICINE

## 2025-04-07 PROCEDURE — G2211 COMPLEX E/M VISIT ADD ON: HCPCS | Performed by: FAMILY MEDICINE

## 2025-04-07 PROCEDURE — 3079F DIAST BP 80-89 MM HG: CPT | Performed by: FAMILY MEDICINE

## 2025-04-07 RX ORDER — ATORVASTATIN CALCIUM 10 MG/1
10 TABLET, FILM COATED ORAL EVERY EVENING
Qty: 90 TABLET | Refills: 3 | Status: SHIPPED | OUTPATIENT
Start: 2025-04-07

## 2025-04-07 RX ORDER — LISINOPRIL 10 MG/1
10 TABLET ORAL DAILY
Qty: 90 TABLET | Refills: 3 | Status: SHIPPED | OUTPATIENT
Start: 2025-04-07

## 2025-04-07 RX ORDER — OMEPRAZOLE 20 MG/1
20 CAPSULE, DELAYED RELEASE ORAL
Qty: 90 CAPSULE | Refills: 3 | Status: SHIPPED | OUTPATIENT
Start: 2025-04-07

## 2025-04-07 ASSESSMENT — PAIN SCALES - GENERAL: PAINLEVEL_OUTOF10: NO PAIN (0)

## 2025-04-07 NOTE — PATIENT INSTRUCTIONS
Patient Education   Preventive Care Advice   This is general advice given by our system to help you stay healthy. However, your care team may have specific advice just for you. Please talk to your care team about your preventive care needs.  Nutrition  Eat 5 or more servings of fruits and vegetables each day.  Try wheat bread, brown rice and whole grain pasta (instead of white bread, rice, and pasta).  Get enough calcium and vitamin D. Check the label on foods and aim for 100% of the RDA (recommended daily allowance).  Lifestyle  Exercise at least 150 minutes each week  (30 minutes a day, 5 days a week).  Do muscle strengthening activities 2 days a week. These help control your weight and prevent disease.  No smoking.  Wear sunscreen to prevent skin cancer.  Have a dental exam and cleaning every 6 months.  Yearly exams  See your health care team every year to talk about:  Any changes in your health.  Any medicines your care team has prescribed.  Preventive care, family planning, and ways to prevent chronic diseases.  Shots (vaccines)   HPV shots (up to age 26), if you've never had them before.  Hepatitis B shots (up to age 59), if you've never had them before.  COVID-19 shot: Get this shot when it's due.  Flu shot: Get a flu shot every year.  Tetanus shot: Get a tetanus shot every 10 years.  Pneumococcal, hepatitis A, and RSV shots: Ask your care team if you need these based on your risk.  Shingles shot (for age 50 and up)  General health tests  Diabetes screening:  Starting at age 35, Get screened for diabetes at least every 3 years.  If you are younger than age 35, ask your care team if you should be screened for diabetes.  Cholesterol test: At age 39, start having a cholesterol test every 5 years, or more often if advised.  Bone density scan (DEXA): At age 50, ask your care team if you should have this scan for osteoporosis (brittle bones).  Hepatitis C: Get tested at least once in your life.  STIs (sexually  transmitted infections)  Before age 24: Ask your care team if you should be screened for STIs.  After age 24: Get screened for STIs if you're at risk. You are at risk for STIs (including HIV) if:  You are sexually active with more than one person.  You don't use condoms every time.  You or a partner was diagnosed with a sexually transmitted infection.  If you are at risk for HIV, ask about PrEP medicine to prevent HIV.  Get tested for HIV at least once in your life, whether you are at risk for HIV or not.  Cancer screening tests  Cervical cancer screening: If you have a cervix, begin getting regular cervical cancer screening tests starting at age 21.  Breast cancer scan (mammogram): If you've ever had breasts, begin having regular mammograms starting at age 40. This is a scan to check for breast cancer.  Colon cancer screening: It is important to start screening for colon cancer at age 45.  Have a colonoscopy test every 10 years (or more often if you're at risk) Or, ask your provider about stool tests like a FIT test every year or Cologuard test every 3 years.  To learn more about your testing options, visit:   .  For help making a decision, visit:   https://bit.ly/ij58806.  Prostate cancer screening test: If you have a prostate, ask your care team if a prostate cancer screening test (PSA) at age 55 is right for you.  Lung cancer screening: If you are a current or former smoker ages 50 to 80, ask your care team if ongoing lung cancer screenings are right for you.  For informational purposes only. Not to replace the advice of your health care provider. Copyright   2023 [facility]. All rights reserved. Clinically reviewed by the [facility]. [employer] [id number] - REV 01/24.  Hearing Loss: Care Instructions  Overview     Hearing loss is a sudden or slow decrease in how well you hear. It can range from slight to profound. Permanent hearing loss can occur with aging. It also can  happen when you are exposed long-term to loud noise. Examples include listening to loud music, riding motorcycles, or being around other loud machines.  Hearing loss can affect your work and home life. It can make you feel lonely or depressed. You may feel that you have lost your independence. But hearing aids and other devices can help you hear better and feel connected to others.  Follow-up care is a key part of your treatment and safety. Be sure to make and go to all appointments, and call your doctor if you are having problems. It's also a good idea to know your test results and keep a list of the medicines you take.  How can you care for yourself at home?  Avoid loud noises whenever possible. This helps keep your hearing from getting worse.  Always wear hearing protection around loud noises.  Wear a hearing aid as directed.  A professional can help you pick a hearing aid that will work best for you.  You can also get hearing aids over the counter for mild to moderate hearing loss.  Have hearing tests as your doctor suggests. They can show whether your hearing has changed. Your hearing aid may need to be adjusted.  Use other devices as needed. These may include:  Telephone amplifiers and hearing aids that can connect to a television, stereo, radio, or microphone.  Devices that use lights or vibrations. These alert you to the doorbell, a ringing telephone, or a baby monitor.  Television closed-captioning. This shows the words at the bottom of the screen. Most new TVs can do this.  TTY (text telephone). This lets you type messages back and forth on the telephone instead of talking or listening. These devices are also called TDD. When messages are typed on the keyboard, they are sent over the phone line to a receiving TTY. The message is shown on a monitor.  Use text messaging, social media, and email if it is hard for you to communicate by telephone.  Try to learn a listening technique called speechreading. It is  "not lipreading. You pay attention to people's gestures, expressions, posture, and tone of voice. These clues can help you understand what a person is saying. Face the person you are talking to, and have them face you. Make sure the lighting is good. You need to see the other person's face clearly.  Think about counseling if you need help to adjust to your hearing loss.  When should you call for help?  Watch closely for changes in your health, and be sure to contact your doctor if:    You think your hearing is getting worse.     You have new symptoms, such as dizziness or nausea.   Where can you learn more?  Go to https://www.[facility].net/patiented  Enter R798 in the search box to learn more about \"Hearing Loss: Care Instructions.\"  Current as of: October 27, 2024  Content Version: 14.4    8216-5651 [employer].   Care instructions adapted under license by your healthcare professional. If you have questions about a medical condition or this instruction, always ask your healthcare professional. [employer] disclaims any warranty or liability for your use of this information.    Bladder Training: Care Instructions  Your Care Instructions     Bladder training is used to treat urge incontinence and stress incontinence. Urge incontinence means that the need to urinate comes on so fast that you can't get to a toilet in time. Stress incontinence means that you leak urine because of pressure on your bladder. For example, it may happen when you laugh, cough, or lift something heavy.  Bladder training can increase how long you can wait before you have to urinate. It can also help your bladder hold more urine. And it can give you better control over the urge to urinate.  It is important to remember that bladder training takes a few weeks to a few months to make a difference. You may not see results right away, but don't give up.  Follow-up care is a key part of your treatment and safety. Be sure to make " and go to all appointments, and call your doctor if you are having problems. It's also a good idea to know your test results and keep a list of the medicines you take.  How can you care for yourself at home?  Work with your doctor to come up with a bladder training program that is right for you. You may use one or more of the following methods.  Delayed urination  In the beginning, try to keep from urinating for 5 minutes after you first feel the need to go.  While you wait, take deep, slow breaths to relax. Kegel exercises can also help you delay the need to go to the bathroom.  After some practice, when you can easily wait 5 minutes to urinate, try to wait 10 minutes before you urinate.  Slowly increase the waiting period until you are able to control when you have to urinate.  Scheduled urination  Empty your bladder when you first wake up in the morning.  Schedule times throughout the day when you will urinate.  Start by going to the bathroom every hour, even if you don't need to go.  Slowly increase the time between trips to the bathroom.  When you have found a schedule that works well for you, keep doing it.  If you wake up during the night and have to urinate, do it. Apply your schedule to waking hours only.  Kegel exercises  These tighten and strengthen pelvic muscles, which can help you control the flow of urine. (If doing these exercises causes pain, stop doing them and talk with your doctor.) To do Kegel exercises:  Squeeze your muscles as if you were trying not to pass gas. Or squeeze your muscles as if you were stopping the flow of urine. Your belly, legs, and buttocks shouldn't move.  Hold the squeeze for 3 seconds, then relax for 5 to 10 seconds.  Start with 3 seconds, then add 1 second each week until you are able to squeeze for 10 seconds.  Repeat the exercise 10 times a session. Do 3 to 8 sessions a day.  When should you call for help?  Watch closely for changes in your health, and be sure to  "contact your doctor if:    Your incontinence is getting worse.     You do not get better as expected.   Where can you learn more?  Go to [email or web address]/patiented  Enter V684 in the search box to learn more about \"Bladder Training: Care Instructions.\"  Current as of: April 30, 2024  Content Version: 14.4    2910-6709 [employer].   Care instructions adapted under license by your healthcare professional. If you have questions about a medical condition or this instruction, always ask your healthcare professional. [employer] disclaims any warranty or liability for your use of this information.       "

## 2025-04-07 NOTE — PROGRESS NOTES
"Preventive Care Visit  [facility]  Leonor Muri MD, Family Medicine  Apr 7, 2025      Assessment & Plan     Encounter for Medicare annual wellness exam  Discussed on regular exercises, daily calcium intake, healthy eating,and routine dental checks    - PRIMARY CARE FOLLOW-UP SCHEDULING    Bernal's esophagus without dysplasia  Reviewed upper GI endoscopy from 2022, recommended to proceed with repeat endoscopy per provider's recommendation this year  Continue current dose of Prilosec, reports precautions  - omeprazole (PRILOSEC) 20 MG DR capsule; Take 1 capsule (20 mg) by mouth every morning (before breakfast).  - Adult GI  Referral - Procedure Only; Future    Hyperlipidemia LDL goal <100  LDL Cholesterol Calculated   Date Value Ref Range Status   12/13/2024 77 <100 mg/dL Final   11/16/2020 129 (H) <100 mg/dL Final     Comment:     Above desirable:  100-129 mg/dl  Borderline High:  130-159 mg/dL  High:             160-189 mg/dL  Very high:       >189 mg/dl         LDL is at goal, continue current dose of Lipitor 10 mg daily, recheck in 1 year or sooner if needed  - atorvastatin (LIPITOR) 10 MG tablet; Take 1 tablet (10 mg) by mouth every evening.    Coronary artery calcification seen on CAT scan  as above    - atorvastatin (LIPITOR) 10 MG tablet; Take 1 tablet (10 mg) by mouth every evening.    Aortic atherosclerosis  as above    - atorvastatin (LIPITOR) 10 MG tablet; Take 1 tablet (10 mg) by mouth every evening.    Hypertension goal BP (blood pressure) < 140/90  BP Readings from Last 6 Encounters:   04/07/25 131/85   12/16/24 125/77   05/23/24 121/74   05/20/24 121/81   03/15/24 124/85   03/05/24 (!) 163/84     Lab Results   Component Value Date    MICROL <12.0 12/13/2024    MICROL 7 03/09/2023    MICROL 14 11/16/2020     No results found for: \"MICROALBUMIN\"  Last Comprehensive Metabolic Panel:  Lab Results   Component Value Date     12/13/2024    POTASSIUM 4.1 " 12/13/2024    CHLORIDE 102 12/13/2024    CO2 22 12/13/2024    ANIONGAP 14 12/13/2024    GLC 92 12/13/2024    BUN 15.2 12/13/2024    CR 0.78 12/13/2024    GFRESTIMATED 82 12/13/2024    HANH 9.6 12/13/2024       Reviewed labs from 4 months ago showing negative urine microalbumin, normal potassium and kidney functions  Blood pressure is at goal, continue with current dose of lisinopril 10 mg daily, low-salt, regular exercises, recheck in 1 year or sooner if needed   - lisinopril (ZESTRIL) 10 MG tablet; Take 1 tablet (10 mg) by mouth daily.    Hair loss  Recommended to consult dermatology for further evaluation  ongoing for the last 3 to 4 years  - Adult Dermatology  Referral; Future    Osteopenia, unspecified location    - DX Bone Density; Future    Disorder of bone and cartilage    - DX Bone Density; Future    Menopause    - DX Bone Density; Future    Hearing deficit, bilateral    - Adult Audiology  Referral; Future    Advance care planning  Patient will bring a copy of the reports that she has at home    Colon cancer screening  Reviewed colonoscopy from 2022, recheck in 2027 due to history of polyps or sooner if needed.            Counseling  Appropriate preventive services were addressed with this patient via screening, questionnaire, or discussion as appropriate for fall prevention, nutrition, physical activity, Tobacco-use cessation, social engagement, weight loss and cognition.  Checklist reviewing preventive services available has been given to the patient.  Reviewed patient's diet, addressing concerns and/or questions.   She is at risk for lack of exercise and has been provided with information to increase physical activity for the benefit of her well-being.   The patient was provided with written information regarding signs of hearing loss.   Information on urinary incontinence and treatment options given to patient.       Chart documentation done in part with Dragon Voice recognition  Software. Although reviewed after completion, some word and grammatical error may remain.    See Patient Instructions    Subjective   Nilda is a 69 year old, presenting for the following:  Physical        4/7/2025    11:48 AM   Additional Questions   Roomed by Neema   Accompanied by self         4/7/2025    11:48 AM   Patient Reported Additional Medications   Patient reports taking the following new medications no           HPI       Hyperlipidemia Follow-Up    Are you regularly taking any medication or supplement to lower your cholesterol?   Yes- Lipitor  Are you having muscle aches or other side effects that you think could be caused by your cholesterol lowering medication?  No    Hypertension Follow-up    Do you check your blood pressure regularly outside of the clinic?   At the office visits  Are you following a low salt diet? Yes  Are your blood pressures ever more than 140 on the top number (systolic) OR more   than 90 on the bottom number (diastolic), for example 140/90? No    BP Readings from Last 2 Encounters:   04/07/25 131/85   12/16/24 125/77     Medication Followup of omeprazole for Bernal's esophagitis  Taking Medication as prescribed: yes  Side Effects:  None  Medication Helping Symptoms:  yes    Advance Care Planning  Patient does not have a Health Care Directive: Patient states has Advance Directive and will bring in a copy to clinic.      4/5/2025   General Health   How would you rate your overall physical health? Good   Feel stress (tense, anxious, or unable to sleep) Only a little   (!) STRESS CONCERN      4/5/2025   Nutrition   Diet: Regular (no restrictions)         4/5/2025   Exercise   Days per week of moderate/strenous exercise 3 days   Average minutes spent exercising at this level 20 min         4/5/2025   Social Factors   Frequency of gathering with friends or relatives Three times a week   Worry food won't last until get money to buy more No   Food not last or not have enough money for  food? No   Do you have housing? (Housing is defined as stable permanent housing and does not include staying ouside in a car, in a tent, in an abandoned building, in an overnight shelter, or couch-surfing.) Yes   Are you worried about losing your housing? No   Lack of transportation? No   Unable to get utilities (heat,electricity)? No         4/5/2025   Fall Risk   Fallen 2 or more times in the past year? No    No   Trouble with walking or balance? No    No       Multiple values from one day are sorted in reverse-chronological order          4/5/2025   Activities of Daily Living- Home Safety   Needs help with the following daily activites None of the above   Safety concerns in the home None of the above         4/5/2025   Dental   Dentist two times every year? Yes         4/5/2025   Hearing Screening   Hearing concerns? (!) IT'S HARD TO FOLLOW A CONVERSATION IN A NOISY RESTAURANT OR CROWDED ROOM.         4/5/2025   Driving Risk Screening   Patient/family members have concerns about driving No         4/5/2025   General Alertness/Fatigue Screening   Have you been more tired than usual lately? No         4/5/2025   Urinary Incontinence Screening   Bothered by leaking urine in past 6 months Yes           3/8/2024   TB Screening   Were you born outside of the [place]? No           Today's PHQ-2 Score:       4/7/2025     9:34 AM   PHQ-2 ( 1999 [employer])   Q1: Little interest or pleasure in doing things 0   Q2: Feeling down, depressed or hopeless 0   PHQ-2 Score 0    Q1: Little interest or pleasure in doing things Not at all   Q2: Feeling down, depressed or hopeless Not at all   PHQ-2 Score 0       Patient-reported           4/5/2025   Substance Use   Alcohol more than 3/day or more than 7/wk No   Do you have a current opioid prescription? No   How severe/bad is pain from 1 to 10? 2/10   Do you use any other substances recreationally? No     Social History     Tobacco Use    Smoking status: Former     Current packs/day: 0.00      Average packs/day: 1.5 packs/day for 36.0 years (54.0 ttl pk-yrs)     Types: Cigarettes     Start date: 1972     Quit date: 3/1/2008     Years since quittin.1     Passive exposure: Never    Smokeless tobacco: Never   Vaping Use    Vaping status: Never Used   Substance Use Topics    Alcohol use: Yes     Alcohol/week: 5.0 standard drinks of alcohol     Types: 5 Standard drinks or equivalent per week    Drug use: No           2025   LAST FHS-7 RESULTS   1st degree relative breast or ovarian cancer Yes   Any relative bilateral breast cancer No   Any male have breast cancer No   Any ONE woman have BOTH breast AND ovarian cancer Yes   Any woman with breast cancer before 50yrs Yes   2 or more relatives with breast AND/OR ovarian cancer No   2 or more relatives with breast AND/OR bowel cancer No        Mammogram Screening - Mammogram every 1-2 years updated in Health Maintenance based on mutual decision making      History of abnormal Pap smear: no        Latest Ref Rng & Units 10/1/2018     8:10 AM 10/1/2018     8:06 AM 2015     6:18 PM   PAP / HPV   PAP (Historical)   NIL     HPV 16 DNA NEG^Negative Negative   Negative    HPV 18 DNA NEG^Negative Negative   Negative    Other HR HPV NEG^Negative Negative   Negative      ASCVD Risk   The 10-year ASCVD risk score (Nanda CALDERÓN, et al., 2019) is: 9.1%    Values used to calculate the score:      Age: 69 years      Sex: Female      Is Non- : No      Diabetic: No      Tobacco smoker: No      Systolic Blood Pressure: 131 mmHg      Is BP treated: Yes      HDL Cholesterol: 76 mg/dL      Total Cholesterol: 168 mg/dL            Reviewed and updated as needed this visit by Provider                    Past Medical History:   Diagnosis Date    Arthritis     Atherosclerosis of right coronary artery 2022    Basal cell carcinoma     Gastroesophageal reflux disease with esophagitis     Hypertension     JASPER (obstructive sleep apnea) - mild  (AHI 12) 01/10/2024     Past Surgical History:   Procedure Laterality Date    BIOPSY  2018    COLONOSCOPY  03/21/2016    COLONOSCOPY WITH CO2 INSUFFLATION N/A 03/21/2016    Procedure: COLONOSCOPY WITH CO2 INSUFFLATION;  Surgeon: Geoffrey Blackwell MD;  Location: [facility]    COLONOSCOPY WITH CO2 INSUFFLATION N/A 3/14/2022    Procedure: COLONOSCOPY, WITH CO2 INSUFFLATION;  Surgeon: Ramy Murray DO;  Location: [facility]    COMBINED ESOPHAGOSCOPY, GASTROSCOPY, DUODENOSCOPY (EGD) WITH CO2 INSUFFLATION N/A 3/14/2022    Procedure: ESOPHAGOGASTRODUODENOSCOPY, WITH CO2 INSUFFLATION;  Surgeon: Ramy Murray DO;  Location: [facility]    COMBINED ESOPHAGOSCOPY, GASTROSCOPY, DUODENOSCOPY (EGD) WITH CO2 INSUFFLATION N/A 11/28/2022    Procedure: ESOPHAGOGASTRODUODENOSCOPY, WITH CO2 INSUFFLATION;  Surgeon: Rey Boothe MD;  Location: [facility]    ENDOSCOPY UPPER, COLONOSCOPY, COMBINED N/A 03/21/2016    Procedure: COMBINED ENDOSCOPY UPPER, COLONOSCOPY;  Surgeon: Geoffrey Blackwell MD;  Location: [facility]    ESOPHAGOSCOPY, GASTROSCOPY, DUODENOSCOPY (EGD), COMBINED N/A 03/21/2016    Procedure: COMBINED ESOPHAGOSCOPY, GASTROSCOPY, DUODENOSCOPY (EGD), BIOPSY SINGLE OR MULTIPLE;  Surgeon: Geoffrey Blackwell MD;  Location: [facility]    ESOPHAGOSCOPY, GASTROSCOPY, DUODENOSCOPY (EGD), COMBINED N/A 3/14/2022    Procedure: ESOPHAGOGASTRODUODENOSCOPY, WITH BIOPSY;  Surgeon: Ramy Murray DO;  [facility]: [facility]    ESOPHAGOSCOPY, GASTROSCOPY, DUODENOSCOPY (EGD), COMBINED N/A 11/28/2022    Procedure: ESOPHAGOGASTRODUODENOSCOPY, WITH BIOPSY;  Surgeon: Rey Boothe MD;  Location: [facility]    ORTHOPEDIC SURGERY Right 04/23/2018    TKA     OB History   No obstetric history on file.     Lab work is in process  Labs reviewed in EPIC  BP Readings from Last 3 Encounters:   04/07/25 131/85   12/16/24 125/77   05/23/24 121/74    Wt Readings from Last 3 Encounters:   04/07/25 81.6 kg (180 lb)   03/28/25 84.4 kg (186 lb)    02/17/25 86.6 kg (191 lb)                  Patient Active Problem List   Diagnosis    Chronic GERD    Primary osteoarthritis of both knees    Change in color of pigmented skin lesion, left neck, 2mm    Family history of breast cancer in sister    Hypertension goal BP (blood pressure) < 140/90    Intertriginous candidiasis    Hearing deficit, bilateral    Family history of Alzheimer's disease    Tubular adenoma of colon    Coronary artery calcification seen on CAT scan    Hepatic steatosis    Hyperlipidemia LDL goal <100    Aortic atherosclerosis    Hepatic cyst    Bernal's esophagus without dysplasia    Former smoker    Osteopenia of multiple sites    Ascending aorta dilatation    Urinary, incontinence, stress female    JASPER (obstructive sleep apnea) - mild (AHI 12)    Class 2 severe obesity due to excess calories with serious comorbidity in adult (H)    Hair loss     Past Surgical History:   Procedure Laterality Date    BIOPSY  2018    COLONOSCOPY  03/21/2016    COLONOSCOPY WITH CO2 INSUFFLATION N/A 03/21/2016    Procedure: COLONOSCOPY WITH CO2 INSUFFLATION;  Surgeon: Geoffrey Blackwell MD;  [facility]    COLONOSCOPY WITH CO2 INSUFFLATION N/A 3/14/2022    Procedure: COLONOSCOPY, WITH CO2 INSUFFLATION;  Surgeon: Ramy Murray DO;  [facility]    COMBINED ESOPHAGOSCOPY, GASTROSCOPY, DUODENOSCOPY (EGD) WITH CO2 INSUFFLATION N/A 3/14/2022    Procedure: ESOPHAGOGASTRODUODENOSCOPY, WITH CO2 INSUFFLATION;  Surgeon: Ramy Murray DO;  [facility]    COMBINED ESOPHAGOSCOPY, GASTROSCOPY, DUODENOSCOPY (EGD) WITH CO2 INSUFFLATION N/A 11/28/2022    Procedure: ESOPHAGOGASTRODUODENOSCOPY, WITH CO2 INSUFFLATION;  Surgeon: Rey Boothe MD;  [facility]    ENDOSCOPY UPPER, COLONOSCOPY, COMBINED N/A 03/21/2016    Procedure: COMBINED ENDOSCOPY UPPER, COLONOSCOPY;  Surgeon: Geoffrey Blacwkell MD;  [facility]    ESOPHAGOSCOPY, GASTROSCOPY, DUODENOSCOPY (EGD), COMBINED N/A  2016    Procedure: COMBINED ESOPHAGOSCOPY, GASTROSCOPY, DUODENOSCOPY (EGD), BIOPSY SINGLE OR MULTIPLE;  Surgeon: Geoffrey Blackwell MD;  [facility]    ESOPHAGOSCOPY, GASTROSCOPY, DUODENOSCOPY (EGD), COMBINED N/A 3/14/2022    Procedure: ESOPHAGOGASTRODUODENOSCOPY, WITH BIOPSY;  Surgeon: Ramy Murray DO;  [facility]    ESOPHAGOSCOPY, GASTROSCOPY, DUODENOSCOPY (EGD), COMBINED N/A 2022    Procedure: ESOPHAGOGASTRODUODENOSCOPY, WITH BIOPSY;  Surgeon: Rey Boothe MD;  [facility]: [facility]    ORTHOPEDIC SURGERY Right 2018    TKA       Social History     Tobacco Use    Smoking status: Former     Current packs/day: 0.00     Average packs/day: 1.5 packs/day for 36.0 years (54.0 ttl pk-yrs)     Types: Cigarettes     Start date: 1972     Quit date: 3/1/2008     Years since quittin.1     Passive exposure: Never    Smokeless tobacco: Never   Substance Use Topics    Alcohol use: Yes     Alcohol/week: 5.0 standard drinks of alcohol     Types: 5 Standard drinks or equivalent per week     Family History   Problem Relation Age of Onset    Ankylosing Spondylitis Brother 57    Heart Disease Brother     Breast Cancer Sister     Heart Disease Sister     Heart Disease Mother     Heart Disease Father     Coronary Artery Disease Father     Hypertension Father     Heart Disease Brother     Diabetes Sister     Hypertension Sister     Diabetes Brother     Hypertension Brother     Coronary Artery Disease Brother     Hypertension Brother     Osteoporosis Brother     Hyperlipidemia Brother     Coronary Artery Disease Brother         Bypass surgery    Hypertension Brother     Hyperlipidemia Brother     Hypertension Sister     Hyperlipidemia Sister     Breast Cancer Sister     Diabetes Nephew     Coronary Artery Disease Nephew          Current Outpatient Medications   Medication Sig Dispense Refill    aspirin (ASPIRIN LOW DOSE) 81 MG EC tablet Take 1 tablet (81 mg) by mouth daily. 90 tablet 3     atorvastatin (LIPITOR) 10 MG tablet Take 1 tablet (10 mg) by mouth every evening. 90 tablet 3    BIOTIN PO Take 10,000 mg by mouth daily.      calcium citrate-vitamin D (CITRACAL) 200-6.25 MG-MCG TABS per tablet Take 1 tablet by mouth 2 times daily. With zinc      Cyanocobalamin (VITAMIN B 12 PO) Take 1 tablet by mouth daily      lisinopril (ZESTRIL) 10 MG tablet Take 1 tablet (10 mg) by mouth daily. 90 tablet 3    MAGNESIUM PO Take 1 tablet by mouth daily. Complex  400 mg / dose      Multiple Vitamins-Minerals (CENTRUM ADULTS PO) Take 1 tablet by mouth daily      naltrexone (DEPADE/REVIA) 50 MG tablet Take 1/2 tablet (25 mg) by mouth daily for 7 days, if tolerating then increase to 1 tablet (50 mg) daily (if not tolerating 1/2 tablet you can try taking 1/4th tablet for a few days then increase to 1/2 tablet) 30 tablet 2    omeprazole (PRILOSEC) 20 MG DR capsule Take 1 capsule (20 mg) by mouth every morning (before breakfast). 90 capsule 3    Semaglutide-Weight Management (WEGOVY) 1 MG/0.5ML pen Inject 1 mg subcutaneously once a week. 2 mL 2    triamcinolone (KENALOG) 0.1 % external ointment Apply sparingly to affected area twice daily. Apply sparingly 2-3 weeks as directed. (Patient taking differently: Apply topically 2 times daily as needed. Apply sparingly to affected area twice daily. Apply sparingly 2-3 weeks as directed.) 80 g 1     Allergies   Allergen Reactions    Penicillins      Recent Labs   Lab Test 12/13/24  0845 03/15/24  0852 02/10/24  0913 03/09/23  0701 02/21/22  1158 01/31/22  1153 01/31/22  1153 11/16/20  0840 10/21/19  0719   A1C  --   --  5.5  --   --   --   --   --   --    LDL 77 97  --  97  --    < > 152* 129* 109*   HDL 76 77  --  80  --    < > 84 85 84   TRIG 74 52  --  54  --    < > 64 51 39   ALT 18  --   --  26  --   --  23 21 21   CR 0.78 0.74  --  0.69  --    < > 0.67 0.71 0.62   GFRESTIMATED 82 88  --  >90  --    < > >90 >90 >90   GFRESTBLACK  --   --   --   --   --   --   --  >90  >90   POTASSIUM 4.1 4.4  --  4.1  --    < > 3.8 3.9 4.0   TSH  --   --  1.71  --  1.94  --   --  2.48  --     < > = values in this interval not displayed.      Current providers sharing in care for this patient include:  Patient Care Team:  Leonor Muir MD as PCP - General (Family Practice)  Anahy Nelson PA-C as Physician Assistant (Dermatology)  Rey Boothe MD as MD (Gastroenterology)  Sol Bianchi PA-C as Assigned Surgical Provider  Agatha Hernandez APRN CNP as Nurse Practitioner (Dermatology)  Leonor Muir MD as Assigned PCP  Maria T Valdez RPH as Pharmacist (Pharmacist Ambulatory Care)  Bronwyn Kinney RPH as Pharmacist (Pharmacist Ambulatory Care)  Maria T Valdez RPH as Assigned MTM Pharmacist  Jose Manzano MD as Assigned Dermatology Provider    The following health maintenance items are reviewed in Epic and correct as of today:  Health Maintenance   Topic Date Due    INFLUENZA VACCINE (1) 12/25/2025 (Originally 9/1/2024)    RSV VACCINE (1 - Risk 60-74 years 1-dose series) 04/07/2026 (Originally 8/21/2016)    BMP  12/13/2025    LIPID  12/13/2025    MICROALBUMIN  12/13/2025    MAMMO SCREENING  02/21/2026    MEDICARE ANNUAL WELLNESS VISIT  04/07/2026    ANNUAL REVIEW OF HM ORDERS  04/07/2026    FALL RISK ASSESSMENT  04/07/2026    COLORECTAL CANCER SCREENING  03/14/2027    DIABETES SCREENING  12/13/2027    ADVANCE CARE PLANNING  04/07/2030    DTAP/TDAP/TD IMMUNIZATION (4 - Td or Tdap) 01/16/2033    DEXA  02/14/2037    HEPATITIS C SCREENING  Completed    PHQ-2 (once per calendar year)  Completed    Pneumococcal Vaccine: 50+ Years  Completed    ZOSTER IMMUNIZATION  Completed    HPV IMMUNIZATION  Aged Out    MENINGITIS IMMUNIZATION  Aged Out    PAP  Discontinued    LUNG CANCER SCREENING  Discontinued    COVID-19 Vaccine  Discontinued         Review of Systems  CONSTITUTIONAL: NEGATIVE for fever, chills, change in weight  INTEGUMENTARY/SKIN: Ongoing concerns  "with hair loss  EYES: NEGATIVE for vision changes or irritation  ENT/MOUTH: Bilateral hearing deficit  RESP: NEGATIVE for significant cough or SOB  BREAST: NEGATIVE for masses, tenderness or discharge  CV: History of hypertension, coronary calcification  GI: History of Bernal's esophagitis and GERD  : NEGATIVE for frequency, dysuria, or hematuria   female: Postmenopausal  MUSCULOSKELETAL: NEGATIVE for significant arthralgias or myalgia  NEURO: NEGATIVE for weakness, dizziness or paresthesias  ENDOCRINE: NEGATIVE for temperature intolerance, skin/hair changes  HEME: NEGATIVE for bleeding problems  PSYCHIATRIC: NEGATIVE for changes in mood or affect     Objective    Exam  /85 (BP Location: Right arm, Patient Position: Sitting, Cuff Size: Adult Large)   Pulse 55   Temp 97.2  F (36.2  C) (Temporal)   Resp 16   Ht 1.549 m (5' 1\")   Wt 81.6 kg (180 lb)   SpO2 99%   BMI 34.01 kg/m     Estimated body mass index is 34.01 kg/m  as calculated from the following:    Height as of this encounter: 1.549 m (5' 1\").    Weight as of this encounter: 81.6 kg (180 lb).    Physical Exam  GENERAL: alert and no distress  EYES: Eyes grossly normal to inspection, PERRL and conjunctivae and sclerae normal  HENT: ear canals and TM's normal, nose and mouth without ulcers or lesions  NECK: no adenopathy, no asymmetry, masses, or scars  RESP: lungs clear to auscultation - no rales, rhonchi or wheezes  BREAST: normal without masses, tenderness or nipple discharge and no palpable axillary masses or adenopathy  CV: regular rate and rhythm, normal S1 S2, no S3 or S4, no murmur, click or rub, no peripheral edema  ABDOMEN: soft, nontender, no hepatosplenomegaly, no masses and bowel sounds normal  MS: no gross musculoskeletal defects noted, no edema  SKIN: no suspicious lesions or rashes  NEURO: Normal strength and tone, mentation intact and speech normal  PSYCH: mentation appears normal, affect normal/bright        4/7/2025   Mini Cog "   Clock Draw Score 2 Normal   3 Item Recall 3 objects recalled   Mini Cog Total Score 5             3/15/2024   Vision Screen   Vision Screen Results REFER       Signed Electronically by: Leonor Muir MD

## 2025-04-08 ENCOUNTER — OFFICE VISIT (OUTPATIENT)
Dept: SURGERY | Facility: CLINIC | Age: 70
End: 2025-04-08
Payer: COMMERCIAL

## 2025-04-08 ENCOUNTER — LAB (OUTPATIENT)
Dept: LAB | Facility: CLINIC | Age: 70
End: 2025-04-08
Payer: COMMERCIAL

## 2025-04-08 ENCOUNTER — TELEPHONE (OUTPATIENT)
Dept: GASTROENTEROLOGY | Facility: CLINIC | Age: 70
End: 2025-04-08

## 2025-04-08 VITALS
WEIGHT: 183 LBS | HEIGHT: 61 IN | BODY MASS INDEX: 34.55 KG/M2 | OXYGEN SATURATION: 95 % | RESPIRATION RATE: 16 BRPM | HEART RATE: 62 BPM | SYSTOLIC BLOOD PRESSURE: 125 MMHG | DIASTOLIC BLOOD PRESSURE: 65 MMHG

## 2025-04-08 DIAGNOSIS — E66.09 CLASS 1 OBESITY DUE TO EXCESS CALORIES WITH SERIOUS COMORBIDITY AND BODY MASS INDEX (BMI) OF 34.0 TO 34.9 IN ADULT: ICD-10-CM

## 2025-04-08 DIAGNOSIS — K76.0 FATTY LIVER: ICD-10-CM

## 2025-04-08 DIAGNOSIS — E66.811 CLASS 1 OBESITY DUE TO EXCESS CALORIES WITH SERIOUS COMORBIDITY AND BODY MASS INDEX (BMI) OF 34.0 TO 34.9 IN ADULT: Primary | ICD-10-CM

## 2025-04-08 DIAGNOSIS — E66.811 CLASS 1 OBESITY DUE TO EXCESS CALORIES WITH SERIOUS COMORBIDITY AND BODY MASS INDEX (BMI) OF 34.0 TO 34.9 IN ADULT: ICD-10-CM

## 2025-04-08 DIAGNOSIS — I25.10 CORONARY ARTERY CALCIFICATION SEEN ON CAT SCAN: ICD-10-CM

## 2025-04-08 DIAGNOSIS — E66.09 CLASS 1 OBESITY DUE TO EXCESS CALORIES WITH SERIOUS COMORBIDITY AND BODY MASS INDEX (BMI) OF 34.0 TO 34.9 IN ADULT: Primary | ICD-10-CM

## 2025-04-08 LAB
ALBUMIN SERPL BCG-MCNC: 4.3 G/DL (ref 3.5–5.2)
ALP SERPL-CCNC: 71 U/L (ref 40–150)
ALT SERPL W P-5'-P-CCNC: 27 U/L (ref 0–50)
ANION GAP SERPL CALCULATED.3IONS-SCNC: 12 MMOL/L (ref 7–15)
AST SERPL W P-5'-P-CCNC: 26 U/L (ref 0–45)
BILIRUB SERPL-MCNC: 0.4 MG/DL
BUN SERPL-MCNC: 16.1 MG/DL (ref 8–23)
CALCIUM SERPL-MCNC: 9.6 MG/DL (ref 8.8–10.4)
CHLORIDE SERPL-SCNC: 99 MMOL/L (ref 98–107)
CREAT SERPL-MCNC: 0.76 MG/DL (ref 0.51–0.95)
EGFRCR SERPLBLD CKD-EPI 2021: 85 ML/MIN/1.73M2
GLUCOSE SERPL-MCNC: 81 MG/DL (ref 70–99)
HCO3 SERPL-SCNC: 25 MMOL/L (ref 22–29)
POTASSIUM SERPL-SCNC: 4 MMOL/L (ref 3.4–5.3)
PROT SERPL-MCNC: 7.5 G/DL (ref 6.4–8.3)
SODIUM SERPL-SCNC: 136 MMOL/L (ref 135–145)

## 2025-04-08 PROCEDURE — 3074F SYST BP LT 130 MM HG: CPT | Performed by: PHYSICIAN ASSISTANT

## 2025-04-08 PROCEDURE — 36415 COLL VENOUS BLD VENIPUNCTURE: CPT

## 2025-04-08 PROCEDURE — 99214 OFFICE O/P EST MOD 30 MIN: CPT | Performed by: PHYSICIAN ASSISTANT

## 2025-04-08 PROCEDURE — 82565 ASSAY OF CREATININE: CPT

## 2025-04-08 PROCEDURE — G2211 COMPLEX E/M VISIT ADD ON: HCPCS | Performed by: PHYSICIAN ASSISTANT

## 2025-04-08 PROCEDURE — 3078F DIAST BP <80 MM HG: CPT | Performed by: PHYSICIAN ASSISTANT

## 2025-04-08 PROCEDURE — 84155 ASSAY OF PROTEIN SERUM: CPT

## 2025-04-08 RX ORDER — SEMAGLUTIDE 1.7 MG/.75ML
1.7 INJECTION, SOLUTION SUBCUTANEOUS
Qty: 9 ML | Refills: 0 | Status: SHIPPED | OUTPATIENT
Start: 2025-04-08

## 2025-04-08 NOTE — TELEPHONE ENCOUNTER
"Pre Assessment RN Review    Focused Assessments      JASPER Hx  Estimated body mass index is 34.01 kg/m  as calculated from the following:    Height as of 4/7/25: 1.549 m (5' 1\").    Weight as of 4/7/25: 81.6 kg (180 lb).     Patient has reported / documented history JASPER and reports she does use a device for sleep.     Device: CPAP    Severity Assessment    Sleep Study: 12/15/23  Diagnosis/Severity: Mild  AHI: 12.7      Scheduling Status & Recommendations    Sedation: Moderate Sedation - Per order.  Location Type: No Scheduling Restrictions - Per RN assessment.      Adamaris Herrmann RN  Endoscopy Procedure Pre-Assessment RN  [phone], option 3  "

## 2025-04-08 NOTE — TELEPHONE ENCOUNTER
"Endoscopy Scheduling Screen    Have you had any respiratory illness or flu-like symptoms in the last 10 days?  No    What is your communication preference for Instructions and/or Bowel Prep?   MyChart    What insurance is in the chart?  Other:  BCBS    Ordering/Referring Provider: Leonor Muir MD in [facility]   (If ordering provider performs procedure, schedule with ordering provider unless otherwise instructed. )    BMI: Estimated body mass index is 34.01 kg/m  as calculated from the following:    Height as of 4/7/25: 1.549 m (5' 1\").    Weight as of 4/7/25: 81.6 kg (180 lb).     Sedation Ordered  moderate sedation.   If patient BMI > 50 do not schedule in ASC.    If patient BMI > 45 do not schedule at [facility].    Are you taking methadone or Suboxone?  NO, No RN review required.    Have you been diagnosed and are being treated for severe PTSD or severe anxiety?  NO, No RN review required.    Are you taking any prescription medications for pain 3 or more times per week?   NO, No RN review required.    Do you have a history of malignant hyperthermia?  No    (Females) Are you currently pregnant?   No     Have you been diagnosed or told you have pulmonary hypertension?   No    Do you have an LVAD?  No    Have you been told you have moderate to severe sleep apnea?  Yes. Do you use a CPAP? Yes Where is the patient located?. (RN Review required for scheduling unless scheduling in Hospital.)     Have you been told you have COPD, asthma, or any other lung disease?  No    Has your doctor ordered any cardiac tests like echo, angiogram, stress test, ablation, or EKG, that you have not completed yet?  No    Do you  have a history of any heart conditions?  No     Have you ever had or are you waiting for an organ transplant?  No. Continue scheduling, no site restrictions.    Have you had a stroke or transient ischemic attack (TIA aka \"mini stroke\") in the last 2 years?   No.    Have you been diagnosed with or been told " "you have cirrhosis of the liver?   No.    Are you currently on dialysis?   No    Do you need assistance transferring?   No    BMI: Estimated body mass index is 34.01 kg/m  as calculated from the following:    Height as of 4/7/25: 1.549 m (5' 1\").    Weight as of 4/7/25: 81.6 kg (180 lb).     Is patients BMI > 40 and scheduling location [facility]?  No    Do you take an injectable or oral medication for weight loss or diabetes (excluding insulin)?  Yes, hold time can be up to 7 days. Please consult with you prescribing provider to discuss endoscopy recommendations. (Please schedule at least 7 days out.)    Do you take the medication Naltrexone?  Yes Recommended hold time is 3 days. Please consult with you prescribing provider to discuss endoscopy recommendations.    Do you take blood thinners?  No       Prep   Are you currently on dialysis or do you have chronic kidney disease?  No    Do you have a diagnosis of diabetes?  No    Do you have a diagnosis of cystic fibrosis (CF)?  No    On a regular basis do you go 3 -5 days between bowel movements?  No    BMI > 40?  No    Preferred Pharmacy:    [id number] - [place] - [address] [address]  [address] [address] [address]  Phone: [phone] Fax: [phone]    Final Scheduling Details     Procedure scheduled  Upper endoscopy (EGD)    Patient Reminders:   You will receive a call from a Nurse to review instructions and health history.  This assessment must be completed prior to your procedure.  Failure to complete the Nurse assessment may result in the procedure being cancelled.      On the day of your procedure, please designate an adult(s) who can drive you home stay with you for the next 24 hours. The medicines used in the exam will make you sleepy. You will not be able to drive.      You cannot take public transportation, ride share services, or non-medical taxi service without a responsible caregiver.  Medical transport services are allowed " with the requirement that a responsible caregiver will receive you at your destination.  We require that drivers and caregivers are confirmed prior to your procedure.

## 2025-04-08 NOTE — PATIENT INSTRUCTIONS
"Nice to talk with you today! Thank you for allowing me the privilege of caring for you.   We hope we provided you with the excellent service you deserve.     To ensure the quality of our services you may receive a patient satisfaction survey from an independent monitoring company.  The greatest compliment you can give is \"Likely to Recommend\"      Below is our plan we discussed.-  YECENIA Pelaez      Plan:  Get lab drawn  Increase wegovy to 1.7 mg weekly  Protein snack   Stop naltrexone      FOLLOW-UP:  end of July     Please schedule a follow up with Latanya Perez PA-C end of July.  If you need to reach me sooner you can do so by calling [phone].    Have a great day!     Eat Better ? Move More ? Live Well    Eat 3 nutrient-rich meals each day    Don t skip meals--it will cause you to overeat later in the day!    Eating fiber (vegetables/fruits/whole grains) and protein with meals helps you stay full longer    Choose foods with less than 10 grams of sugar and 5 grams of fat per serving to prevent excess calories and weight gain  Eat around the same times each day to develop a routine eating schedule   Avoid snacking unless physically hungry.   Planned snacks: 1-2 times per day and no more than 150 calories    Eat protein first   Protein helps with healing, maintaining adequate muscle mass, reducing hunger and optimizing nutritional status   Aim for 60-80 grams of protein per day   Fill up on Fiber   Fiber comes from plants--fruits, veggies, whole grains, nuts/seeds and beans   Fiber is low in calories, high in phytonutrients and helps you stay full longer   Aim for 25-35 grams per day by eating fiber with meals and snacks  Eat S-[id number]   Take 20-30 minutes to eat each meal by taking small bites, chewing foods to applesauce consistency or 20-30 times before you swallow   Eating foods too fast can delay satiety/fullness signals and increase overeating   Slow down your eating by using toddler utensils, putting " your fork/spoon down between bites and not watching TV or emailing during meals!   Keep a Journal         Writing down what you eat, how you feel and when you are active helps you identify new changes to work on from week to week         Look for ways to cut 100 calories from your current diet 2-3 times per day  Drink 64 ounces of 0-Calorie drinks between meals   Water   Zero calorie Propel  or Vitamin Water     [employer]  Zero Calories   Crystal Light , Sugar-Free Antonio-Aid , and other sugar-free lemonade or flavored chopra   Keep Caffeine to less than 300mg per day ie: 3-6oz cups coffee     Work up to 45-60 minutes of physical activity most days of the week   Helps with losing weight and prevent regaining those extra pounds!    Do a combo of cardio (walking/water exercises) and strength training (lifting weights/Vinyasa yoga)    Avoid Mindless Eating   Be present when you eat--take note of the smell, taste and quality of your food   Make a list of alternative activities you could do to prevent eating out of boredom/stress  Go for a walk, call a friend, chew gum, paint your nails, re-organize the garage, etc

## 2025-04-08 NOTE — PROGRESS NOTES
Return Medical Weight Management Note         Nilda Amaro  MRN:  [id number]  :  1955  DC:  2025        Dear Leonor Muir MD,    I had the pleasure of seeing your patient Nilda Amaro. She is a 69 year old female who I am continuing to see for treatment of obesity related to:        2024     9:19 PM   --   I have the following health issues associated with obesity High Blood Pressure    GERD (Reflux)    Fatty Liver   I have the following symptoms associated with obesity Knee Pain    Back Pain    Fatigue       Assessment   Problem List Items Addressed This Visit       Coronary artery calcification seen on CAT scan (Chronic)    Relevant Medications    Semaglutide-Weight Management (WEGOVY) 1.7 MG/0.75ML pen    Other Relevant Orders    Comprehensive metabolic panel    Class 1 obesity due to excess calories with serious comorbidity and body mass index (BMI) of 34.0 to 34.9 in adult - Primary    Relevant Medications    Semaglutide-Weight Management (WEGOVY) 1.7 MG/0.75ML pen    Other Relevant Orders    Comprehensive metabolic panel     Other Visit Diagnoses       Fatty liver        Relevant Medications    Semaglutide-Weight Management (WEGOVY) 1.7 MG/0.75ML pen    Other Relevant Orders    Comprehensive metabolic panel            PLAN/DISCUSSION:  Will continue titrating up on the wegovy. Rx sent over today for the 1.7 mg dose. After discussion does not feel like the naltrexone is working for craving and has decided to stop it.     Another option in the future is to consider a low dose topiramate to help with cravings.     Plan:  Get lab drawn  Increase wegovy to 1.7 mg weekly  Protein snack   Stop naltrexone      FOLLOW-UP:  end          SUBJECTIVE/OBJECTIVE:  Patient last seen by Sol Bianchi PA-C on 2024 and was started on wegovy. Unfortunately this was denied by insurance at that time. Followed up with ABRAHAM Valdez 1/3/2025, 25 and 3/28/25.  Was started on  naltrexone and metformin. Appeal for wegovy was eventually approved due to cardiac indications. Is paying over 500$ per month hoping to reach deductible. This should lower her monthly cost.     Today reports some appetite suppression but still craves sweets or something to eat. Is on the 1.0 mg dose. Appetite is down. Eating about half as much as before    Restarted naltrexone 50 mg 1/2 in the AM and 1/2 in the PM in the last week due to craving. Does not think it is helpful  Is focusing more on protein but has not noticed much of an impact on cravings.     Anti-obesity medications:   Current: wegovy 1.0 mg on Wednesdays. Naltrexone 50 mg 1/2 in the AM and 1/2 in the PM  Side effects:  Denies nausea, vomiting, abdominal pain, severe constipation, diarrhea, or heartburn    AOM Considerations: (per Sol Bianchi 2024)  Phentermine:   History of CAD, avoid  Topiramate: H/O glaucoma:   No - caution runs in family/may be start           GLP-1: Do not appear covered by insurance               Naltrexone:     option   Wellbutrin:   H/O glaucoma:   No - caution runs in family/may be start          Metformin: Labs and hydration with lisinopril option but no preDM/mental health meds  Contrave: Does not appear covered by insurance  Qsymia: History of CAD, a void           4/5/2025    10:16 AM   Weight Loss Medication History Reviewed With Patient   Which weight loss medications are you currently taking on a regular basis? Naltrexone    Wegovy   Are you having any side effects from the weight loss medication that we have prescribed you? No       Recent diet changes: drinks 48-64 oz water daily, drinks 2-3 alcoholic beverages  B: egg frittas or PB toast  L: chicken noodle soup with grilled cheese sandwich and protein bar  D: soup and mac and cheese  Snacks: couple times. One time sweet and one time salt.     Recent exercise/activity changes: walking daily x 3 weeks for 1.5 miles    CURRENT WEIGHT:   183 lbs 0 oz    Initial Weight  (lbs): 193.1 lbs  Last Visits Weight: 197 lb (89.4 kg)  Cumulative weight loss (lbs): 10.1  Weight Loss Percentage: 5.23%        4/5/2025    10:16 AM   Changes and Difficulties   I have made the following changes to my diet since my last visit: Quantity much smaller   With regards to my diet, I am still struggling with: eating healthy   I have made the following changes to my activity/exercise since my last visit: not as much as i would like   With regards to my activity/exercise, I am still struggling with: motivation         MEDICATIONS:   Current Outpatient Medications   Medication Sig Dispense Refill    aspirin (ASPIRIN LOW DOSE) 81 MG EC tablet Take 1 tablet (81 mg) by mouth daily. 90 tablet 3    atorvastatin (LIPITOR) 10 MG tablet Take 1 tablet (10 mg) by mouth every evening. 90 tablet 3    BIOTIN PO Take 10,000 mg by mouth daily.      calcium citrate-vitamin D (CITRACAL) 200-6.25 MG-MCG TABS per tablet Take 1 tablet by mouth 2 times daily. With zinc      Cyanocobalamin (VITAMIN B 12 PO) Take 1 tablet by mouth daily      lisinopril (ZESTRIL) 10 MG tablet Take 1 tablet (10 mg) by mouth daily. 90 tablet 3    MAGNESIUM PO Take 1 tablet by mouth daily. Complex  400 mg / dose      Multiple Vitamins-Minerals (CENTRUM ADULTS PO) Take 1 tablet by mouth daily      naltrexone (DEPADE/REVIA) 50 MG tablet Take 1/2 tablet (25 mg) by mouth daily for 7 days, if tolerating then increase to 1 tablet (50 mg) daily (if not tolerating 1/2 tablet you can try taking 1/4th tablet for a few days then increase to 1/2 tablet) 30 tablet 2    omeprazole (PRILOSEC) 20 MG DR capsule Take 1 capsule (20 mg) by mouth every morning (before breakfast). 90 capsule 3    Semaglutide-Weight Management (WEGOVY) 1 MG/0.5ML pen Inject 1 mg subcutaneously once a week. 2 mL 2    Semaglutide-Weight Management (WEGOVY) 1.7 MG/0.75ML pen Inject 1.7 mg subcutaneously every 7 days. 9 mL 0    triamcinolone (KENALOG) 0.1 % external ointment Apply sparingly to  "affected area twice daily. Apply sparingly 2-3 weeks as directed. (Patient taking differently: Apply topically 2 times daily as needed. Apply sparingly to affected area twice daily. Apply sparingly 2-3 weeks as directed.) 80 g 1         ROS:  General  Fatigue: No  Sleep Quality: OK      PHYSICAL EXAM:  Objective    /65   Pulse 62   Resp 16   Ht 5' 1\" (1.549 m)   Wt 183 lb (83 kg)   SpO2 95%   BMI 34.58 kg/m    GENERAL: Healthy, alert and no distress  EYES: Eyes grossly normal to inspection.  No discharge or erythema, or obvious scleral/conjunctival abnormalities.  RESP: No audible wheeze, cough, or visible cyanosis.  No visible retractions or increased work of breathing.    SKIN: Visible skin clear. No significant rash, abnormal pigmentation or lesions.  NEURO: Cranial nerves grossly intact.  Mentation and speech appropriate for age.  PSYCH: Mentation appears normal, affect normal/bright, judgement and insight intact, normal speech and appearance well-groomed.        Sincerely,    Latanya Perez PA-C    33 minutes spent on the date of the encounter doing chart review, review of test results, patient visit and documentation     "

## 2025-04-09 ENCOUNTER — ANCILLARY PROCEDURE (OUTPATIENT)
Dept: BONE DENSITY | Facility: CLINIC | Age: 70
End: 2025-04-09
Attending: FAMILY MEDICINE
Payer: COMMERCIAL

## 2025-04-09 DIAGNOSIS — Z78.0 MENOPAUSE: ICD-10-CM

## 2025-04-09 DIAGNOSIS — M94.9 DISORDER OF BONE AND CARTILAGE: ICD-10-CM

## 2025-04-09 DIAGNOSIS — M85.80 OSTEOPENIA, UNSPECIFIED LOCATION: ICD-10-CM

## 2025-04-09 DIAGNOSIS — M89.9 DISORDER OF BONE AND CARTILAGE: ICD-10-CM

## 2025-04-09 PROCEDURE — 77080 DXA BONE DENSITY AXIAL: CPT | Performed by: RADIOLOGY

## 2025-04-09 NOTE — RESULT ENCOUNTER NOTE
Calixto Munguia,  Your DEXA showed reduced bone density- osteopenia, that is in between normal bones and osteoporosis. Please make sure to take CALTRATE-D twice daily along with weight bearing exercises including walking. We will repeat the DEXA in 2 years for recheck.   Let me know if you have any questions. Take care.  Leonor Muir MD

## 2025-04-16 ENCOUNTER — OFFICE VISIT (OUTPATIENT)
Dept: AUDIOLOGY | Facility: CLINIC | Age: 70
End: 2025-04-16
Attending: FAMILY MEDICINE
Payer: COMMERCIAL

## 2025-04-16 DIAGNOSIS — H90.3 SENSORINEURAL HEARING LOSS (SNHL) OF BOTH EARS: Primary | ICD-10-CM

## 2025-04-16 DIAGNOSIS — H91.93 HEARING DEFICIT, BILATERAL: ICD-10-CM

## 2025-04-16 PROCEDURE — 92557 COMPREHENSIVE HEARING TEST: CPT | Performed by: AUDIOLOGIST

## 2025-04-16 PROCEDURE — 92550 TYMPANOMETRY & REFLEX THRESH: CPT | Performed by: AUDIOLOGIST

## 2025-04-16 NOTE — PROGRESS NOTES
AUDIOLOGY REPORT    SUBJECTIVE:  Nilda Amaro is a 69 year old female who was seen in the Audiology Clinic at the [facility] for audiologic evaluation, referred by Leonor Muir M.D. The patient suspects a gradual hearing loss bilaterally. The patient denies otalgia, aural fullness, otorrhea, tinnitus, and dizziness.  The patient reports some history of noise exposure.     OBJECTIVE:  Abuse Screening:  Do you feel unsafe at home or work/school? No  Do you feel threatened by someone? No  Does anyone try to keep you from having contact with others, or doing things outside of your home? No  Physical signs of abuse present? No     Fall Risk Screen:  1. Have you fallen two or more times in the past year? No  2. Have you fallen and had an injury in the past year? No    Otoscopic exam indicates ears are clear of cerumen bilaterally     Pure Tone Thresholds assessed using conventional audiometry with good  reliability from 250-8000 Hz bilaterally using insert earphones and circumaural headphones     RIGHT:  normal sloping to borderline-normal  hearing    LEFT:    normal sloping to borderline-normal  hearing    Tympanogram:    RIGHT: normal eardrum mobility    LEFT:   normal eardrum mobility    Reflexes (reported by stimulus ear):  RIGHT: Ipsilateral is present at normal levels  RIGHT: Contralateral is absent at frequencies tested  LEFT:   Ipsilateral is elevated at frequencies tested  LEFT:   Contralateral is elevated at frequencies tested      Speech Reception Threshold:    RIGHT: 15 dB HL    LEFT:   10 dB HL  Word Recognition Score:     RIGHT: 100% at 55 dB HL using NU-6 recorded word list.    LEFT:   100% at 55 dB HL using NU-6 recorded word list.      ASSESSMENT:     ICD-10-CM    1. Sensorineural hearing loss (SNHL) of both ears  H90.3 [facility] & Speech Recog ([id number])     Tymps / Reflex   ([id number])      2. Hearing deficit, bilateral  H91.93 Adult Audiology   Referral          Today s results were discussed with the patient in detail.     PLAN:  Patient was counseled regarding hearing loss and impact on communication. It is recommended that the patient be retested in approximately 1 year or sooner if new concerns arise.  Please call this clinic with questions regarding these results or recommendations.        Luis A Olivo.  Doctor of Audiology  [place] License # [id number]

## 2025-04-22 ENCOUNTER — ANCILLARY PROCEDURE (OUTPATIENT)
Dept: CT IMAGING | Facility: CLINIC | Age: 70
End: 2025-04-22
Attending: FAMILY MEDICINE
Payer: COMMERCIAL

## 2025-04-22 DIAGNOSIS — R91.1 LUNG NODULE: ICD-10-CM

## 2025-04-22 PROCEDURE — 71250 CT THORAX DX C-: CPT | Performed by: RADIOLOGY

## 2025-04-23 NOTE — RESULT ENCOUNTER NOTE
Dear Nilda,  Your chest CT showed no significant changes since the last CT from 2024, we will continue with annual chest CT for screening.   Let me know if you have any questions. Take care.  Leonor Muir MD

## 2025-04-29 ENCOUNTER — DOCUMENTATION ONLY (OUTPATIENT)
Dept: SLEEP MEDICINE | Facility: CLINIC | Age: 70
End: 2025-04-29
Payer: COMMERCIAL

## 2025-04-29 DIAGNOSIS — G47.33 OBSTRUCTIVE SLEEP APNEA (ADULT) (PEDIATRIC): Primary | ICD-10-CM

## 2025-05-12 NOTE — PROGRESS NOTES
"Nilda is a 69 year old who is being evaluated via a billable video visit.      The patient has been notified of following:     \"This video visit will be conducted via a call between you and your physician/provider. We have found that certain health care needs can be provided without the need for an in-person physical exam.  This service lets us provide the care you need with a video conversation.  If a prescription is necessary we can send it directly to your pharmacy.  If lab work is needed we can place an order for that and you can then stop by our lab to have the test done at a later time.    Video visits are billed at different rates depending on your insurance coverage.  Please reach out to your insurance provider with any questions.    If during the course of the call the physician/provider feels a video visit is not appropriate, you will not be charged for this service.\"    Patient has given verbal consent for Video visit? Yes    How would you like to obtain your AVS? MyChart    If the video visit is dropped, the invitation should be resent by: Text to cell phone: [phone]    Will anyone else be joining your video visit? No    I    Video-Visit Details    Type of service:  Video Visit    Originating Location (pt. Location): Home in [place]     Distant Location (provider location):   [facility]    Platform used for Video Visit: [employer]    Video Start Time: 11:34 AM    Video End Time:11:59 AM    2025      Return Medical Weight Management Note     Nilda Amaro  MRN:  [id number]  :  1955    Assessment & Plan   Problem List Items Addressed This Visit       Hypertension goal BP (blood pressure) < 140/90 (Chronic)    This is a weight related comorbidity that I would anticipate to improve with weight management.         Coronary artery calcification seen on CAT scan (Chronic)    Relevant Medications    Semaglutide-Weight Management (WEGOVY) 1.7 MG/0.75ML pen    JASPER " (obstructive sleep apnea) - mild (AHI 12) - Primary (Chronic)    This is a weight related comorbidity that I would anticipate to improve with weight management.           Class 1 obesity due to excess calories with serious comorbidity and body mass index (BMI) of 34.0 to 34.9 in adult    Patient was congratulated on wt loss success thus far. Healthy habits to assist with further weight loss were discussed. Nilda will continue 1.7 mg Wegovy weekly - reviewed considerations to increase dose. At this time, I would not increase as I want her to consistently be getting in 3 meals daily and good protein intake first. She also is still having a weight response at 1.7 mg so would look to see if that has plateaued prior to dose increase. We reviewed recommendations for muscle conservation including eating three meals daily, good protein intake, and strength training.          Relevant Medications    Semaglutide-Weight Management (WEGOVY) 1.7 MG/0.75ML pen        AOM Considerations/notes from prior:  Phentermine:   History of CAD, avoid  Topiramate: H/O glaucoma:   No - caution runs in family/may be start           GLP-1: Do not appear covered by insurance               Naltrexone:     option, tried 2020/2025 but she did not feel helpful  Wellbutrin:   H/O glaucoma:   No - caution runs in family/may be start          Metformin: Labs and hydration with lisinopril option but no preDM/mental health meds  Contrave: Does not appear covered by insurance  Qsymia: History of CAD, a void     PATIENT INSTRUCTIONS:  Pt Instructions:  Continue 1.7 mg Wegovy weekly as long as it's being an effective dose (look for a weight stall/plateau of no weight loss over 3-4 weeks as a reference) and until you're consistently being able to get in 3 meals daily and good protein intake. I would anticipate this likely will be another 1-2 months but could be longer - keep me posted!   If you learn of an insurance change or loss of coverage of Wegovy  coming up - let me know and we'd want to try for another 3 month fill if possible. Medically, we'd typically want to continue medications long term for maintenance for most cases.    Goals:  Focus on healthy food choices - prioritizing protein and veggies in your meals. I recommend eating every 4-6 hours to reduce the risk of low blood sugars and try to minimize snacking between meals. Stay well hydrated though the day as well.  Great job with exercising - please continue to invest in yourself with regular exercise. Continue to strive for a range for 150-300 minutes of exercise for weight maintenance and incorporating strength training twice-three times a week to help preserve muscle!  3.  I included some handouts below for strength training reference in case that's helpful.    Follow up:    Call [phone] to schedule next visit in next available. Be in touch on [employer] for refills/concerns between visits       37 minutes spent on the date of the encounter doing chart review, history and exam, result review, counseling, developing plan of care, documentation, and further activities as noted      INTERVAL HISTORY:  Nilda returns for medical weight management follow up.  Last seen on 4/8/2025 with Latanya Perez PA-C and plan to continue Wegovy to 1.7 mg.  Naltrexone stopped as she did not feel it was helpful.  They did review low-dose topiramate consideration for cravings    She does feel every other week gets cravings but not consistent.    Doesn't feel like the 1.7 mg dose is different from the other doses.    Getting in 2.5 meals right now      Breakfast: does egg frita w/protein  Lunch - splits with  and will do 1/2 sandwich - with turkey. Gets veggies - celery/PB and fruit  Dinner: turkey/steak or pasta    Does have a protein handout from pharmacy so does have protein option to be working on.     She is noting appetite improvement. She does note she didn't eat when out Mother's Day - nothing sounded good  but did one bowl of chicken wild rice soup.    But then the next day she was hungry and the next day did egg/hashbrowns/olvera bite (limits grease). Then typical lunch/dinner.    Denies bothersome GI issues.    golfing more and walking - outside    She had read that Medicare will be cutting this in 3 months. Discussed that is not something we're aware of but if she learns of this to le us know and would try for a 3 month fill if possible.    WEIGHT METRICS:  Body mass index is 33.2 kg/m .   Current Weight: 175 lb 11.2 oz (79.7 kg)  Last Visits Weight: 183 lb (83 kg)  Initial Weight (lbs): 193.1 lbs  Cumulative weight loss (lbs): 17.4  Weight Loss Percentage: 9.01%    Wt Readings from Last 10 Encounters:   05/13/25 175 lb 11.2 oz (79.7 kg)   04/08/25 183 lb (83 kg)   04/07/25 180 lb (81.6 kg)   03/28/25 186 lb (84.4 kg)   02/17/25 191 lb (86.6 kg)   01/03/25 195 lb (88.5 kg)   12/16/24 197 lb 8 oz (89.6 kg)   05/23/24 186 lb (84.4 kg)   05/20/24 188 lb (85.3 kg)   03/15/24 190 lb (86.2 kg)                 5/13/2025     7:20 AM   Weight Loss Medication History Reviewed With Patient   Which weight loss medications are you currently taking on a regular basis? Wegovy   Are you having any side effects from the weight loss medication that we have prescribed you? No           5/13/2025     7:20 AM   Changes and Difficulties   I have made the following changes to my diet since my last visit: eating less   With regards to my diet, I am still struggling with: sweets but nearly as much as i used to   I have made the following changes to my activity/exercise since my last visit: golfing more and walking   With regards to my activity/exercise, I am still struggling with: running out of breath walking up hill       LABS:  Hemoglobin A1C   Date Value Ref Range Status   02/10/2024 5.5 0.0 - 5.6 % Final     Comment:     Normal <5.7%   Prediabetes 5.7-6.4%    Diabetes 6.5% or higher     Note: Adopted from ADA consensus guidelines.  "  08/03/2015 5.4 4.3 - 6.0 % Final     Creatinine   Date Value Ref Range Status   04/08/2025 0.76 0.51 - 0.95 mg/dL Final   11/16/2020 0.71 0.52 - 1.04 mg/dL Final     GFR Estimate   Date Value Ref Range Status   04/08/2025 85 >60 mL/min/1.73m2 Final     Comment:     eGFR calculated using 2021 CKD-EPI equation.   11/16/2020 >90 >60 mL/min/[1.73_m2] Final     Comment:     Non  GFR Calc  Starting 12/18/2018, serum creatinine based estimated GFR (eGFR) will be   calculated using the Chronic Kidney Disease Epidemiology Collaboration   (CKD-EPI) equation.       Carbon Dioxide   Date Value Ref Range Status   11/16/2020 27 20 - 32 mmol/L Final     Carbon Dioxide (CO2)   Date Value Ref Range Status   04/08/2025 25 22 - 29 mmol/L Final   03/09/2023 27 20 - 32 mmol/L Final     Chloride   Date Value Ref Range Status   04/08/2025 99 98 - 107 mmol/L Final   03/09/2023 105 94 - 109 mmol/L Final   11/16/2020 108 94 - 109 mmol/L Final     Urea Nitrogen   Date Value Ref Range Status   04/08/2025 16.1 8.0 - 23.0 mg/dL Final   03/09/2023 17 7 - 30 mg/dL Final   11/16/2020 20 7 - 30 mg/dL Final     ALT   Date Value Ref Range Status   04/08/2025 27 0 - 50 U/L Final   11/16/2020 21 0 - 50 U/L Final     AST   Date Value Ref Range Status   04/08/2025 26 0 - 45 U/L Final   11/16/2020 15 0 - 45 U/L Final     Vitamin D, Total (25-Hydroxy)   Date Value Ref Range Status   02/21/2022 34 20 - 75 ug/L Final     TSH   Date Value Ref Range Status   02/10/2024 1.71 0.30 - 4.20 uIU/mL Final   02/21/2022 1.94 0.40 - 4.00 mU/L Final   11/16/2020 2.48 0.40 - 4.00 mU/L Final        BP Readings from Last 6 Encounters:   04/08/25 125/65   04/07/25 131/85   12/16/24 125/77   05/23/24 121/74   05/20/24 121/81   03/15/24 124/85       Pulse Readings from Last 6 Encounters:   04/08/25 62   04/07/25 55   12/16/24 60   05/23/24 54   05/20/24 53   03/15/24 61       PE:  Ht 5' 1\" (1.549 m)   Wt 175 lb 11.2 oz (79.7 kg)   BMI 33.20 kg/m    GENERAL: " Healthy, alert and no distress  EYES: Eyes grossly normal to inspection.    RESP: No audible wheeze, cough, or visible cyanosis.  No increased work of breathing.    SKIN: Visible skin clear. No significant rash, abnormal pigmentation or lesions.  NEURO: Mentation and speech appropriate for age.  PSYCH: Mentation appears normal, affect normal/bright, judgement and insight intact, normal speech and appearance well-groomed.      Sincerely,      Sol Bianchi PA-C

## 2025-05-13 ENCOUNTER — VIRTUAL VISIT (OUTPATIENT)
Dept: SURGERY | Facility: CLINIC | Age: 70
End: 2025-05-13
Payer: COMMERCIAL

## 2025-05-13 VITALS — BODY MASS INDEX: 33.17 KG/M2 | WEIGHT: 175.7 LBS | HEIGHT: 61 IN

## 2025-05-13 DIAGNOSIS — E66.811 CLASS 1 OBESITY DUE TO EXCESS CALORIES WITH SERIOUS COMORBIDITY AND BODY MASS INDEX (BMI) OF 33.0 TO 33.9 IN ADULT: ICD-10-CM

## 2025-05-13 DIAGNOSIS — G47.33 OSA (OBSTRUCTIVE SLEEP APNEA): Primary | Chronic | ICD-10-CM

## 2025-05-13 DIAGNOSIS — I10 HYPERTENSION GOAL BP (BLOOD PRESSURE) < 140/90: Chronic | ICD-10-CM

## 2025-05-13 DIAGNOSIS — I25.10 CORONARY ARTERY CALCIFICATION SEEN ON CAT SCAN: ICD-10-CM

## 2025-05-13 DIAGNOSIS — E66.09 CLASS 1 OBESITY DUE TO EXCESS CALORIES WITH SERIOUS COMORBIDITY AND BODY MASS INDEX (BMI) OF 33.0 TO 33.9 IN ADULT: ICD-10-CM

## 2025-05-13 PROCEDURE — 98006 SYNCH AUDIO-VIDEO EST MOD 30: CPT | Performed by: PHYSICIAN ASSISTANT

## 2025-05-13 RX ORDER — SEMAGLUTIDE 1.7 MG/.75ML
1.7 INJECTION, SOLUTION SUBCUTANEOUS
Qty: 3 ML | Refills: 1 | Status: SHIPPED | OUTPATIENT
Start: 2025-05-13

## 2025-05-13 NOTE — PATIENT INSTRUCTIONS
Nice to talk with you today. Below is the plan discussed.-  Sol Bianchi PA-C     Pt Instructions:  Continue 1.7 mg Wegovy weekly as long as it's being an effective dose (look for a weight stall/plateau of no weight loss over 3-4 weeks as a reference) and until you're consistently being able to get in 3 meals daily and good protein intake. I would anticipate this likely will be another 1-2 months but could be longer - keep me posted!   If you learn of an insurance change or loss of coverage of Wegovy coming up - let me know and we'd want to try for another 3 month fill if possible. Medically, we'd typically want to continue medications long term for maintenance for most cases.    Goals:  Focus on healthy food choices - prioritizing protein and veggies in your meals. I recommend eating every 4-6 hours to reduce the risk of low blood sugars and try to minimize snacking between meals. Stay well hydrated though the day as well.  Great job with exercising - please continue to invest in yourself with regular exercise. Continue to strive for a range for 150-300 minutes of exercise for weight maintenance and incorporating strength training twice-three times a week to help preserve muscle!  3.  I included some handouts below for strength training reference in case that's helpful.    Follow up:    Call [phone] to schedule next visit in next available. Be in touch on [facility] for refills/concerns between visits     Strength Training  Strength training is exercise that involves your own body weight or equipment to build muscle, endurance, and strength. Increasing muscle helps increase your metabolism.     Muscle Conditioning: Helping You Get and Stay Fit  [email or web address]    Muscle Conditionin Exercises    Resistance Training with Free Weights: 3 Exercises    Resistance Training with Surgical Tubin Exercises    Seated Exercises for Arms and Legs: 11 Exercises    Stretchin Exercises    No  Equipment Home Exercise Lists from [facility]   [email or web address]/[email or web address]    Strength training YouTube workouts  [email or web address]

## 2025-05-13 NOTE — ASSESSMENT & PLAN NOTE
Patient was congratulated on wt loss success thus far. Healthy habits to assist with further weight loss were discussed. Nilda will continue 1.7 mg Wegovy weekly - reviewed considerations to increase dose. At this time, I would not increase as I want her to consistently be getting in 3 meals daily and good protein intake first. She also is still having a weight response at 1.7 mg so would look to see if that has plateaued prior to dose increase. We reviewed recommendations for muscle conservation including eating three meals daily, good protein intake, and strength training.

## 2025-06-12 ENCOUNTER — OFFICE VISIT (OUTPATIENT)
Dept: SLEEP MEDICINE | Facility: CLINIC | Age: 70
End: 2025-06-12
Payer: COMMERCIAL

## 2025-06-12 VITALS
HEIGHT: 61 IN | DIASTOLIC BLOOD PRESSURE: 81 MMHG | RESPIRATION RATE: 14 BRPM | OXYGEN SATURATION: 99 % | SYSTOLIC BLOOD PRESSURE: 127 MMHG | BODY MASS INDEX: 32.47 KG/M2 | HEART RATE: 52 BPM | WEIGHT: 172 LBS

## 2025-06-12 DIAGNOSIS — G47.33 OSA (OBSTRUCTIVE SLEEP APNEA): Primary | Chronic | ICD-10-CM

## 2025-06-12 DIAGNOSIS — E66.09 CLASS 1 OBESITY DUE TO EXCESS CALORIES WITH SERIOUS COMORBIDITY AND BODY MASS INDEX (BMI) OF 34.0 TO 34.9 IN ADULT: ICD-10-CM

## 2025-06-12 DIAGNOSIS — E66.811 CLASS 1 OBESITY DUE TO EXCESS CALORIES WITH SERIOUS COMORBIDITY AND BODY MASS INDEX (BMI) OF 34.0 TO 34.9 IN ADULT: ICD-10-CM

## 2025-06-12 ASSESSMENT — SLEEP AND FATIGUE QUESTIONNAIRES
HOW LIKELY ARE YOU TO NOD OFF OR FALL ASLEEP WHILE SITTING INACTIVE IN A PUBLIC PLACE: WOULD NEVER DOZE
HOW LIKELY ARE YOU TO NOD OFF OR FALL ASLEEP IN A CAR, WHILE STOPPED FOR A FEW MINUTES IN TRAFFIC: WOULD NEVER DOZE
HOW LIKELY ARE YOU TO NOD OFF OR FALL ASLEEP WHILE SITTING AND READING: SLIGHT CHANCE OF DOZING
HOW LIKELY ARE YOU TO NOD OFF OR FALL ASLEEP WHILE SITTING QUIETLY AFTER LUNCH WITHOUT ALCOHOL: SLIGHT CHANCE OF DOZING
HOW LIKELY ARE YOU TO NOD OFF OR FALL ASLEEP WHEN YOU ARE A PASSENGER IN A CAR FOR AN HOUR WITHOUT A BREAK: WOULD NEVER DOZE
HOW LIKELY ARE YOU TO NOD OFF OR FALL ASLEEP WHILE LYING DOWN TO REST IN THE AFTERNOON WHEN CIRCUMSTANCES PERMIT: SLIGHT CHANCE OF DOZING
HOW LIKELY ARE YOU TO NOD OFF OR FALL ASLEEP WHILE WATCHING TV: SLIGHT CHANCE OF DOZING
HOW LIKELY ARE YOU TO NOD OFF OR FALL ASLEEP WHILE SITTING AND TALKING TO SOMEONE: WOULD NEVER DOZE

## 2025-06-12 NOTE — PATIENT INSTRUCTIONS
[facility]  Search engine: https://mms.aadsm.org/members/directory/search_bootstrap.php?org_id=ADSM&   Certified in Dental Sleep Medicine    David Audrey  Degree: PK  [address]  [address]  [place] [address]  Professional Phone: [phone]  Website: [email or web address]    Gustabo Randolph  Degree: PK  [facility]  [address]  [address]  [place] [address]  Professional Phone: [phone]: [phone]    [facility]  [address] [place] [address]  [address]  [place] [address]  Appointments: [phone]  Fax: [phone]    Cindi Wood  [facility]  [address]  [place] [address]  Professional Phone: [phone]  Website: [email or web address]      Evin Quispe   [facility]   Degree: MD PK   [address]  [address]  [place] [address]  Appointments: [phone]    Ramy Byrnes  Degree: PK  [address]  [address]  [place] [address]  Professional Phone: [phone]  Fax: [phone]    Delroy Wu  Degree: PK  [facility]  [address]  [address]  [place] [address]  Professional Phone: [phone]  Website: [email or web address]      Maci Helms  [facility]-you should verify insurance coverage  [address]  [address]  [place] [address]  Professional Phone: [phone]  Website: [email or web address]      Catrina Ramírez--DOES NOT ACCEPT INSURANCE  Degree: PK--you should verify insurance coverage  [facility]  [address]  [address]  [place] [address]  Professional Phone: [phone]    Leigh Ann Bronson  Degree: PK, PhD  [facility]  [address] [place] [address]   Appointments: [phone]   Fax: [phone]     [place]  [facility]  Degree: DDS  [address] [place] [address]  Professional Phone: [phone]  Fax: [phone]  Website: [email or web address]      Chung Trujillo  Degree: DDS  [facility]  [address] [place] [address]    Mariona Mulet Pradera  Degree: DDS, MS  [facility]  [address] [place] [address]  Professional Phone: [phone]      Rossi Ren  Degree: DDS, MS  [facility]  [address]  [address]  [place] [address]  Professional Phone: [phone]    Ethel Nelson  Degree: KORINAS  [facility]  [address] [place] [address]  Appointments: [phone]    Michael Brush  Degree: DDS  [facility]  [address] [place] [address]  Professional Phone: [phone]  Website: [email or web address]      Adal Hanna  Degree: DDS  [facility]  [address] [place] [address]  Professional Phone: [phone]  Fax: [phone]      Reymundo Rizo  Degree: DDS  [facility]  [address]  [address]  [place] [address]    Joel Roa   Degree: DDS   [facility]   [address]   [address]  [place] [address]  Phone [phone]  Website: [email or web address]    Shaye Sher   Degree: DDS   [address]  [address]  [place] [address]  Appointments: [phone]    Mena Perez   Degree: DDS   [address] [place] [address]   Appointments: [phone]    [place] Aparicio   Degree: DDS   [address] [place] [address]   Appointments: [phone]    Rey Campo   Degree: DDS   [address] [place] [address]   Appointments: [phone]    Maninder Uribe   Degree: PK [facility], [address] [address]   Appointments: [phone]    Ella Donovan   Degree: PK  [address] [place] [address]  [place] [address]  Appointments: [phone]    [facility]   [address] [place] [address]  Appointments: [phone]    Maty Lane   Degree: DDS   [facility] [address] [address]   Appointments: [phone]    Evaristo Bland   Degree: PK   [address] [place] [address]   Appointments: [phone]    [facility]    Degree: PK  [address]  [address]  [place] [address]  Appointments: [phone]    Tom Monroy   Degree: DDS   [facility]   [address]  [address]  [place] [address]  Appointment: [phone]    Anjelica Girl   Degree: MS NANCY, FAAOP   [facility]  [address]   [address]  [place] [address]  Appointments: [phone]    Evin Springer   Degree: DDS   [address]   [place] [address]   Appointments: [phone]   Fax: [phone]        ACCEPT MEDICARE  Moises Durand DDS  [address] [facility] [place] [address], [place] [address]  [phone]; [phone] (fax)  [employer]    Jesus Martin DDS, MS   [facility] [place] [address]   [address]   [place] [address]   Appointments: [phone]   Fax: [phone]     Alex Hayden   Degree: DMD, MSD   [facility]   [address]  [address]  [place] [address]  Appointments: [phone]  Fax: [phone]    Tiarra Guido BDS, MS(CR). MS (OFP)  Diplomate American Board of Orofacial Pain  [facility]  [address],   [place] [address]  Appointment: [phone]  Fax: [phone]        ADDITIONAL PROVIDERS    Sohail Cuellar DDS    [facility]  [address] [address]  Appointments: [phone]     [facility]  [address]  [place] [address]  Appointments: [phone]  Fax-  [phone]    Tricia Martinez   Degree: DDS   [facility]    [address]  [address]  [place] [address]  Appointments: [phone]     Your Body mass index is 32.5 kg/m .  Weight management is a personal decision.  If you are interested in exploring weight loss strategies, the following discussion covers the approaches that may be successful. Body mass index (BMI) is one way to tell whether you are at a healthy weight, overweight, or obese. It measures your weight in relation to your height.  A BMI of 18.5 to 24.9 is in the healthy range. A person with a BMI of 25 to 29.9 is considered overweight, and someone with a BMI of 30 or greater is considered obese. More than two-thirds of American adults are considered overweight or obese.  Being overweight or obese increases the risk for further weight gain. Excess weight may lead to heart disease and diabetes.  Creating and following plans for healthy eating and physical activity may help you improve your health.  Weight control is part of healthy lifestyle and includes exercise, emotional health, and healthy eating habits. Careful eating habits lifelong are the mainstay of weight control. Though there are significant health benefits from weight loss, long-term weight loss with diet alone may be very difficult to achieve- studies show long-term success with dietary management in less than 10% of people. Attaining a healthy weight may be especially difficult to achieve in those with severe obesity. In some cases, medications, devices and surgical management might be considered.  What can you do?  If you are overweight or obese and are interested in methods for weight loss, you should discuss this with your provider.   Consider reducing daily calorie intake by 500 calories.   Keep a food journal.   Avoiding skipping meals, consider cutting portions instead.    Diet combined with exercise helps maintain muscle while optimizing fat loss. Strength  training is particularly important for building and maintaining muscle mass. Exercise helps reduce stress, increase energy, and improves fitness. Increasing exercise without diet control, however, may not burn enough calories to loose weight.     Start walking three days a week 10-20 minutes at a time  Work towards walking thirty minutes five days a week   Eventually, increase the speed of your walking for 1-2 minutes at time    In addition, we recommend that you review healthy lifestyles and methods for weight loss available through the National Institutes of Health patient information sites:  http://win.niddk.nih.gov/publications/index.htm    And look into health and wellness programs that may be available through your health insurance provider, employer, local community center, or stephen club.

## 2025-06-12 NOTE — NURSING NOTE
"Chief Complaint   Patient presents with    CPAP Follow Up       Initial /81   Pulse 52   Resp 14   Ht 1.549 m (5' 1\")   Wt 78 kg (172 lb)   SpO2 99%   BMI 32.50 kg/m   Estimated body mass index is 32.5 kg/m  as calculated from the following:    Height as of this encounter: 1.549 m (5' 1\").    Weight as of this encounter: 78 kg (172 lb).    Medication Reconciliation: complete  ESS: 4  Neck circumference: 13.75 inches / 35 centimeters.  DME: FRANCESCA Singletary CMA      "

## 2025-06-12 NOTE — NURSING NOTE
DME orders have been automatically faxed to [facility]. Next year's annual CPAP appointment was scheduled today. AVS printed and given to patient.  Anila Singletary CMA

## 2025-06-12 NOTE — PROGRESS NOTES
Sleep Apnea - Follow-up Visit:    Impression/Plan:  Mild obstructive sleep apnea-  Excellent CPAP compliance and AHI is well controlled on CPAP 6-16 cm/H20. Daytime symptoms are improved.   She wants to see if MAD is a good option and referral placed.   Meanwhile, she will continue current treatment of auto-CPAP.   Comprehensive DME order placed.    Obesity with BMI of 32:  She has lost ~19 lbs since her last sleep study. Will re-evaluate sleep apnea once she is at goal for weight gain.     Nilda Amaro will follow up in about 1 year.     30 minutes spent on day of encounter doing chart review,  history and exam, counseling, coordinating plan of care, documentation and further activities as noted above.       The longitudinal plan of care for the diagnosis(es)/condition(s) as documented were addressed during this visit. Due to the added complexity in care, I will continue to support Nilda in the subsequent management and with ongoing continuity of care.     Sharon Campo PA-C  Sleep Medicine     History of Present Illness:  Chief Complaint   Patient presents with    CPAP Follow Up       Nilda Amaro presents for follow-up of their mild sleep apnea, managed with CPAP.     She presented with loud snoring, non-refreshing sleep, daytime fatigue/sleepiness (ESS 8), difficulty maintaining sleep, crowded oropharynx and co-morbid HTN.    12/15/2023 [facility] [facility] Sleep Study (191.0 lbs) - AHI 12.7, RDI 16.2, Supine AHI 20.5, REM AHI 33.7, Low O2 68.0%, Time Spent less than 88% 13.3 minutes / Time Spent less than 89% 21.6 minutes. PLM index was 32.8 movements per hour. The PLM Arousal Index was 10.5 per hour.    February 12, 2024. Patient received a Resmed Airsense 11 Pressures were set at  6-16 cm H2O.    Do you use a CPAP Machine at home: (Patient-Rptd) Yes  Overall, on a scale of 0-10 how would you rate your CPAP (0 poor, 10 great): (Patient-Rptd) 5    What type of mask do you use: (Patient-Rptd) Nasal Mask  Is  your mask comfortable: (Patient-Rptd) No  If not, why: (Patient-Rptd) ight and bothersome    Is your mask leaking: (Patient-Rptd) Yes  If yes, where do you feel it: (Patient-Rptd) on my score  How many night per week does the mask leak (0-7): (Patient-Rptd) 5    Do you notice snoring with mask on: (Patient-Rptd) No  Do you notice gasping arousals with mask on: (Patient-Rptd) No  Are you having significant oral or nasal dryness: (Patient-Rptd) No  Is the pressure setting comfortable: (Patient-Rptd) Yes  If not, why:      What is your typical bedtime: (Patient-Rptd) 10  How long does it take you to go to sleep on PAP therapy: (Patient-Rptd) right away  What time do you typically get out of bed for the day: (Patient-Rptd) 730  How many hours on average per night are you using PAP therapy: (Patient-Rptd) 8  How many hours are you sleeping per night: (Patient-Rptd) 6  Do you feel well rested in the morning: (Patient-Rptd) Yes      ResMed   Auto-PAP 6.0 - 16.0 cmH2O 30 day usage data:    96% of days with > 4 hours of use. 0/30 days with no use.   Average use 464 minutes per day.   95%ile Leak 24.23 L/min.   CPAP 95% pressure 9.8 cm.   AHI 1.36 events per hour.       EPWORTH SLEEPINESS SCALE         6/12/2025     2:19 PM    [place] Sleepiness Scale ( COLT Martinez  [id number]<br>ESS - [place]/English - Final version - 21 Nov 07 - [facility] [place].)   Sitting and reading Slight chance of dozing   Watching TV Slight chance of dozing   Sitting, inactive in a public place (e.g. a theatre or a meeting) Would never doze   As a passenger in a car for an hour without a break Would never doze   Lying down to rest in the afternoon when circumstances permit Slight chance of dozing   Sitting and talking to someone Would never doze   Sitting quietly after a lunch without alcohol Slight chance of dozing   In a car, while stopped for a few minutes in traffic Would never doze   [place] Score (MC) 4   [place] Score (Sleep) 4         Patient-reported       INSOMNIA SEVERITY INDEX (NADYA)          6/12/2025     2:19 PM   Insomnia Severity Index (NADYA)   Difficulty falling asleep 0   Difficulty staying asleep 2   Problems waking up too early 2   How SATISFIED/DISSATISFIED are you with your CURRENT sleep pattern? 2   How NOTICEABLE to others do you think your sleep problem is in terms of impairing the quality of your life? 1   How WORRIED/DISTRESSED are you about your current sleep problem? 1   To what extent do you consider your sleep problem to INTERFERE with your daily functioning (e.g. daytime fatigue, mood, ability to function at work/daily chores, concentration, memory, mood, etc.) CURRENTLY? 2   NADYA Total Score 10       Guidelines for Scoring/Interpretation:  Total score categories:  0-7 = No clinically significant insomnia   8-14 = Subthreshold insomnia   15-21 = Clinical insomnia (moderate severity)  22-28 = Clinical insomnia (severe)  Used via courtesy of www.[employer].va.gov with permission from Aj Jaramillo PhD., [facility]        Past medical/surgical history, family history, social history, medications and allergies were reviewed.        Problem List:  Patient Active Problem List    Diagnosis Date Noted    Hair loss 04/07/2025     Priority: Medium    Class 2 severe obesity due to excess calories with serious comorbidity in adult (H) 12/16/2024     Priority: Medium    JASPER (obstructive sleep apnea) - mild (AHI 12) 01/10/2024     Priority: Medium     12/15/2023 [place] [facility] Sleep Study (191.0 lbs) - AHI 12.7, RDI 16.2, Supine AHI 20.5, REM AHI 33.7, Low O2 68.0%, Time Spent <=88% 13.3 minutes / Time Spent <=89% 21.6 minutes. PLM index was 32.8 movements per hour. The PLM Arousal Index was 10.5 per hour.      Osteopenia of multiple sites 03/13/2023     Priority: Medium    Ascending aorta dilatation 03/13/2023     Priority: Medium    Urinary, incontinence, stress female 03/13/2023     Priority: Medium    Former smoker  "01/31/2023     Priority: Medium    Bernal's esophagus without dysplasia 12/05/2022     Priority: Medium    Hepatic steatosis 02/21/2022     Priority: Medium    Hyperlipidemia LDL goal <100 02/21/2022     Priority: Medium    Aortic atherosclerosis 02/21/2022     Priority: Medium    Hepatic cyst 02/21/2022     Priority: Medium    Coronary artery calcification seen on CAT scan 02/20/2022     Priority: Medium    Tubular adenoma of colon 01/31/2022     Priority: Medium    Family history of Alzheimer's disease 11/16/2020     Priority: Medium    Intertriginous candidiasis 10/21/2019     Priority: Medium    Hearing deficit, bilateral 10/21/2019     Priority: Medium    Hypertension goal BP (blood pressure) < 140/90 10/01/2018     Priority: Medium    Change in color of pigmented skin lesion, left neck, 2mm 09/25/2017     Priority: Medium    Family history of breast cancer in sister 09/25/2017     Priority: Medium    Primary osteoarthritis of both knees 06/27/2016     Priority: Medium    Chronic GERD 11/05/2015     Priority: Medium    Class 1 obesity due to excess calories with serious comorbidity and body mass index (BMI) of 34.0 to 34.9 in adult 09/25/2015     Priority: Medium        /81   Pulse 52   Resp 14   Ht 1.549 m (5' 1\")   Wt 78 kg (172 lb)   SpO2 99%   BMI 32.50 kg/m     "

## 2025-07-08 ENCOUNTER — VIRTUAL VISIT (OUTPATIENT)
Dept: SURGERY | Facility: CLINIC | Age: 70
End: 2025-07-08
Payer: COMMERCIAL

## 2025-07-08 VITALS — HEIGHT: 61 IN | BODY MASS INDEX: 31.91 KG/M2 | WEIGHT: 169 LBS

## 2025-07-08 DIAGNOSIS — I25.10 CORONARY ARTERY CALCIFICATION SEEN ON CAT SCAN: ICD-10-CM

## 2025-07-08 DIAGNOSIS — E66.811 CLASS 1 OBESITY WITH SERIOUS COMORBIDITY AND BODY MASS INDEX (BMI) OF 31.0 TO 31.9 IN ADULT, UNSPECIFIED OBESITY TYPE: Primary | ICD-10-CM

## 2025-07-08 DIAGNOSIS — G47.33 OSA (OBSTRUCTIVE SLEEP APNEA): Chronic | ICD-10-CM

## 2025-07-08 PROCEDURE — 98006 SYNCH AUDIO-VIDEO EST MOD 30: CPT | Performed by: PHYSICIAN ASSISTANT

## 2025-07-08 NOTE — ASSESSMENT & PLAN NOTE
Patient was congratulated on wt loss success thus far. Healthy habits to assist with further weight loss were discussed. Nilda will continue 2.4 mg Wegovy weekly. We reviewed recommendations for muscle conservation including eating three meals daily, good protein intake, and strength training.

## 2025-07-08 NOTE — PROGRESS NOTES
"Nilda is a 69 year old who is being evaluated via a billable video visit.      The patient has been notified of following:     \"This video visit will be conducted via a call between you and your physician/provider. We have found that certain health care needs can be provided without the need for an in-person physical exam.  This service lets us provide the care you need with a video conversation.  If a prescription is necessary we can send it directly to your pharmacy.  If lab work is needed we can place an order for that and you can then stop by our lab to have the test done at a later time.    Video visits are billed at different rates depending on your insurance coverage.  Please reach out to your insurance provider with any questions.    If during the course of the call the physician/provider feels a video visit is not appropriate, you will not be charged for this service.\"    Patient has given verbal consent for Video visit? Yes    How would you like to obtain your AVS? MyChart    If the video visit is dropped, the invitation should be resent by: Text to cell phone: [phone]    Will anyone else be joining your video visit? No    I    Video-Visit Details    Type of service:  Video Visit    Originating Location (pt. Location): Home in [place]     Distant Location (provider location):   [facility]    Platform used for Video Visit: Modanisa    Video Start Time: 9:43 AM    Video End Time:9:54 AM    2025      Return Medical Weight Management Note     Nilda Amaro  MRN:  [id number]  :  1955    Assessment & Plan   Problem List Items Addressed This Visit       Coronary artery calcification seen on CAT scan (Chronic)    [place] benefit to reduce risk of cardiac disease utilizing Wegovy.         Relevant Medications    Semaglutide-Weight Management (WEGOVY) 2.4 MG/0.75ML pen    JASPER (obstructive sleep apnea) - mild (AHI 12) (Chronic)    Patient reports improvement of this " with weight loss. This is a weight related comorbidity that I would anticipate to improve with weight management.           Class 1 obesity with serious comorbidity and body mass index (BMI) of 31.0 to 31.9 in adult, unspecified obesity type - Primary    Patient was congratulated on wt loss success thus far. Healthy habits to assist with further weight loss were discussed. Nilda will continue 2.4 mg Wegovy weekly. We reviewed recommendations for muscle conservation including eating three meals daily, good protein intake, and strength training.          Relevant Medications    Semaglutide-Weight Management (WEGOVY) 2.4 MG/0.75ML pen        AOM Considerations/notes from prior:  Phentermine:   History of CAD, avoid  Topiramate: H/O glaucoma:   No - caution runs in family/may be start           GLP-1: Do not appear covered by insurance               Naltrexone:     option, tried 2020/2025 but she did not feel helpful  Wellbutrin:   H/O glaucoma:   No - caution runs in family/may be start          Metformin: Labs and hydration with lisinopril option but no preDM/mental health meds  Contrave: Does not appear covered by insurance  Qsymia: History of CAD, a void     PATIENT INSTRUCTIONS:  Pt Instructions:  Continue 2.4 mg Wegovy. Be in touch between visits for dose changes/refills.    Goals:  Focus on healthy food choices - prioritizing protein and veggies in your meals. I recommend eating every 4-6 hours to reduce the risk of low blood sugars and try to minimize snacking between meals. Stay well hydrated though the day as well.  Great job with exercising - please continue to invest in yourself with regular exercise. Continue to strive for a range for 150-300 minutes of exercise for weight maintenance and incorporating strength training twice-three times a week to help preserve muscle!      Follow up:    Call [phone] to schedule next visit in next available in 3-4 months. Be in touch on Mychart for refills/concerns  between visits    16 minutes spent on the date of the encounter doing chart review, history and exam, result review, counseling, developing plan of care, documentation, and further activities as noted      The longitudinal plan of care for the diagnosis(es)/condition(s) as documented were addressed during this visit. Due to the added complexity in care, I will continue to support Nilda in the subsequent management and with ongoing continuity of care.      INTERVAL HISTORY:  Nilda returns for medical weight management follow up.  Last seen on 5/13/2025 with myself and plan at that time to continue 1.7 mg Wegovy.    Up to 2.4 mg dose just started this week. No new side effects she's noticing.    She did feel she's been eating more - some with holidays.    Eating patterns: egg fritta and small bowl of Special K cornflakes in the morning when waking up  Lunch: split sandwich (ham/turkey or PB) - and fruit  Supper: chicken or steak or hamburger with veggie and starch     Exercise - working on doing more heavy/carrying work around the house and getting up/down stairs (4 flights).         Snacking less drinking kirw pretein drinks   Bordum earing when workingmin my computer   Walking more around the house in a regular basis tangela g more teips to acish a task   Consistency       WEIGHT METRICS:  Body mass index is 31.93 kg/m .   Current Weight: 169 lb (76.7 kg)  Last Visits Weight: 175 lb 11.2 oz (79.7 kg)  Initial Weight (lbs): 193.1 lbs  Cumulative weight loss (lbs): 24.1  Weight Loss Percentage: 12.48%    Wt Readings from Last 10 Encounters:   07/08/25 169 lb (76.7 kg)   06/12/25 172 lb (78 kg)   05/13/25 175 lb 11.2 oz (79.7 kg)   04/08/25 183 lb (83 kg)   04/07/25 180 lb (81.6 kg)   03/28/25 186 lb (84.4 kg)   02/17/25 191 lb (86.6 kg)   01/03/25 195 lb (88.5 kg)   12/16/24 197 lb 8 oz (89.6 kg)   05/23/24 186 lb (84.4 kg)                 7/7/2025     3:17 PM   Weight Loss Medication History Reviewed With Patient   Which  weight loss medications are you currently taking on a regular basis? Wegovy   Are you having any side effects from the weight loss medication that we have prescribed you? No           7/7/2025     3:17 PM   Changes and Difficulties   I have made the following changes to my diet since my last visit: Snacking less drinking kirw pretein drinks   With regards to my diet, I am still struggling with: Bordum earing when workingmin my computer   I have made the following changes to my activity/exercise since my last visit: Walking more around the house in a regular basis tangela cortez more teips to acish a task   With regards to my activity/exercise, I am still struggling with: Consistency       LABS:  Hemoglobin A1C   Date Value Ref Range Status   02/10/2024 5.5 0.0 - 5.6 % Final     Comment:     Normal <5.7%   Prediabetes 5.7-6.4%    Diabetes 6.5% or higher     Note: Adopted from ADA consensus guidelines.   08/03/2015 5.4 4.3 - 6.0 % Final     Creatinine   Date Value Ref Range Status   04/08/2025 0.76 0.51 - 0.95 mg/dL Final   11/16/2020 0.71 0.52 - 1.04 mg/dL Final     GFR Estimate   Date Value Ref Range Status   04/08/2025 85 >60 mL/min/1.73m2 Final     Comment:     eGFR calculated using 2021 CKD-EPI equation.   11/16/2020 >90 >60 mL/min/[1.73_m2] Final     Comment:     Non  GFR Calc  Starting 12/18/2018, serum creatinine based estimated GFR (eGFR) will be   calculated using the Chronic Kidney Disease Epidemiology Collaboration   (CKD-EPI) equation.       Carbon Dioxide   Date Value Ref Range Status   11/16/2020 27 20 - 32 mmol/L Final     Carbon Dioxide (CO2)   Date Value Ref Range Status   04/08/2025 25 22 - 29 mmol/L Final   03/09/2023 27 20 - 32 mmol/L Final     Chloride   Date Value Ref Range Status   04/08/2025 99 98 - 107 mmol/L Final   03/09/2023 105 94 - 109 mmol/L Final   11/16/2020 108 94 - 109 mmol/L Final     Urea Nitrogen   Date Value Ref Range Status   04/08/2025 16.1 8.0 - 23.0 mg/dL Final  "  03/09/2023 17 7 - 30 mg/dL Final   11/16/2020 20 7 - 30 mg/dL Final     ALT   Date Value Ref Range Status   04/08/2025 27 0 - 50 U/L Final   11/16/2020 21 0 - 50 U/L Final     AST   Date Value Ref Range Status   04/08/2025 26 0 - 45 U/L Final   11/16/2020 15 0 - 45 U/L Final     Vitamin D, Total (25-Hydroxy)   Date Value Ref Range Status   02/21/2022 34 20 - 75 ug/L Final     TSH   Date Value Ref Range Status   02/10/2024 1.71 0.30 - 4.20 uIU/mL Final   02/21/2022 1.94 0.40 - 4.00 mU/L Final   11/16/2020 2.48 0.40 - 4.00 mU/L Final        BP Readings from Last 6 Encounters:   06/12/25 127/81   04/08/25 125/65   04/07/25 131/85   12/16/24 125/77   05/23/24 121/74   05/20/24 121/81       Pulse Readings from Last 6 Encounters:   06/12/25 52   04/08/25 62   04/07/25 55   12/16/24 60   05/23/24 54   05/20/24 53       PE:  Ht 5' 1\" (1.549 m)   Wt 169 lb (76.7 kg)   BMI 31.93 kg/m    GENERAL: Healthy, alert and no distress  EYES: Eyes grossly normal to inspection.    RESP: No audible wheeze, cough, or visible cyanosis.  No increased work of breathing.    SKIN: Visible skin clear. No significant rash, abnormal pigmentation or lesions.  NEURO: Mentation and speech appropriate for age.  PSYCH: Mentation appears normal, affect normal/bright, judgement and insight intact, normal speech and appearance well-groomed.      Sincerely,      Sol Bianchi PA-C       "

## 2025-07-08 NOTE — PATIENT INSTRUCTIONS
Nice to talk with you today. Below is the plan discussed.-  Sol Bianchi PA-C     Pt Instructions:  Continue 2.4 mg Wegovy. Be in touch between visits for dose changes/refills.    Goals:  Focus on healthy food choices - prioritizing protein and veggies in your meals. I recommend eating every 4-6 hours to reduce the risk of low blood sugars and try to minimize snacking between meals. Stay well hydrated though the day as well.  Great job with exercising - please continue to invest in yourself with regular exercise. Continue to strive for a range for 150-300 minutes of exercise for weight maintenance and incorporating strength training twice-three times a week to help preserve muscle!      Follow up:    Call [phone] to schedule next visit in next available in 3-4 months. Be in touch on Mychart for refills/concerns between visits    Strength Training  Strength training is exercise that involves your own body weight or equipment to build muscle, endurance, and strength. Increasing muscle helps increase your metabolism.     [employer] for Strength training  https://www.[email or web address].pdf  Muscle Conditionin Exercises  Resistance Training with Free Weights: 3 Exercises  Resistance Training with Surgical Tubin Exercises  Seated Exercises for Arms and Legs: 11 Exercises  Stretchin Exercises    Additional Outside Sources  No Equipment Home Exercise Lists from [facility] [employer]   https://www.StatAceports.org/public/upload/files/general/YH31_MT_AR_JvOtna_Uowduxim_Bgyooyaz.pdf  Strength training YouTube workouts   https://[email or web address] Successful [email or web address]/user/KozakSportsPerform  Guide to Beginning Strength Training  https://www.[employer].com/fitness/s1956/beginners-guide-weight-training/

## 2025-07-08 NOTE — ASSESSMENT & PLAN NOTE
Patient reports improvement of this with weight loss. This is a weight related comorbidity that I would anticipate to improve with weight management.

## 2025-07-24 ENCOUNTER — TELEPHONE (OUTPATIENT)
Dept: GASTROENTEROLOGY | Facility: CLINIC | Age: 70
End: 2025-07-24

## 2025-07-24 NOTE — TELEPHONE ENCOUNTER
"Endoscopy Scheduling Screen    Caller: patient    Have you had any respiratory illness or flu-like symptoms in the last 10 days?  No    Patient is ACTIVE on [employer].  Inform patient that all appointment instructions will be sent via [employer].    Review patient's insurance for any non participating payor.    Ordering/Referring Provider: DENIS MILLS    (If ordering provider performs procedure, schedule with ordering provider unless otherwise instructed. )    BMI: Estimated body mass index is 31.93 kg/m  as calculated from the following:    Height as of 7/8/25: 1.549 m (5' 1\").    Weight as of 7/8/25: 76.7 kg (169 lb).     Sedation Ordered  moderate sedation.   If patient BMI > 50 do not schedule in [facility].    If patient BMI > 45 do not schedule at [facility].    Are you taking methadone or Suboxone?  NO, No RN review required.    Have you been diagnosed and are being treated for severe PTSD or severe anxiety?  NO, No RN review required.    Are you taking any prescription medications for pain 3 or more times per week?   NO, No RN review required.    Do you have a history of malignant hyperthermia?  No    (Females) Are you currently pregnant?   No     Have you been diagnosed or told you have pulmonary hypertension?   No    Do you have an LVAD?  No    Have you been told you have moderate to severe sleep apnea?  No. MILD    Have you been told you have COPD, asthma, or any other lung disease?  No    Has your doctor ordered any cardiac tests like echo, angiogram, stress test, ablation, or EKG, that you have not completed yet?  No    Do you  have a history of any heart conditions?  No     Have you ever had or are you waiting for an organ transplant?  No. Continue scheduling, no site restrictions.    Have you had a stroke or transient ischemic attack (TIA aka \"mini stroke\") in the last 2 years?   No.    Have you been diagnosed with or been told you have cirrhosis of the liver?   No.    Are you currently on dialysis?   No    Do " "you need assistance transferring?   No    BMI: Estimated body mass index is 31.93 kg/m  as calculated from the following:    Height as of 7/8/25: 1.549 m (5' 1\").    Weight as of 7/8/25: 76.7 kg (169 lb).     Is patients BMI > 40 and scheduling location [facility]?  No    Do you take an injectable or oral medication for weight loss or diabetes (excluding insulin)?  Yes, hold time can be up to 7 days. Please consult with you prescribing provider to discuss endoscopy recommendations. (Please schedule at least 7 days out.)    Do you take the medication Naltrexone?  No    Do you take blood thinners?  No       Prep   Are you currently on dialysis or do you have chronic kidney disease?  No    Do you have a diagnosis of diabetes?  No    Do you have a diagnosis of cystic fibrosis (CF)?  No    On a regular basis do you go 3 -5 days between bowel movements?  No    BMI > 40?  No    Preferred Pharmacy:    [id number] [place] [address]  [id number]  [facility] [address]  Phone: [phone] Fax: [phone]        Final Scheduling Details     Procedure scheduled  Upper endoscopy (EGD)    Surgeon:  HANSEL     Date of procedure:  12/1/25     Pre-OP / PAC:   No - Not required for this site.    Location  [facility] - [facility] - Patient preference.    Sedation   Moderate Sedation - Per order.      Patient Reminders:   You will receive a call from a Nurse to review instructions and health history.  This assessment must be completed prior to your procedure.  Failure to complete the Nurse assessment may result in the procedure being cancelled.      On the day of your procedure, please designate an adult(s) who can drive you home stay with you for the next 24 hours. The medicines used in the exam will make you sleepy. You will not be able to drive.      You cannot take public transportation, ride share services, or non-medical taxi service without a responsible caregiver.  Medical transport services are allowed " with the requirement that a responsible caregiver will receive you at your destination.  We require that drivers and caregivers are confirmed prior to your procedure.

## (undated) DEVICE — SOL WATER IRRIG 1000ML BOTTLE 07139-09

## (undated) RX ORDER — FENTANYL CITRATE 50 UG/ML
INJECTION, SOLUTION INTRAMUSCULAR; INTRAVENOUS
Status: DISPENSED
Start: 2022-03-14

## (undated) RX ORDER — REGADENOSON 0.08 MG/ML
INJECTION, SOLUTION INTRAVENOUS
Status: DISPENSED
Start: 2022-04-18

## (undated) RX ORDER — FENTANYL CITRATE 50 UG/ML
INJECTION, SOLUTION INTRAMUSCULAR; INTRAVENOUS
Status: DISPENSED
Start: 2022-11-28

## (undated) RX ORDER — SIMETHICONE 40MG/0.6ML
SUSPENSION, DROPS(FINAL DOSAGE FORM)(ML) ORAL
Status: DISPENSED
Start: 2022-11-28